# Patient Record
Sex: MALE | Race: WHITE | NOT HISPANIC OR LATINO | URBAN - METROPOLITAN AREA
[De-identification: names, ages, dates, MRNs, and addresses within clinical notes are randomized per-mention and may not be internally consistent; named-entity substitution may affect disease eponyms.]

---

## 2022-12-26 ENCOUNTER — INPATIENT (INPATIENT)
Facility: HOSPITAL | Age: 77
LOS: 34 days | Discharge: HOME | DRG: 4 | End: 2023-01-30
Attending: INTERNAL MEDICINE | Admitting: HOSPITALIST
Payer: MEDICARE

## 2022-12-26 VITALS
SYSTOLIC BLOOD PRESSURE: 176 MMHG | HEART RATE: 70 BPM | TEMPERATURE: 98 F | DIASTOLIC BLOOD PRESSURE: 61 MMHG | WEIGHT: 170.2 LBS | HEIGHT: 62 IN | OXYGEN SATURATION: 95 % | RESPIRATION RATE: 12 BRPM

## 2022-12-26 DIAGNOSIS — I61.3 NONTRAUMATIC INTRACEREBRAL HEMORRHAGE IN BRAIN STEM: ICD-10-CM

## 2022-12-26 LAB — GLUCOSE BLDC GLUCOMTR-MCNC: 164 MG/DL — HIGH (ref 70–99)

## 2022-12-26 PROCEDURE — 99222 1ST HOSP IP/OBS MODERATE 55: CPT

## 2022-12-26 RX ORDER — SODIUM CHLORIDE 9 MG/ML
1000 INJECTION, SOLUTION INTRAVENOUS
Refills: 0 | Status: DISCONTINUED | OUTPATIENT
Start: 2022-12-26 | End: 2023-01-02

## 2022-12-26 RX ORDER — ACETAMINOPHEN 500 MG
650 TABLET ORAL EVERY 6 HOURS
Refills: 0 | Status: DISCONTINUED | OUTPATIENT
Start: 2022-12-26 | End: 2022-12-27

## 2022-12-26 RX ORDER — INSULIN LISPRO 100/ML
VIAL (ML) SUBCUTANEOUS AT BEDTIME
Refills: 0 | Status: DISCONTINUED | OUTPATIENT
Start: 2022-12-26 | End: 2022-12-28

## 2022-12-26 RX ORDER — HEPARIN SODIUM 5000 [USP'U]/ML
5000 INJECTION INTRAVENOUS; SUBCUTANEOUS EVERY 8 HOURS
Refills: 0 | Status: DISCONTINUED | OUTPATIENT
Start: 2022-12-26 | End: 2022-12-27

## 2022-12-26 RX ORDER — ONDANSETRON 8 MG/1
4 TABLET, FILM COATED ORAL EVERY 8 HOURS
Refills: 0 | Status: DISCONTINUED | OUTPATIENT
Start: 2022-12-26 | End: 2023-01-30

## 2022-12-26 RX ORDER — DEXTROSE 50 % IN WATER 50 %
15 SYRINGE (ML) INTRAVENOUS ONCE
Refills: 0 | Status: DISCONTINUED | OUTPATIENT
Start: 2022-12-26 | End: 2023-01-02

## 2022-12-26 RX ORDER — INSULIN LISPRO 100/ML
VIAL (ML) SUBCUTANEOUS
Refills: 0 | Status: DISCONTINUED | OUTPATIENT
Start: 2022-12-26 | End: 2022-12-28

## 2022-12-26 RX ORDER — DEXTROSE 50 % IN WATER 50 %
25 SYRINGE (ML) INTRAVENOUS ONCE
Refills: 0 | Status: DISCONTINUED | OUTPATIENT
Start: 2022-12-26 | End: 2023-01-02

## 2022-12-26 RX ORDER — GLUCAGON INJECTION, SOLUTION 0.5 MG/.1ML
1 INJECTION, SOLUTION SUBCUTANEOUS ONCE
Refills: 0 | Status: DISCONTINUED | OUTPATIENT
Start: 2022-12-26 | End: 2023-01-02

## 2022-12-26 RX ORDER — DEXTROSE 50 % IN WATER 50 %
12.5 SYRINGE (ML) INTRAVENOUS ONCE
Refills: 0 | Status: DISCONTINUED | OUTPATIENT
Start: 2022-12-26 | End: 2023-01-02

## 2022-12-26 RX ORDER — LANOLIN ALCOHOL/MO/W.PET/CERES
3 CREAM (GRAM) TOPICAL AT BEDTIME
Refills: 0 | Status: DISCONTINUED | OUTPATIENT
Start: 2022-12-26 | End: 2022-12-27

## 2022-12-26 NOTE — H&P ADULT - NSHPPHYSICALEXAM_GEN_ALL_CORE
Vital Signs Last 24 Hrs  T(C): 36.7 (26 Dec 2022 21:23), Max: 36.7 (26 Dec 2022 21:23)  T(F): 98 (26 Dec 2022 21:23), Max: 98 (26 Dec 2022 21:23)  HR: 70 (26 Dec 2022 21:23) (70 - 70)  BP: 176/61 (26 Dec 2022 21:23) (176/61 - 176/61)  BP(mean): --  RR: 12 (26 Dec 2022 21:23) (12 - 12)  SpO2: 95% (26 Dec 2022 21:23) (95% - 95%)    Parameters below as of 26 Dec 2022 21:23  Patient On (Oxygen Delivery Method): room air

## 2022-12-26 NOTE — H&P ADULT - HISTORY OF PRESENT ILLNESS
78 y/o male with PMH of DM-2, HTN, CAD was transferred from [place] for stroke. Patient reported right sided weakness and slurred speech along with difficulty swallowing that started yesterday. Was seen at [place] today, code stroke initiated out of window for tPA. CT head: no acute finding. MRI: acute infarct with left debby. MRA head/neck: Left vertebral artery segmental stenoses and T1 hyperintensity suggesting foci of dissection and/or thrombus. As per resident, neurology from [facility] was consulted and accepted patient for further evaluation. Patient said he is upset about his condition, noted discomfort in his head otherwise no chest pain, shortness of breath, palpitation, fever, chills, nausea, vomiting, abdominal pain, change in bowel/urinary habit.

## 2022-12-26 NOTE — H&P ADULT - ASSESSMENT
76 y/o male with PMH of DM-2, HTN, CAD was transferred from [place] for stroke. Patient reported right sided weakness and slurred speech along with difficulty swallowing that started yesterday. Was seen at [place] today, code stroke initiated out of window for tPA. CT head: no acute finding. MRI: acute infarct with left debby. MRA head/neck: Left vertebral artery segmental stenoses and T1 hyperintensity suggesting foci of dissection and/or thrombus. As per resident, neurology from [facility] was consulted and accepted patient for further evaluation.     Acute left debby stroke   Admit to stroke unit   CT and MRI as noted above   Echo ordered   HbA1C and Lipid panel with AM lab   Neuro checks q2 hours   Neurology consulted   PT/OT/Swallow eval in AM   Aspiration precaution     HTN/CAD   On Aspirin 81mg   Plavix 75mg   On hold for now, failed bedside dysphagia screening   Monitor BP     DM-2  HbA1C with AM lab  Insulin sliding scale     CKD-3  Cr: 1.32 (baseline 1.2) (Terrance GONZALES)   Monitor renal function     Supportive   DVT prophylaxis: Heparin sc   Diet: NPO     Plan of care discussed with patient and nurse

## 2022-12-26 NOTE — PATIENT PROFILE ADULT - FALL HARM RISK - RISK INTERVENTIONS
Assistance OOB with selected safe patient handling equipment/Assistance with ambulation/Communicate Fall Risk and Risk Factors to all staff, patient, and family/Discuss with provider need for PT consult/Monitor gait and stability/Reinforce activity limits and safety measures with patient and family/Visual Cue: Yellow wristband/Bed in lowest position, wheels locked, appropriate side rails in place/Call bell, personal items and telephone in reach/Instruct patient to call for assistance before getting out of bed or chair/Non-slip footwear when patient is out of bed/[place] to call system/Physically safe environment - no spills, clutter or unnecessary equipment/Purposeful Proactive Rounding/Room/bathroom lighting operational, light cord in reach

## 2022-12-27 LAB
A1C WITH ESTIMATED AVERAGE GLUCOSE RESULT: 8 % — HIGH (ref 4–5.6)
ALBUMIN SERPL ELPH-MCNC: 3.9 G/DL — SIGNIFICANT CHANGE UP (ref 3.3–5.2)
ALBUMIN SERPL ELPH-MCNC: 4.2 G/DL — SIGNIFICANT CHANGE UP (ref 3.3–5.2)
ALP SERPL-CCNC: 43 U/L — SIGNIFICANT CHANGE UP (ref 40–120)
ALP SERPL-CCNC: 45 U/L — SIGNIFICANT CHANGE UP (ref 40–120)
ALT FLD-CCNC: 13 U/L — SIGNIFICANT CHANGE UP
ALT FLD-CCNC: 14 U/L — SIGNIFICANT CHANGE UP
ANION GAP SERPL CALC-SCNC: 16 MMOL/L — SIGNIFICANT CHANGE UP (ref 5–17)
ANION GAP SERPL CALC-SCNC: 17 MMOL/L — SIGNIFICANT CHANGE UP (ref 5–17)
APTT BLD: 24.8 SEC — LOW (ref 27.5–35.5)
AST SERPL-CCNC: 13 U/L — SIGNIFICANT CHANGE UP
AST SERPL-CCNC: 20 U/L — SIGNIFICANT CHANGE UP
BASOPHILS # BLD AUTO: 0.02 K/UL — SIGNIFICANT CHANGE UP (ref 0–0.2)
BASOPHILS NFR BLD AUTO: 0.2 % — SIGNIFICANT CHANGE UP (ref 0–2)
BILIRUB SERPL-MCNC: 0.5 MG/DL — SIGNIFICANT CHANGE UP (ref 0.4–2)
BILIRUB SERPL-MCNC: 0.5 MG/DL — SIGNIFICANT CHANGE UP (ref 0.4–2)
BUN SERPL-MCNC: 35.6 MG/DL — HIGH (ref 8–20)
BUN SERPL-MCNC: 38.7 MG/DL — HIGH (ref 8–20)
CALCIUM SERPL-MCNC: 10 MG/DL — SIGNIFICANT CHANGE UP (ref 8.4–10.5)
CALCIUM SERPL-MCNC: 9.6 MG/DL — SIGNIFICANT CHANGE UP (ref 8.4–10.5)
CHLORIDE SERPL-SCNC: 101 MMOL/L — SIGNIFICANT CHANGE UP (ref 96–108)
CHLORIDE SERPL-SCNC: 101 MMOL/L — SIGNIFICANT CHANGE UP (ref 96–108)
CHOLEST SERPL-MCNC: 217 MG/DL — HIGH
CK SERPL-CCNC: 54 U/L — SIGNIFICANT CHANGE UP (ref 30–200)
CO2 SERPL-SCNC: 21 MMOL/L — LOW (ref 22–29)
CO2 SERPL-SCNC: 22 MMOL/L — SIGNIFICANT CHANGE UP (ref 22–29)
CREAT SERPL-MCNC: 1.17 MG/DL — SIGNIFICANT CHANGE UP (ref 0.5–1.3)
CREAT SERPL-MCNC: 1.23 MG/DL — SIGNIFICANT CHANGE UP (ref 0.5–1.3)
EGFR: 60 ML/MIN/1.73M2 — SIGNIFICANT CHANGE UP
EGFR: 64 ML/MIN/1.73M2 — SIGNIFICANT CHANGE UP
EOSINOPHIL # BLD AUTO: 0.06 K/UL — SIGNIFICANT CHANGE UP (ref 0–0.5)
EOSINOPHIL NFR BLD AUTO: 0.6 % — SIGNIFICANT CHANGE UP (ref 0–6)
ESTIMATED AVERAGE GLUCOSE: 183 MG/DL — HIGH (ref 68–114)
GLUCOSE BLDC GLUCOMTR-MCNC: 124 MG/DL — HIGH (ref 70–99)
GLUCOSE BLDC GLUCOMTR-MCNC: 140 MG/DL — HIGH (ref 70–99)
GLUCOSE BLDC GLUCOMTR-MCNC: 153 MG/DL — HIGH (ref 70–99)
GLUCOSE BLDC GLUCOMTR-MCNC: 160 MG/DL — HIGH (ref 70–99)
GLUCOSE BLDC GLUCOMTR-MCNC: 166 MG/DL — HIGH (ref 70–99)
GLUCOSE BLDC GLUCOMTR-MCNC: 221 MG/DL — HIGH (ref 70–99)
GLUCOSE SERPL-MCNC: 161 MG/DL — HIGH (ref 70–99)
GLUCOSE SERPL-MCNC: 220 MG/DL — HIGH (ref 70–99)
HCT VFR BLD CALC: 41.7 % — SIGNIFICANT CHANGE UP (ref 39–50)
HCT VFR BLD CALC: 43.7 % — SIGNIFICANT CHANGE UP (ref 39–50)
HCV AB S/CO SERPL IA: 0.16 S/CO — SIGNIFICANT CHANGE UP (ref 0–0.99)
HCV AB SERPL-IMP: SIGNIFICANT CHANGE UP
HDLC SERPL-MCNC: 42 MG/DL — SIGNIFICANT CHANGE UP
HGB BLD-MCNC: 14 G/DL — SIGNIFICANT CHANGE UP (ref 13–17)
HGB BLD-MCNC: 14.4 G/DL — SIGNIFICANT CHANGE UP (ref 13–17)
IMM GRANULOCYTES NFR BLD AUTO: 0.4 % — SIGNIFICANT CHANGE UP (ref 0–0.9)
INR BLD: 1.05 RATIO — SIGNIFICANT CHANGE UP (ref 0.88–1.16)
LIPID PNL WITH DIRECT LDL SERPL: 143 MG/DL — HIGH
LYMPHOCYTES # BLD AUTO: 1.98 K/UL — SIGNIFICANT CHANGE UP (ref 1–3.3)
LYMPHOCYTES # BLD AUTO: 18.3 % — SIGNIFICANT CHANGE UP (ref 13–44)
MCHC RBC-ENTMCNC: 29.1 PG — SIGNIFICANT CHANGE UP (ref 27–34)
MCHC RBC-ENTMCNC: 29.6 PG — SIGNIFICANT CHANGE UP (ref 27–34)
MCHC RBC-ENTMCNC: 33 GM/DL — SIGNIFICANT CHANGE UP (ref 32–36)
MCHC RBC-ENTMCNC: 33.6 GM/DL — SIGNIFICANT CHANGE UP (ref 32–36)
MCV RBC AUTO: 88.2 FL — SIGNIFICANT CHANGE UP (ref 80–100)
MCV RBC AUTO: 88.5 FL — SIGNIFICANT CHANGE UP (ref 80–100)
MONOCYTES # BLD AUTO: 1.15 K/UL — HIGH (ref 0–0.9)
MONOCYTES NFR BLD AUTO: 10.6 % — SIGNIFICANT CHANGE UP (ref 2–14)
NEUTROPHILS # BLD AUTO: 7.55 K/UL — HIGH (ref 1.8–7.4)
NEUTROPHILS NFR BLD AUTO: 69.9 % — SIGNIFICANT CHANGE UP (ref 43–77)
NON HDL CHOLESTEROL: 175 MG/DL — HIGH
PLATELET # BLD AUTO: 261 K/UL — SIGNIFICANT CHANGE UP (ref 150–400)
PLATELET # BLD AUTO: 277 K/UL — SIGNIFICANT CHANGE UP (ref 150–400)
POTASSIUM SERPL-MCNC: 4.4 MMOL/L — SIGNIFICANT CHANGE UP (ref 3.5–5.3)
POTASSIUM SERPL-MCNC: 4.8 MMOL/L — SIGNIFICANT CHANGE UP (ref 3.5–5.3)
POTASSIUM SERPL-SCNC: 4.4 MMOL/L — SIGNIFICANT CHANGE UP (ref 3.5–5.3)
POTASSIUM SERPL-SCNC: 4.8 MMOL/L — SIGNIFICANT CHANGE UP (ref 3.5–5.3)
PROT SERPL-MCNC: 7.1 G/DL — SIGNIFICANT CHANGE UP (ref 6.6–8.7)
PROT SERPL-MCNC: 7.6 G/DL — SIGNIFICANT CHANGE UP (ref 6.6–8.7)
PROTHROM AB SERPL-ACNC: 12.2 SEC — SIGNIFICANT CHANGE UP (ref 10.5–13.4)
RBC # BLD: 4.73 M/UL — SIGNIFICANT CHANGE UP (ref 4.2–5.8)
RBC # BLD: 4.94 M/UL — SIGNIFICANT CHANGE UP (ref 4.2–5.8)
RBC # FLD: 13 % — SIGNIFICANT CHANGE UP (ref 10.3–14.5)
RBC # FLD: 13.2 % — SIGNIFICANT CHANGE UP (ref 10.3–14.5)
SODIUM SERPL-SCNC: 139 MMOL/L — SIGNIFICANT CHANGE UP (ref 135–145)
SODIUM SERPL-SCNC: 139 MMOL/L — SIGNIFICANT CHANGE UP (ref 135–145)
TRIGL SERPL-MCNC: 162 MG/DL — HIGH
TROPONIN T SERPL-MCNC: <0.01 NG/ML — SIGNIFICANT CHANGE UP (ref 0–0.06)
WBC # BLD: 10.8 K/UL — HIGH (ref 3.8–10.5)
WBC # BLD: 9.73 K/UL — SIGNIFICANT CHANGE UP (ref 3.8–10.5)
WBC # FLD AUTO: 10.8 K/UL — HIGH (ref 3.8–10.5)
WBC # FLD AUTO: 9.73 K/UL — SIGNIFICANT CHANGE UP (ref 3.8–10.5)

## 2022-12-27 PROCEDURE — 93010 ELECTROCARDIOGRAM REPORT: CPT

## 2022-12-27 PROCEDURE — 71045 X-RAY EXAM CHEST 1 VIEW: CPT | Mod: 26

## 2022-12-27 PROCEDURE — 99222 1ST HOSP IP/OBS MODERATE 55: CPT

## 2022-12-27 PROCEDURE — 99291 CRITICAL CARE FIRST HOUR: CPT

## 2022-12-27 PROCEDURE — 99233 SBSQ HOSP IP/OBS HIGH 50: CPT

## 2022-12-27 PROCEDURE — 70450 CT HEAD/BRAIN W/O DYE: CPT | Mod: 26

## 2022-12-27 RX ORDER — HEPARIN SODIUM 5000 [USP'U]/ML
950 INJECTION INTRAVENOUS; SUBCUTANEOUS
Qty: 25000 | Refills: 0 | Status: DISCONTINUED | OUTPATIENT
Start: 2022-12-27 | End: 2023-01-02

## 2022-12-27 RX ORDER — ASPIRIN/CALCIUM CARB/MAGNESIUM 324 MG
300 TABLET ORAL ONCE
Refills: 0 | Status: COMPLETED | OUTPATIENT
Start: 2022-12-27 | End: 2022-12-27

## 2022-12-27 RX ORDER — ASPIRIN/CALCIUM CARB/MAGNESIUM 324 MG
325 TABLET ORAL DAILY
Refills: 0 | Status: DISCONTINUED | OUTPATIENT
Start: 2022-12-27 | End: 2022-12-27

## 2022-12-27 RX ORDER — PREDNISOLONE SODIUM PHOSPHATE 1 %
1 DROPS OPHTHALMIC (EYE) DAILY
Refills: 0 | Status: DISCONTINUED | OUTPATIENT
Start: 2022-12-28 | End: 2023-01-30

## 2022-12-27 RX ORDER — CLOPIDOGREL BISULFATE 75 MG/1
75 TABLET, FILM COATED ORAL DAILY
Refills: 0 | Status: DISCONTINUED | OUTPATIENT
Start: 2022-12-27 | End: 2022-12-28

## 2022-12-27 RX ORDER — HEPARIN SODIUM 5000 [USP'U]/ML
800 INJECTION INTRAVENOUS; SUBCUTANEOUS
Qty: 25000 | Refills: 0 | Status: DISCONTINUED | OUTPATIENT
Start: 2022-12-27 | End: 2022-12-27

## 2022-12-27 RX ORDER — SODIUM CHLORIDE 9 MG/ML
500 INJECTION INTRAMUSCULAR; INTRAVENOUS; SUBCUTANEOUS ONCE
Refills: 0 | Status: COMPLETED | OUTPATIENT
Start: 2022-12-27 | End: 2022-12-27

## 2022-12-27 RX ORDER — LOTEPREDNOL ETABONATE 2 MG/ML
1 SUSPENSION/ DROPS OPHTHALMIC DAILY
Refills: 0 | Status: DISCONTINUED | OUTPATIENT
Start: 2022-12-28 | End: 2023-01-30

## 2022-12-27 RX ORDER — SODIUM CHLORIDE 9 MG/ML
1000 INJECTION INTRAMUSCULAR; INTRAVENOUS; SUBCUTANEOUS
Refills: 0 | Status: DISCONTINUED | OUTPATIENT
Start: 2022-12-27 | End: 2022-12-28

## 2022-12-27 RX ORDER — LANOLIN ALCOHOL/MO/W.PET/CERES
3 CREAM (GRAM) TOPICAL AT BEDTIME
Refills: 0 | Status: DISCONTINUED | OUTPATIENT
Start: 2022-12-27 | End: 2023-01-03

## 2022-12-27 RX ORDER — CLOPIDOGREL BISULFATE 75 MG/1
75 TABLET, FILM COATED ORAL DAILY
Refills: 0 | Status: DISCONTINUED | OUTPATIENT
Start: 2022-12-27 | End: 2022-12-27

## 2022-12-27 RX ORDER — ACETAMINOPHEN 500 MG
650 TABLET ORAL EVERY 6 HOURS
Refills: 0 | Status: DISCONTINUED | OUTPATIENT
Start: 2022-12-27 | End: 2023-01-03

## 2022-12-27 RX ORDER — ATORVASTATIN CALCIUM 80 MG/1
80 TABLET, FILM COATED ORAL AT BEDTIME
Refills: 0 | Status: DISCONTINUED | OUTPATIENT
Start: 2022-12-27 | End: 2022-12-27

## 2022-12-27 RX ORDER — ATORVASTATIN CALCIUM 80 MG/1
80 TABLET, FILM COATED ORAL AT BEDTIME
Refills: 0 | Status: DISCONTINUED | OUTPATIENT
Start: 2022-12-27 | End: 2023-01-03

## 2022-12-27 RX ORDER — ASPIRIN/CALCIUM CARB/MAGNESIUM 324 MG
325 TABLET ORAL DAILY
Refills: 0 | Status: DISCONTINUED | OUTPATIENT
Start: 2022-12-27 | End: 2022-12-30

## 2022-12-27 RX ORDER — ASPIRIN/CALCIUM CARB/MAGNESIUM 324 MG
1 TABLET ORAL
Qty: 0 | Refills: 0 | DISCHARGE

## 2022-12-27 RX ADMIN — HEPARIN SODIUM 9.5 UNIT(S)/HR: 5000 INJECTION INTRAVENOUS; SUBCUTANEOUS at 18:30

## 2022-12-27 RX ADMIN — Medication 300 MILLIGRAM(S): at 10:48

## 2022-12-27 RX ADMIN — SODIUM CHLORIDE 500 MILLILITER(S): 9 INJECTION INTRAMUSCULAR; INTRAVENOUS; SUBCUTANEOUS at 15:14

## 2022-12-27 RX ADMIN — CLOPIDOGREL BISULFATE 75 MILLIGRAM(S): 75 TABLET, FILM COATED ORAL at 22:53

## 2022-12-27 RX ADMIN — HEPARIN SODIUM 5000 UNIT(S): 5000 INJECTION INTRAVENOUS; SUBCUTANEOUS at 13:56

## 2022-12-27 RX ADMIN — Medication 1: at 15:49

## 2022-12-27 RX ADMIN — HEPARIN SODIUM 5000 UNIT(S): 5000 INJECTION INTRAVENOUS; SUBCUTANEOUS at 05:36

## 2022-12-27 RX ADMIN — Medication 325 MILLIGRAM(S): at 22:53

## 2022-12-27 RX ADMIN — HEPARIN SODIUM 9.5 UNIT(S)/HR: 5000 INJECTION INTRAVENOUS; SUBCUTANEOUS at 19:22

## 2022-12-27 RX ADMIN — Medication 1: at 12:00

## 2022-12-27 RX ADMIN — SODIUM CHLORIDE 100 MILLILITER(S): 9 INJECTION INTRAMUSCULAR; INTRAVENOUS; SUBCUTANEOUS at 22:52

## 2022-12-27 RX ADMIN — CLOPIDOGREL BISULFATE 75 MILLIGRAM(S): 75 TABLET, FILM COATED ORAL at 13:56

## 2022-12-27 RX ADMIN — ATORVASTATIN CALCIUM 80 MILLIGRAM(S): 80 TABLET, FILM COATED ORAL at 22:53

## 2022-12-27 NOTE — CONSULT NOTE ADULT - SUBJECTIVE AND OBJECTIVE BOX
Patient is a 77 year old Male who presents with a chief complaint of Acute stroke (27 Dec 2022 13:26)    HISTORY OF PRESENT ILLNESS  77 year old Male with PMHX DMII, HTN, CAD was trf from [place] c/o R sided weakness, slurred speech, difficulty swallowing that started 12/25. Code stroke at [place] 12/26, CTH negative, on MRI found to have L debby CVA; out of tPA window. MRA revealing L vertebral artery segmental stenoses and T1 hyperintensity suggesting foci of dissection and/or thrombus. Trf to [facility] admitted to Hospitalist service. 12/27 afternoon, noted to have worsening R sided weakness, worsening dysarthria- code stroke called.   NSICU consulted from Neurology team for higher level of care. Patient seen and examined on floor, denies HA. C/o of swallowing difficulties. Wife bedside.     PAST MEDICAL & SURGICAL HISTORY:  HTN (hypertension)  CAD (coronary artery disease)  DM (diabetes mellitus)    FAMILY HISTORY:  No pertinent family history in first degree relatives    SOCIAL HISTORY:  Per chart, denies cigarettes, ETOH, illicit drug use    Allergies  iodine (Anaphylaxis (Mod to Severe))    REVIEW OF SYSTEMS  Negative except as noted in HPI    HOME MEDICATIONS:  Aspirin Low Dose 81 mg oral tablet, chewable: 1 tab(s) orally once a day (27 Dec 2022 13:15)  atenolol 50 mg oral tablet: 1 tab(s) orally once a day (27 Dec 2022 13:15)  atorvastatin 40 mg oral tablet: 1 tab(s) orally once a day (27 Dec 2022 13:15)  Jardiance 25 mg oral tablet: 1 tab(s) orally once a day (in the morning) (27 Dec 2022 13:15)  lisinopril 10 mg oral tablet: 1 tab(s) orally once a day (27 Dec 2022 13:15)  metFORMIN 1000 mg oral tablet: 1 tab(s) orally 2 times a day (27 Dec 2022 13:15)  Plavix 75 mg oral tablet: 1 tab(s) orally once a day (27 Dec 2022 13:15)      MEDICATIONS:  Antibiotics:  Neuro:  acetaminophen     Tablet .. 650 milliGRAM(s) Oral every 6 hours PRN  melatonin 3 milliGRAM(s) Oral at bedtime PRN  ondansetron Injectable 4 milliGRAM(s) IV Push every 8 hours PRN  Anticoagulation:  aspirin enteric coated 325 milliGRAM(s) Oral daily  clopidogrel Tablet 75 milliGRAM(s) Oral daily  heparin  Infusion. 800 Unit(s)/Hr IV Continuous <Continuous>  OTHER:  aluminum hydroxide/magnesium hydroxide/simethicone Suspension 30 milliLiter(s) Oral every 4 hours PRN  atorvastatin 80 milliGRAM(s) Oral at bedtime  dextrose 50% Injectable 25 Gram(s) IV Push once  dextrose 50% Injectable 12.5 Gram(s) IV Push once  dextrose 50% Injectable 25 Gram(s) IV Push once  dextrose Oral Gel 15 Gram(s) Oral once PRN  glucagon  Injectable 1 milliGRAM(s) IntraMuscular once  insulin lispro (ADMELOG) corrective regimen sliding scale   SubCutaneous three times a day before meals  insulin lispro (ADMELOG) corrective regimen sliding scale   SubCutaneous at bedtime  IVF:  dextrose 5%. 1000 milliLiter(s) IV Continuous <Continuous>  dextrose 5%. 1000 milliLiter(s) IV Continuous <Continuous>  sodium chloride 0.9%. 1000 milliLiter(s) IV Continuous <Continuous>    Vital Signs Last 24 Hrs  T(C): 36.7 (27 Dec 2022 15:21), Max: 37.3 (27 Dec 2022 12:43)  T(F): 98 (27 Dec 2022 15:21), Max: 99.1 (27 Dec 2022 12:43)  HR: 75 (27 Dec 2022 15:21) (69 - 82)  BP: 150/69 (27 Dec 2022 15:21) (123/49 - 179/59)  BP(mean): 82 (27 Dec 2022 08:00) (82 - 82)  RR: 15 (27 Dec 2022 15:21) (12 - 16)  SpO2: 95% (27 Dec 2022 15:21) (94% - 97%)  Parameters below as of 27 Dec 2022 15:21  Patient On (Oxygen Delivery Method): room air      PHYSICAL EXAM:  GENERAL: NAD, well-groomed, well-developed  HEAD: Atraumatic, normocephalic  MENTAL STATUS: Awake; Opens eyes spontaneously; conversant with slurred speech, following simple commands w encouragement   CRANIAL NERVES: Visual acuity normal for age, R surgical oval pupil. L pupil 3mm reactive. EOMI without nystagmus. Facial sensation intact V1-3 distribution b/l. Right facial. Tongue midline. Hearing grossly intact. Dysarthric, slurred speech. Head turning intact. Shoulder shrug intact L>>>R  MOTOR: strength RUE 0/5 to noxious. LUE 5/5, no drift. RLE WD to noxious. LLE 3/5  SENSATION: grossly intact to light touch all extremities  CHEST/LUNG: Pursed lip breathing  HEART: +S1/+S2  ABDOMEN: Soft, nontender, nondistended; bowel sounds present all four quadrants  SKIN: Warm, dry    LABS:             14.4   10.80 )-----------( 261      ( 27 Dec 2022 15:05 )             43.7     12-27    139  |  101  |  35.6<H>  ----------------------------<  161<H>  4.4   |  21.0<L>  |  1.17    Ca    9.6      27 Dec 2022 06:20    TPro  7.1  /  Alb  3.9  /  TBili  0.5  /  DBili  x   /  AST  13  /  ALT  13  /  AlkPhos  43  12-27      CULTURES:        RADIOLOGY & ADDITIONAL STUDIES:  CT Head No Cont (12.27.22 @ 14:28)   IMPRESSION:  No evidence of an acute intracranial hemorrhage, midline shift, or   hydrocephalus.  Patient's known acute left pontine infarct better visualized on recent   MRI.  Other chronic small infarcts and white matter ischemic changes.    12/26 MRA Angio w/ contrast @ [facility] 2PM:   IMPRESSION:  1. RIGHT CAROTID NECK CIRCULATION: Intact.  2. LEFT CAROTID NECK CIRCULATION: Intact.  3. VERTEBRAL NECK CIRCULATION: Right vertebral artery is intact. Left  vertebral artery demonstrates segmental stenoses and T1 hyperintensity  suggesting foci of dissection and/or thrombus. Flow appears improved  compared to time-of-flight angiography earlier same date  4. ANTERIOR INTRACRANIAL CIRCULATION: Intracranial atherosclerosis  cavernous carotid segments internal carotid arteries, at least mild, and  right MCA M1 segment, moderate.  5. POSTERIOR INTRACRANIAL CIRCULATION: Right vertebral artery focal  stenosis just proximal to the basilar formation, moderate. Attenuated  segmentally nonvisualized left vertebral artery. Flow appears improved  compared to earlier this same date. Intact basilar artery and posterior  cerebral arteries.    12/26 MRA Brain @ [facility]  1. Nonopacification of left proximal to mid intracranial V4 segment, as  well as the left extracranial V3 segment. Distal reconstitution at left  vertebrobasilar junction. 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 However, occlusive  intraluminal thrombus or acute dissection cannot excluded (and are  suboptimally evaluated for due to lack of axial T1 fat saturated sequence).  Correlate clinically and consider follow-up imaging with CTA or contrast  enhanced MRA as indicated.  2. Mild multifocal stenosis within the basilar artery and posterior  cerebral arteries, likely due to atherosclerosis.  3. Tiny 2 mm outpouching at right M1 segment, may reflect possible tiny  saccular aneurysm or mild luminal irregularity to atherosclerotic stenosis.  Consider CTA evaluation for further characterization as clinically indicated

## 2022-12-27 NOTE — SWALLOW BEDSIDE ASSESSMENT ADULT - MODE OF PRESENTATION
spoon/self fed/fed by clinician spoon/fed by clinician cup/spoon/fed by clinician cup/self fed/fed by clinician

## 2022-12-27 NOTE — PHYSICAL THERAPY INITIAL EVALUATION ADULT - PHYSICAL ASSIST/NONPHYSICAL ASSIST: STAND/SIT, REHAB EVAL
required increased assistance  to help safely lower to a sitting position + verbal cues for proper hand placement./2 person assist

## 2022-12-27 NOTE — SPEECH LANGUAGE PATHOLOGY EVALUATION - SLP DIAGNOSIS
Pt's receptive/expressive language skills informally assessed to be WFL. Pt presents w/ moderate dysarthria w/ impact on speech intelligibility, pt benefits from cues to reduce rate & over articulate. Higher level cognitive linguistic skills were not assessed 2' pt's emotional lability and increased frustration w/ speech intelligibility.

## 2022-12-27 NOTE — SWALLOW BEDSIDE ASSESSMENT ADULT - SLP GENERAL OBSERVATIONS
Pt recd a&oX3, cooperative, emotional lability noted t/o evaluation, tolerating RA, NAD, 0/10 pain scale pre/post, wife @ the bedside

## 2022-12-27 NOTE — CONSULT NOTE ADULT - ASSESSMENT
IMPRESSION:  -Left pontine infarct  - Left VA dissection    Mechanism- Artery to artery embolism vs Small vessel disease.  Risk Factors. HTN, DM HLD    ASSESSMENT/ PLAN:     - Admit to inpatient hospitalist service.  - Neuro checks and vital signs Q 2 hours.  - SBP goal permissive upto 200/120 for 48 hours.  -  mg PO or 300 WI QD.  - Add Plavix 75 QD after enteral access is established.  - Lipitor for LDL goal of < 70 .  - Telemetry monitoring.  - CT -images and reports were reviewed.   - MRI and MRA Brain -images and reports were reviewed.   - TTE with bubble study.  - PT OT SLP evaluation.  - SCD/ SQ Heparin for DVT prophylaxis.

## 2022-12-27 NOTE — PHYSICAL THERAPY INITIAL EVALUATION ADULT - PHYSICAL ASSIST/NONPHYSICAL ASSIST: SUPINE/SIT, REHAB EVAL
required increased assistance to get bilateral LE's out of bed/assume upright sitting at EOB position + verbal cues for proper hand placement. pt required MOD A x 1 to help maintain upright sitting posture/2 person assist

## 2022-12-27 NOTE — SPEECH LANGUAGE PATHOLOGY EVALUATION - SLP PERTINENT HISTORY OF CURRENT PROBLEM
As per H&P: "76 y/o male with PMH of DM-2, HTN, CAD was transferred from [place] for stroke. Patient reported right sided weakness and slurred speech along with difficulty swallowing that started yesterday. Was seen at [place] today, code stroke initiated out of window for tPA. CT head: no acute finding. MRI: acute infarct with left debby. MRA head/neck: Left vertebral artery segmental stenoses and T1 hyperintensity suggesting foci of dissection and/or thrombus. As per resident, neurology from [facility] was consulted and accepted patient for further evaluation. Patient said he is upset about his condition, noted discomfort in his head otherwise no chest pain, shortness of breath, palpitation, fever, chills, nausea, vomiting, abdominal pain, change in bowel/urinary habit."

## 2022-12-27 NOTE — PHYSICAL THERAPY INITIAL EVALUATION ADULT - PHYSICAL ASSIST/NONPHYSICAL ASSIST: SIT/STAND, REHAB EVAL
required increased assistance  to help rise to a full standing position +verbal cues for proper hand placement. pt tolerated multiple attempts. pt demonstrated Left LE buckling/2 person assist

## 2022-12-27 NOTE — OCCUPATIONAL THERAPY INITIAL EVALUATION ADULT - PHYSICAL ASSIST/NONPHYSICAL ASSIST: BED TO CHAIR, REHAB EVAL
with use of fan steady. Additional assist to place and maintain RUE in position/verbal cues/2 person assist

## 2022-12-27 NOTE — SWALLOW BEDSIDE ASSESSMENT ADULT - SWALLOW EVAL: DIAGNOSIS
Pt presents w/ moderate oral dysphagia w/ soft/bite sized trials marked by increased mastication time and moderate diffuse oral residue. Suspect pharyngeal dysphagia w/ thin and mildly thick liquids marked by immediate strong cough following intake.

## 2022-12-27 NOTE — PROGRESS NOTE ADULT - ASSESSMENT
76 y/o male with PMH of DM-2, HTN, CAD was transferred from [place] for stroke. Patient reported right sided weakness and slurred speech along with difficulty swallowing that started yesterday. Was seen at [place] today, code stroke initiated out of window for tPA. CT head: no acute finding. MRI: acute infarct with left debby. MRA head/neck: Left vertebral artery segmental stenoses and T1 hyperintensity suggesting foci of dissection and/or thrombus. As per resident, neurology from [facility] was consulted and accepted patient for further evaluation.     Acute left debby stroke   CT and MRI as noted above   Echo ordered   HbA1C at 8    Neuro checks q2 hours   Neurology consulted  Permissive HTN < x 48h   PT/OT/Swallow eval  Aspiration precaution   Was on DAPT prior to presentation   Increase ASA to 325 mg and Cont Plavix 75mg    HTN/CAD   On Aspirin 81mg / Plavix 75mg at home   Cont DAPT  Hold AntiHTN meds for first 48 hours    DM-2  A1C 8  Insulin sliding scale     CKD-3  Cr: 1.32 (baseline 1.2) (Terrance GONZALES)   Monitor renal function     Supportive   DVT prophylaxis: Heparin sc       Dispo: Remains acute  Patient and wife updated

## 2022-12-27 NOTE — PROGRESS NOTE ADULT - SUBJECTIVE AND OBJECTIVE BOX
[facility] Division of Hospital Medicine    Chief Complaint:    Acute CVA    SUBJECTIVE / OVERNIGHT EVENTS:  Admitted overnight  SLP evaluated and recommended oral diet   Patient with minimal change in symptoms   Patient denies chest pain, SOB, abd pain, N/V, fever, chills, dysuria or any other complaints. All remainder ROS negative.     MEDICATIONS  (STANDING):  aspirin enteric coated 325 milliGRAM(s) Oral daily  atorvastatin 80 milliGRAM(s) Oral at bedtime  clopidogrel Tablet 75 milliGRAM(s) Oral daily  dextrose 5%. 1000 milliLiter(s) (100 mL/Hr) IV Continuous <Continuous>  dextrose 5%. 1000 milliLiter(s) (50 mL/Hr) IV Continuous <Continuous>  dextrose 50% Injectable 25 Gram(s) IV Push once  dextrose 50% Injectable 12.5 Gram(s) IV Push once  dextrose 50% Injectable 25 Gram(s) IV Push once  glucagon  Injectable 1 milliGRAM(s) IntraMuscular once  heparin   Injectable 5000 Unit(s) SubCutaneous every 8 hours  insulin lispro (ADMELOG) corrective regimen sliding scale   SubCutaneous three times a day before meals  insulin lispro (ADMELOG) corrective regimen sliding scale   SubCutaneous at bedtime    MEDICATIONS  (PRN):  acetaminophen     Tablet .. 650 milliGRAM(s) Oral every 6 hours PRN Temp greater or equal to 38C (100.4F), Mild Pain (1 - 3)  aluminum hydroxide/magnesium hydroxide/simethicone Suspension 30 milliLiter(s) Oral every 4 hours PRN Dyspepsia  dextrose Oral Gel 15 Gram(s) Oral once PRN Blood Glucose LESS THAN 70 milliGRAM(s)/deciliter  melatonin 3 milliGRAM(s) Oral at bedtime PRN Insomnia  ondansetron Injectable 4 milliGRAM(s) IV Push every 8 hours PRN Nausea and/or Vomiting        I&O's Summary    26 Dec 2022 07:01  -  27 Dec 2022 07:00  --------------------------------------------------------  IN: 0 mL / OUT: 500 mL / NET: -500 mL        PHYSICAL EXAM:  Vital Signs Last 24 Hrs  T(C): 37.3 (27 Dec 2022 12:43), Max: 37.3 (27 Dec 2022 12:43)  T(F): 99.1 (27 Dec 2022 12:43), Max: 99.1 (27 Dec 2022 12:43)  HR: 69 (27 Dec 2022 12:02) (69 - 82)  BP: 140/92 (27 Dec 2022 12:02) (123/49 - 176/61)  BP(mean): 82 (27 Dec 2022 08:00) (82 - 82)  RR: 16 (27 Dec 2022 12:02) (12 - 16)  SpO2: 96% (27 Dec 2022 12:02) (94% - 97%)    Parameters below as of 27 Dec 2022 12:02  Patient On (Oxygen Delivery Method): room air            CONSTITUTIONAL: NAD, well-developed  ENMT: Moist oral mucosa, no pharyngeal injection or exudates; normal dentition  RESPIRATORY: Normal respiratory effort; lungs are clear to auscultation bilaterally  CARDIOVASCULAR: Regular rate and rhythm, normal S1 and S2, no murmur/rub/gallop; Peripheral pulses are 2+ bilaterally  ABDOMEN: Nontender to palpation, normoactive bowel sounds, no rebound/guarding;   MUSCLOSKELETAL:  No clubbing or cyanosis of digits; no joint swelling or tenderness to palpation  PSYCH: A+O to person, place, and time; affect appropriate  NEUROLOGY: Right sided strength diminished. Aphasia  SKIN: No rashes; no palpable lesions    LABS:                        14.0   9.73  )-----------( 277      ( 27 Dec 2022 06:20 )             41.7     12-27    139  |  101  |  35.6<H>  ----------------------------<  161<H>  4.4   |  21.0<L>  |  1.17    Ca    9.6      27 Dec 2022 06:20    TPro  7.1  /  Alb  3.9  /  TBili  0.5  /  DBili  x   /  AST  13  /  ALT  13  /  AlkPhos  43  12-27              CAPILLARY BLOOD GLUCOSE      POCT Blood Glucose.: 166 mg/dL (27 Dec 2022 11:57)  POCT Blood Glucose.: 153 mg/dL (27 Dec 2022 08:08)  POCT Blood Glucose.: 140 mg/dL (27 Dec 2022 05:33)  POCT Blood Glucose.: 164 mg/dL (26 Dec 2022 23:21)        RADIOLOGY & ADDITIONAL TESTS:  Results Reviewed:   Imaging Personally Reviewed:  Electrocardiogram Personally Reviewed:

## 2022-12-27 NOTE — CONSULT NOTE ADULT - CRITICAL CARE ATTENDING COMMENT
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  Worsening neuro exam today - RUE plegia, worsening bulbar dysfunction. NIH 13 today. Concern for embolic events.  PMH of  DMII, HTN, CAD.  Allergy to iodine.    Plan:  admit to NSICU  monitor resp status  keep NPO except meds, needs NGT  cont DAPT via NGT, lipitor  start Heparin per GRIFFIN - no bolus, weight-based dosing, aPTT goal 60-80  rest as above

## 2022-12-27 NOTE — SPEECH LANGUAGE PATHOLOGY EVALUATION - COMMENTS
higher level cognitive linguistic skills not formally assessed 2' pt emotional lability & increased frustration w/ dysarthria.

## 2022-12-27 NOTE — SWALLOW BEDSIDE ASSESSMENT ADULT - SLP PERTINENT HISTORY OF CURRENT PROBLEM
As per H&P: "78 y/o male with PMH of DM-2, HTN, CAD was transferred from [place] for stroke. Patient reported right sided weakness and slurred speech along with difficulty swallowing that started yesterday. Was seen at [place] today, code stroke initiated out of window for tPA. CT head: no acute finding. MRI: acute infarct with left debby. MRA head/neck: Left vertebral artery segmental stenoses and T1 hyperintensity suggesting foci of dissection and/or thrombus. As per resident, neurology from [facility] was consulted and accepted patient for further evaluation. Patient said he is upset about his condition, noted discomfort in his head otherwise no chest pain, shortness of breath, palpitation, fever, chills, nausea, vomiting, abdominal pain, change in bowel/urinary habit."

## 2022-12-27 NOTE — CONSULT NOTE ADULT - SUBJECTIVE AND OBJECTIVE BOX
Neurology consult    KYUNG BALLARD 77y Male       HPI:  76 y/o male with PMH of DM-2, HTN, CAD was transferred from [place] for stroke. Patient reported right sided weakness and slurred speech along with difficulty swallowing that started yesterday. Was seen at [place] today, code stroke initiated out of window for tPA. CT head: no acute finding. MRI: acute infarct with left debby. MRA head/neck: Left vertebral artery segmental stenoses and T1 hyperintensity suggesting foci of dissection and/or thrombus. As per resident, neurology from [facility] was consulted and accepted patient for further evaluation. Patient said he is upset about his condition, noted discomfort in his head otherwise no chest pain, shortness of breath, palpitation, fever, chills, nausea, vomiting, abdominal pain, change in bowel/urinary habit.  (26 Dec 2022 22:58)    PMH: HTN (hypertension)    CAD (coronary artery disease)    DM (diabetes mellitus)     PSH:     FAMILY HISTORY:  No pertinent family history in first degree relatives    SOCIAL HISTORY:  No history of tobacco or alcohol use     Allergies    iodine (Anaphylaxis (Mod to Severe))    Intolerances    Height (cm): 157.5 (12-26 @ 21:23)  Weight (kg): 77.2 (12-26 @ 21:23)  BMI (kg/m2): 31.1 (12-26 @ 21:23)    Vital Signs Last 24 Hrs  T(C): 36.6 (27 Dec 2022 07:27), Max: 36.8 (27 Dec 2022 04:52)  T(F): 97.9 (27 Dec 2022 07:27), Max: 98.2 (27 Dec 2022 04:52)  HR: 75 (27 Dec 2022 08:00) (70 - 82)  BP: 147/59 (27 Dec 2022 08:00) (123/49 - 176/61)  BP(mean): 82 (27 Dec 2022 08:00) (82 - 82)  RR: 15 (27 Dec 2022 08:00) (12 - 16)  SpO2: 97% (27 Dec 2022 08:00) (94% - 97%)    Parameters below as of 27 Dec 2022 08:00  Patient On (Oxygen Delivery Method): room air      MEDICATIONS    acetaminophen     Tablet .. 650 milliGRAM(s) Oral every 6 hours PRN  aluminum hydroxide/magnesium hydroxide/simethicone Suspension 30 milliLiter(s) Oral every 4 hours PRN  dextrose 5%. 1000 milliLiter(s) IV Continuous <Continuous>  dextrose 5%. 1000 milliLiter(s) IV Continuous <Continuous>  dextrose 50% Injectable 25 Gram(s) IV Push once  dextrose 50% Injectable 12.5 Gram(s) IV Push once  dextrose 50% Injectable 25 Gram(s) IV Push once  dextrose Oral Gel 15 Gram(s) Oral once PRN  glucagon  Injectable 1 milliGRAM(s) IntraMuscular once  heparin   Injectable 5000 Unit(s) SubCutaneous every 8 hours  insulin lispro (ADMELOG) corrective regimen sliding scale   SubCutaneous three times a day before meals  insulin lispro (ADMELOG) corrective regimen sliding scale   SubCutaneous at bedtime  melatonin 3 milliGRAM(s) Oral at bedtime PRN  ondansetron Injectable 4 milliGRAM(s) IV Push every 8 hours PRN      LABS:  CBC Full  -  ( 27 Dec 2022 06:20 )  WBC Count : 9.73 K/uL  RBC Count : 4.73 M/uL  Hemoglobin : 14.0 g/dL  Hematocrit : 41.7 %  Platelet Count - Automated : 277 K/uL  Mean Cell Volume : 88.2 fl  Mean Cell Hemoglobin : 29.6 pg  Mean Cell Hemoglobin Concentration : 33.6 gm/dL  Auto Neutrophil # : x  Auto Lymphocyte # : x  Auto Monocyte # : x  Auto Eosinophil # : x  Auto Basophil # : x  Auto Neutrophil % : x  Auto Lymphocyte % : x  Auto Monocyte % : x  Auto Eosinophil % : x  Auto Basophil % : x      12-27    139  |  101  |  35.6<H>  ----------------------------<  161<H>  4.4   |  21.0<L>  |  1.17    Ca    9.6      27 Dec 2022 06:20    TPro  7.1  /  Alb  3.9  /  TBili  0.5  /  DBili  x   /  AST  13  /  ALT  13  /  AlkPhos  43  12-27    LIVER FUNCTIONS - ( 27 Dec 2022 06:20 )  Alb: 3.9 g/dL / Pro: 7.1 g/dL / ALK PHOS: 43 U/L / ALT: 13 U/L / AST: 13 U/L / GGT: x           Hemoglobin A1C:   A1C with Estimated Average Glucose Result: 8.0 % (12.27.22 @ 06:20)   Lipid Panel 12-27 @ 06:20  Total Cholesterol, Serum 217  LDL --LDL Cholesterol Calculated: 143 mg/dL (12.27.22 @ 06:20)   Triglycerides 162    On Neurological Examination:    Mental Status - Patient is  awake, alert and oriented X3.   Patient can name, repeat and follow commands correctly  There is marked dysarthria.     Cranial Nerves - PERRL, EOMI,  Visual fields are full to finger counting, There is mild right central facial weakness.    Motor Exam -   Right upper ---4-/5 with drift  Left upper ---5/5 No drift  Right lower ---2/5 proximally. PF is 4-/5  Left lower  ---5/5 No drift     nml bulk/tone    Sensory :  Intact to light touch and pinprick bilaterally    Coord:  FTN intact on the left and not testable on the right due to weakness.    Gait - Not assessed.     RADIOLOGY ( All neurological imaging studies were independently reviewed and interpreted by me)    [facility]  12/26/22    IMPRESSION:  No evidence of acute intracranial hemorrhage, midline shift or CT evidence  of acute territorial infarct.    If the patient's symptoms persist, consider short interval follow-up head CT  or brain MRI if there are no MRI contraindications  Findings discussed by Dr. Acharya with Dr. Hobbs on 12/26/22 at 6:55am.    CTA  CTP    MRI:  IMPRESSION:    1. Acute infarct within left debby (1.5 cm), without hemorrhagic  transformation. No midline shift or mass effect. Consider serial imaging  follow-up as clinically indicated.    2. Nonspecific but extensive white matter findings, suggestive of prior  chronic lacunar infarcts and chronic small vessel ischemia.    3. Please see separate report for concomitant MRA brain and neck.    Findings were discussed with covering ED provider (Dr. Sarah Caicedo) via  telephone on 12/26/2022 at 9:49 AM with verbal read back.    Interpreting Physician: SHAHEED STEPHENSON  Signed Electronically by / Date: SHAHEED Sheridan 26 2022 10:03AM    MRA: Head and Neck  IMPRESSION:    1. RIGHT CAROTID NECK CIRCULATION: Intact.    2. LEFT CAROTID NECK CIRCULATION: Intact.    3. VERTEBRAL NECK CIRCULATION: Right vertebral artery is intact. Left  vertebral artery demonstrates segmental stenoses and T1 hyperintensity  suggesting foci of dissection and/or thrombus. Flow appears improved  compared to time-of-flight angiography earlier same date    4. ANTERIOR INTRACRANIAL CIRCULATION: Intracranial atherosclerosis  cavernous carotid segments internal carotid arteries, at least mild, and  right MCA M1 segment, moderate.    5. POSTERIOR INTRACRANIAL CIRCULATION: Right vertebral artery focal  stenosis just proximal to the basilar formation, moderate. Attenuated  segmentally nonvisualized left vertebral artery. Flow appears improved  compared to earlier this same date. Intact basilar artery and posterior  cerebral arteries.    Interpreting Physician: Radiologist LEYDA ZAMBRANO,   Resident VIRAL RUIZ    TTE

## 2022-12-27 NOTE — CONSULT NOTE ADULT - ASSESSMENT
77M w/ PMHX DMII, HTN, CAD trf from [place] c/o R sided weakness, slurred speech, difficulty swallowing. Code stroke at [place] 12/26, CTH negative, on MRI found to have L debby CVA; out of tPA window. MRA revealing L vertebral artery segmental stenoses and T1 hyperintensity suggesting foci of dissection and/or thrombus.  - Upgraded to NSICU for heparin gtt, airway watch       Plan  - Upgrade from Hospitalist service to NSICU   Neuro:  	Q1 hour Neuro checks, Q1 hour Vitals  	HOB 30 degrees, Neck midline position  	Maintain normothermia, PO acetaminophen for temp>38 C or pain  	Pain management: Avoid oversedation   	Turn and Position Q2    CV:  	SBP Goal 100-180, Cardene gtt and PRN medications as needed  Pulm:  	Supplemental O2 PRN to maintain Spo2>92%  	Chest PT, OOB, Pulmonary Toilet              Airway watch  GI:  	NPO except meds               NGT to be placed   Gu:  	Voiding  	Monitor Electrolytes & Renal Function  Heme:  	Monitor H&H  	[id number]              Plavix 75              Heparin gtt PTT goal 60-80, no bolus  ID:  	Monitor WBC and Temperature  Endo  	Monitor BGL, maintain <180  	GINA   		100-150 no correction  		150-200  2units  		200-250  4units  		250-300	 6units  		300-350  8units  		350-400  10units  		400+	12units   D/w Dr. Mtz

## 2022-12-27 NOTE — PHYSICAL THERAPY INITIAL EVALUATION ADULT - RANGE OF MOTION EXAMINATION, REHAB EVAL
defer to OT eval for bilateral UE's ROM specifics. pt was unable to produce AROM of right LE. Left LE WNL

## 2022-12-27 NOTE — PHYSICAL THERAPY INITIAL EVALUATION ADULT - PHYSICAL ASSIST/NONPHYSICAL ASSIST: GAIT, REHAB EVAL
required increased assistance + verbal cues to help maintain proper upright walking posture/pattern + for proper use/sequencing of AD. pt demonstrated multiple incidents of Left LE buckling/2 person assist

## 2022-12-27 NOTE — PHYSICAL THERAPY INITIAL EVALUATION ADULT - GAIT DEVIATIONS NOTED, PT EVAL
decreased janelle/increased time in double stance/decreased step length/decreased stride length/decreased weight-shifting ability

## 2022-12-28 LAB
ANION GAP SERPL CALC-SCNC: 14 MMOL/L — SIGNIFICANT CHANGE UP (ref 5–17)
ANION GAP SERPL CALC-SCNC: 19 MMOL/L — HIGH (ref 5–17)
APTT BLD: 52 SEC — HIGH (ref 27.5–35.5)
APTT BLD: 64.3 SEC — HIGH (ref 27.5–35.5)
APTT BLD: 66.7 SEC — HIGH (ref 27.5–35.5)
APTT BLD: 79.9 SEC — HIGH (ref 27.5–35.5)
BASE EXCESS BLDA CALC-SCNC: -4.5 MMOL/L — LOW (ref -2–3)
BASE EXCESS BLDA CALC-SCNC: -7.8 MMOL/L — LOW (ref -2–3)
BASE EXCESS BLDA CALC-SCNC: -8.8 MMOL/L — LOW (ref -2–3)
BLOOD GAS COMMENTS ARTERIAL: SIGNIFICANT CHANGE UP
BUN SERPL-MCNC: 31.2 MG/DL — HIGH (ref 8–20)
BUN SERPL-MCNC: 36.8 MG/DL — HIGH (ref 8–20)
CALCIUM SERPL-MCNC: 9 MG/DL — SIGNIFICANT CHANGE UP (ref 8.4–10.5)
CALCIUM SERPL-MCNC: 9.1 MG/DL — SIGNIFICANT CHANGE UP (ref 8.4–10.5)
CHLORIDE SERPL-SCNC: 105 MMOL/L — SIGNIFICANT CHANGE UP (ref 96–108)
CHLORIDE SERPL-SCNC: 106 MMOL/L — SIGNIFICANT CHANGE UP (ref 96–108)
CO2 SERPL-SCNC: 19 MMOL/L — LOW (ref 22–29)
CO2 SERPL-SCNC: 22 MMOL/L — SIGNIFICANT CHANGE UP (ref 22–29)
CREAT SERPL-MCNC: 1.07 MG/DL — SIGNIFICANT CHANGE UP (ref 0.5–1.3)
CREAT SERPL-MCNC: 1.23 MG/DL — SIGNIFICANT CHANGE UP (ref 0.5–1.3)
EGFR: 60 ML/MIN/1.73M2 — SIGNIFICANT CHANGE UP
EGFR: 71 ML/MIN/1.73M2 — SIGNIFICANT CHANGE UP
GAS PNL BLDA: SIGNIFICANT CHANGE UP
GLUCOSE BLDC GLUCOMTR-MCNC: 143 MG/DL — HIGH (ref 70–99)
GLUCOSE BLDC GLUCOMTR-MCNC: 213 MG/DL — HIGH (ref 70–99)
GLUCOSE BLDC GLUCOMTR-MCNC: 242 MG/DL — HIGH (ref 70–99)
GLUCOSE SERPL-MCNC: 215 MG/DL — HIGH (ref 70–99)
GLUCOSE SERPL-MCNC: 261 MG/DL — HIGH (ref 70–99)
HCO3 BLDA-SCNC: 19 MMOL/L — LOW (ref 21–28)
HCO3 BLDA-SCNC: 19 MMOL/L — LOW (ref 21–28)
HCO3 BLDA-SCNC: 21 MMOL/L — SIGNIFICANT CHANGE UP (ref 21–28)
HCT VFR BLD CALC: 47.4 % — SIGNIFICANT CHANGE UP (ref 39–50)
HGB BLD-MCNC: 15.3 G/DL — SIGNIFICANT CHANGE UP (ref 13–17)
HOROWITZ INDEX BLDA+IHG-RTO: 0.7 — SIGNIFICANT CHANGE UP
HOROWITZ INDEX BLDA+IHG-RTO: 70 — SIGNIFICANT CHANGE UP
HOROWITZ INDEX BLDA+IHG-RTO: SIGNIFICANT CHANGE UP
MAGNESIUM SERPL-MCNC: 1.9 MG/DL — SIGNIFICANT CHANGE UP (ref 1.8–2.6)
MCHC RBC-ENTMCNC: 29.1 PG — SIGNIFICANT CHANGE UP (ref 27–34)
MCHC RBC-ENTMCNC: 32.3 GM/DL — SIGNIFICANT CHANGE UP (ref 32–36)
MCV RBC AUTO: 90.3 FL — SIGNIFICANT CHANGE UP (ref 80–100)
PCO2 BLDA: 37 MMHG — SIGNIFICANT CHANGE UP (ref 35–48)
PCO2 BLDA: 39 MMHG — SIGNIFICANT CHANGE UP (ref 35–48)
PCO2 BLDA: 45 MMHG — SIGNIFICANT CHANGE UP (ref 35–48)
PH BLDA: 7.23 — LOW (ref 7.35–7.45)
PH BLDA: 7.29 — LOW (ref 7.35–7.45)
PH BLDA: 7.36 — SIGNIFICANT CHANGE UP (ref 7.35–7.45)
PHOSPHATE SERPL-MCNC: 3.9 MG/DL — SIGNIFICANT CHANGE UP (ref 2.4–4.7)
PLATELET # BLD AUTO: 273 K/UL — SIGNIFICANT CHANGE UP (ref 150–400)
PO2 BLDA: 126 MMHG — HIGH (ref 83–108)
PO2 BLDA: 131 MMHG — HIGH (ref 83–108)
PO2 BLDA: 185 MMHG — HIGH (ref 83–108)
POTASSIUM SERPL-MCNC: 4.3 MMOL/L — SIGNIFICANT CHANGE UP (ref 3.5–5.3)
POTASSIUM SERPL-MCNC: 4.7 MMOL/L — SIGNIFICANT CHANGE UP (ref 3.5–5.3)
POTASSIUM SERPL-SCNC: 4.3 MMOL/L — SIGNIFICANT CHANGE UP (ref 3.5–5.3)
POTASSIUM SERPL-SCNC: 4.7 MMOL/L — SIGNIFICANT CHANGE UP (ref 3.5–5.3)
RBC # BLD: 5.25 M/UL — SIGNIFICANT CHANGE UP (ref 4.2–5.8)
RBC # FLD: 13.2 % — SIGNIFICANT CHANGE UP (ref 10.3–14.5)
SAO2 % BLDA: 97.6 % — SIGNIFICANT CHANGE UP (ref 94–98)
SAO2 % BLDA: 98 % — SIGNIFICANT CHANGE UP (ref 94–98)
SAO2 % BLDA: 98.6 % — HIGH (ref 94–98)
SODIUM SERPL-SCNC: 142 MMOL/L — SIGNIFICANT CHANGE UP (ref 135–145)
SODIUM SERPL-SCNC: 143 MMOL/L — SIGNIFICANT CHANGE UP (ref 135–145)
TSH SERPL-MCNC: 0.2 UIU/ML — LOW (ref 0.27–4.2)
WBC # BLD: 15.01 K/UL — HIGH (ref 3.8–10.5)
WBC # FLD AUTO: 15.01 K/UL — HIGH (ref 3.8–10.5)

## 2022-12-28 PROCEDURE — 99291 CRITICAL CARE FIRST HOUR: CPT

## 2022-12-28 PROCEDURE — 71045 X-RAY EXAM CHEST 1 VIEW: CPT | Mod: 26

## 2022-12-28 PROCEDURE — 99232 SBSQ HOSP IP/OBS MODERATE 35: CPT

## 2022-12-28 RX ORDER — INFLUENZA VIRUS VACCINE 15; 15; 15; 15 UG/.5ML; UG/.5ML; UG/.5ML; UG/.5ML
0.7 SUSPENSION INTRAMUSCULAR ONCE
Refills: 0 | Status: COMPLETED | OUTPATIENT
Start: 2022-12-28 | End: 2022-12-28

## 2022-12-28 RX ORDER — INSULIN LISPRO 100/ML
VIAL (ML) SUBCUTANEOUS EVERY 6 HOURS
Refills: 0 | Status: DISCONTINUED | OUTPATIENT
Start: 2022-12-28 | End: 2023-01-03

## 2022-12-28 RX ORDER — DEXMEDETOMIDINE HYDROCHLORIDE IN 0.9% SODIUM CHLORIDE 4 UG/ML
0.1 INJECTION INTRAVENOUS
Qty: 200 | Refills: 0 | Status: DISCONTINUED | OUTPATIENT
Start: 2022-12-28 | End: 2023-01-05

## 2022-12-28 RX ORDER — ETOMIDATE 2 MG/ML
4 INJECTION INTRAVENOUS ONCE
Refills: 0 | Status: DISCONTINUED | OUTPATIENT
Start: 2022-12-28 | End: 2022-12-28

## 2022-12-28 RX ORDER — PROPOFOL 10 MG/ML
100 INJECTION, EMULSION INTRAVENOUS ONCE
Refills: 0 | Status: COMPLETED | OUTPATIENT
Start: 2022-12-28 | End: 2022-12-28

## 2022-12-28 RX ORDER — LABETALOL HCL 100 MG
10 TABLET ORAL
Refills: 0 | Status: DISCONTINUED | OUTPATIENT
Start: 2022-12-28 | End: 2023-01-03

## 2022-12-28 RX ORDER — INSULIN GLARGINE 100 [IU]/ML
10 INJECTION, SOLUTION SUBCUTANEOUS ONCE
Refills: 0 | Status: COMPLETED | OUTPATIENT
Start: 2022-12-28 | End: 2022-12-28

## 2022-12-28 RX ORDER — CHLORHEXIDINE GLUCONATE 213 G/1000ML
1 SOLUTION TOPICAL DAILY
Refills: 0 | Status: DISCONTINUED | OUTPATIENT
Start: 2022-12-28 | End: 2023-01-30

## 2022-12-28 RX ORDER — HYDRALAZINE HCL 50 MG
5 TABLET ORAL
Refills: 0 | Status: DISCONTINUED | OUTPATIENT
Start: 2022-12-28 | End: 2022-12-28

## 2022-12-28 RX ORDER — ACETYLCYSTEINE 200 MG/ML
4 VIAL (ML) MISCELLANEOUS EVERY 6 HOURS
Refills: 0 | Status: DISCONTINUED | OUTPATIENT
Start: 2022-12-28 | End: 2023-01-02

## 2022-12-28 RX ORDER — DEXMEDETOMIDINE HYDROCHLORIDE IN 0.9% SODIUM CHLORIDE 4 UG/ML
0.05 INJECTION INTRAVENOUS
Qty: 200 | Refills: 0 | Status: DISCONTINUED | OUTPATIENT
Start: 2022-12-28 | End: 2022-12-28

## 2022-12-28 RX ORDER — SODIUM CHLORIDE 9 MG/ML
1000 INJECTION INTRAMUSCULAR; INTRAVENOUS; SUBCUTANEOUS
Refills: 0 | Status: DISCONTINUED | OUTPATIENT
Start: 2022-12-28 | End: 2022-12-28

## 2022-12-28 RX ORDER — HYDRALAZINE HCL 50 MG
10 TABLET ORAL
Refills: 0 | Status: DISCONTINUED | OUTPATIENT
Start: 2022-12-28 | End: 2023-01-03

## 2022-12-28 RX ORDER — POLYETHYLENE GLYCOL 3350 17 G/17G
17 POWDER, FOR SOLUTION ORAL DAILY
Refills: 0 | Status: DISCONTINUED | OUTPATIENT
Start: 2022-12-28 | End: 2022-12-29

## 2022-12-28 RX ORDER — HYDROMORPHONE HYDROCHLORIDE 2 MG/ML
0.25 INJECTION INTRAMUSCULAR; INTRAVENOUS; SUBCUTANEOUS ONCE
Refills: 0 | Status: DISCONTINUED | OUTPATIENT
Start: 2022-12-28 | End: 2022-12-28

## 2022-12-28 RX ORDER — INSULIN GLARGINE 100 [IU]/ML
10 INJECTION, SOLUTION SUBCUTANEOUS EVERY MORNING
Refills: 0 | Status: DISCONTINUED | OUTPATIENT
Start: 2022-12-29 | End: 2023-01-02

## 2022-12-28 RX ORDER — PANTOPRAZOLE SODIUM 20 MG/1
40 TABLET, DELAYED RELEASE ORAL DAILY
Refills: 0 | Status: DISCONTINUED | OUTPATIENT
Start: 2022-12-28 | End: 2023-01-03

## 2022-12-28 RX ORDER — PROPOFOL 10 MG/ML
5 INJECTION, EMULSION INTRAVENOUS
Qty: 1000 | Refills: 0 | Status: DISCONTINUED | OUTPATIENT
Start: 2022-12-28 | End: 2022-12-28

## 2022-12-28 RX ORDER — BNT162B2 ORIGINAL AND OMICRON BA.4/BA.5 .1125; .1125 MG/2.25ML; MG/2.25ML
0.3 INJECTION, SUSPENSION INTRAMUSCULAR ONCE
Refills: 0 | Status: COMPLETED | OUTPATIENT
Start: 2022-12-28 | End: 2022-12-28

## 2022-12-28 RX ORDER — SODIUM BICARBONATE 1 MEQ/ML
0.05 SYRINGE (ML) INTRAVENOUS
Qty: 75 | Refills: 0 | Status: DISCONTINUED | OUTPATIENT
Start: 2022-12-28 | End: 2022-12-28

## 2022-12-28 RX ORDER — CHLORHEXIDINE GLUCONATE 213 G/1000ML
15 SOLUTION TOPICAL EVERY 12 HOURS
Refills: 0 | Status: DISCONTINUED | OUTPATIENT
Start: 2022-12-28 | End: 2023-01-30

## 2022-12-28 RX ORDER — SODIUM CHLORIDE 9 MG/ML
1000 INJECTION, SOLUTION INTRAVENOUS
Refills: 0 | Status: DISCONTINUED | OUTPATIENT
Start: 2022-12-28 | End: 2022-12-28

## 2022-12-28 RX ORDER — IPRATROPIUM/ALBUTEROL SULFATE 18-103MCG
3 AEROSOL WITH ADAPTER (GRAM) INHALATION EVERY 6 HOURS
Refills: 0 | Status: DISCONTINUED | OUTPATIENT
Start: 2022-12-28 | End: 2023-01-08

## 2022-12-28 RX ORDER — SENNA PLUS 8.6 MG/1
2 TABLET ORAL AT BEDTIME
Refills: 0 | Status: DISCONTINUED | OUTPATIENT
Start: 2022-12-28 | End: 2023-01-01

## 2022-12-28 RX ORDER — ROCURONIUM BROMIDE 10 MG/ML
90 VIAL (ML) INTRAVENOUS ONCE
Refills: 0 | Status: COMPLETED | OUTPATIENT
Start: 2022-12-28 | End: 2022-12-28

## 2022-12-28 RX ADMIN — Medication 2: at 05:26

## 2022-12-28 RX ADMIN — HYDROMORPHONE HYDROCHLORIDE 0.25 MILLIGRAM(S): 2 INJECTION INTRAMUSCULAR; INTRAVENOUS; SUBCUTANEOUS at 00:30

## 2022-12-28 RX ADMIN — Medication 5 MILLIGRAM(S): at 00:33

## 2022-12-28 RX ADMIN — ATORVASTATIN CALCIUM 80 MILLIGRAM(S): 80 TABLET, FILM COATED ORAL at 21:18

## 2022-12-28 RX ADMIN — CHLORHEXIDINE GLUCONATE 15 MILLILITER(S): 213 SOLUTION TOPICAL at 19:05

## 2022-12-28 RX ADMIN — HEPARIN SODIUM 10 UNIT(S)/HR: 5000 INJECTION INTRAVENOUS; SUBCUTANEOUS at 22:00

## 2022-12-28 RX ADMIN — PANTOPRAZOLE SODIUM 40 MILLIGRAM(S): 20 TABLET, DELAYED RELEASE ORAL at 11:37

## 2022-12-28 RX ADMIN — PROPOFOL 100 MILLIGRAM(S): 10 INJECTION, EMULSION INTRAVENOUS at 02:18

## 2022-12-28 RX ADMIN — Medication 4 MILLILITER(S): at 15:01

## 2022-12-28 RX ADMIN — CHLORHEXIDINE GLUCONATE 1 APPLICATION(S): 213 SOLUTION TOPICAL at 11:37

## 2022-12-28 RX ADMIN — HEPARIN SODIUM 10 UNIT(S)/HR: 5000 INJECTION INTRAVENOUS; SUBCUTANEOUS at 08:50

## 2022-12-28 RX ADMIN — Medication 3 MILLILITER(S): at 20:24

## 2022-12-28 RX ADMIN — PROPOFOL 2.32 MICROGRAM(S)/KG/MIN: 10 INJECTION, EMULSION INTRAVENOUS at 02:55

## 2022-12-28 RX ADMIN — Medication 3 MILLILITER(S): at 08:05

## 2022-12-28 RX ADMIN — Medication 1 DROP(S): at 05:25

## 2022-12-28 RX ADMIN — SODIUM CHLORIDE 50 MILLILITER(S): 9 INJECTION INTRAMUSCULAR; INTRAVENOUS; SUBCUTANEOUS at 04:00

## 2022-12-28 RX ADMIN — DEXMEDETOMIDINE HYDROCHLORIDE IN 0.9% SODIUM CHLORIDE 0.97 MICROGRAM(S)/KG/HR: 4 INJECTION INTRAVENOUS at 00:44

## 2022-12-28 RX ADMIN — POLYETHYLENE GLYCOL 3350 17 GRAM(S): 17 POWDER, FOR SOLUTION ORAL at 11:37

## 2022-12-28 RX ADMIN — Medication 10 MILLIGRAM(S): at 03:49

## 2022-12-28 RX ADMIN — Medication 4 MILLILITER(S): at 08:05

## 2022-12-28 RX ADMIN — DEXMEDETOMIDINE HYDROCHLORIDE IN 0.9% SODIUM CHLORIDE 1.93 MICROGRAM(S)/KG/HR: 4 INJECTION INTRAVENOUS at 21:31

## 2022-12-28 RX ADMIN — SENNA PLUS 2 TABLET(S): 8.6 TABLET ORAL at 21:18

## 2022-12-28 RX ADMIN — Medication 4 MILLILITER(S): at 20:24

## 2022-12-28 RX ADMIN — Medication 50 MEQ/KG/HR: at 08:43

## 2022-12-28 RX ADMIN — INSULIN GLARGINE 10 UNIT(S): 100 INJECTION, SOLUTION SUBCUTANEOUS at 11:35

## 2022-12-28 RX ADMIN — CHLORHEXIDINE GLUCONATE 15 MILLILITER(S): 213 SOLUTION TOPICAL at 04:41

## 2022-12-28 RX ADMIN — Medication 90 MILLIGRAM(S): at 02:18

## 2022-12-28 RX ADMIN — HEPARIN SODIUM 9.5 UNIT(S)/HR: 5000 INJECTION INTRAVENOUS; SUBCUTANEOUS at 01:29

## 2022-12-28 RX ADMIN — Medication 3 MILLILITER(S): at 15:01

## 2022-12-28 RX ADMIN — Medication 325 MILLIGRAM(S): at 11:37

## 2022-12-28 RX ADMIN — Medication 3 MILLILITER(S): at 01:01

## 2022-12-28 RX ADMIN — Medication 4 MILLILITER(S): at 01:00

## 2022-12-28 RX ADMIN — LOTEPREDNOL ETABONATE 1 DROP(S): 2 SUSPENSION/ DROPS OPHTHALMIC at 05:25

## 2022-12-28 NOTE — PROCEDURE NOTE - NSTRACHPOSTINTU_RESP_A_CORE
Chest xray to be ordered by primary team/Breath sounds bilateral/Breath sounds equal/Chest excursion noted/Positive end tidal Co2 noted

## 2022-12-28 NOTE — PROGRESS NOTE ADULT - SUBJECTIVE AND OBJECTIVE BOX
HPI:  77 year old Male with PMH DMII, HTN, CAD was trf from [place] c/o R sided weakness, slurred speech, difficulty swallowing that started 12/25. Code stroke at [place] 12/26, CTH negative, on MRI found to have L debby CVA; out of tPA window. MRA revealing L vertebral artery segmental stenoses and T1 hyperintensity suggesting foci of dissection and/or thrombus. Trf to [facility] admitted to Hospitalist service. 12/27 afternoon, noted to have worsening R sided weakness, worsening dysarthria- code stroke called.   NSICU consulted from Neurology team for higher level of care. Patient seen and examined on floor, denies HA. C/o of swallowing difficulties. Wife bedside. (12/27/2022)    O/n events: unable to manage oral secretions and protect own airways, in resp distress - required intubation.  VS, labs, imaging reviewed.    Exam: off sedation  awake and alert, FC, shakes/nods appropriately,  pupils are post surgical but reactive BL, EOMs - left gaze palsy noted;  Motor - Right side is plegic 0/5; LUE 5/5 and LLE 4/5. Sensation intact to LT.  CTAB  S1S2 present  Abd soft  No peripheral edema

## 2022-12-28 NOTE — PROGRESS NOTE ADULT - ASSESSMENT
IMPRESSION:  -Left pontine infarct  - Left VA dissection    Mechanism- Artery to artery embolism vs Small vessel disease.  Risk Factors. HTN, DM HLD    ASSESSMENT/ PLAN:     - Neuro checks and vital signs Q 2 hours.  - SBP goal keep normotensive.  -  mg PO or 300 OK QD.  - Off Plavix  - On heparin gtt.  - Lipitor for LDL goal of < 70 .  - Telemetry monitoring.  - Wean off ventilator as tolerated.  - CT -images and reports were reviewed.   - MRI and MRA Brain -images and reports were reviewed.   - TTE pending.  - PT OT SLP evaluation.  - SCD/ On Heparin gtt for DVT prophylaxis.

## 2022-12-28 NOTE — PROCEDURE NOTE - ADDITIONAL PROCEDURE DETAILS
During preoxygenation, dried blood noted on patient's lip.  Patient preoxygenated with 100% FiO2 for five minutes with SpO2 ultimately 94%.  RSI performed.  Glidescope S4 blade provided Grade 1 view.  Intubation atraumatic and successful upon first attempt.  Patient remains hemodynamically stable.  Chest xray to be ordered by primary team.

## 2022-12-28 NOTE — DIETITIAN INITIAL EVALUATION ADULT - ENTERAL
As medically feasible/tolerable, initiate Glucerna 1.5 @ 20 ml/hr and advance 10 ml/hr q4 hrs until goal rate of 55 ml/hr (x20 hrs) to provide 1100 ml, 1650 kcal, 91g protein, 835 ml free water, and >100% of RDIs for vitamins/minerals. Additional free water per MD discretion.

## 2022-12-28 NOTE — PROGRESS NOTE ADULT - SUBJECTIVE AND OBJECTIVE BOX
Neurology   KYUNG BALLARD 77y Male     HPI:  76 y/o male with PMH of DM-2, HTN, CAD was transferred from [place] for stroke. Patient reported right sided weakness and slurred speech along with difficulty swallowing that started yesterday. Was seen at [place] today, code stroke initiated out of window for tPA. CT head: no acute finding. MRI: acute infarct with left debby. MRA head/neck: Left vertebral artery segmental stenoses and T1 hyperintensity suggesting foci of dissection and/or thrombus. As per resident, neurology from [facility] was consulted and accepted patient for further evaluation. Patient said he is upset about his condition, noted discomfort in his head otherwise no chest pain, shortness of breath, palpitation, fever, chills, nausea, vomiting, abdominal pain, change in bowel/urinary habit.  (26 Dec 2022 22:58)      12-28-22  Interim events reviewed. Patient was moved to ICU for worsening neuro exam and then intubated overnight for airway protection and respiratory failure.      PMH: HTN (hypertension)    CAD (coronary artery disease)    DM (diabetes mellitus)     PSH:     FAMILY HISTORY:  No pertinent family history in first degree relatives    SOCIAL HISTORY:  No history of tobacco or alcohol use     Allergies    iodine (Anaphylaxis (Mod to Severe))    Intolerances    Height (cm): 157.5 (12-26 @ 21:23)  Weight (kg): 77.2 (12-26 @ 21:23)  BMI (kg/m2): 31.1 (12-26 @ 21:23)          Vital Signs Last 24 Hrs  T(C): 37.4 (28 Dec 2022 04:00), Max: 37.4 (28 Dec 2022 04:00)  T(F): 99.3 (28 Dec 2022 04:00), Max: 99.3 (28 Dec 2022 04:00)  HR: 85 (28 Dec 2022 10:00) (51 - 121)  BP: 154/80 (28 Dec 2022 10:00) (114/64 - 198/82)  BP(mean): 101 (28 Dec 2022 10:00) (72 - 141)  RR: 19 (28 Dec 2022 10:00) (0 - 37)  SpO2: 98% (28 Dec 2022 10:00) (85% - 100%)    Parameters below as of 28 Dec 2022 10:00  Patient On (Oxygen Delivery Method): ventilator    O2 Concentration (%): 50    MEDICATIONS    acetaminophen     Tablet .. 650 milliGRAM(s) Oral every 6 hours PRN  acetylcysteine 10%  Inhalation 4 milliLiter(s) Inhalation every 6 hours  albuterol/ipratropium for Nebulization 3 milliLiter(s) Nebulizer every 6 hours  aluminum hydroxide/magnesium hydroxide/simethicone Suspension 30 milliLiter(s) Oral every 4 hours PRN  aspirin 325 milliGRAM(s) Oral daily  atorvastatin 80 milliGRAM(s) Oral at bedtime  chlorhexidine 0.12% Liquid 15 milliLiter(s) Oral Mucosa every 12 hours  chlorhexidine 2% Cloths 1 Application(s) Topical daily  dextrose 5%. 1000 milliLiter(s) IV Continuous <Continuous>  dextrose 5%. 1000 milliLiter(s) IV Continuous <Continuous>  dextrose 50% Injectable 25 Gram(s) IV Push once  dextrose 50% Injectable 12.5 Gram(s) IV Push once  dextrose 50% Injectable 25 Gram(s) IV Push once  dextrose Oral Gel 15 Gram(s) Oral once PRN  glucagon  Injectable 1 milliGRAM(s) IntraMuscular once  heparin  Infusion 950 Unit(s)/Hr IV Continuous <Continuous>  hydrALAZINE Injectable 10 milliGRAM(s) IV Push every 2 hours PRN  insulin glargine Injectable (LANTUS) 10 Unit(s) SubCutaneous once  insulin lispro (ADMELOG) corrective regimen sliding scale   SubCutaneous every 6 hours  labetalol Injectable 10 milliGRAM(s) IV Push every 2 hours PRN  loteprednol 0.5% Ophthalmic Suspension 1 Drop(s) Right EYE daily  melatonin 3 milliGRAM(s) Oral at bedtime PRN  ondansetron Injectable 4 milliGRAM(s) IV Push every 8 hours PRN  pantoprazole   Suspension 40 milliGRAM(s) Oral daily  polyethylene glycol 3350 17 Gram(s) Oral daily  prednisoLONE acetate 1% Suspension 1 Drop(s) Left EYE daily  propofol Infusion 5 MICROgram(s)/kG/Min IV Continuous <Continuous>  senna 2 Tablet(s) Oral at bedtime  sodium bicarbonate  Infusion 0.049 mEq/kG/Hr IV Continuous <Continuous>         LABS:  CBC Full  -  ( 28 Dec 2022 06:50 )  WBC Count : 15.01 K/uL  RBC Count : 5.25 M/uL  Hemoglobin : 15.3 g/dL  Hematocrit : 47.4 %  Platelet Count - Automated : 273 K/uL  Mean Cell Volume : 90.3 fl  Mean Cell Hemoglobin : 29.1 pg  Mean Cell Hemoglobin Concentration : 32.3 gm/dL  Auto Neutrophil # : x  Auto Lymphocyte # : x  Auto Monocyte # : x  Auto Eosinophil # : x  Auto Basophil # : x  Auto Neutrophil % : x  Auto Lymphocyte % : x  Auto Monocyte % : x  Auto Eosinophil % : x  Auto Basophil % : x      12-28    143  |  105  |  36.8<H>  ----------------------------<  261<H>  4.7   |  19.0<L>  |  1.23    Ca    9.1      28 Dec 2022 06:50  Phos  3.9     12-28  Mg     1.9     12-28    TPro  7.6  /  Alb  4.2  /  TBili  0.5  /  DBili  x   /  AST  20  /  ALT  14  /  AlkPhos  45  12-27    LIVER FUNCTIONS - ( 27 Dec 2022 15:05 )  Alb: 4.2 g/dL / Pro: 7.6 g/dL / ALK PHOS: 45 U/L / ALT: 14 U/L / AST: 20 U/L / GGT: x           Hemoglobin A1C:       PT/INR - ( 27 Dec 2022 15:05 )   PT: 12.2 sec;   INR: 1.05 ratio         PTT - ( 28 Dec 2022 06:50 )  PTT:52.0 sec    STROKE CORE LABS:   Hemoglobin A1C:   A1C with Estimated Average Glucose Result: 8.0 % (12.27.22 @ 06:20)   Lipid Panel 12-27 @ 06:20  Total Cholesterol, Serum 217  LDL --LDL Cholesterol Calculated: 143 mg/dL (12.27.22 @ 06:20)   Triglycerides 162    On Neurological Examination:    Mental Status - Patient is  Intubated.   When sedation was held for exam  he is awake, alert and  follow commands correctly      Cranial Nerves - Pupils are post surgical , EOMs---has a left gaze palsy- unable to overcome with oculocephalics  Visual fields are full to finger counting, There is mild right central facial weakness.    Motor Exam -   Right side is plegic 0/5.  Left upper ---5/5 No drift  Left lower  ---5/5 No drift     nml bulk/tone    Sensory :  Intact to light touch and pinprick bilaterally    Coord:  FTN intact on the left and not testable on the right due to weakness.    Gait - Not assessed.     RADIOLOGY ( All neurological imaging studies were independently reviewed and interpreted by me)    [facility]  12/26/22    IMPRESSION:  No evidence of acute intracranial hemorrhage, midline shift or CT evidence  of acute territorial infarct.    If the patient's symptoms persist, consider short interval follow-up head CT  or brain MRI if there are no MRI contraindications  Findings discussed by Dr. Achayra with Dr. Hobbs on 12/26/22 at 6:55am.    CTA  CTP    MRI:  IMPRESSION:    1. Acute infarct within left debby (1.5 cm), without hemorrhagic  transformation. No midline shift or mass effect. Consider serial imaging  follow-up as clinically indicated.    2. Nonspecific but extensive white matter findings, suggestive of prior  chronic lacunar infarcts and chronic small vessel ischemia.    3. Please see separate report for concomitant MRA brain and neck.    Findings were discussed with covering ED provider (Dr. Sarah Caicedo) via  telephone on 12/26/2022 at 9:49 AM with verbal read back.    Interpreting Physician: SHAHEED STEPHENSON  Signed Electronically by / Date: SHAHEED Sheridan 26 2022 10:03AM    MRA: Head and Neck  IMPRESSION:    1. RIGHT CAROTID NECK CIRCULATION: Intact.    2. LEFT CAROTID NECK CIRCULATION: Intact.    3. VERTEBRAL NECK CIRCULATION: Right vertebral artery is intact. Left  vertebral artery demonstrates segmental stenoses and T1 hyperintensity  suggesting foci of dissection and/or thrombus. Flow appears improved  compared to time-of-flight angiography earlier same date    4. ANTERIOR INTRACRANIAL CIRCULATION: Intracranial atherosclerosis  cavernous carotid segments internal carotid arteries, at least mild, and  right MCA M1 segment, moderate.    5. POSTERIOR INTRACRANIAL CIRCULATION: Right vertebral artery focal  stenosis just proximal to the basilar formation, moderate. Attenuated  segmentally nonvisualized left vertebral artery. Flow appears improved  compared to earlier this same date. Intact basilar artery and posterior  cerebral arteries.    Interpreting Physician: Radiologist LEYDA ZAMBRANO,   Resident VIRAL RUIZ     CT Head No Cont (12.27.22 @ 14:28) >    IMPRESSION:  No evidence of an acute intracranial hemorrhage, midline shift, or   hydrocephalus.  Patient's known acute left pontine infarct better visualized on recent   MRI.  Other chronic small infarcts and whitematter ischemic changes.    MOE JARVIS MD; Attending Radiologist    TTE    `

## 2022-12-28 NOTE — DIETITIAN INITIAL EVALUATION ADULT - PERTINENT MEDS FT
MEDICATIONS  (STANDING):  acetylcysteine 10%  Inhalation 4 milliLiter(s) Inhalation every 6 hours  albuterol/ipratropium for Nebulization 3 milliLiter(s) Nebulizer every 6 hours  aspirin 325 milliGRAM(s) Oral daily  atorvastatin 80 milliGRAM(s) Oral at bedtime  chlorhexidine 0.12% Liquid 15 milliLiter(s) Oral Mucosa every 12 hours  chlorhexidine 2% Cloths 1 Application(s) Topical daily  dextrose 5%. 1000 milliLiter(s) (100 mL/Hr) IV Continuous <Continuous>  dextrose 5%. 1000 milliLiter(s) (50 mL/Hr) IV Continuous <Continuous>  dextrose 50% Injectable 25 Gram(s) IV Push once  dextrose 50% Injectable 12.5 Gram(s) IV Push once  dextrose 50% Injectable 25 Gram(s) IV Push once  glucagon  Injectable 1 milliGRAM(s) IntraMuscular once  heparin  Infusion 950 Unit(s)/Hr (10 mL/Hr) IV Continuous <Continuous>  insulin lispro (ADMELOG) corrective regimen sliding scale   SubCutaneous three times a day before meals  insulin lispro (ADMELOG) corrective regimen sliding scale   SubCutaneous at bedtime  loteprednol 0.5% Ophthalmic Suspension 1 Drop(s) Right EYE daily  prednisoLONE acetate 1% Suspension 1 Drop(s) Left EYE daily  propofol Infusion 5 MICROgram(s)/kG/Min (2.32 mL/Hr) IV Continuous <Continuous>  sodium bicarbonate  Infusion 0.049 mEq/kG/Hr (50 mL/Hr) IV Continuous <Continuous>    MEDICATIONS  (PRN):  acetaminophen     Tablet .. 650 milliGRAM(s) Oral every 6 hours PRN Temp greater or equal to 38C (100.4F), Mild Pain (1 - 3)  aluminum hydroxide/magnesium hydroxide/simethicone Suspension 30 milliLiter(s) Oral every 4 hours PRN Dyspepsia  dextrose Oral Gel 15 Gram(s) Oral once PRN Blood Glucose LESS THAN 70 milliGRAM(s)/deciliter  hydrALAZINE Injectable 10 milliGRAM(s) IV Push every 2 hours PRN SBP >180  labetalol Injectable 10 milliGRAM(s) IV Push every 2 hours PRN SBP >180  melatonin 3 milliGRAM(s) Oral at bedtime PRN Insomnia  ondansetron Injectable 4 milliGRAM(s) IV Push every 8 hours PRN Nausea and/or Vomiting

## 2022-12-28 NOTE — DIETITIAN INITIAL EVALUATION ADULT - NS FNS DIET ORDER
Diet, NPO:      Special Instructions for Nursing:  CODE STROKE; NPO until Dysphagia screen or Speech Bedside Swallow Evaluation completed and passed (12-27-22 @ 14:46)

## 2022-12-28 NOTE — PROGRESS NOTE ADULT - CRITICAL CARE ATTENDING COMMENT
Pt is critically ill and at high risk of rapid deterioration/death due to abovementioned conditions.   Still requires critical care interventions - ongoing frequent evaluations, interventions and management adjustment by the Attending and ICU team, - and included review of relevant history, clinical examination, review of data and images, discussion of treatment with the multidisciplinary team and any consultants involved in this patient’s care as well as family discussion.

## 2022-12-28 NOTE — DIETITIAN INITIAL EVALUATION ADULT - PERTINENT LABORATORY DATA
12-28    143  |  105  |  36.8<H>  ----------------------------<  261<H>  4.7   |  19.0<L>  |  1.23    Ca    9.1      28 Dec 2022 06:50  Phos  3.9     12-28  Mg     1.9     12-28    TPro  7.6  /  Alb  4.2  /  TBili  0.5  /  DBili  x   /  AST  20  /  ALT  14  /  AlkPhos  45  12-27  POCT Blood Glucose.: 242 mg/dL (12-28-22 @ 05:14)  A1C with Estimated Average Glucose Result: 8.0 % (12-27-22 @ 06:20)

## 2022-12-28 NOTE — DIETITIAN INITIAL EVALUATION ADULT - OTHER INFO
77 year old male with acute L pontine CVA, possible L vert dissection; admitted 12/26. Worsening neuro exam - RUE plegia, worsening bulbar dysfunction. NIH 13 today. Concern for embolic events. PMH of DMII, HTN, CAD.    Pt intubated/sedated at this time. Remains NPO. Per flowsheet, propofol infusing @ 6.9 ml/hr (x24 hrs would provide ~182 kcal). RD to follow up.  negative

## 2022-12-28 NOTE — PROGRESS NOTE ADULT - ASSESSMENT
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  Worsening neuro exam 12/27 - RUE plegia, worsening bulbar dysfunction.   Acute resp failure due to neurologic injury.  PMH of  DMII, HTN, CAD.  Allergy to iodine.  Anticoagulated on Heparin ggt.    Plan:  neurochecks  repeat CTh in am  cont ASA 81 mg daily   cont Heparin ggt,  aPTT goal 60-80  vent support, SBTs daily as tolerated  start TF, BM regimen, PPI for ppx  cont lipitor  stroke core measures  monitor e-lytes, d/c bicarb ggt  SCDs, on Heparin ggt  possible early trach/PEG in view of stroke topology with bulbar dysfunction

## 2022-12-28 NOTE — CHART NOTE - NSCHARTNOTEFT_GEN_A_CORE
Called to bedside by nurse around [phone] for desaturations to 70s-80s and severe oral-pharyngeal secretions. Patient placed on 100% NRB. Patient previously had been refusing NT suctioning. Patient was redirected and explained about the benefits of NT suctioning. Nasal trumpet was placed, repeated NT suctioning and oral suctioning performed with improvement in saturations, patient left on 100% NRB. Patient also started on low-dose precedex for agitation and non compliance with cares. ABG performed, stable. However, patient began again with desaturations on 100% NRB with tachypnea, increased work of breathing, and continued oral-pharyngeal secretions with poor airway clearance. Decision was made to intubate patient. Anesthesia was called to bedside, assessed patient and agreed with intubation. Patient was intubated by anesthesia. CXR performed which confirmed ETT in place. Patient left in NAD with minimal sedation.

## 2022-12-29 ENCOUNTER — TRANSCRIPTION ENCOUNTER (OUTPATIENT)
Age: 77
End: 2022-12-29

## 2022-12-29 LAB
ALBUMIN SERPL ELPH-MCNC: 3.4 G/DL — SIGNIFICANT CHANGE UP (ref 3.3–5.2)
ALP SERPL-CCNC: 39 U/L — LOW (ref 40–120)
ALT FLD-CCNC: 12 U/L — SIGNIFICANT CHANGE UP
ANION GAP SERPL CALC-SCNC: 15 MMOL/L — SIGNIFICANT CHANGE UP (ref 5–17)
APTT BLD: 76.8 SEC — HIGH (ref 27.5–35.5)
AST SERPL-CCNC: 18 U/L — SIGNIFICANT CHANGE UP
BASE EXCESS BLDA CALC-SCNC: 0.3 MMOL/L — SIGNIFICANT CHANGE UP (ref -2–3)
BASOPHILS # BLD AUTO: 0.03 K/UL — SIGNIFICANT CHANGE UP (ref 0–0.2)
BASOPHILS NFR BLD AUTO: 0.2 % — SIGNIFICANT CHANGE UP (ref 0–2)
BILIRUB SERPL-MCNC: 1 MG/DL — SIGNIFICANT CHANGE UP (ref 0.4–2)
BLOOD GAS COMMENTS ARTERIAL: SIGNIFICANT CHANGE UP
BUN SERPL-MCNC: 33.1 MG/DL — HIGH (ref 8–20)
CALCIUM SERPL-MCNC: 8.8 MG/DL — SIGNIFICANT CHANGE UP (ref 8.4–10.5)
CHLORIDE SERPL-SCNC: 111 MMOL/L — HIGH (ref 96–108)
CO2 SERPL-SCNC: 22 MMOL/L — SIGNIFICANT CHANGE UP (ref 22–29)
CREAT SERPL-MCNC: 0.99 MG/DL — SIGNIFICANT CHANGE UP (ref 0.5–1.3)
EGFR: 78 ML/MIN/1.73M2 — SIGNIFICANT CHANGE UP
EOSINOPHIL # BLD AUTO: 0.06 K/UL — SIGNIFICANT CHANGE UP (ref 0–0.5)
EOSINOPHIL NFR BLD AUTO: 0.5 % — SIGNIFICANT CHANGE UP (ref 0–6)
GLUCOSE BLDC GLUCOMTR-MCNC: 152 MG/DL — HIGH (ref 70–99)
GLUCOSE BLDC GLUCOMTR-MCNC: 174 MG/DL — HIGH (ref 70–99)
GLUCOSE BLDC GLUCOMTR-MCNC: 185 MG/DL — HIGH (ref 70–99)
GLUCOSE BLDC GLUCOMTR-MCNC: 199 MG/DL — HIGH (ref 70–99)
GLUCOSE SERPL-MCNC: 166 MG/DL — HIGH (ref 70–99)
HCO3 BLDA-SCNC: 25 MMOL/L — SIGNIFICANT CHANGE UP (ref 21–28)
HCT VFR BLD CALC: 39.6 % — SIGNIFICANT CHANGE UP (ref 39–50)
HGB BLD-MCNC: 13.1 G/DL — SIGNIFICANT CHANGE UP (ref 13–17)
HOROWITZ INDEX BLDA+IHG-RTO: 40 — SIGNIFICANT CHANGE UP
IMM GRANULOCYTES NFR BLD AUTO: 0.5 % — SIGNIFICANT CHANGE UP (ref 0–0.9)
LYMPHOCYTES # BLD AUTO: 1.09 K/UL — SIGNIFICANT CHANGE UP (ref 1–3.3)
LYMPHOCYTES # BLD AUTO: 8.5 % — LOW (ref 13–44)
MAGNESIUM SERPL-MCNC: 2.3 MG/DL — SIGNIFICANT CHANGE UP (ref 1.6–2.6)
MCHC RBC-ENTMCNC: 29 PG — SIGNIFICANT CHANGE UP (ref 27–34)
MCHC RBC-ENTMCNC: 33.1 GM/DL — SIGNIFICANT CHANGE UP (ref 32–36)
MCV RBC AUTO: 87.8 FL — SIGNIFICANT CHANGE UP (ref 80–100)
MONOCYTES # BLD AUTO: 0.98 K/UL — HIGH (ref 0–0.9)
MONOCYTES NFR BLD AUTO: 7.6 % — SIGNIFICANT CHANGE UP (ref 2–14)
MRSA PCR RESULT.: SIGNIFICANT CHANGE UP
NEUTROPHILS # BLD AUTO: 10.63 K/UL — HIGH (ref 1.8–7.4)
NEUTROPHILS NFR BLD AUTO: 82.7 % — HIGH (ref 43–77)
PCO2 BLDA: 43 MMHG — SIGNIFICANT CHANGE UP (ref 35–48)
PH BLDA: 7.38 — SIGNIFICANT CHANGE UP (ref 7.35–7.45)
PHOSPHATE SERPL-MCNC: 3.8 MG/DL — SIGNIFICANT CHANGE UP (ref 2.4–4.7)
PLATELET # BLD AUTO: 230 K/UL — SIGNIFICANT CHANGE UP (ref 150–400)
PO2 BLDA: 95 MMHG — SIGNIFICANT CHANGE UP (ref 83–108)
POTASSIUM SERPL-MCNC: 4.3 MMOL/L — SIGNIFICANT CHANGE UP (ref 3.5–5.3)
POTASSIUM SERPL-SCNC: 4.3 MMOL/L — SIGNIFICANT CHANGE UP (ref 3.5–5.3)
PROT SERPL-MCNC: 6.5 G/DL — LOW (ref 6.6–8.7)
RBC # BLD: 4.51 M/UL — SIGNIFICANT CHANGE UP (ref 4.2–5.8)
RBC # FLD: 13.3 % — SIGNIFICANT CHANGE UP (ref 10.3–14.5)
S AUREUS DNA NOSE QL NAA+PROBE: DETECTED
SAO2 % BLDA: 97.5 % — SIGNIFICANT CHANGE UP (ref 94–98)
SODIUM SERPL-SCNC: 148 MMOL/L — HIGH (ref 135–145)
TRIGL SERPL-MCNC: 136 MG/DL — SIGNIFICANT CHANGE UP
WBC # BLD: 12.86 K/UL — HIGH (ref 3.8–10.5)
WBC # FLD AUTO: 12.86 K/UL — HIGH (ref 3.8–10.5)

## 2022-12-29 PROCEDURE — 70450 CT HEAD/BRAIN W/O DYE: CPT | Mod: 26

## 2022-12-29 PROCEDURE — 99292 CRITICAL CARE ADDL 30 MIN: CPT

## 2022-12-29 PROCEDURE — 99291 CRITICAL CARE FIRST HOUR: CPT

## 2022-12-29 RX ORDER — POLYETHYLENE GLYCOL 3350 17 G/17G
17 POWDER, FOR SOLUTION ORAL
Refills: 0 | Status: DISCONTINUED | OUTPATIENT
Start: 2022-12-29 | End: 2023-01-01

## 2022-12-29 RX ADMIN — DEXMEDETOMIDINE HYDROCHLORIDE IN 0.9% SODIUM CHLORIDE 1.93 MICROGRAM(S)/KG/HR: 4 INJECTION INTRAVENOUS at 00:45

## 2022-12-29 RX ADMIN — Medication 650 MILLIGRAM(S): at 21:33

## 2022-12-29 RX ADMIN — Medication 4 MILLILITER(S): at 15:12

## 2022-12-29 RX ADMIN — Medication 4 MILLILITER(S): at 08:57

## 2022-12-29 RX ADMIN — HEPARIN SODIUM 10 UNIT(S)/HR: 5000 INJECTION INTRAVENOUS; SUBCUTANEOUS at 05:12

## 2022-12-29 RX ADMIN — CHLORHEXIDINE GLUCONATE 1 APPLICATION(S): 213 SOLUTION TOPICAL at 13:15

## 2022-12-29 RX ADMIN — Medication 4 MILLILITER(S): at 21:04

## 2022-12-29 RX ADMIN — Medication 1 DROP(S): at 05:16

## 2022-12-29 RX ADMIN — Medication 3 MILLILITER(S): at 08:57

## 2022-12-29 RX ADMIN — Medication 4 MILLILITER(S): at 03:44

## 2022-12-29 RX ADMIN — Medication 3 MILLILITER(S): at 21:04

## 2022-12-29 RX ADMIN — SENNA PLUS 2 TABLET(S): 8.6 TABLET ORAL at 21:34

## 2022-12-29 RX ADMIN — Medication 3 MILLILITER(S): at 15:12

## 2022-12-29 RX ADMIN — CHLORHEXIDINE GLUCONATE 15 MILLILITER(S): 213 SOLUTION TOPICAL at 17:17

## 2022-12-29 RX ADMIN — INSULIN GLARGINE 10 UNIT(S): 100 INJECTION, SOLUTION SUBCUTANEOUS at 09:13

## 2022-12-29 RX ADMIN — POLYETHYLENE GLYCOL 3350 17 GRAM(S): 17 POWDER, FOR SOLUTION ORAL at 17:17

## 2022-12-29 RX ADMIN — PANTOPRAZOLE SODIUM 40 MILLIGRAM(S): 20 TABLET, DELAYED RELEASE ORAL at 13:20

## 2022-12-29 RX ADMIN — ATORVASTATIN CALCIUM 80 MILLIGRAM(S): 80 TABLET, FILM COATED ORAL at 21:34

## 2022-12-29 RX ADMIN — Medication 325 MILLIGRAM(S): at 13:21

## 2022-12-29 RX ADMIN — LOTEPREDNOL ETABONATE 1 DROP(S): 2 SUSPENSION/ DROPS OPHTHALMIC at 05:16

## 2022-12-29 RX ADMIN — CHLORHEXIDINE GLUCONATE 15 MILLILITER(S): 213 SOLUTION TOPICAL at 05:18

## 2022-12-29 RX ADMIN — Medication 3 MILLILITER(S): at 03:44

## 2022-12-29 RX ADMIN — Medication 650 MILLIGRAM(S): at 22:06

## 2022-12-29 NOTE — DISCHARGE NOTE NURSING/CASE MANAGEMENT/SOCIAL WORK - PATIENT PORTAL LINK FT
You can access the FollowMyHealth Patient Portal offered by [facility] by registering at the following website: [email or web address]. By joining [facility]’s FollowMyHealth portal, you will also be able to view your health information using other applications (apps) compatible with our system.

## 2022-12-29 NOTE — PROGRESS NOTE ADULT - ASSESSMENT
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  Worsening neuro exam 12/27 - RUE plegia, worsening bulbar dysfunction.   Acute resp failure due to neurologic injury, required intubation.  PMH of  DMII, HTN, CAD.  Allergy to iodine.  Anticoagulated on Heparin ggt.    Plan:  neurochecks  cont ASA 81 mg daily   cont Heparin ggt,  aPTT goal 60-80  vent support, SBTs daily as tolerated; possible early trach/PEG in view of stroke topology with bulbar dysfunction  bed to chair position, PT/OT  cont TF and FW, BM regimen, PPI for ppx  cont lipitor  stroke core measures  monitor e-lytes; TOV today  SCDs, on Heparin ggt           76 yo M with acute L pontine CVA, possible L vert dissection; admitted 12/26. Worsening neuro exam 12/27 - RUE plegia, worsening bulbar dysfunction.   Acute resp failure due to neurologic injury, required intubation.  Anticoagulated on Heparin ggt.    Plan:  - neurochecks q1  - cont  mg daily   - cont Heparin ggt,  aPTT goal 60-80; most recent 76.8  - cont lipitor  - stroke core measures  - SBP goal 100-180  - vent support, SBTs daily as tolerated; possible early trach/PEG in view of stroke topology with bulbar dysfunction  - bed to chair position, PT/OT  - cont TF and FW, BM regimen, PPI for ppx  - monitor electrolytes  - SCDs, on Heparin ggt           78 yo M with acute L pontine CVA, possible L vert dissection; admitted 12/26. Worsening neuro exam 12/27 - RUE plegia, worsening bulbar dysfunction.   Acute resp failure due to neurologic injury, required intubation.  Anticoagulated on Heparin ggt.    Plan:  - neurochecks q1  - cont  mg daily   - cont Heparin ggt,  aPTT goal 60-80; most recent 76.8  - cont lipitor  - stroke core measures  - SBP goal 100-180  - vent support, SBTs daily as tolerated; possible early trach/PEG in view of stroke topology with bulbar dysfunction  - bed to chair position, PT/OT  - cont TF and FW, BM regimen, PPI for ppx  - monitor electrolytes  - SCDs, on Heparin ggt    Patient is critically ill, requiring critical care services.   I have personally and independently provided 31 minutes of critical care services.    Patient is critically ill and at high risk of rapid deterioration/death due to abovementioned conditions.   Still requires critical care interventions - ongoing frequent evaluations, interventions and management adjustment by the Attending and ICU team, - and included review of relevant history, clinical examination, review of data and images, discussion of treatment with the multidisciplinary team and any consultants involved in this patient’s care as well as family discussion.

## 2022-12-29 NOTE — PROGRESS NOTE ADULT - SUBJECTIVE AND OBJECTIVE BOX
HPI:  77 year old Male with PMH DMII, HTN, CAD was trf from [place] c/o R sided weakness, slurred speech, difficulty swallowing that started 12/25. Code stroke at [place] 12/26, CTH negative, on MRI found to have L debby CVA; out of tPA window. MRA revealing L vertebral artery segmental stenoses and T1 hyperintensity suggesting foci of dissection and/or thrombus. Trf to [facility] admitted to Hospitalist service. 12/27 afternoon, noted to have worsening R sided weakness, worsening dysarthria- code stroke called.   NSICU consulted from Neurology team for higher level of care. Patient seen and examined on floor, denies HA. C/o of swallowing difficulties. Wife bedside. (12/27/2022)  12/27 unable to manage oral secretions and protect own airways, in resp distress - required intubation.      O/n events: none reported.  VS, labs, imaging reviewed.    Exam: off sedation  awake and alert, FC, shakes/nods appropriately, makes hes needs known, pupils are post surgical but reactive BL, EOMs - left gaze palsy noted;  Motor - Right side is plegic 0/5; LUE 5/5 and LLE 4/5. Sensation intact to LT.  CTAB  S1S2 present  Abd soft  No peripheral edema           Patient is a 77 year old Male with PMH DMII, HTN, CAD was transferred from [place] c/o R sided weakness, slurred speech, difficulty swallowing that started 12/25. Code stroke at [place] 12/26, CTH negative, on MRI found to have L debby CVA; out of tPA window. MRA revealing L vertebral artery segmental stenoses and T1 hyperintensity suggesting foci of dissection and/or thrombus. Trf to [facility] admitted to Hospitalist service. 12/27 afternoon, noted to have worsening R sided weakness, worsening dysarthria- code stroke called.   NSICU consulted from Neurology team for higher level of care. Patient seen and examined on floor, denies HA. C/o of swallowing difficulties. Wife bedside. (12/27/2022)  12/27 unable to manage oral secretions and protect own airways, in resp distress - required intubation.    O/n events: patient stable, tolerated CPAP for majority of the day, febrile to 38.7; pancultured  VS, labs, imaging reviewed.    Exam: Intubated, awake and alert, FC, shakes/nods appropriately, makes his needs known, pupils are post surgical but reactive BL, EOMs - left gaze palsy noted;  Motor - Right side is plegic 0/5; LUE 5/5 and LLE 4/5. Sensation intact to LT.  CTAB  S1S2 present  Abd soft  No peripheral edema

## 2022-12-29 NOTE — PROGRESS NOTE ADULT - ASSESSMENT
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  Worsening neuro exam 12/27 - RUE plegia, worsening bulbar dysfunction.   Acute resp failure due to neurologic injury, required intubation.  PMH of  DMII, HTN, CAD.  Allergy to iodine.  Anticoagulated on Heparin ggt.    Plan:  neurochecks  cont ASA 81 mg daily   cont Heparin ggt,  aPTT goal 60-80  vent support, SBTs daily as tolerated; possible early trach/PEG in view of stroke topology with bulbar dysfunction  bed to chair position, PT/OT  cont TF and FW, BM regimen, PPI for ppx  cont lipitor  stroke core measures  monitor e-lytes; TOV today  SCDs, on Heparin ggt

## 2022-12-29 NOTE — PROGRESS NOTE ADULT - SUBJECTIVE AND OBJECTIVE BOX
HPI:  77 year old Male with PMH DMII, HTN, CAD was trf from [place] c/o R sided weakness, slurred speech, difficulty swallowing that started 12/25. Code stroke at [place] 12/26, CTH negative, on MRI found to have L debby CVA; out of tPA window. MRA revealing L vertebral artery segmental stenoses and T1 hyperintensity suggesting foci of dissection and/or thrombus. Trf to [facility] admitted to Hospitalist service. 12/27 afternoon, noted to have worsening R sided weakness, worsening dysarthria- code stroke called.   NSICU consulted from Neurology team for higher level of care. Patient seen and examined on floor, denies HA. C/o of swallowing difficulties. Wife bedside. (12/27/2022)  12/27 unable to manage oral secretions and protect own airways, in resp distress - required intubation.      O/n events: none reported.  VS, labs, imaging reviewed.    Exam: off sedation  awake and alert, FC, shakes/nods appropriately, makes hes needs known, pupils are post surgical but reactive BL, EOMs - left gaze palsy noted;  Motor - Right side is plegic 0/5; LUE 5/5 and LLE 4/5. Sensation intact to LT.  CTAB  S1S2 present  Abd soft  No peripheral edema

## 2022-12-30 LAB
ANION GAP SERPL CALC-SCNC: 14 MMOL/L — SIGNIFICANT CHANGE UP (ref 5–17)
APPEARANCE UR: CLEAR — SIGNIFICANT CHANGE UP
APTT BLD: 72.1 SEC — HIGH (ref 27.5–35.5)
BACTERIA # UR AUTO: ABNORMAL
BILIRUB UR-MCNC: NEGATIVE — SIGNIFICANT CHANGE UP
BUN SERPL-MCNC: 39.9 MG/DL — HIGH (ref 8–20)
CALCIUM SERPL-MCNC: 9 MG/DL — SIGNIFICANT CHANGE UP (ref 8.4–10.5)
CHLORIDE SERPL-SCNC: 110 MMOL/L — HIGH (ref 96–108)
CO2 SERPL-SCNC: 24 MMOL/L — SIGNIFICANT CHANGE UP (ref 22–29)
COLOR SPEC: YELLOW — SIGNIFICANT CHANGE UP
CREAT SERPL-MCNC: 1.12 MG/DL — SIGNIFICANT CHANGE UP (ref 0.5–1.3)
DIFF PNL FLD: ABNORMAL
EGFR: 68 ML/MIN/1.73M2 — SIGNIFICANT CHANGE UP
EPI CELLS # UR: SIGNIFICANT CHANGE UP
GAS PNL BLDA: SIGNIFICANT CHANGE UP
GLUCOSE BLDC GLUCOMTR-MCNC: 154 MG/DL — HIGH (ref 70–99)
GLUCOSE BLDC GLUCOMTR-MCNC: 174 MG/DL — HIGH (ref 70–99)
GLUCOSE BLDC GLUCOMTR-MCNC: 251 MG/DL — HIGH (ref 70–99)
GLUCOSE BLDC GLUCOMTR-MCNC: 265 MG/DL — HIGH (ref 70–99)
GLUCOSE SERPL-MCNC: 233 MG/DL — HIGH (ref 70–99)
GLUCOSE UR QL: 1000 MG/DL
GRAM STN FLD: SIGNIFICANT CHANGE UP
HCT VFR BLD CALC: 38.4 % — LOW (ref 39–50)
HGB BLD-MCNC: 12.7 G/DL — LOW (ref 13–17)
KETONES UR-MCNC: ABNORMAL
LEUKOCYTE ESTERASE UR-ACNC: NEGATIVE — SIGNIFICANT CHANGE UP
MAGNESIUM SERPL-MCNC: 2.6 MG/DL — SIGNIFICANT CHANGE UP (ref 1.6–2.6)
MCHC RBC-ENTMCNC: 29.8 PG — SIGNIFICANT CHANGE UP (ref 27–34)
MCHC RBC-ENTMCNC: 33.1 GM/DL — SIGNIFICANT CHANGE UP (ref 32–36)
MCV RBC AUTO: 90.1 FL — SIGNIFICANT CHANGE UP (ref 80–100)
NITRITE UR-MCNC: NEGATIVE — SIGNIFICANT CHANGE UP
PH UR: 5 — SIGNIFICANT CHANGE UP (ref 5–8)
PHOSPHATE SERPL-MCNC: 2.7 MG/DL — SIGNIFICANT CHANGE UP (ref 2.4–4.7)
PLATELET # BLD AUTO: 219 K/UL — SIGNIFICANT CHANGE UP (ref 150–400)
POTASSIUM SERPL-MCNC: 4.3 MMOL/L — SIGNIFICANT CHANGE UP (ref 3.5–5.3)
POTASSIUM SERPL-SCNC: 4.3 MMOL/L — SIGNIFICANT CHANGE UP (ref 3.5–5.3)
PROCALCITONIN SERPL-MCNC: 4.13 NG/ML — HIGH (ref 0.02–0.1)
PROT UR-MCNC: 15
RBC # BLD: 4.26 M/UL — SIGNIFICANT CHANGE UP (ref 4.2–5.8)
RBC # FLD: 13.7 % — SIGNIFICANT CHANGE UP (ref 10.3–14.5)
RBC CASTS # UR COMP ASSIST: SIGNIFICANT CHANGE UP /HPF (ref 0–4)
SODIUM SERPL-SCNC: 148 MMOL/L — HIGH (ref 135–145)
SP GR SPEC: 1.01 — SIGNIFICANT CHANGE UP (ref 1.01–1.02)
SPECIMEN SOURCE: SIGNIFICANT CHANGE UP
UROBILINOGEN FLD QL: NEGATIVE MG/DL — SIGNIFICANT CHANGE UP
WBC # BLD: 14.72 K/UL — HIGH (ref 3.8–10.5)
WBC # FLD AUTO: 14.72 K/UL — HIGH (ref 3.8–10.5)
WBC UR QL: SIGNIFICANT CHANGE UP /HPF (ref 0–5)

## 2022-12-30 PROCEDURE — 93970 EXTREMITY STUDY: CPT | Mod: 26

## 2022-12-30 PROCEDURE — 99291 CRITICAL CARE FIRST HOUR: CPT

## 2022-12-30 PROCEDURE — 71045 X-RAY EXAM CHEST 1 VIEW: CPT | Mod: 26

## 2022-12-30 RX ORDER — PIPERACILLIN AND TAZOBACTAM 4; .5 G/20ML; G/20ML
3.38 INJECTION, POWDER, LYOPHILIZED, FOR SOLUTION INTRAVENOUS ONCE
Refills: 0 | Status: COMPLETED | OUTPATIENT
Start: 2022-12-30 | End: 2022-12-30

## 2022-12-30 RX ORDER — VANCOMYCIN HCL 1 G
750 VIAL (EA) INTRAVENOUS EVERY 12 HOURS
Refills: 0 | Status: DISCONTINUED | OUTPATIENT
Start: 2022-12-30 | End: 2022-12-31

## 2022-12-30 RX ORDER — ASPIRIN/CALCIUM CARB/MAGNESIUM 324 MG
81 TABLET ORAL DAILY
Refills: 0 | Status: DISCONTINUED | OUTPATIENT
Start: 2022-12-30 | End: 2023-01-03

## 2022-12-30 RX ORDER — DOXAZOSIN MESYLATE 4 MG
2 TABLET ORAL AT BEDTIME
Refills: 0 | Status: DISCONTINUED | OUTPATIENT
Start: 2022-12-30 | End: 2022-12-31

## 2022-12-30 RX ORDER — MUPIROCIN 20 MG/G
1 OINTMENT TOPICAL
Refills: 0 | Status: COMPLETED | OUTPATIENT
Start: 2022-12-30 | End: 2023-01-03

## 2022-12-30 RX ORDER — SODIUM,POTASSIUM PHOSPHATES 278-250MG
1 POWDER IN PACKET (EA) ORAL ONCE
Refills: 0 | Status: COMPLETED | OUTPATIENT
Start: 2022-12-30 | End: 2022-12-30

## 2022-12-30 RX ORDER — PIPERACILLIN AND TAZOBACTAM 4; .5 G/20ML; G/20ML
3.38 INJECTION, POWDER, LYOPHILIZED, FOR SOLUTION INTRAVENOUS EVERY 8 HOURS
Refills: 0 | Status: DISCONTINUED | OUTPATIENT
Start: 2022-12-31 | End: 2023-01-03

## 2022-12-30 RX ORDER — PIPERACILLIN AND TAZOBACTAM 4; .5 G/20ML; G/20ML
3.38 INJECTION, POWDER, LYOPHILIZED, FOR SOLUTION INTRAVENOUS ONCE
Refills: 0 | Status: COMPLETED | OUTPATIENT
Start: 2022-12-31 | End: 2022-12-31

## 2022-12-30 RX ORDER — VANCOMYCIN HCL 1 G
1150 VIAL (EA) INTRAVENOUS EVERY 12 HOURS
Refills: 0 | Status: DISCONTINUED | OUTPATIENT
Start: 2022-12-30 | End: 2022-12-30

## 2022-12-30 RX ORDER — PIPERACILLIN AND TAZOBACTAM 4; .5 G/20ML; G/20ML
3.38 INJECTION, POWDER, LYOPHILIZED, FOR SOLUTION INTRAVENOUS ONCE
Refills: 0 | Status: DISCONTINUED | OUTPATIENT
Start: 2022-12-30 | End: 2022-12-30

## 2022-12-30 RX ADMIN — MUPIROCIN 1 APPLICATION(S): 20 OINTMENT TOPICAL at 05:05

## 2022-12-30 RX ADMIN — DEXMEDETOMIDINE HYDROCHLORIDE IN 0.9% SODIUM CHLORIDE 1.93 MICROGRAM(S)/KG/HR: 4 INJECTION INTRAVENOUS at 15:06

## 2022-12-30 RX ADMIN — Medication 4 MILLILITER(S): at 09:16

## 2022-12-30 RX ADMIN — Medication 2: at 05:10

## 2022-12-30 RX ADMIN — Medication 4 MILLILITER(S): at 15:41

## 2022-12-30 RX ADMIN — Medication 1 PACKET(S): at 07:58

## 2022-12-30 RX ADMIN — PIPERACILLIN AND TAZOBACTAM 25 GRAM(S): 4; .5 INJECTION, POWDER, LYOPHILIZED, FOR SOLUTION INTRAVENOUS at 15:43

## 2022-12-30 RX ADMIN — CHLORHEXIDINE GLUCONATE 15 MILLILITER(S): 213 SOLUTION TOPICAL at 17:04

## 2022-12-30 RX ADMIN — Medication 4 MILLILITER(S): at 03:57

## 2022-12-30 RX ADMIN — SENNA PLUS 2 TABLET(S): 8.6 TABLET ORAL at 21:47

## 2022-12-30 RX ADMIN — Medication 3 MILLILITER(S): at 09:15

## 2022-12-30 RX ADMIN — POLYETHYLENE GLYCOL 3350 17 GRAM(S): 17 POWDER, FOR SOLUTION ORAL at 05:04

## 2022-12-30 RX ADMIN — Medication 250 MILLIGRAM(S): at 09:44

## 2022-12-30 RX ADMIN — HEPARIN SODIUM 10 UNIT(S)/HR: 5000 INJECTION INTRAVENOUS; SUBCUTANEOUS at 20:49

## 2022-12-30 RX ADMIN — POLYETHYLENE GLYCOL 3350 17 GRAM(S): 17 POWDER, FOR SOLUTION ORAL at 17:04

## 2022-12-30 RX ADMIN — Medication 650 MILLIGRAM(S): at 11:15

## 2022-12-30 RX ADMIN — ATORVASTATIN CALCIUM 80 MILLIGRAM(S): 80 TABLET, FILM COATED ORAL at 21:47

## 2022-12-30 RX ADMIN — Medication 650 MILLIGRAM(S): at 12:00

## 2022-12-30 RX ADMIN — CHLORHEXIDINE GLUCONATE 15 MILLILITER(S): 213 SOLUTION TOPICAL at 05:05

## 2022-12-30 RX ADMIN — INSULIN GLARGINE 10 UNIT(S): 100 INJECTION, SOLUTION SUBCUTANEOUS at 08:36

## 2022-12-30 RX ADMIN — MUPIROCIN 1 APPLICATION(S): 20 OINTMENT TOPICAL at 17:06

## 2022-12-30 RX ADMIN — Medication 325 MILLIGRAM(S): at 11:15

## 2022-12-30 RX ADMIN — PIPERACILLIN AND TAZOBACTAM 200 GRAM(S): 4; .5 INJECTION, POWDER, LYOPHILIZED, FOR SOLUTION INTRAVENOUS at 07:58

## 2022-12-30 RX ADMIN — Medication 3 MILLILITER(S): at 21:38

## 2022-12-30 RX ADMIN — Medication 1 DROP(S): at 05:05

## 2022-12-30 RX ADMIN — Medication 4 MILLILITER(S): at 21:38

## 2022-12-30 RX ADMIN — CHLORHEXIDINE GLUCONATE 1 APPLICATION(S): 213 SOLUTION TOPICAL at 11:16

## 2022-12-30 RX ADMIN — PANTOPRAZOLE SODIUM 40 MILLIGRAM(S): 20 TABLET, DELAYED RELEASE ORAL at 11:15

## 2022-12-30 RX ADMIN — LOTEPREDNOL ETABONATE 1 DROP(S): 2 SUSPENSION/ DROPS OPHTHALMIC at 05:05

## 2022-12-30 RX ADMIN — Medication 250 MILLIGRAM(S): at 17:04

## 2022-12-30 RX ADMIN — Medication 3 MILLILITER(S): at 15:41

## 2022-12-30 RX ADMIN — Medication 3 MILLILITER(S): at 03:57

## 2022-12-30 RX ADMIN — Medication 2: at 11:34

## 2022-12-30 RX ADMIN — DEXMEDETOMIDINE HYDROCHLORIDE IN 0.9% SODIUM CHLORIDE 1.93 MICROGRAM(S)/KG/HR: 4 INJECTION INTRAVENOUS at 20:50

## 2022-12-30 NOTE — PROGRESS NOTE ADULT - SUBJECTIVE AND OBJECTIVE BOX
Chief complaint:   Patient is a 77y old  Male who presents with a chief complaint of Acute stroke (29 Dec 2022 21:35)    HPI:  78 y/o male with PMH of DM-2, HTN, CAD was transferred from [place] for stroke. Patient reported right sided weakness and slurred speech along with difficulty swallowing that started yesterday. Was seen at [place] today, code stroke initiated out of window for tPA. CT head: no acute finding. MRI: acute infarct with left debby. MRA head/neck: Left vertebral artery segmental stenoses and T1 hyperintensity suggesting foci of dissection and/or thrombus. As per resident, neurology from [facility] was consulted and accepted patient for further evaluation. Patient said he is upset about his condition, noted discomfort in his head otherwise no chest pain, shortness of breath, palpitation, fever, chills, nausea, vomiting, abdominal pain, change in bowel/urinary habit.  (26 Dec 2022 22:58)        24hr EVENTS:      ROS: [ ]  Unable to assess due to mental status   All other systems negative    -----------------------------------------------------------------------------------------------------------------------------------------------------------------------------------  ICU Vital Signs Last 24 Hrs  T(C): 37.5 (30 Dec 2022 03:00), Max: 38.5 (29 Dec 2022 20:00)  T(F): 99.5 (30 Dec 2022 03:00), Max: 101.3 (29 Dec 2022 20:00)  HR: 69 (30 Dec 2022 08:00) (60 - 80)  BP: 150/72 (30 Dec 2022 08:00) (116/57 - 157/93)  BP(mean): 92 (30 Dec 2022 08:00) (74 - 113)  ABP: --  ABP(mean): --  RR: 16 (30 Dec 2022 08:00) (15 - 20)  SpO2: 95% (30 Dec 2022 08:00) (95% - 98%)    O2 Parameters below as of 30 Dec 2022 08:00  Patient On (Oxygen Delivery Method): ventilator            I&O's Summary    29 Dec 2022 07:01  -  30 Dec 2022 07:00  --------------------------------------------------------  IN: 1931.6 mL / OUT: 2015 mL / NET: -83.4 mL    30 Dec 2022 07:01  -  30 Dec 2022 08:25  --------------------------------------------------------  IN: 135.4 mL / OUT: 30 mL / NET: 105.4 mL        MEDICATIONS  (STANDING):  acetylcysteine 10%  Inhalation 4 milliLiter(s) Inhalation every 6 hours  albuterol/ipratropium for Nebulization 3 milliLiter(s) Nebulizer every 6 hours  aspirin 325 milliGRAM(s) Oral daily  atorvastatin 80 milliGRAM(s) Oral at bedtime  chlorhexidine 0.12% Liquid 15 milliLiter(s) Oral Mucosa every 12 hours  chlorhexidine 2% Cloths 1 Application(s) Topical daily  coronavirus bivalent (EUA) Booster Vaccine (PFIZER) 0.3 milliLiter(s) IntraMuscular once  dexMEDEtomidine Infusion 0.1 MICROgram(s)/kG/Hr (1.93 mL/Hr) IV Continuous <Continuous>  dextrose 5%. 1000 milliLiter(s) (100 mL/Hr) IV Continuous <Continuous>  dextrose 5%. 1000 milliLiter(s) (50 mL/Hr) IV Continuous <Continuous>  dextrose 50% Injectable 25 Gram(s) IV Push once  dextrose 50% Injectable 12.5 Gram(s) IV Push once  dextrose 50% Injectable 25 Gram(s) IV Push once  glucagon  Injectable 1 milliGRAM(s) IntraMuscular once  heparin  Infusion 950 Unit(s)/Hr (10 mL/Hr) IV Continuous <Continuous>  influenza  Vaccine (HIGH DOSE) 0.7 milliLiter(s) IntraMuscular once  insulin glargine Injectable (LANTUS) 10 Unit(s) SubCutaneous every morning  insulin lispro (ADMELOG) corrective regimen sliding scale   SubCutaneous every 6 hours  loteprednol 0.5% Ophthalmic Suspension 1 Drop(s) Right EYE daily  mupirocin 2% Ointment 1 Application(s) Both Nostrils two times a day  pantoprazole   Suspension 40 milliGRAM(s) Oral daily  piperacillin/tazobactam IVPB.- 3.375 Gram(s) IV Intermittent once  piperacillin/tazobactam IVPB.- 3.375 Gram(s) IV Intermittent once  polyethylene glycol 3350 17 Gram(s) Oral two times a day  prednisoLONE acetate 1% Suspension 1 Drop(s) Left EYE daily  senna 2 Tablet(s) Oral at bedtime  vancomycin  IVPB 750 milliGRAM(s) IV Intermittent every 12 hours      RESPIRATORY:  Mode: CPAP with PS  FiO2: 40  PEEP: 5  PS: 10  MAP: 8        IMAGING:   Recent imaging studies were reviewed.    LAB RESULTS:                          12.7   14.72 )-----------( 219      ( 30 Dec 2022 02:45 )             38.4       PTT - ( 30 Dec 2022 02:45 )  PTT:72.1 sec    12-30    148<H>  |  110<H>  |  39.9<H>  ----------------------------<  233<H>  4.3   |  24.0  |  1.12    Ca    9.0      30 Dec 2022 02:45  Phos  2.7     12-30  Mg     2.6     12-30    TPro  6.5<L>  /  Alb  3.4  /  TBili  1.0  /  DBili  x   /  AST  18  /  ALT  12  /  AlkPhos  39<L>  12-29    ABG - ( 29 Dec 2022 04:56 )  pH, Arterial: 7.380 pH, Blood: x     /  pCO2: 43    /  pO2: 95    / HCO3: 25    / Base Excess: 0.3   /  SaO2: 97.5      -----------------------------------------------------------------------------------------------------------------------------------------------------------------------------------    PHYSICAL EXAM:  General: Calm, intubated  HEENT: MMM  Neuro:  -Mental status- No acute distress  -CN- PERRL 3mm, EOMI, tongue midline, face symmetric    CV: RRR  Pulm: clear to auscultation  Abd: Soft, nontender, nondistended  Ext: no noted edema in lower ext  Skin: warm, dry       Chief complaint:   Patient is a 77y old  Male who presents with a chief complaint of Acute stroke (29 Dec 2022 21:35)    HPI:  76 y/o male with PMH of DM-2, HTN, CAD was transferred from [place] for stroke. Patient reported right sided weakness and slurred speech along with difficulty swallowing that started yesterday. Was seen at [place] today, code stroke initiated out of window for tPA. CT head: no acute finding. MRI: acute infarct with left debby. MRA head/neck: Left vertebral artery segmental stenoses and T1 hyperintensity suggesting foci of dissection and/or thrombus. As per resident, neurology from [facility] was consulted and accepted patient for further evaluation. Patient said he is upset about his condition, noted discomfort in his head otherwise no chest pain, shortness of breath, palpitation, fever, chills, nausea, vomiting, abdominal pain, change in bowel/urinary habit.  (26 Dec 2022 22:58)    24hr EVENTS: improved exam  febrile    ROS: wants to leave  All other systems negative    -----------------------------------------------------------------------------------------------------------------------------------------------------------------------------------  ICU Vital Signs Last 24 Hrs  T(C): 37.5 (30 Dec 2022 03:00), Max: 38.5 (29 Dec 2022 20:00)  T(F): 99.5 (30 Dec 2022 03:00), Max: 101.3 (29 Dec 2022 20:00)  HR: 69 (30 Dec 2022 08:00) (60 - 80)  BP: 150/72 (30 Dec 2022 08:00) (116/57 - 157/93)  BP(mean): 92 (30 Dec 2022 08:00) (74 - 113)  ABP: --  ABP(mean): --  RR: 16 (30 Dec 2022 08:00) (15 - 20)  SpO2: 95% (30 Dec 2022 08:00) (95% - 98%)    O2 Parameters below as of 30 Dec 2022 08:00  Patient On (Oxygen Delivery Method): ventilator            I&O's Summary    29 Dec 2022 07:01  -  30 Dec 2022 07:00  --------------------------------------------------------  IN: 1931.6 mL / OUT: 2015 mL / NET: -83.4 mL    30 Dec 2022 07:01  -  30 Dec 2022 08:25  --------------------------------------------------------  IN: 135.4 mL / OUT: 30 mL / NET: 105.4 mL        MEDICATIONS  (STANDING):  acetylcysteine 10%  Inhalation 4 milliLiter(s) Inhalation every 6 hours  albuterol/ipratropium for Nebulization 3 milliLiter(s) Nebulizer every 6 hours  aspirin 325 milliGRAM(s) Oral daily  atorvastatin 80 milliGRAM(s) Oral at bedtime  chlorhexidine 0.12% Liquid 15 milliLiter(s) Oral Mucosa every 12 hours  chlorhexidine 2% Cloths 1 Application(s) Topical daily  coronavirus bivalent (EUA) Booster Vaccine (PFIZER) 0.3 milliLiter(s) IntraMuscular once  dexMEDEtomidine Infusion 0.1 MICROgram(s)/kG/Hr (1.93 mL/Hr) IV Continuous <Continuous>  dextrose 5%. 1000 milliLiter(s) (100 mL/Hr) IV Continuous <Continuous>  dextrose 5%. 1000 milliLiter(s) (50 mL/Hr) IV Continuous <Continuous>  dextrose 50% Injectable 25 Gram(s) IV Push once  dextrose 50% Injectable 12.5 Gram(s) IV Push once  dextrose 50% Injectable 25 Gram(s) IV Push once  glucagon  Injectable 1 milliGRAM(s) IntraMuscular once  heparin  Infusion 950 Unit(s)/Hr (10 mL/Hr) IV Continuous <Continuous>  influenza  Vaccine (HIGH DOSE) 0.7 milliLiter(s) IntraMuscular once  insulin glargine Injectable (LANTUS) 10 Unit(s) SubCutaneous every morning  insulin lispro (ADMELOG) corrective regimen sliding scale   SubCutaneous every 6 hours  loteprednol 0.5% Ophthalmic Suspension 1 Drop(s) Right EYE daily  mupirocin 2% Ointment 1 Application(s) Both Nostrils two times a day  pantoprazole   Suspension 40 milliGRAM(s) Oral daily  piperacillin/tazobactam IVPB.- 3.375 Gram(s) IV Intermittent once  piperacillin/tazobactam IVPB.- 3.375 Gram(s) IV Intermittent once  polyethylene glycol 3350 17 Gram(s) Oral two times a day  prednisoLONE acetate 1% Suspension 1 Drop(s) Left EYE daily  senna 2 Tablet(s) Oral at bedtime  vancomycin  IVPB 750 milliGRAM(s) IV Intermittent every 12 hours      RESPIRATORY:  Mode: CPAP with PS  FiO2: 40  PEEP: 5  PS: 10  MAP: 8        IMAGING:   Recent imaging studies were reviewed.    LAB RESULTS:                          12.7   14.72 )-----------( 219      ( 30 Dec 2022 02:45 )             38.4       PTT - ( 30 Dec 2022 02:45 )  PTT:72.1 sec    12-30    148<H>  |  110<H>  |  39.9<H>  ----------------------------<  233<H>  4.3   |  24.0  |  1.12    Ca    9.0      30 Dec 2022 02:45  Phos  2.7     12-30  Mg     2.6     12-30    TPro  6.5<L>  /  Alb  3.4  /  TBili  1.0  /  DBili  x   /  AST  18  /  ALT  12  /  AlkPhos  39<L>  12-29    ABG - ( 29 Dec 2022 04:56 )  pH, Arterial: 7.380 pH, Blood: x     /  pCO2: 43    /  pO2: 95    / HCO3: 25    / Base Excess: 0.3   /  SaO2: 97.5      -----------------------------------------------------------------------------------------------------------------------------------------------------------------------------------    PHYSICAL EXAM:  General: Calm, intubated  HEENT: MMM  Neuro:  -Mental status- anxious following commands  R gaze pref  -CN- PERRL 3mm, tongue midline, R facial droop  +cough/gag  flaccid RUE and RLE  LUE and LLE full strength  diminished sensation on R    CV: RRR  Pulm: clear to auscultation  Abd: Soft, nontender, nondistended  Ext: no noted edema in lower ext  Skin: warm, dry

## 2022-12-30 NOTE — PROGRESS NOTE ADULT - ASSESSMENT
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  Worsening neuro exam 12/27 - RUE plegia, worsening bulbar dysfunction.   Acute resp failure due to neurologic injury, required intubation.  PMH of  DMII, HTN, CAD.  Allergy to iodine.  Anticoagulated on Heparin ggt.    Plan:  neurochecks  cont ASA 81 mg daily   cont Heparin ggt,  aPTT goal 60-80  vent support, SBTs daily as tolerated; possible early trach/PEG in view of stroke topology with bulbar dysfunction  bed to chair position, PT/OT  cont TF and FW, BM regimen, PPI for ppx  cont lipitor  stroke core measures  monitor e-lytes; TOV today  SCDs, on Heparin ggt           76 yo M with acute L pontine CVA, possible L vert dissection; admitted 12/26.  Worsening neuro exam 12/27 - RUE plegia, worsening bulbar dysfunction.   Acute resp failure due to neurologic injury, required intubation.  PMH of  DMII, HTN, CAD.  Allergy to iodine.  Anticoagulated on Heparin ggt.    NEURO:   SBP<160  q2hr neurochecks  neurochecks  cont ASA 81 mg daily   cont Heparin ggt,  aPTT goal 60-80  pain mgt: tylenol and oxy 5 prn  Activity: [x] mobilize as tolerated [] Bedrest [x] PT [x] OT [] PMNR    PULM: vent support, SBTs daily as tolerated; possible early trach/PEG in view of stroke topology with bulbar dysfunction  bed to chair position, PT/OT  SpO2>92%  incentive spirometry     CV:  SBP goal <160  lipitor  ASA    RENAL: monitor I/O  replete lytes prn  dc tierney  NS while nPO    GI:  Diet: hold TF for possible extubation  GI prophylaxis [x] PPI  zofran prn for nausea  Bowel regimen [x] senna [] other: miralax    ENDO:   Goal euglycemia (-180)  lantus  ISS    HEME/ONC:  VTE prophylaxis: [] SCDs [x] chemoprophylaxis hep gtt  hep gtt (AC pending trach/peg)    ID: febrile   start empiric vanco and zosyn  follow up cultures    MISC:    I affirm that this patient is critically ill and at high risk for sudden, fatal deterioration due to one or more of the above stated active issues.     This time does not include bedside procedures that are documented separately.

## 2022-12-31 DIAGNOSIS — J96.01 ACUTE RESPIRATORY FAILURE WITH HYPOXIA: ICD-10-CM

## 2022-12-31 LAB
ANION GAP SERPL CALC-SCNC: 13 MMOL/L — SIGNIFICANT CHANGE UP (ref 5–17)
APTT BLD: 67.2 SEC — HIGH (ref 27.5–35.5)
BASOPHILS # BLD AUTO: 0.03 K/UL — SIGNIFICANT CHANGE UP (ref 0–0.2)
BASOPHILS NFR BLD AUTO: 0.2 % — SIGNIFICANT CHANGE UP (ref 0–2)
BUN SERPL-MCNC: 36 MG/DL — HIGH (ref 8–20)
CALCIUM SERPL-MCNC: 9.1 MG/DL — SIGNIFICANT CHANGE UP (ref 8.4–10.5)
CHLORIDE SERPL-SCNC: 108 MMOL/L — SIGNIFICANT CHANGE UP (ref 96–108)
CO2 SERPL-SCNC: 27 MMOL/L — SIGNIFICANT CHANGE UP (ref 22–29)
CREAT SERPL-MCNC: 1.2 MG/DL — SIGNIFICANT CHANGE UP (ref 0.5–1.3)
EGFR: 62 ML/MIN/1.73M2 — SIGNIFICANT CHANGE UP
EOSINOPHIL # BLD AUTO: 0.06 K/UL — SIGNIFICANT CHANGE UP (ref 0–0.5)
EOSINOPHIL NFR BLD AUTO: 0.4 % — SIGNIFICANT CHANGE UP (ref 0–6)
GLUCOSE BLDC GLUCOMTR-MCNC: 172 MG/DL — HIGH (ref 70–99)
GLUCOSE BLDC GLUCOMTR-MCNC: 183 MG/DL — HIGH (ref 70–99)
GLUCOSE BLDC GLUCOMTR-MCNC: 197 MG/DL — HIGH (ref 70–99)
GLUCOSE SERPL-MCNC: 174 MG/DL — HIGH (ref 70–99)
HCT VFR BLD CALC: 38.9 % — LOW (ref 39–50)
HGB BLD-MCNC: 12.7 G/DL — LOW (ref 13–17)
IMM GRANULOCYTES NFR BLD AUTO: 1 % — HIGH (ref 0–0.9)
LYMPHOCYTES # BLD AUTO: 1.05 K/UL — SIGNIFICANT CHANGE UP (ref 1–3.3)
LYMPHOCYTES # BLD AUTO: 7.1 % — LOW (ref 13–44)
MAGNESIUM SERPL-MCNC: 2.4 MG/DL — SIGNIFICANT CHANGE UP (ref 1.6–2.6)
MCHC RBC-ENTMCNC: 29.5 PG — SIGNIFICANT CHANGE UP (ref 27–34)
MCHC RBC-ENTMCNC: 32.6 GM/DL — SIGNIFICANT CHANGE UP (ref 32–36)
MCV RBC AUTO: 90.3 FL — SIGNIFICANT CHANGE UP (ref 80–100)
MONOCYTES # BLD AUTO: 1.2 K/UL — HIGH (ref 0–0.9)
MONOCYTES NFR BLD AUTO: 8.1 % — SIGNIFICANT CHANGE UP (ref 2–14)
NEUTROPHILS # BLD AUTO: 12.27 K/UL — HIGH (ref 1.8–7.4)
NEUTROPHILS NFR BLD AUTO: 83.2 % — HIGH (ref 43–77)
PHOSPHATE SERPL-MCNC: 3.8 MG/DL — SIGNIFICANT CHANGE UP (ref 2.4–4.7)
PLATELET # BLD AUTO: 237 K/UL — SIGNIFICANT CHANGE UP (ref 150–400)
POTASSIUM SERPL-MCNC: 4 MMOL/L — SIGNIFICANT CHANGE UP (ref 3.5–5.3)
POTASSIUM SERPL-SCNC: 4 MMOL/L — SIGNIFICANT CHANGE UP (ref 3.5–5.3)
RBC # BLD: 4.31 M/UL — SIGNIFICANT CHANGE UP (ref 4.2–5.8)
RBC # FLD: 13.7 % — SIGNIFICANT CHANGE UP (ref 10.3–14.5)
SODIUM SERPL-SCNC: 148 MMOL/L — HIGH (ref 135–145)
VANCOMYCIN TROUGH SERPL-MCNC: 11 UG/ML — SIGNIFICANT CHANGE UP (ref 10–20)
WBC # BLD: 14.76 K/UL — HIGH (ref 3.8–10.5)
WBC # FLD AUTO: 14.76 K/UL — HIGH (ref 3.8–10.5)

## 2022-12-31 PROCEDURE — 99222 1ST HOSP IP/OBS MODERATE 55: CPT

## 2022-12-31 PROCEDURE — 71045 X-RAY EXAM CHEST 1 VIEW: CPT | Mod: 26

## 2022-12-31 PROCEDURE — 99291 CRITICAL CARE FIRST HOUR: CPT

## 2022-12-31 PROCEDURE — 99223 1ST HOSP IP/OBS HIGH 75: CPT

## 2022-12-31 RX ORDER — DOXAZOSIN MESYLATE 4 MG
2 TABLET ORAL AT BEDTIME
Refills: 0 | Status: DISCONTINUED | OUTPATIENT
Start: 2022-12-31 | End: 2022-12-31

## 2022-12-31 RX ORDER — VANCOMYCIN HCL 1 G
1000 VIAL (EA) INTRAVENOUS EVERY 12 HOURS
Refills: 0 | Status: DISCONTINUED | OUTPATIENT
Start: 2022-12-31 | End: 2023-01-02

## 2022-12-31 RX ORDER — DOXAZOSIN MESYLATE 4 MG
2 TABLET ORAL AT BEDTIME
Refills: 0 | Status: DISCONTINUED | OUTPATIENT
Start: 2022-12-31 | End: 2023-01-03

## 2022-12-31 RX ADMIN — POLYETHYLENE GLYCOL 3350 17 GRAM(S): 17 POWDER, FOR SOLUTION ORAL at 17:11

## 2022-12-31 RX ADMIN — CHLORHEXIDINE GLUCONATE 15 MILLILITER(S): 213 SOLUTION TOPICAL at 17:11

## 2022-12-31 RX ADMIN — Medication 3 MILLILITER(S): at 16:07

## 2022-12-31 RX ADMIN — Medication 650 MILLIGRAM(S): at 05:45

## 2022-12-31 RX ADMIN — Medication 4 MILLILITER(S): at 16:07

## 2022-12-31 RX ADMIN — POLYETHYLENE GLYCOL 3350 17 GRAM(S): 17 POWDER, FOR SOLUTION ORAL at 05:44

## 2022-12-31 RX ADMIN — Medication 3 MILLILITER(S): at 21:14

## 2022-12-31 RX ADMIN — Medication 250 MILLIGRAM(S): at 05:45

## 2022-12-31 RX ADMIN — PIPERACILLIN AND TAZOBACTAM 25 GRAM(S): 4; .5 INJECTION, POWDER, LYOPHILIZED, FOR SOLUTION INTRAVENOUS at 22:01

## 2022-12-31 RX ADMIN — Medication 3 MILLILITER(S): at 05:11

## 2022-12-31 RX ADMIN — CHLORHEXIDINE GLUCONATE 1 APPLICATION(S): 213 SOLUTION TOPICAL at 11:29

## 2022-12-31 RX ADMIN — MUPIROCIN 1 APPLICATION(S): 20 OINTMENT TOPICAL at 17:11

## 2022-12-31 RX ADMIN — Medication 4 MILLILITER(S): at 05:10

## 2022-12-31 RX ADMIN — Medication 650 MILLIGRAM(S): at 06:15

## 2022-12-31 RX ADMIN — Medication 2 MILLIGRAM(S): at 22:01

## 2022-12-31 RX ADMIN — Medication 4 MILLILITER(S): at 21:14

## 2022-12-31 RX ADMIN — DEXMEDETOMIDINE HYDROCHLORIDE IN 0.9% SODIUM CHLORIDE 1.93 MICROGRAM(S)/KG/HR: 4 INJECTION INTRAVENOUS at 05:51

## 2022-12-31 RX ADMIN — PIPERACILLIN AND TAZOBACTAM 25 GRAM(S): 4; .5 INJECTION, POWDER, LYOPHILIZED, FOR SOLUTION INTRAVENOUS at 13:37

## 2022-12-31 RX ADMIN — PIPERACILLIN AND TAZOBACTAM 25 GRAM(S): 4; .5 INJECTION, POWDER, LYOPHILIZED, FOR SOLUTION INTRAVENOUS at 00:30

## 2022-12-31 RX ADMIN — Medication 3 MILLILITER(S): at 09:03

## 2022-12-31 RX ADMIN — LOTEPREDNOL ETABONATE 1 DROP(S): 2 SUSPENSION/ DROPS OPHTHALMIC at 05:56

## 2022-12-31 RX ADMIN — Medication 4 MILLILITER(S): at 09:03

## 2022-12-31 RX ADMIN — CHLORHEXIDINE GLUCONATE 15 MILLILITER(S): 213 SOLUTION TOPICAL at 05:46

## 2022-12-31 RX ADMIN — ATORVASTATIN CALCIUM 80 MILLIGRAM(S): 80 TABLET, FILM COATED ORAL at 22:01

## 2022-12-31 RX ADMIN — Medication 10 MILLIGRAM(S): at 01:07

## 2022-12-31 RX ADMIN — MUPIROCIN 1 APPLICATION(S): 20 OINTMENT TOPICAL at 05:45

## 2022-12-31 RX ADMIN — Medication 250 MILLIGRAM(S): at 17:13

## 2022-12-31 RX ADMIN — Medication 1 DROP(S): at 05:56

## 2022-12-31 RX ADMIN — INSULIN GLARGINE 10 UNIT(S): 100 INJECTION, SOLUTION SUBCUTANEOUS at 08:08

## 2022-12-31 RX ADMIN — SENNA PLUS 2 TABLET(S): 8.6 TABLET ORAL at 22:01

## 2022-12-31 RX ADMIN — Medication 81 MILLIGRAM(S): at 11:30

## 2022-12-31 RX ADMIN — Medication 2 MILLIGRAM(S): at 01:36

## 2022-12-31 RX ADMIN — PIPERACILLIN AND TAZOBACTAM 25 GRAM(S): 4; .5 INJECTION, POWDER, LYOPHILIZED, FOR SOLUTION INTRAVENOUS at 05:45

## 2022-12-31 RX ADMIN — PANTOPRAZOLE SODIUM 40 MILLIGRAM(S): 20 TABLET, DELAYED RELEASE ORAL at 11:29

## 2022-12-31 NOTE — PROGRESS NOTE ADULT - SUBJECTIVE AND OBJECTIVE BOX
I fit Ted with a Right resting hand brace, and a right PRAFO as requested. Interface socks were provided for both braces. Braces should be wiped clean daily.  [facility]  [phone]

## 2022-12-31 NOTE — CONSULT NOTE ADULT - SUBJECTIVE AND OBJECTIVE BOX
Surgeon: Mary    Consult requesting by: Smith    HISTORY OF PRESENT ILLNESS:   76 yo M with PMH HTN, DM, CAD.  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 Worsening neuro exam  - RUE plegia, worsening bulbar dysfunction.   Acute resp failure due to neurologic injury, required intubation.  Thoracic surgery consulted for trach and peg eval.    Pt lying in bed intubated.  NAD noted.     PAST MEDICAL & SURGICAL HISTORY:  HTN (hypertension)      CAD (coronary artery disease)      DM (diabetes mellitus)          MEDICATIONS  (STANDING):  acetylcysteine 10%  Inhalation 4 milliLiter(s) Inhalation every 6 hours  albuterol/ipratropium for Nebulization 3 milliLiter(s) Nebulizer every 6 hours  aspirin 81 milliGRAM(s) Oral daily  atorvastatin 80 milliGRAM(s) Oral at bedtime  bisacodyl Suppository 10 milliGRAM(s) Rectal daily  chlorhexidine 0.12% Liquid 15 milliLiter(s) Oral Mucosa every 12 hours  chlorhexidine 2% Cloths 1 Application(s) Topical daily  coronavirus bivalent (EUA) Booster Vaccine (PFIZER) 0.3 milliLiter(s) IntraMuscular once  dexMEDEtomidine Infusion 0.1 MICROgram(s)/kG/Hr (1.93 mL/Hr) IV Continuous <Continuous>  dextrose 5%. 1000 milliLiter(s) (100 mL/Hr) IV Continuous <Continuous>  dextrose 5%. 1000 milliLiter(s) (50 mL/Hr) IV Continuous <Continuous>  dextrose 50% Injectable 25 Gram(s) IV Push once  dextrose 50% Injectable 12.5 Gram(s) IV Push once  dextrose 50% Injectable 25 Gram(s) IV Push once  doxazosin 2 milliGRAM(s) Oral at bedtime  glucagon  Injectable 1 milliGRAM(s) IntraMuscular once  heparin  Infusion 950 Unit(s)/Hr (10 mL/Hr) IV Continuous <Continuous>  influenza  Vaccine (HIGH DOSE) 0.7 milliLiter(s) IntraMuscular once  insulin glargine Injectable (LANTUS) 10 Unit(s) SubCutaneous every morning  insulin lispro (ADMELOG) corrective regimen sliding scale   SubCutaneous every 6 hours  loteprednol 0.5% Ophthalmic Suspension 1 Drop(s) Right EYE daily  mupirocin 2% Ointment 1 Application(s) Both Nostrils two times a day  pantoprazole   Suspension 40 milliGRAM(s) Oral daily  piperacillin/tazobactam IVPB.. 3.375 Gram(s) IV Intermittent every 8 hours  polyethylene glycol 3350 17 Gram(s) Oral two times a day  prednisoLONE acetate 1% Suspension 1 Drop(s) Left EYE daily  senna 2 Tablet(s) Oral at bedtime  vancomycin  IVPB 1000 milliGRAM(s) IV Intermittent every 12 hours    MEDICATIONS  (PRN):  acetaminophen     Tablet .. 650 milliGRAM(s) Oral every 6 hours PRN Temp greater or equal to 38C (100.4F), Mild Pain (1 - 3)  aluminum hydroxide/magnesium hydroxide/simethicone Suspension 30 milliLiter(s) Oral every 4 hours PRN Dyspepsia  dextrose Oral Gel 15 Gram(s) Oral once PRN Blood Glucose LESS THAN 70 milliGRAM(s)/deciliter  hydrALAZINE Injectable 10 milliGRAM(s) IV Push every 2 hours PRN SBP >180  labetalol Injectable 10 milliGRAM(s) IV Push every 2 hours PRN SBP >180  melatonin 3 milliGRAM(s) Oral at bedtime PRN Insomnia  ondansetron Injectable 4 milliGRAM(s) IV Push every 8 hours PRN Nausea and/or Vomiting    Antiplatelet therapy:                           Last dose/amt:    Allergies    iodine (Anaphylaxis (Mod to Severe))    Intolerances        SOCIAL HISTORY:  Smoker: [ ] Yes  [ ] No        PACK YEARS:                         WHEN QUIT?  ETOH use: [ ] Yes  [ ] No              FREQUENCY / QUANTITY:  Ilicit Drug use:  [ ] Yes  [ ] No  Occupation:  Live with:  Assisted device use:    FAMILY HISTORY:  No pertinent family history in first degree relatives        Review of Systems * Limited ROS.  Pt intubated.  CONSTITUTIONAL:  Fevers[ ] chills[ ] sweats[ ] fatigue[ ] weight loss[ ] weight gain [ ]                                     NEGATIVE [ ]   NEURO:  parathesias[ ] seizures [ ]  syncope [ ]  confusion [ ]                                                                                NEGATIVE[ ]   EYES: glasses[ ]  blurry vision[ ]  discharge[ ] pain[ ] glaucoma [ ]                                                                          NEGATIVE[ ]   ENMT:  difficulty hearing [ ]  vertigo[ ]  dysphagia[ ] epistaxis[ ] recent dental work [ ]                                    NEGATIVE[ ]   CV:  chest pain[ ] palpitations[ ] ABDI [ ] diaphoresis [ ]                                                                                           NEGATIVE[ ]   RESPIRATORY:  wheezing[ ] SOB[ ] cough [ ] sputum[ ] hemoptysis[ ]                                                                  NEGATIVE[ ]   GI:  nausea[ ]  vommiting [ ]  diarrhea[ ] constipation [ ] melena [ ]                                                                      NEGATIVE[ ]   : hematuria[ ]  dysuria[ ] urgency[ ] incontinence[ ]                                                                                            NEGATIVE[ ]   MUSKULOSKELETAL:  arthritis[ ]  joint swelling [ ] muscle weakness [ ]                                                                NEGATIVE[ ]   SKIN/BREAST:  rash[ ] itching [ ]  hair loss[ ] masses[ ]                                                                                              NEGATIVE[ ]   PSYCH:  dementia [ ] depresion [ ] anxiety[ ]                                                                                                               NEGATIVE[ ]   HEME/LYMPH:  bruises easily[ ] enlarged lymph nodes[ ] tender lymph nodes[ ]                                               NEGATIVE[ ]   ENDOCRINE:  cold intolerance[ ] heat intolerance[ ] polydipsia[ ]                                                                          NEGATIVE[ ]     PHYSICAL EXAM  Vital Signs Last 24 Hrs  T(C): 37.2 (31 Dec 2022 12:00), Max: 38 (31 Dec 2022 05:00)  T(F): 98.9 (31 Dec 2022 12:00), Max: 100.4 (31 Dec 2022 05:00)  HR: 56 (31 Dec 2022 18:00) (50 - 67)  BP: 146/61 (31 Dec 2022 18:00) (120/99 - 165/73)  BP(mean): 87 (31 Dec 2022 18:00) (82 - 97)  RR: 16 (31 Dec 2022 08:00) (16 - 16)  SpO2: 96% (31 Dec 2022 18:00) (92% - 99%)    Parameters below as of 31 Dec 2022 08:00  Patient On (Oxygen Delivery Method): ventilator        CONSTITUTIONAL:                                                                           Neuro: WNL[ ] Normal exam oriented to person/place & time with no focal motor or sensory  deficits. Other                     Eyes: WNL[ ]   Normal exam of conjunctiva & lids, pupils equally reactive. Other     ENT: WNL[ ]    Normal exam of nasal/oral mucosa with absence of cyanosis. Other  Neck: WNL[ ]  Normal exam of jugular veins, trachea & thyroid. Other  Chest: WNL[ ] Normal lung exam with good air movement absence of wheezes, rales, or rhonchi: Other                                                                                CV:  Auscultation: normal [ ] S3[ ] S4[ ] Irregular [ ] Rub[ ] Clicks[ ]    Murmurs none:[ ]systolic [ ]  diastolic [ ] holosystolic [ ]  Carotids: No Bruits[ ] Other                 Abdominal Aorta: normal [ ] nonpalpable[ ]Other                                                                                      GI:           WNL[ ] Normal exam of abdomen, liver & spleen with no noted masses or tenderness. Other                                                                                                        Extremities: WNL[ ] Normal no evidence of cyanosis or deformity Edema: none[ ]trace[ ]1+[ ]2+[ ]3+[ ]4+[ ]  Lower Extremity Pulses: Right[ ] Left[ ]Varicosities[ ]  SKIN :WNL[ ] Normal exam to inspection & palation. Other:                                                          LABS:                        12.7   14.76 )-----------( 237      ( 31 Dec 2022 02:59 )             38.9     12-    148<H>  |  108  |  36.0<H>  ----------------------------<  174<H>  4.0   |  27.0  |  1.20    Ca    9.1      31 Dec 2022 02:59  Phos  3.8       Mg     2.4           PTT - ( 31 Dec 2022 02:59 )  PTT:67.2 sec  Urinalysis Basic - ( 30 Dec 2022 02:50 )    Color: Yellow / Appearance: Clear / S.015 / pH: x  Gluc: x / Ketone: Moderate  / Bili: Negative / Urobili: Negative mg/dL   Blood: x / Protein: 15 / Nitrite: Negative   Leuk Esterase: Negative / RBC: 0-2 /HPF / WBC 0-2 /HPF   Sq Epi: x / Non Sq Epi: Occasional / Bacteria: Occasional                                 Surgeon: Mary    Consult requesting by: Smith    HISTORY OF PRESENT ILLNESS:   76 yo M with PMH HTN, DM, CAD.  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 Worsening neuro exam  - RUE plegia, worsening bulbar dysfunction.   Acute resp failure due to neurologic injury, required intubation.  Thoracic surgery consulted for trach and peg eval.    Pt lying in bed intubated.  NAD noted.     PAST MEDICAL & SURGICAL HISTORY:  HTN (hypertension)      CAD (coronary artery disease)      DM (diabetes mellitus)          MEDICATIONS  (STANDING):  acetylcysteine 10%  Inhalation 4 milliLiter(s) Inhalation every 6 hours  albuterol/ipratropium for Nebulization 3 milliLiter(s) Nebulizer every 6 hours  aspirin 81 milliGRAM(s) Oral daily  atorvastatin 80 milliGRAM(s) Oral at bedtime  bisacodyl Suppository 10 milliGRAM(s) Rectal daily  chlorhexidine 0.12% Liquid 15 milliLiter(s) Oral Mucosa every 12 hours  chlorhexidine 2% Cloths 1 Application(s) Topical daily  coronavirus bivalent (EUA) Booster Vaccine (PFIZER) 0.3 milliLiter(s) IntraMuscular once  dexMEDEtomidine Infusion 0.1 MICROgram(s)/kG/Hr (1.93 mL/Hr) IV Continuous <Continuous>  dextrose 5%. 1000 milliLiter(s) (100 mL/Hr) IV Continuous <Continuous>  dextrose 5%. 1000 milliLiter(s) (50 mL/Hr) IV Continuous <Continuous>  dextrose 50% Injectable 25 Gram(s) IV Push once  dextrose 50% Injectable 12.5 Gram(s) IV Push once  dextrose 50% Injectable 25 Gram(s) IV Push once  doxazosin 2 milliGRAM(s) Oral at bedtime  glucagon  Injectable 1 milliGRAM(s) IntraMuscular once  heparin  Infusion 950 Unit(s)/Hr (10 mL/Hr) IV Continuous <Continuous>  influenza  Vaccine (HIGH DOSE) 0.7 milliLiter(s) IntraMuscular once  insulin glargine Injectable (LANTUS) 10 Unit(s) SubCutaneous every morning  insulin lispro (ADMELOG) corrective regimen sliding scale   SubCutaneous every 6 hours  loteprednol 0.5% Ophthalmic Suspension 1 Drop(s) Right EYE daily  mupirocin 2% Ointment 1 Application(s) Both Nostrils two times a day  pantoprazole   Suspension 40 milliGRAM(s) Oral daily  piperacillin/tazobactam IVPB.. 3.375 Gram(s) IV Intermittent every 8 hours  polyethylene glycol 3350 17 Gram(s) Oral two times a day  prednisoLONE acetate 1% Suspension 1 Drop(s) Left EYE daily  senna 2 Tablet(s) Oral at bedtime  vancomycin  IVPB 1000 milliGRAM(s) IV Intermittent every 12 hours    MEDICATIONS  (PRN):  acetaminophen     Tablet .. 650 milliGRAM(s) Oral every 6 hours PRN Temp greater or equal to 38C (100.4F), Mild Pain (1 - 3)  aluminum hydroxide/magnesium hydroxide/simethicone Suspension 30 milliLiter(s) Oral every 4 hours PRN Dyspepsia  dextrose Oral Gel 15 Gram(s) Oral once PRN Blood Glucose LESS THAN 70 milliGRAM(s)/deciliter  hydrALAZINE Injectable 10 milliGRAM(s) IV Push every 2 hours PRN SBP >180  labetalol Injectable 10 milliGRAM(s) IV Push every 2 hours PRN SBP >180  melatonin 3 milliGRAM(s) Oral at bedtime PRN Insomnia  ondansetron Injectable 4 milliGRAM(s) IV Push every 8 hours PRN Nausea and/or Vomiting    Antiplatelet therapy:                           Last dose/amt:    Allergies    iodine (Anaphylaxis (Mod to Severe))    Intolerances        SOCIAL HISTORY:  Unable to assess. Pt intubatedx  Smoker: [ ] Yes  [ ] No        PACK YEARS:                         WHEN QUIT?  ETOH use: [ ] Yes  [ ] No              FREQUENCY / QUANTITY:  Ilicit Drug use:  [ ] Yes  [ ] No  Occupation:  Live with:  Assisted device use:    FAMILY HISTORY:  No pertinent family history in first degree relatives        Review of Systems * Limited ROS.  Pt intubated.  CONSTITUTIONAL:  Fevers[ ] chills[ ] sweats[ ] fatigue[ ] weight loss[ ] weight gain [ ]                                     NEGATIVE [ ]   NEURO:  parathesias[ ] seizures [ ]  syncope [ ]  confusion [ ]                                                                                NEGATIVE[x ]   EYES: glasses[ ]  blurry vision[ ]  discharge[ ] pain[ ] glaucoma [ ]                                                                          NEGATIVE[x ]   ENMT:  difficulty hearing [ ]  vertigo[ ]  dysphagia[ ] epistaxis[ ] recent dental work [ ]                                    NEGATIVE[ x]   CV:  chest pain[ ] palpitations[ ] ABDI [ ] diaphoresis [ ]                                                                                           NEGATIVE[ x]   RESPIRATORY:  wheezing[ ] SOB[ ] cough [ ] sputum[ ] hemoptysis[ ]                                                                  NEGATIVE[x ]   GI:  nausea[ ]  vommiting [ ]  diarrhea[ ] constipation [ ] melena [ ]                                                                      NEGATIVE[x ]   : hematuria[ ]  dysuria[ ] urgency[ ] incontinence[ ]                                                                                            NEGATIVE[x ]   MUSKULOSKELETAL:  arthritis[ ]  joint swelling [ ] muscle weakness [ ]                                                                NEGATIVE[ x  SKIN/BREAST:  rash[ ] itching [ ]  hair loss[ ] masses[ ]                                                                                              NEGATIVE[x ]   PSYCH:  dementia [ ] depresion [ ] anxiety[ ]                                                                                                               NEGATIVE[x ]   HEME/LYMPH:  bruises easily[ ] enlarged lymph nodes[ ] tender lymph nodes[ ]                                               NEGATIVE[x ]   ENDOCRINE:  cold intolerance[ ] heat intolerance[ ] polydipsia[ ]                                                                          NEGATIVE[x ]     PHYSICAL EXAM  Vital Signs Last 24 Hrs  T(C): 37.2 (31 Dec 2022 12:00), Max: 38 (31 Dec 2022 05:00)  T(F): 98.9 (31 Dec 2022 12:00), Max: 100.4 (31 Dec 2022 05:00)  HR: 56 (31 Dec 2022 18:00) (50 - 67)  BP: 146/61 (31 Dec 2022 18:00) (120/99 - 165/73)  BP(mean): 87 (31 Dec 2022 18:00) (82 - 97)  RR: 16 (31 Dec 2022 08:00) (16 - 16)  SpO2: 96% (31 Dec 2022 18:00) (92% - 99%)    Parameters below as of 31 Dec 2022 08:00  Patient On (Oxygen Delivery Method): ventilator        CONSTITUTIONAL:           Pt intubated.  Limited exam                                                                Neuro: WNL[ ] Normal exam oriented to person/place & time with no focal motor or sensory  deficits. Other     Pt intubated .  Moves left arm spontaneously.                  Eyes: WNL[x ]   Normal exam of conjunctiva & lids, pupils equally reactive. Other     ENT: WNL[ x]    Normal exam of nasal/oral mucosa with absence of cyanosis. Other  Neck: WNL[x ]  Normal exam of jugular veins, trachea & thyroid. Other  Chest: WNL[x ] Normal lung exam with good air movement absence of wheezes, rales, or rhonchi: Other                                                                                CV:  Auscultation: normal [ x] S3[ ] S4[ ] Irregular [ ] Rub[ ] Clicks[ ]    Murmurs none:[x ]systolic [ ]  diastolic [ ] holosystolic [ ]  Carotids: No Bruits[ x] Other                 Abdominal Aorta: normal [ ] nonpalpable[x ]Other                                                                                      GI:           WNL[x ] Normal exam of abdomen, liver & spleen with no noted masses or tenderness. Other                                                                                                        Extremities: WNL[x ] Normal no evidence of cyanosis or deformity Edema: none[ x]trace[ ]1+[ ]2+[ ]3+[ ]4+[ ]  Lower Extremity Pulses: Right[pp ] Left[ pp]  SKIN :WNL[ x] Normal exam to inspection & palpation. Other:                                                          LABS:                        12.7   14.76 )-----------( 237      ( 31 Dec 2022 02:59 )             38.9     12-    148<H>  |  108  |  36.0<H>  ----------------------------<  174<H>  4.0   |  27.0  |  1.20    Ca    9.1      31 Dec 2022 02:59  Phos  3.8     12-  Mg     2.4     12-31      PTT - ( 31 Dec 2022 02:59 )  PTT:67.2 sec  Urinalysis Basic - ( 30 Dec 2022 02:50 )    Color: Yellow / Appearance: Clear / S.015 / pH: x  Gluc: x / Ketone: Moderate  / Bili: Negative / Urobili: Negative mg/dL   Blood: x / Protein: 15 / Nitrite: Negative   Leuk Esterase: Negative / RBC: 0-2 /HPF / WBC 0-2 /HPF   Sq Epi: x / Non Sq Epi: Occasional / Bacteria: Occasional

## 2022-12-31 NOTE — CONSULT NOTE ADULT - ASSESSMENT
78 yo M with PMH HTN, DM, CAD.  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 Worsening neuro exam 12/27 - RUE plegia, worsening bulbar dysfunction.   Acute resp failure due to neurologic injury, required intubation.  Thoracic surgery consulted for trach and peg eval.

## 2022-12-31 NOTE — PROGRESS NOTE ADULT - ASSESSMENT
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  Worsening neuro exam 12/27 - RUE plegia, worsening bulbar dysfunction.   Acute resp failure due to neurologic injury, required intubation.  PMH of  DMII, HTN, CAD.  Allergy to iodine.  Anticoagulated on Heparin ggt.  tracheal aspirate GPCs 12/30    NEURO:   SBP<160  q2hr neurochecks  cont ASA 81 mg daily   cont Heparin ggt,  aPTT goal 60-80  pain mgt: tylenol and oxy 5 prn  Activity: [x] mobilize as tolerated [] Bedrest [x] PT [x] OT [] PMNR    PULM: vent support, SBTs daily as tolerated; possible early trach/PEG in view of stroke topology with bulbar dysfunction  trial of extubation today  bed to chair position, PT/OT  SpO2>92%  incentive spirometry     CV:  SBP goal <160  lipitor  ASA    RENAL: monitor I/O  replete lytes prn  NS while nPO    GI:  Diet: hold TF for possible extubation  GI prophylaxis [x] PPI  zofran prn for nausea  Bowel regimen [x] senna [] other: miralax    ENDO:   Goal euglycemia (-180)  lantus  ISS    HEME/ONC:  VTE prophylaxis: [] SCDs [x] chemoprophylaxis hep gtt  hep gtt (AC pending trach/peg)    ID: febrile   start empiric vanco and zosyn  follow up cultures    MISC:    I affirm that this patient is critically ill and at high risk for sudden, fatal deterioration due to one or more of the above stated active issues.     This time does not include bedside procedures that are documented separately.           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  Worsening neuro exam 12/27 - RUE plegia, worsening bulbar dysfunction.   Acute resp failure due to neurologic injury, required intubation.  PMH of  DMII, HTN, CAD.  Allergy to iodine.  Anticoagulated on Heparin ggt.  tracheal aspirate GPCs 12/30- PNA    NEURO:   q4hr neurochecks  cont ASA 81 mg daily   cont Heparin ggt,  aPTT goal 60-80  precedex for sedation  pain mgt: tylenol and oxy 5 prn  Activity: [x] mobilize as tolerated [] Bedrest [x] PT [x] OT [] PMNR    PULM: vent support, SBTs daily as tolerated; possible early trach/PEG in view of stroke topology with bulbar dysfunction  trial of extubation today?  bed to chair position, PT/OT  SpO2>92%    CV:  SBP goal <160  lipitor  ASA    RENAL: monitor I/O  replete lytes prn  NS while nPO  urinary retention- start cardura    GI:  Diet: hold TF for possible extubation  GI prophylaxis [x] PPI  zofran prn for nausea  Bowel regimen [x] senna [] other: miralax  LBM PTA    ENDO:   Goal euglycemia (-180)  lantus 10units  ISS    HEME/ONC:  VTE prophylaxis: [] SCDs [x] chemoprophylaxis hep gtt  hep gtt (AC pending trach/peg)    ID: febrile   empiric vanco and zosyn  follow up cultures  MRSA + swab    MISC:    I affirm that this patient is critically ill and at high risk for sudden, fatal deterioration due to one or more of the above stated active issues.     This time does not include bedside procedures that are documented separately.

## 2022-12-31 NOTE — PHYSICAL THERAPY INITIAL EVALUATION ADULT - IMPAIRMENTS CONTRIBUTING IMPAIRED BED MOBILITY, REHAB EVAL
impaired balance/impaired coordination/impaired motor control/impaired postural control/decreased ROM/decreased strength
impaired balance/impaired coordination/decreased flexibility/impaired postural control/decreased ROM/decreased strength

## 2022-12-31 NOTE — PROGRESS NOTE ADULT - SUBJECTIVE AND OBJECTIVE BOX
Chief complaint:   Patient is a 77y old  Male who presents with a chief complaint of Acute stroke (30 Dec 2022 08:25)    HPI:  76 y/o male with PMH of DM-2, HTN, CAD was transferred from [place] for stroke. Patient reported right sided weakness and slurred speech along with difficulty swallowing that started yesterday. Was seen at [place] today, code stroke initiated out of window for tPA. CT head: no acute finding. MRI: acute infarct with left debby. MRA head/neck: Left vertebral artery segmental stenoses and T1 hyperintensity suggesting foci of dissection and/or thrombus. As per resident, neurology from [facility] was consulted and accepted patient for further evaluation. Patient said he is upset about his condition, noted discomfort in his head otherwise no chest pain, shortness of breath, palpitation, fever, chills, nausea, vomiting, abdominal pain, change in bowel/urinary habit.  (26 Dec 2022 22:58)    24hr EVENTS:  febrile    ROS: wants ETT removed  All other systems negative    -----------------------------------------------------------------------------------------------------------------------------------------------------------------------------------  ICU Vital Signs Last 24 Hrs  T(C): 37.5 (31 Dec 2022 08:00), Max: 38.3 (30 Dec 2022 11:14)  T(F): 99.5 (31 Dec 2022 08:00), Max: 100.9 (30 Dec 2022 11:14)  HR: 60 (31 Dec 2022 08:00) (54 - 72)  BP: 144/68 (31 Dec 2022 08:00) (100/77 - 165/73)  BP(mean): 91 (31 Dec 2022 08:00) (82 - 109)  ABP: --  ABP(mean): --  RR: 16 (31 Dec 2022 08:00) (16 - 16)  SpO2: 97% (31 Dec 2022 08:00) (92% - 99%)    O2 Parameters below as of 31 Dec 2022 08:00  Patient On (Oxygen Delivery Method): ventilator      I&O's Summary    30 Dec 2022 07:01  -  31 Dec 2022 07:00  --------------------------------------------------------  IN: 2261.3 mL / OUT: 2025 mL / NET: 236.3 mL    31 Dec 2022 07:01  -  31 Dec 2022 08:54  --------------------------------------------------------  IN: 89.4 mL / OUT: 0 mL / NET: 89.4 mL      MEDICATIONS  (STANDING):  acetylcysteine 10%  Inhalation 4 milliLiter(s) Inhalation every 6 hours  albuterol/ipratropium for Nebulization 3 milliLiter(s) Nebulizer every 6 hours  aspirin 81 milliGRAM(s) Oral daily  atorvastatin 80 milliGRAM(s) Oral at bedtime  bisacodyl Suppository 10 milliGRAM(s) Rectal daily  chlorhexidine 0.12% Liquid 15 milliLiter(s) Oral Mucosa every 12 hours  chlorhexidine 2% Cloths 1 Application(s) Topical daily  coronavirus bivalent (EUA) Booster Vaccine (PFIZER) 0.3 milliLiter(s) IntraMuscular once  dexMEDEtomidine Infusion 0.1 MICROgram(s)/kG/Hr (1.93 mL/Hr) IV Continuous <Continuous>  dextrose 5%. 1000 milliLiter(s) (100 mL/Hr) IV Continuous <Continuous>  dextrose 5%. 1000 milliLiter(s) (50 mL/Hr) IV Continuous <Continuous>  dextrose 50% Injectable 25 Gram(s) IV Push once  dextrose 50% Injectable 12.5 Gram(s) IV Push once  dextrose 50% Injectable 25 Gram(s) IV Push once  doxazosin 2 milliGRAM(s) Oral at bedtime  glucagon  Injectable 1 milliGRAM(s) IntraMuscular once  heparin  Infusion 950 Unit(s)/Hr (10 mL/Hr) IV Continuous <Continuous>  influenza  Vaccine (HIGH DOSE) 0.7 milliLiter(s) IntraMuscular once  insulin glargine Injectable (LANTUS) 10 Unit(s) SubCutaneous every morning  insulin lispro (ADMELOG) corrective regimen sliding scale   SubCutaneous every 6 hours  loteprednol 0.5% Ophthalmic Suspension 1 Drop(s) Right EYE daily  mupirocin 2% Ointment 1 Application(s) Both Nostrils two times a day  pantoprazole   Suspension 40 milliGRAM(s) Oral daily  piperacillin/tazobactam IVPB.. 3.375 Gram(s) IV Intermittent every 8 hours  polyethylene glycol 3350 17 Gram(s) Oral two times a day  prednisoLONE acetate 1% Suspension 1 Drop(s) Left EYE daily  senna 2 Tablet(s) Oral at bedtime  vancomycin  IVPB 1000 milliGRAM(s) IV Intermittent every 12 hours      RESPIRATORY:  Mode: AC/ CMV (Assist Control/ Continuous Mandatory Ventilation)  RR (machine): 16  TV (machine): 400  FiO2: 40  PEEP: 5  PS: 5  MAP: 12  PIP: 28        IMAGING:   Recent imaging studies were reviewed.    LAB RESULTS:                          12.7   14.76 )-----------( 237      ( 31 Dec 2022 02:59 )             38.9       PTT - ( 31 Dec 2022 02:59 )  PTT:67.2 sec    12-31    148<H>  |  108  |  36.0<H>  ----------------------------<  174<H>  4.0   |  27.0  |  1.20    Ca    9.1      31 Dec 2022 02:59  Phos  3.8     12-31  Mg     2.4     12-31            ABG - ( 30 Dec 2022 15:34 )  pH, Arterial: 7.470 pH, Blood: x     /  pCO2: 44    /  pO2: 114   / HCO3: 32    / Base Excess: 8.3   /  SaO2: 98.2        Culture - Sputum (collected 12-30-22 @ 12:06)  Source: Trach Asp Tracheal Aspirate  Gram Stain (12-30-22 @ 20:24):    Numerous polymorphonuclear leukocytes per low power field    Rare Squamous epithelial cells per low power field    Numerous Gram positive cocci in pairs per oil power field      -----------------------------------------------------------------------------------------------------------------------------------------------------------------------------------    PHYSICAL EXAM:  General: Calm, intubated  HEENT: MMM  Neuro:  -Mental status- No acute distress  -CN- PERRL 3mm, EOMI, tongue midline, face symmetric    CV: RRR  Pulm: clear to auscultation  Abd: Soft, nontender, nondistended  Ext: no noted edema in lower ext  Skin: warm, dry       Chief complaint:   Patient is a 77y old  Male who presents with a chief complaint of Acute stroke (30 Dec 2022 08:25)    HPI:  76 y/o male with PMH of DM-2, HTN, CAD was transferred from [place] for stroke. Patient reported right sided weakness and slurred speech along with difficulty swallowing that started yesterday. Was seen at [place] today, code stroke initiated out of window for tPA. CT head: no acute finding. MRI: acute infarct with left debby. MRA head/neck: Left vertebral artery segmental stenoses and T1 hyperintensity suggesting foci of dissection and/or thrombus. As per resident, neurology from [facility] was consulted and accepted patient for further evaluation. Patient said he is upset about his condition, noted discomfort in his head otherwise no chest pain, shortness of breath, palpitation, fever, chills, nausea, vomiting, abdominal pain, change in bowel/urinary habit.  (26 Dec 2022 22:58)    24hr EVENTS:  febrile  did not tolerate PSV overnight  retaining urine    ROS: wants ETT removed  All other systems negative    -----------------------------------------------------------------------------------------------------------------------------------------------------------------------------------  ICU Vital Signs Last 24 Hrs  T(C): 37.5 (31 Dec 2022 08:00), Max: 38.3 (30 Dec 2022 11:14)  T(F): 99.5 (31 Dec 2022 08:00), Max: 100.9 (30 Dec 2022 11:14)  HR: 60 (31 Dec 2022 08:00) (54 - 72)  BP: 144/68 (31 Dec 2022 08:00) (100/77 - 165/73)  BP(mean): 91 (31 Dec 2022 08:00) (82 - 109)  ABP: --  ABP(mean): --  RR: 16 (31 Dec 2022 08:00) (16 - 16)  SpO2: 97% (31 Dec 2022 08:00) (92% - 99%)    O2 Parameters below as of 31 Dec 2022 08:00  Patient On (Oxygen Delivery Method): ventilator      I&O's Summary    30 Dec 2022 07:01  -  31 Dec 2022 07:00  --------------------------------------------------------  IN: 2261.3 mL / OUT: 2025 mL / NET: 236.3 mL    31 Dec 2022 07:01  -  31 Dec 2022 08:54  --------------------------------------------------------  IN: 89.4 mL / OUT: 0 mL / NET: 89.4 mL      MEDICATIONS  (STANDING):  acetylcysteine 10%  Inhalation 4 milliLiter(s) Inhalation every 6 hours  albuterol/ipratropium for Nebulization 3 milliLiter(s) Nebulizer every 6 hours  aspirin 81 milliGRAM(s) Oral daily  atorvastatin 80 milliGRAM(s) Oral at bedtime  bisacodyl Suppository 10 milliGRAM(s) Rectal daily  chlorhexidine 0.12% Liquid 15 milliLiter(s) Oral Mucosa every 12 hours  chlorhexidine 2% Cloths 1 Application(s) Topical daily  coronavirus bivalent (EUA) Booster Vaccine (PFIZER) 0.3 milliLiter(s) IntraMuscular once  dexMEDEtomidine Infusion 0.1 MICROgram(s)/kG/Hr (1.93 mL/Hr) IV Continuous <Continuous>  dextrose 5%. 1000 milliLiter(s) (100 mL/Hr) IV Continuous <Continuous>  dextrose 5%. 1000 milliLiter(s) (50 mL/Hr) IV Continuous <Continuous>  dextrose 50% Injectable 25 Gram(s) IV Push once  dextrose 50% Injectable 12.5 Gram(s) IV Push once  dextrose 50% Injectable 25 Gram(s) IV Push once  doxazosin 2 milliGRAM(s) Oral at bedtime  glucagon  Injectable 1 milliGRAM(s) IntraMuscular once  heparin  Infusion 950 Unit(s)/Hr (10 mL/Hr) IV Continuous <Continuous>  influenza  Vaccine (HIGH DOSE) 0.7 milliLiter(s) IntraMuscular once  insulin glargine Injectable (LANTUS) 10 Unit(s) SubCutaneous every morning  insulin lispro (ADMELOG) corrective regimen sliding scale   SubCutaneous every 6 hours  loteprednol 0.5% Ophthalmic Suspension 1 Drop(s) Right EYE daily  mupirocin 2% Ointment 1 Application(s) Both Nostrils two times a day  pantoprazole   Suspension 40 milliGRAM(s) Oral daily  piperacillin/tazobactam IVPB.. 3.375 Gram(s) IV Intermittent every 8 hours  polyethylene glycol 3350 17 Gram(s) Oral two times a day  prednisoLONE acetate 1% Suspension 1 Drop(s) Left EYE daily  senna 2 Tablet(s) Oral at bedtime  vancomycin  IVPB 1000 milliGRAM(s) IV Intermittent every 12 hours      RESPIRATORY:  Mode: AC/ CMV (Assist Control/ Continuous Mandatory Ventilation)  RR (machine): 16  TV (machine): 400  FiO2: 40  PEEP: 5  PS: 5  MAP: 12  PIP: 28        IMAGING:   Recent imaging studies were reviewed.    LAB RESULTS:                          12.7   14.76 )-----------( 237      ( 31 Dec 2022 02:59 )             38.9       PTT - ( 31 Dec 2022 02:59 )  PTT:67.2 sec    12-31    148<H>  |  108  |  36.0<H>  ----------------------------<  174<H>  4.0   |  27.0  |  1.20    Ca    9.1      31 Dec 2022 02:59  Phos  3.8     12-31  Mg     2.4     12-31            ABG - ( 30 Dec 2022 15:34 )  pH, Arterial: 7.470 pH, Blood: x     /  pCO2: 44    /  pO2: 114   / HCO3: 32    / Base Excess: 8.3   /  SaO2: 98.2        Culture - Sputum (collected 12-30-22 @ 12:06)  Source: Trach Asp Tracheal Aspirate  Gram Stain (12-30-22 @ 20:24):    Numerous polymorphonuclear leukocytes per low power field    Rare Squamous epithelial cells per low power field    Numerous Gram positive cocci in pairs per oil power field      -----------------------------------------------------------------------------------------------------------------------------------------------------------------------------------  PHYSICAL EXAM:  General: Calm, intubated  HEENT: MMM  Neuro:  -Mental status- anxious following commands  R gaze pref  -CN- PERRL 3mm, tongue midline, R facial droop  +cough/gag  flaccid RUE and RLE  LUE and LLE full strength  diminished sensation on R    CV: RRR  Pulm: clear to auscultation  Abd: Soft, nontender, nondistended  Ext: no noted edema in lower ext  Skin: warm, dry

## 2022-12-31 NOTE — PHYSICAL THERAPY INITIAL EVALUATION ADULT - PHYSICAL ASSIST/NONPHYSICAL ASSIST: SUPINE/SIT, REHAB EVAL
required increased assistance to get bilateral LE's out of bed/assume upright sitting at EOB position + verbal cues for proper hand placement./2 person assist required increased assistance to get bilateral LE's out of bed/assume upright sitting at EOB position + verbal cues for proper hand placement. pt tolerated sitting at EOB x 3-4 mins/2 person assist

## 2022-12-31 NOTE — CONSULT NOTE ADULT - PROBLEM SELECTOR RECOMMENDATION 9
Admitted with acute left pontine CVA.  Intubated 12/27 for airway protection.  Consulted for trach and peg evaluation.  Will discuss with Dr. Hernandez.   Recommendations to follow.

## 2023-01-01 LAB
ANION GAP SERPL CALC-SCNC: 11 MMOL/L — SIGNIFICANT CHANGE UP (ref 5–17)
ANION GAP SERPL CALC-SCNC: 15 MMOL/L — SIGNIFICANT CHANGE UP (ref 5–17)
APTT BLD: 77.6 SEC — HIGH (ref 27.5–35.5)
APTT BLD: 80 SEC — HIGH (ref 27.5–35.5)
BUN SERPL-MCNC: 45.8 MG/DL — HIGH (ref 8–20)
BUN SERPL-MCNC: 48.6 MG/DL — HIGH (ref 8–20)
CALCIUM SERPL-MCNC: 8.4 MG/DL — SIGNIFICANT CHANGE UP (ref 8.4–10.5)
CALCIUM SERPL-MCNC: 9 MG/DL — SIGNIFICANT CHANGE UP (ref 8.4–10.5)
CHLORIDE SERPL-SCNC: 109 MMOL/L — HIGH (ref 96–108)
CHLORIDE SERPL-SCNC: 109 MMOL/L — HIGH (ref 96–108)
CO2 SERPL-SCNC: 24 MMOL/L — SIGNIFICANT CHANGE UP (ref 22–29)
CO2 SERPL-SCNC: 26 MMOL/L — SIGNIFICANT CHANGE UP (ref 22–29)
CREAT SERPL-MCNC: 1.22 MG/DL — SIGNIFICANT CHANGE UP (ref 0.5–1.3)
CREAT SERPL-MCNC: 1.25 MG/DL — SIGNIFICANT CHANGE UP (ref 0.5–1.3)
EGFR: 59 ML/MIN/1.73M2 — LOW
EGFR: 61 ML/MIN/1.73M2 — SIGNIFICANT CHANGE UP
GAS PNL BLDA: SIGNIFICANT CHANGE UP
GLUCOSE BLDC GLUCOMTR-MCNC: 276 MG/DL — HIGH (ref 70–99)
GLUCOSE BLDC GLUCOMTR-MCNC: 277 MG/DL — HIGH (ref 70–99)
GLUCOSE BLDC GLUCOMTR-MCNC: 306 MG/DL — HIGH (ref 70–99)
GLUCOSE SERPL-MCNC: 258 MG/DL — HIGH (ref 70–99)
GLUCOSE SERPL-MCNC: 334 MG/DL — HIGH (ref 70–99)
HCT VFR BLD CALC: 38.3 % — LOW (ref 39–50)
HGB BLD-MCNC: 12.5 G/DL — LOW (ref 13–17)
MAGNESIUM SERPL-MCNC: 2.7 MG/DL — HIGH (ref 1.6–2.6)
MCHC RBC-ENTMCNC: 29.6 PG — SIGNIFICANT CHANGE UP (ref 27–34)
MCHC RBC-ENTMCNC: 32.6 GM/DL — SIGNIFICANT CHANGE UP (ref 32–36)
MCV RBC AUTO: 90.5 FL — SIGNIFICANT CHANGE UP (ref 80–100)
PHOSPHATE SERPL-MCNC: 4.1 MG/DL — SIGNIFICANT CHANGE UP (ref 2.4–4.7)
PLATELET # BLD AUTO: 246 K/UL — SIGNIFICANT CHANGE UP (ref 150–400)
POTASSIUM SERPL-MCNC: 3.7 MMOL/L — SIGNIFICANT CHANGE UP (ref 3.5–5.3)
POTASSIUM SERPL-MCNC: 5.5 MMOL/L — HIGH (ref 3.5–5.3)
POTASSIUM SERPL-SCNC: 3.7 MMOL/L — SIGNIFICANT CHANGE UP (ref 3.5–5.3)
POTASSIUM SERPL-SCNC: 5.5 MMOL/L — HIGH (ref 3.5–5.3)
RBC # BLD: 4.23 M/UL — SIGNIFICANT CHANGE UP (ref 4.2–5.8)
RBC # FLD: 14 % — SIGNIFICANT CHANGE UP (ref 10.3–14.5)
SODIUM SERPL-SCNC: 146 MMOL/L — HIGH (ref 135–145)
SODIUM SERPL-SCNC: 148 MMOL/L — HIGH (ref 135–145)
WBC # BLD: 9.59 K/UL — SIGNIFICANT CHANGE UP (ref 3.8–10.5)
WBC # FLD AUTO: 9.59 K/UL — SIGNIFICANT CHANGE UP (ref 3.8–10.5)

## 2023-01-01 PROCEDURE — 99231 SBSQ HOSP IP/OBS SF/LOW 25: CPT

## 2023-01-01 PROCEDURE — 99291 CRITICAL CARE FIRST HOUR: CPT

## 2023-01-01 PROCEDURE — 93010 ELECTROCARDIOGRAM REPORT: CPT

## 2023-01-01 RX ORDER — INSULIN HUMAN 100 [IU]/ML
10 INJECTION, SOLUTION SUBCUTANEOUS ONCE
Refills: 0 | Status: COMPLETED | OUTPATIENT
Start: 2023-01-01 | End: 2023-01-01

## 2023-01-01 RX ORDER — SODIUM CHLORIDE 9 MG/ML
500 INJECTION, SOLUTION INTRAVENOUS ONCE
Refills: 0 | Status: DISCONTINUED | OUTPATIENT
Start: 2023-01-01 | End: 2023-01-01

## 2023-01-01 RX ORDER — DEXTROSE 50 % IN WATER 50 %
50 SYRINGE (ML) INTRAVENOUS ONCE
Refills: 0 | Status: COMPLETED | OUTPATIENT
Start: 2023-01-01 | End: 2023-01-01

## 2023-01-01 RX ORDER — SODIUM BICARBONATE 1 MEQ/ML
25 SYRINGE (ML) INTRAVENOUS ONCE
Refills: 0 | Status: COMPLETED | OUTPATIENT
Start: 2023-01-01 | End: 2023-01-01

## 2023-01-01 RX ORDER — ALPRAZOLAM 0.25 MG
0.5 TABLET ORAL EVERY 12 HOURS
Refills: 0 | Status: DISCONTINUED | OUTPATIENT
Start: 2023-01-01 | End: 2023-01-03

## 2023-01-01 RX ADMIN — Medication 1 DROP(S): at 10:54

## 2023-01-01 RX ADMIN — Medication 4 MILLILITER(S): at 20:29

## 2023-01-01 RX ADMIN — PIPERACILLIN AND TAZOBACTAM 25 GRAM(S): 4; .5 INJECTION, POWDER, LYOPHILIZED, FOR SOLUTION INTRAVENOUS at 22:09

## 2023-01-01 RX ADMIN — PIPERACILLIN AND TAZOBACTAM 25 GRAM(S): 4; .5 INJECTION, POWDER, LYOPHILIZED, FOR SOLUTION INTRAVENOUS at 13:30

## 2023-01-01 RX ADMIN — Medication 4 MILLILITER(S): at 03:55

## 2023-01-01 RX ADMIN — PANTOPRAZOLE SODIUM 40 MILLIGRAM(S): 20 TABLET, DELAYED RELEASE ORAL at 12:40

## 2023-01-01 RX ADMIN — CHLORHEXIDINE GLUCONATE 15 MILLILITER(S): 213 SOLUTION TOPICAL at 05:33

## 2023-01-01 RX ADMIN — Medication 650 MILLIGRAM(S): at 09:58

## 2023-01-01 RX ADMIN — Medication 250 MILLIGRAM(S): at 05:38

## 2023-01-01 RX ADMIN — INSULIN GLARGINE 10 UNIT(S): 100 INJECTION, SOLUTION SUBCUTANEOUS at 08:56

## 2023-01-01 RX ADMIN — Medication 3 MILLILITER(S): at 20:29

## 2023-01-01 RX ADMIN — DEXMEDETOMIDINE HYDROCHLORIDE IN 0.9% SODIUM CHLORIDE 1.93 MICROGRAM(S)/KG/HR: 4 INJECTION INTRAVENOUS at 22:14

## 2023-01-01 RX ADMIN — Medication 81 MILLIGRAM(S): at 12:39

## 2023-01-01 RX ADMIN — Medication 4 MILLILITER(S): at 15:04

## 2023-01-01 RX ADMIN — MUPIROCIN 1 APPLICATION(S): 20 OINTMENT TOPICAL at 05:33

## 2023-01-01 RX ADMIN — CHLORHEXIDINE GLUCONATE 15 MILLILITER(S): 213 SOLUTION TOPICAL at 17:39

## 2023-01-01 RX ADMIN — Medication 2: at 05:32

## 2023-01-01 RX ADMIN — Medication 2: at 21:42

## 2023-01-01 RX ADMIN — Medication 3 MILLILITER(S): at 08:54

## 2023-01-01 RX ADMIN — PIPERACILLIN AND TAZOBACTAM 25 GRAM(S): 4; .5 INJECTION, POWDER, LYOPHILIZED, FOR SOLUTION INTRAVENOUS at 06:49

## 2023-01-01 RX ADMIN — Medication 4: at 15:16

## 2023-01-01 RX ADMIN — Medication 3 MILLILITER(S): at 03:55

## 2023-01-01 RX ADMIN — Medication 50 MILLILITER(S): at 12:40

## 2023-01-01 RX ADMIN — Medication 250 MILLIGRAM(S): at 17:40

## 2023-01-01 RX ADMIN — INSULIN HUMAN 10 UNIT(S): 100 INJECTION, SOLUTION SUBCUTANEOUS at 12:38

## 2023-01-01 RX ADMIN — LOTEPREDNOL ETABONATE 1 DROP(S): 2 SUSPENSION/ DROPS OPHTHALMIC at 05:33

## 2023-01-01 RX ADMIN — CHLORHEXIDINE GLUCONATE 1 APPLICATION(S): 213 SOLUTION TOPICAL at 12:40

## 2023-01-01 RX ADMIN — Medication 3 MILLILITER(S): at 15:04

## 2023-01-01 RX ADMIN — Medication 25 MILLIEQUIVALENT(S): at 12:30

## 2023-01-01 RX ADMIN — ATORVASTATIN CALCIUM 80 MILLIGRAM(S): 80 TABLET, FILM COATED ORAL at 21:27

## 2023-01-01 RX ADMIN — Medication 4 MILLILITER(S): at 08:54

## 2023-01-01 RX ADMIN — MUPIROCIN 1 APPLICATION(S): 20 OINTMENT TOPICAL at 17:40

## 2023-01-01 RX ADMIN — Medication 2 MILLIGRAM(S): at 21:26

## 2023-01-01 NOTE — PROGRESS NOTE ADULT - PROBLEM SELECTOR PLAN 1
Admitted with Acute Left Pontine CVA.  Intubated 12/27 for airway protection.  Consulted for trach and peg evaluation.  Wife at bedside and agreeable to plan for Trach/PEG.   Patient febrile with Vanco/Zosyn ordered. Blood cultures from 12/20/22 resulted Negative.   Plan discussed / reviewed with Thoracic Surgery attending Dr. Hernandez / team during AM rounds.  Planning Trach/PEG for later this week, date pending.

## 2023-01-01 NOTE — PROGRESS NOTE ADULT - SUBJECTIVE AND OBJECTIVE BOX
Chief complaint:   Patient is a 77y old  Male who presents with a chief complaint of Acute stroke (31 Dec 2022 18:17)    HPI:  78 y/o male with PMH of DM-2, HTN, CAD was transferred from [place] for stroke. Patient reported right sided weakness and slurred speech along with difficulty swallowing that started yesterday. Was seen at [place] today, code stroke initiated out of window for tPA. CT head: no acute finding. MRI: acute infarct with left debby. MRA head/neck: Left vertebral artery segmental stenoses and T1 hyperintensity suggesting foci of dissection and/or thrombus. As per resident, neurology from [facility] was consulted and accepted patient for further evaluation. Patient said he is upset about his condition, noted discomfort in his head otherwise no chest pain, shortness of breath, palpitation, fever, chills, nausea, vomiting, abdominal pain, change in bowel/urinary habit.  (26 Dec 2022 22:58)        24hr EVENTS:      ROS: [ ]  Unable to assess due to mental status   All other systems negative    -----------------------------------------------------------------------------------------------------------------------------------------------------------------------------------  ICU Vital Signs Last 24 Hrs  T(C): 37.7 (01 Jan 2023 04:00), Max: 37.8 (31 Dec 2022 23:00)  T(F): 99.9 (01 Jan 2023 04:00), Max: 100.1 (31 Dec 2022 23:00)  HR: 61 (01 Jan 2023 06:00) (50 - 86)  BP: 132/62 (01 Jan 2023 06:00) (127/52 - 160/68)  BP(mean): 84 (01 Jan 2023 06:00) (79 - 106)  ABP: --  ABP(mean): --  RR: --  SpO2: 94% (01 Jan 2023 06:00) (93% - 99%)    O2 Parameters below as of 01 Jan 2023 06:00  Patient On (Oxygen Delivery Method): ventilator    O2 Concentration (%): 40        I&O's Summary    31 Dec 2022 07:01  -  01 Jan 2023 07:00  --------------------------------------------------------  IN: 2658.6 mL / OUT: 700 mL / NET: 1958.6 mL        MEDICATIONS  (STANDING):  acetylcysteine 10%  Inhalation 4 milliLiter(s) Inhalation every 6 hours  albuterol/ipratropium for Nebulization 3 milliLiter(s) Nebulizer every 6 hours  aspirin 81 milliGRAM(s) Oral daily  atorvastatin 80 milliGRAM(s) Oral at bedtime  bisacodyl Suppository 10 milliGRAM(s) Rectal daily  chlorhexidine 0.12% Liquid 15 milliLiter(s) Oral Mucosa every 12 hours  chlorhexidine 2% Cloths 1 Application(s) Topical daily  coronavirus bivalent (EUA) Booster Vaccine (PFIZER) 0.3 milliLiter(s) IntraMuscular once  dexMEDEtomidine Infusion 0.1 MICROgram(s)/kG/Hr (1.93 mL/Hr) IV Continuous <Continuous>  dextrose 5%. 1000 milliLiter(s) (100 mL/Hr) IV Continuous <Continuous>  dextrose 5%. 1000 milliLiter(s) (50 mL/Hr) IV Continuous <Continuous>  dextrose 50% Injectable 25 Gram(s) IV Push once  dextrose 50% Injectable 12.5 Gram(s) IV Push once  dextrose 50% Injectable 25 Gram(s) IV Push once  doxazosin 2 milliGRAM(s) Oral at bedtime  glucagon  Injectable 1 milliGRAM(s) IntraMuscular once  heparin  Infusion 950 Unit(s)/Hr (10 mL/Hr) IV Continuous <Continuous>  influenza  Vaccine (HIGH DOSE) 0.7 milliLiter(s) IntraMuscular once  insulin glargine Injectable (LANTUS) 10 Unit(s) SubCutaneous every morning  insulin lispro (ADMELOG) corrective regimen sliding scale   SubCutaneous every 6 hours  loteprednol 0.5% Ophthalmic Suspension 1 Drop(s) Right EYE daily  multiple electrolytes Injection Type 1 Bolus 500 milliLiter(s) IV Bolus once  mupirocin 2% Ointment 1 Application(s) Both Nostrils two times a day  pantoprazole   Suspension 40 milliGRAM(s) Oral daily  piperacillin/tazobactam IVPB.. 3.375 Gram(s) IV Intermittent every 8 hours  polyethylene glycol 3350 17 Gram(s) Oral two times a day  prednisoLONE acetate 1% Suspension 1 Drop(s) Left EYE daily  senna 2 Tablet(s) Oral at bedtime  vancomycin  IVPB 1000 milliGRAM(s) IV Intermittent every 12 hours      RESPIRATORY:  Mode: AC/ CMV (Assist Control/ Continuous Mandatory Ventilation)  RR (machine): 18  TV (machine): 400  FiO2: 40  PEEP: 6  ITime: 0.8  MAP: 11  PIP: 22        IMAGING:   Recent imaging studies were reviewed.    LAB RESULTS:                          12.5   9.59  )-----------( 246      ( 01 Jan 2023 02:30 )             38.3       PTT - ( 01 Jan 2023 02:30 )  PTT:80.0 sec    01-01    148<H>  |  109<H>  |  45.8<H>  ----------------------------<  258<H>  5.5<H>   |  24.0  |  1.25    Ca    9.0      01 Jan 2023 02:30  Phos  4.1     01-01  Mg     2.7     01-01      ABG - ( 01 Jan 2023 04:40 )  pH, Arterial: 7.420 pH, Blood: x     /  pCO2: 40    /  pO2: 82    / HCO3: 26    / Base Excess: 1.4   /  SaO2: 98.2      -----------------------------------------------------------------------------------------------------------------------------------------------------------------------------------    PHYSICAL EXAM:  General: Calm, intubated  HEENT: MMM  Neuro:  -Mental status- No acute distress  -CN- PERRL 3mm, EOMI, tongue midline, face symmetric    CV: RRR  Pulm: clear to auscultation  Abd: Soft, nontender, nondistended  Ext: no noted edema in lower ext  Skin: warm, dry       Chief complaint:   Patient is a 77y old  Male who presents with a chief complaint of Acute stroke (31 Dec 2022 18:17)    HPI:  78 y/o male with PMH of DM-2, HTN, CAD was transferred from [place] for stroke. Patient reported right sided weakness and slurred speech along with difficulty swallowing that started yesterday. Was seen at [place] today, code stroke initiated out of window for tPA. CT head: no acute finding. MRI: acute infarct with left debby. MRA head/neck: Left vertebral artery segmental stenoses and T1 hyperintensity suggesting foci of dissection and/or thrombus. As per resident, neurology from [facility] was consulted and accepted patient for further evaluation. Patient said he is upset about his condition, noted discomfort in his head otherwise no chest pain, shortness of breath, palpitation, fever, chills, nausea, vomiting, abdominal pain, change in bowel/urinary habit.  (26 Dec 2022 22:58)    24hr EVENTS:  thick secretions  3 BMs overnight    ROS: [x ]  Unable to assess due to mental status   All other systems negative    -----------------------------------------------------------------------------------------------------------------------------------------------------------------------------------  ICU Vital Signs Last 24 Hrs  T(C): 37.7 (01 Jan 2023 04:00), Max: 37.8 (31 Dec 2022 23:00)  T(F): 99.9 (01 Jan 2023 04:00), Max: 100.1 (31 Dec 2022 23:00)  HR: 61 (01 Jan 2023 06:00) (50 - 86)  BP: 132/62 (01 Jan 2023 06:00) (127/52 - 160/68)  BP(mean): 84 (01 Jan 2023 06:00) (79 - 106)  ABP: --  ABP(mean): --  RR: --  SpO2: 94% (01 Jan 2023 06:00) (93% - 99%)    O2 Parameters below as of 01 Jan 2023 06:00  Patient On (Oxygen Delivery Method): ventilator    O2 Concentration (%): 40        I&O's Summary    31 Dec 2022 07:01  -  01 Jan 2023 07:00  --------------------------------------------------------  IN: 2658.6 mL / OUT: 700 mL / NET: 1958.6 mL        MEDICATIONS  (STANDING):  acetylcysteine 10%  Inhalation 4 milliLiter(s) Inhalation every 6 hours  albuterol/ipratropium for Nebulization 3 milliLiter(s) Nebulizer every 6 hours  aspirin 81 milliGRAM(s) Oral daily  atorvastatin 80 milliGRAM(s) Oral at bedtime  bisacodyl Suppository 10 milliGRAM(s) Rectal daily  chlorhexidine 0.12% Liquid 15 milliLiter(s) Oral Mucosa every 12 hours  chlorhexidine 2% Cloths 1 Application(s) Topical daily  coronavirus bivalent (EUA) Booster Vaccine (PFIZER) 0.3 milliLiter(s) IntraMuscular once  dexMEDEtomidine Infusion 0.1 MICROgram(s)/kG/Hr (1.93 mL/Hr) IV Continuous <Continuous>  dextrose 5%. 1000 milliLiter(s) (100 mL/Hr) IV Continuous <Continuous>  dextrose 5%. 1000 milliLiter(s) (50 mL/Hr) IV Continuous <Continuous>  dextrose 50% Injectable 25 Gram(s) IV Push once  dextrose 50% Injectable 12.5 Gram(s) IV Push once  dextrose 50% Injectable 25 Gram(s) IV Push once  doxazosin 2 milliGRAM(s) Oral at bedtime  glucagon  Injectable 1 milliGRAM(s) IntraMuscular once  heparin  Infusion 950 Unit(s)/Hr (10 mL/Hr) IV Continuous <Continuous>  influenza  Vaccine (HIGH DOSE) 0.7 milliLiter(s) IntraMuscular once  insulin glargine Injectable (LANTUS) 10 Unit(s) SubCutaneous every morning  insulin lispro (ADMELOG) corrective regimen sliding scale   SubCutaneous every 6 hours  loteprednol 0.5% Ophthalmic Suspension 1 Drop(s) Right EYE daily  multiple electrolytes Injection Type 1 Bolus 500 milliLiter(s) IV Bolus once  mupirocin 2% Ointment 1 Application(s) Both Nostrils two times a day  pantoprazole   Suspension 40 milliGRAM(s) Oral daily  piperacillin/tazobactam IVPB.. 3.375 Gram(s) IV Intermittent every 8 hours  polyethylene glycol 3350 17 Gram(s) Oral two times a day  prednisoLONE acetate 1% Suspension 1 Drop(s) Left EYE daily  senna 2 Tablet(s) Oral at bedtime  vancomycin  IVPB 1000 milliGRAM(s) IV Intermittent every 12 hours      RESPIRATORY:  Mode: AC/ CMV (Assist Control/ Continuous Mandatory Ventilation)  RR (machine): 18  TV (machine): 400  FiO2: 40  PEEP: 6  ITime: 0.8  MAP: 11  PIP: 22        IMAGING:   Recent imaging studies were reviewed.    LAB RESULTS:                          12.5   9.59  )-----------( 246      ( 01 Jan 2023 02:30 )             38.3       PTT - ( 01 Jan 2023 02:30 )  PTT:80.0 sec    01-01    148<H>  |  109<H>  |  45.8<H>  ----------------------------<  258<H>  5.5<H>   |  24.0  |  1.25    Ca    9.0      01 Jan 2023 02:30  Phos  4.1     01-01  Mg     2.7     01-01      ABG - ( 01 Jan 2023 04:40 )  pH, Arterial: 7.420 pH, Blood: x     /  pCO2: 40    /  pO2: 82    / HCO3: 26    / Base Excess: 1.4   /  SaO2: 98.2      -----------------------------------------------------------------------------------------------------------------------------------------------------------------------------------    PHYSICAL EXAM:  General: Calm, intubated  HEENT: MMM  Neuro:  -Mental status- anxious following commands  R gaze pref  -CN- PERRL 3mm, tongue midline, R facial droop  +cough/gag  flaccid RUE and RLE  LUE and LLE full strength  diminished sensation on R    CV: RRR  Pulm: clear to auscultation  Abd: Soft, nontender, nondistended  Ext: no noted edema in lower ext  Skin: warm, dry       Chief complaint:   Patient is a 77y old  Male who presents with a chief complaint of Acute stroke (31 Dec 2022 18:17)    HPI:  78 y/o male with PMH of DM-2, HTN, CAD was transferred from [place] for stroke. Patient reported right sided weakness and slurred speech along with difficulty swallowing that started yesterday. Was seen at [place] today, code stroke initiated out of window for tPA. CT head: no acute finding. MRI: acute infarct with left debby. MRA head/neck: Left vertebral artery segmental stenoses and T1 hyperintensity suggesting foci of dissection and/or thrombus. As per resident, neurology from [facility] was consulted and accepted patient for further evaluation. Patient said he is upset about his condition, noted discomfort in his head otherwise no chest pain, shortness of breath, palpitation, fever, chills, nausea, vomiting, abdominal pain, change in bowel/urinary habit.  (26 Dec 2022 22:58)    24hr EVENTS:  thick secretions  3 BMs overnight    ROS: [x ]  Unable to assess due to mental status   All other systems negative    -----------------------------------------------------------------------------------------------------------------------------------------------------------------------------------  ICU Vital Signs Last 24 Hrs  T(C): 37.7 (01 Jan 2023 04:00), Max: 37.8 (31 Dec 2022 23:00)  T(F): 99.9 (01 Jan 2023 04:00), Max: 100.1 (31 Dec 2022 23:00)  HR: 61 (01 Jan 2023 06:00) (50 - 86)  BP: 132/62 (01 Jan 2023 06:00) (127/52 - 160/68)  BP(mean): 84 (01 Jan 2023 06:00) (79 - 106)  ABP: --  ABP(mean): --  RR: --  SpO2: 94% (01 Jan 2023 06:00) (93% - 99%)    O2 Parameters below as of 01 Jan 2023 06:00  Patient On (Oxygen Delivery Method): ventilator    O2 Concentration (%): 40        I&O's Summary    31 Dec 2022 07:01  -  01 Jan 2023 07:00  --------------------------------------------------------  IN: 2658.6 mL / OUT: 700 mL / NET: 1958.6 mL        MEDICATIONS  (STANDING):  acetylcysteine 10%  Inhalation 4 milliLiter(s) Inhalation every 6 hours  albuterol/ipratropium for Nebulization 3 milliLiter(s) Nebulizer every 6 hours  aspirin 81 milliGRAM(s) Oral daily  atorvastatin 80 milliGRAM(s) Oral at bedtime  bisacodyl Suppository 10 milliGRAM(s) Rectal daily  chlorhexidine 0.12% Liquid 15 milliLiter(s) Oral Mucosa every 12 hours  chlorhexidine 2% Cloths 1 Application(s) Topical daily  coronavirus bivalent (EUA) Booster Vaccine (PFIZER) 0.3 milliLiter(s) IntraMuscular once  dexMEDEtomidine Infusion 0.1 MICROgram(s)/kG/Hr (1.93 mL/Hr) IV Continuous <Continuous>  dextrose 5%. 1000 milliLiter(s) (100 mL/Hr) IV Continuous <Continuous>  dextrose 5%. 1000 milliLiter(s) (50 mL/Hr) IV Continuous <Continuous>  dextrose 50% Injectable 25 Gram(s) IV Push once  dextrose 50% Injectable 12.5 Gram(s) IV Push once  dextrose 50% Injectable 25 Gram(s) IV Push once  doxazosin 2 milliGRAM(s) Oral at bedtime  glucagon  Injectable 1 milliGRAM(s) IntraMuscular once  heparin  Infusion 950 Unit(s)/Hr (10 mL/Hr) IV Continuous <Continuous>  influenza  Vaccine (HIGH DOSE) 0.7 milliLiter(s) IntraMuscular once  insulin glargine Injectable (LANTUS) 10 Unit(s) SubCutaneous every morning  insulin lispro (ADMELOG) corrective regimen sliding scale   SubCutaneous every 6 hours  loteprednol 0.5% Ophthalmic Suspension 1 Drop(s) Right EYE daily  multiple electrolytes Injection Type 1 Bolus 500 milliLiter(s) IV Bolus once  mupirocin 2% Ointment 1 Application(s) Both Nostrils two times a day  pantoprazole   Suspension 40 milliGRAM(s) Oral daily  piperacillin/tazobactam IVPB.. 3.375 Gram(s) IV Intermittent every 8 hours  polyethylene glycol 3350 17 Gram(s) Oral two times a day  prednisoLONE acetate 1% Suspension 1 Drop(s) Left EYE daily  senna 2 Tablet(s) Oral at bedtime  vancomycin  IVPB 1000 milliGRAM(s) IV Intermittent every 12 hours      RESPIRATORY:  Mode: AC/ CMV (Assist Control/ Continuous Mandatory Ventilation)  RR (machine): 18  TV (machine): 400  FiO2: 40  PEEP: 6  ITime: 0.8  MAP: 11  PIP: 22        IMAGING:   Recent imaging studies were reviewed.    LAB RESULTS:                          12.5   9.59  )-----------( 246      ( 01 Jan 2023 02:30 )             38.3       PTT - ( 01 Jan 2023 02:30 )  PTT:80.0 sec    01-01    148<H>  |  109<H>  |  45.8<H>  ----------------------------<  258<H>  5.5<H>   |  24.0  |  1.25    Ca    9.0      01 Jan 2023 02:30  Phos  4.1     01-01  Mg     2.7     01-01      ABG - ( 01 Jan 2023 04:40 )  pH, Arterial: 7.420 pH, Blood: x     /  pCO2: 40    /  pO2: 82    / HCO3: 26    / Base Excess: 1.4   /  SaO2: 98.2      -----------------------------------------------------------------------------------------------------------------------------------------------------------------------------------    PHYSICAL EXAM:  General: Calm, intubated  HEENT: MMM  Neuro:  -Mental status- following commands  -CN- PERRL 3mm, tongue midline, R facial droop  +weak cough/gag  flaccid RUE and RLE  LUE and LLE full strength  diminished sensation on R    CV: RRR  Pulm: coarse breath sounds  Abd: Soft, nontender, nondistended  Ext: no noted edema in lower ext  Skin: warm, dry

## 2023-01-01 NOTE — PROGRESS NOTE ADULT - ASSESSMENT
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  Worsening neuro exam 12/27 - RUE plegia, worsening bulbar dysfunction.   Acute resp failure due to neurologic injury, required intubation.  PMH of  DMII, HTN, CAD.  Allergy to iodine.  Anticoagulated on Heparin ggt.  tracheal aspirate GPCs 12/30- PNA    NEURO:   q4hr neurochecks  cont ASA 81 mg daily   cont Heparin ggt,  aPTT goal 60-80  precedex for sedation  pain mgt: tylenol and oxy 5 prn  Activity: [x] mobilize as tolerated [] Bedrest [x] PT [x] OT [] PMNR    PULM: vent support, SBTs daily as tolerated; possible early trach/PEG in view of stroke with bulbar dysfunction  thick secretions and poor cough/gag, unlikely success at extubation  bed to chair position, PT/OT  SpO2>92%    CV:  SBP goal <160  lipitor  ASA    RENAL: monitor I/O  replete lytes prn  NS while nPO  urinary retention- start cardura    GI:  Diet: TF via NGT  GI prophylaxis [x] PPI  zofran prn for nausea  Bowel regimen [x] senna [] other: miralax  LBM PTA    ENDO:   Goal euglycemia (-180)  lantus 10units  ISS    HEME/ONC:  VTE prophylaxis: [] SCDs [x] chemoprophylaxis hep gtt  hep gtt (AC pending trach/peg)    ID: febrile   empiric vanco and zosyn  follow up cultures  MRSA + swab    MISC:    I affirm that this patient is critically ill and at high risk for sudden, fatal deterioration due to one or more of the above stated active issues.     This time does not include bedside procedures that are documented separately.           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  Worsening neuro exam 12/27 - RUE plegia, worsening bulbar dysfunction.   Acute resp failure due to neurologic injury, required intubation.  PMH of  DMII, HTN, CAD.  Allergy to iodine.  Anticoagulated on Heparin ggt.  tracheal aspirate GPCs 12/30- PNA    NEURO:   q4hr neurochecks  cont ASA 81 mg daily   cont Heparin ggt,  aPTT goal 60-80  precedex for sedation  pain mgt: tylenol and oxy 5 prn  Activity: [x] mobilize as tolerated [] Bedrest [x] PT [x] OT [] PMNR    PULM: vent support, SBTs daily as tolerated; possible early trach/PEG in view of stroke with bulbar dysfunction  thick secretions and poor cough/gag, tachypneic overtime with SBT, unlikely success at extubation  bed to chair position, PT/OT  SpO2>92%  CT surg consulted for trach/peg    CV:  SBP goal <160  lipitor  ASA    RENAL: monitor I/O  replete lytes prn  hyperkalemia- bicarb/insulin/dextrose  voiding cardura    GI:  Diet: TF via NGT (will need PEG)  FWF 350q6h  GI prophylaxis [x] PPI  zofran prn for nausea  Bowel regimen [x] senna [] other: miralax  LBM 1/1    ENDO:   Goal euglycemia (-180)  lantus 10units  ISS    HEME/ONC:  VTE prophylaxis: [] SCDs [x] chemoprophylaxis hep gtt  hep gtt (AC pending trach/peg)    ID: febrile   empiric vanco and zosyn ( 12/30- 1/5)- staph aureus sputum  follow up cultures  MRSA + swab    MISC:    I affirm that this patient is critically ill and at high risk for sudden, fatal deterioration due to one or more of the above stated active issues.     This time does not include bedside procedures that are documented separately.           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  Worsening neuro exam 12/27 - RUE plegia, worsening bulbar dysfunction.   Acute resp failure due to neurologic injury, required intubation.  PMH of  DMII, HTN, CAD.  Allergy to iodine.  Anticoagulated on Heparin ggt.  tracheal aspirate GPCs 12/30- PNA    NEURO:   q4hr neurochecks  cont ASA 81 mg daily   cont Heparin ggt,  aPTT goal 60-80  precedex for sedation  pain mgt: tylenol and oxy 5 prn  Activity: [x] mobilize as tolerated [] Bedrest [x] PT [x] OT [] PMNR    PULM: vent support, SBTs daily as tolerated; possible early trach/PEG in view of stroke with bulbar dysfunction  thick secretions and poor cough/gag, tachypneic overtime with SBT, unlikely success at extubation  bed to chair position, PT/OT  duonebs and mucomyst  SpO2>92%  CT surg consulted for trach/peg    CV:  SBP goal <160  lipitor  ASA    RENAL: monitor I/O  replete lytes prn  hyperkalemia- bicarb/insulin/dextrose  voiding cardura    GI:  Diet: TF via NGT (will need PEG)  FWF 350q6h  GI prophylaxis [x] PPI  zofran prn for nausea  Bowel regimen [x] senna [] other: miralax  LBM 1/1    ENDO:   Goal euglycemia (-180)  lantus 10units  ISS    HEME/ONC:  VTE prophylaxis: [] SCDs [x] chemoprophylaxis hep gtt  hep gtt (AC pending trach/peg)    ID: febrile   empiric vanco and zosyn ( 12/30- 1/5)- staph aureus sputum  follow up cultures  MRSA + swab    MISC:    I affirm that this patient is critically ill and at high risk for sudden, fatal deterioration due to one or more of the above stated active issues.     This time does not include bedside procedures that are documented separately.

## 2023-01-01 NOTE — PROGRESS NOTE ADULT - ASSESSMENT
78 yo M with PMH HTN, DM, CAD.  Transferred from [place] 12/26 with an acute L pontine CVA, possible L vert dissection. Worsening neuro exam 12/27 - RUE plegia, worsening bulbar dysfunction.   Acute resp failure due to neurologic injury, required intubation 12/27 for airway protection.  Thoracic surgery consulted for trach and peg eval.

## 2023-01-01 NOTE — PROGRESS NOTE ADULT - SUBJECTIVE AND OBJECTIVE BOX
Evaluation for Trach/PEG     PAST MEDICAL & SURGICAL HISTORY:  HTN (hypertension)  CAD (coronary artery disease)  DM (diabetes mellitus)    FAMILY HISTORY:  No pertinent family history in first degree relatives    Brief Hospital Course: 76 yo M with PMH HTN, DM, CAD.  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 Worsening neuro exam 12/27 - RUE plegia, worsening bulbar dysfunction.   Acute resp failure due to neurologic injury, required intubation 12/27 for airway protection.  Thoracic surgery consulted for trach and peg eval.    Significant recent/past 24 hr events: Patient febrile with Vanco/Zosyn ordered. Blood cultures from 12/20/22 resulted Negative.     Subjective: Resting in bed, calm, intubated with wife at bedside. Opens eyes briefly when wife talks with him. Unable to obtain full ROS given mental status/ intubation.     MEDICATIONS  (STANDING):  acetylcysteine 10%  Inhalation 4 milliLiter(s) Inhalation every 6 hours  albuterol/ipratropium for Nebulization 3 milliLiter(s) Nebulizer every 6 hours  aspirin 81 milliGRAM(s) Oral daily  atorvastatin 80 milliGRAM(s) Oral at bedtime  bisacodyl Suppository 10 milliGRAM(s) Rectal daily  chlorhexidine 0.12% Liquid 15 milliLiter(s) Oral Mucosa every 12 hours  chlorhexidine 2% Cloths 1 Application(s) Topical daily  coronavirus bivalent (EUA) Booster Vaccine (PFIZER) 0.3 milliLiter(s) IntraMuscular once  dexMEDEtomidine Infusion 0.1 MICROgram(s)/kG/Hr (1.93 mL/Hr) IV Continuous   doxazosin 2 milliGRAM(s) Oral at bedtime  heparin  Infusion 950 Unit(s)/Hr (10 mL/Hr) IV Continuous   influenza  Vaccine (HIGH DOSE) 0.7 milliLiter(s) IntraMuscular once  insulin glargine Injectable (LANTUS) 10 Unit(s) SubCutaneous every morning  insulin lispro (ADMELOG) corrective regimen sliding scale   SubCutaneous every 6 hours  loteprednol 0.5% Ophthalmic Suspension 1 Drop(s) Right EYE daily  mupirocin 2% Ointment 1 Application(s) Both Nostrils two times a day  pantoprazole   Suspension 40 milliGRAM(s) Oral daily  piperacillin/tazobactam IVPB.. 3.375 Gram(s) IV Intermittent every 8 hours  prednisoLONE acetate 1% Suspension 1 Drop(s) Left EYE daily  sodium bicarbonate  Injectable 5 milliEquivalent(s) IV Push once  vancomycin  IVPB 1000 milliGRAM(s) IV Intermittent every 12 hours    MEDICATIONS  (PRN):  acetaminophen     Tablet .. 650 milliGRAM(s) Oral every 6 hours PRN Temp greater or equal to 38C (100.4F), Mild Pain (1 - 3)  ALPRAZolam 0.5 milliGRAM(s) Oral every 12 hours PRN for agitation  aluminum hydroxide/magnesium hydroxide/simethicone Suspension 30 milliLiter(s) Oral every 4 hours PRN Dyspepsia  dextrose Oral Gel 15 Gram(s) Oral once PRN Blood Glucose LESS THAN 70 milliGRAM(s)/deciliter  hydrALAZINE Injectable 10 milliGRAM(s) IV Push every 2 hours PRN SBP >180  labetalol Injectable 10 milliGRAM(s) IV Push every 2 hours PRN SBP >180  melatonin 3 milliGRAM(s) Oral at bedtime PRN Insomnia  ondansetron Injectable 4 milliGRAM(s) IV Push every 8 hours PRN Nausea and/or Vomiting    Allergies: iodine (Anaphylaxis (Mod to Severe))    Vitals   T(C): 38.6 (01 Jan 2023 09:00), Max: 38.6 (01 Jan 2023 09:00)  T(F): 101.4 (01 Jan 2023 09:00), Max: 101.4 (01 Jan 2023 09:00)  HR: 65 (01 Jan 2023 11:45) (51 - 86)  BP: 139/59 (01 Jan 2023 11:00) (127/62 - 156/62)  BP(mean): 84 (01 Jan 2023 11:00) (79 - 106)  RR: 17 (01 Jan 2023 10:00) (17 - 18)  SpO2: 95% (01 Jan 2023 11:45) (93% - 99%)  O2 Parameters below as of 01 Jan 2023 08:00  Patient On (Oxygen Delivery Method): ventilator    I&O's Detail    31 Dec 2022 07:01  -  01 Jan 2023 07:00  --------------------------------------------------------  IN:    Dexmedetomidine: 223.6 mL    Enteral Tube Flush: 1050 mL    Glucerna 1.5: 370 mL    Heparin: 240 mL    IV PiggyBack: 325 mL    IV PiggyBack: 500 mL  Total IN: 2708.6 mL    OUT:    Intermittent Catheterization - Urethral (mL): 700 mL  Total OUT: 700 mL    Total NET: 2008.6 mL      01 Jan 2023 07:01  -  01 Jan 2023 13:03  --------------------------------------------------------  IN:    Dexmedetomidine: 52 mL    Enteral Tube Flush: 75 mL    Glucerna 1.5: 200 mL    Heparin: 40 mL  Total IN: 367 mL    OUT:  Total OUT: 0 mL    Total NET: 367 mL    Physical Exam  General: Calm, intubated  Neuro: R facial droop  Neck:  Supple, trachea midline  Pulm: Course BSs b/l, intubated  CV: regular rate, regular rhythm, +S1S2  Abd: soft, NT, ND, + BS  Ext: Flaccid RUE/RLE, moves LUE and LLE, no edema, no cyanosis  Skin: warm, dry    LABS                        12.5   9.59  )-----------( 246      ( 01 Jan 2023 02:30 )             38.3     01-01    148<H>  |  109<H>  |  45.8<H>  ----------------------------<  258<H>  5.5<H>   |  24.0  |  1.25    Ca    9.0      01 Jan 2023 02:30  Phos  4.1     01-01  Mg     2.7     01-01    PTT - ( 01 Jan 2023 08:30 )  PTT:77.6 sec    POCT Blood Glucose.: 276 mg/dL (01-01-23 @ 10:33)  POCT Blood Glucose.: 197 mg/dL (12-31-22 @ 21:59)  POCT Blood Glucose.: 183 mg/dL (12-31-22 @ 17:10)    ABG - ( 01 Jan 2023 04:40 )  pH, Arterial: 7.420 pH, Blood: x     /  pCO2: 40    /  pO2: 82    / HCO3: 26    / Base Excess: 1.4   /  SaO2: 98.2        Last CXR:  < from: Xray Chest 1 View- PORTABLE-Urgent (Xray Chest 1 View- PORTABLE-Urgent .) (12.28.22 @ 02:54) >  IMPRESSION:  ET and NG tubes in satisfactory position, unchanged.  Infiltrate/atelectasis in the left lower lobe, new  < end of copied text >     Evaluation for Trach/PEG     PAST MEDICAL & SURGICAL HISTORY:  HTN (hypertension)  CAD (coronary artery disease)  DM (diabetes mellitus)    FAMILY HISTORY:  No pertinent family history in first degree relatives    Brief Hospital Course: 76 yo M with PMH HTN, DM, CAD.  Transferred from [place] 12/26 with an acute L pontine CVA, possible L vert dissection. Worsening neuro exam 12/27 - RUE plegia, worsening bulbar dysfunction.   Acute resp failure due to neurologic injury, required intubation 12/27 for airway protection.  Thoracic surgery consulted for trach and peg eval.    Significant recent/past 24 hr events: Patient febrile with Vanco/Zosyn ordered. Blood cultures from 12/20/22 resulted Negative.     Subjective: Resting in bed, calm, intubated with wife at bedside. Opens eyes briefly when wife talks with him. Unable to obtain full ROS given mental status/ intubation.     MEDICATIONS  (STANDING):  acetylcysteine 10%  Inhalation 4 milliLiter(s) Inhalation every 6 hours  albuterol/ipratropium for Nebulization 3 milliLiter(s) Nebulizer every 6 hours  aspirin 81 milliGRAM(s) Oral daily  atorvastatin 80 milliGRAM(s) Oral at bedtime  bisacodyl Suppository 10 milliGRAM(s) Rectal daily  chlorhexidine 0.12% Liquid 15 milliLiter(s) Oral Mucosa every 12 hours  chlorhexidine 2% Cloths 1 Application(s) Topical daily  coronavirus bivalent (EUA) Booster Vaccine (PFIZER) 0.3 milliLiter(s) IntraMuscular once  dexMEDEtomidine Infusion 0.1 MICROgram(s)/kG/Hr (1.93 mL/Hr) IV Continuous   doxazosin 2 milliGRAM(s) Oral at bedtime  heparin  Infusion 950 Unit(s)/Hr (10 mL/Hr) IV Continuous   influenza  Vaccine (HIGH DOSE) 0.7 milliLiter(s) IntraMuscular once  insulin glargine Injectable (LANTUS) 10 Unit(s) SubCutaneous every morning  insulin lispro (ADMELOG) corrective regimen sliding scale   SubCutaneous every 6 hours  loteprednol 0.5% Ophthalmic Suspension 1 Drop(s) Right EYE daily  mupirocin 2% Ointment 1 Application(s) Both Nostrils two times a day  pantoprazole   Suspension 40 milliGRAM(s) Oral daily  piperacillin/tazobactam IVPB.. 3.375 Gram(s) IV Intermittent every 8 hours  prednisoLONE acetate 1% Suspension 1 Drop(s) Left EYE daily  sodium bicarbonate  Injectable 5 milliEquivalent(s) IV Push once  vancomycin  IVPB 1000 milliGRAM(s) IV Intermittent every 12 hours    MEDICATIONS  (PRN):  acetaminophen     Tablet .. 650 milliGRAM(s) Oral every 6 hours PRN Temp greater or equal to 38C (100.4F), Mild Pain (1 - 3)  ALPRAZolam 0.5 milliGRAM(s) Oral every 12 hours PRN for agitation  aluminum hydroxide/magnesium hydroxide/simethicone Suspension 30 milliLiter(s) Oral every 4 hours PRN Dyspepsia  dextrose Oral Gel 15 Gram(s) Oral once PRN Blood Glucose LESS THAN 70 milliGRAM(s)/deciliter  hydrALAZINE Injectable 10 milliGRAM(s) IV Push every 2 hours PRN SBP >180  labetalol Injectable 10 milliGRAM(s) IV Push every 2 hours PRN SBP >180  melatonin 3 milliGRAM(s) Oral at bedtime PRN Insomnia  ondansetron Injectable 4 milliGRAM(s) IV Push every 8 hours PRN Nausea and/or Vomiting    Allergies: iodine (Anaphylaxis (Mod to Severe))    Vitals   T(C): 38.6 (01 Jan 2023 09:00), Max: 38.6 (01 Jan 2023 09:00)  T(F): 101.4 (01 Jan 2023 09:00), Max: 101.4 (01 Jan 2023 09:00)  HR: 65 (01 Jan 2023 11:45) (51 - 86)  BP: 139/59 (01 Jan 2023 11:00) (127/62 - 156/62)  BP(mean): 84 (01 Jan 2023 11:00) (79 - 106)  RR: 17 (01 Jan 2023 10:00) (17 - 18)  SpO2: 95% (01 Jan 2023 11:45) (93% - 99%)  O2 Parameters below as of 01 Jan 2023 08:00  Patient On (Oxygen Delivery Method): ventilator    Vent Settings:  Mode: AC/ CMV (Assist Control/ Continuous Mandatory Ventilation)  RR (machine): 18  TV (machine): 400  FiO2: 40  PEEP: 6  ITime: 0.8  MAP: 12  PIP: 28    I&O's Detail    31 Dec 2022 07:01 - 01 Jan 2023 07:00  --------------------------------------------------------  IN:    Dexmedetomidine: 223.6 mL    Enteral Tube Flush: 1050 mL    Glucerna 1.5: 370 mL    Heparin: 240 mL    IV PiggyBack: 325 mL    IV PiggyBack: 500 mL  Total IN: 2708.6 mL    OUT:    Intermittent Catheterization - Urethral (mL): 700 mL  Total OUT: 700 mL    Total NET: 2008.6 mL      01 Jan 2023 07:01  -  01 Jan 2023 13:03  --------------------------------------------------------  IN:    Dexmedetomidine: 52 mL    Enteral Tube Flush: 75 mL    Glucerna 1.5: 200 mL    Heparin: 40 mL  Total IN: 367 mL    OUT:  Total OUT: 0 mL    Total NET: 367 mL    Physical Exam  General: Calm, intubated  Neuro: R facial droop  Neck:  Supple, trachea midline  Pulm: Course BSs b/l, intubated  CV: regular rate, regular rhythm, +S1S2  Abd: soft, NT, ND, + BS  Ext: Flaccid RUE/RLE, moves LUE and LLE, no edema, no cyanosis  Skin: warm, dry    LABS                        12.5   9.59  )-----------( 246      ( 01 Jan 2023 02:30 )             38.3     01-01    148<H>  |  109<H>  |  45.8<H>  ----------------------------<  258<H>  5.5<H>   |  24.0  |  1.25    Ca    9.0      01 Jan 2023 02:30  Phos  4.1     01-01  Mg     2.7     01-01    PTT - ( 01 Jan 2023 08:30 )  PTT:77.6 sec    POCT Blood Glucose.: 276 mg/dL (01-01-23 @ 10:33)  POCT Blood Glucose.: 197 mg/dL (12-31-22 @ 21:59)  POCT Blood Glucose.: 183 mg/dL (12-31-22 @ 17:10)    ABG - ( 01 Jan 2023 04:40 )  pH, Arterial: 7.420 pH, Blood: x     /  pCO2: 40    /  pO2: 82    / HCO3: 26    / Base Excess: 1.4   /  SaO2: 98.2        Last CXR:  < from: Xray Chest 1 View- PORTABLE-Urgent (Xray Chest 1 View- PORTABLE-Urgent .) (12.28.22 @ 02:54) >  IMPRESSION:  ET and NG tubes in satisfactory position, unchanged.  Infiltrate/atelectasis in the left lower lobe, new  < end of copied text >

## 2023-01-02 ENCOUNTER — TRANSCRIPTION ENCOUNTER (OUTPATIENT)
Age: 78
End: 2023-01-02

## 2023-01-02 LAB
-  AMPICILLIN/SULBACTAM: SIGNIFICANT CHANGE UP
-  CEFAZOLIN: SIGNIFICANT CHANGE UP
-  CLINDAMYCIN: SIGNIFICANT CHANGE UP
-  ERYTHROMYCIN: SIGNIFICANT CHANGE UP
-  GENTAMICIN: SIGNIFICANT CHANGE UP
-  OXACILLIN: SIGNIFICANT CHANGE UP
-  RIFAMPIN: SIGNIFICANT CHANGE UP
-  TETRACYCLINE: SIGNIFICANT CHANGE UP
-  TRIMETHOPRIM/SULFAMETHOXAZOLE: SIGNIFICANT CHANGE UP
-  VANCOMYCIN: SIGNIFICANT CHANGE UP
ANION GAP SERPL CALC-SCNC: 10 MMOL/L — SIGNIFICANT CHANGE UP (ref 5–17)
ANION GAP SERPL CALC-SCNC: 11 MMOL/L — SIGNIFICANT CHANGE UP (ref 5–17)
ANION GAP SERPL CALC-SCNC: 14 MMOL/L — SIGNIFICANT CHANGE UP (ref 5–17)
APPEARANCE UR: CLEAR — SIGNIFICANT CHANGE UP
APTT BLD: 169.9 SEC — CRITICAL HIGH (ref 27.5–35.5)
APTT BLD: 84.4 SEC — HIGH (ref 27.5–35.5)
APTT BLD: 93.5 SEC — HIGH (ref 27.5–35.5)
APTT BLD: 94 SEC — HIGH (ref 27.5–35.5)
APTT BLD: 95.3 SEC — HIGH (ref 27.5–35.5)
BACTERIA # UR AUTO: ABNORMAL
BILIRUB UR-MCNC: NEGATIVE — SIGNIFICANT CHANGE UP
BUN SERPL-MCNC: 50.7 MG/DL — HIGH (ref 8–20)
BUN SERPL-MCNC: 51.5 MG/DL — HIGH (ref 8–20)
BUN SERPL-MCNC: 51.7 MG/DL — HIGH (ref 8–20)
CALCIUM SERPL-MCNC: 7.5 MG/DL — LOW (ref 8.4–10.5)
CALCIUM SERPL-MCNC: 7.9 MG/DL — LOW (ref 8.4–10.5)
CALCIUM SERPL-MCNC: 8.1 MG/DL — LOW (ref 8.4–10.5)
CHLORIDE SERPL-SCNC: 115 MMOL/L — HIGH (ref 96–108)
CHLORIDE SERPL-SCNC: 117 MMOL/L — HIGH (ref 96–108)
CHLORIDE SERPL-SCNC: 117 MMOL/L — HIGH (ref 96–108)
CO2 SERPL-SCNC: 25 MMOL/L — SIGNIFICANT CHANGE UP (ref 22–29)
CO2 SERPL-SCNC: 26 MMOL/L — SIGNIFICANT CHANGE UP (ref 22–29)
CO2 SERPL-SCNC: 27 MMOL/L — SIGNIFICANT CHANGE UP (ref 22–29)
COLOR SPEC: YELLOW — SIGNIFICANT CHANGE UP
CREAT SERPL-MCNC: 1.2 MG/DL — SIGNIFICANT CHANGE UP (ref 0.5–1.3)
CREAT SERPL-MCNC: 1.2 MG/DL — SIGNIFICANT CHANGE UP (ref 0.5–1.3)
CREAT SERPL-MCNC: 1.26 MG/DL — SIGNIFICANT CHANGE UP (ref 0.5–1.3)
CULTURE RESULTS: SIGNIFICANT CHANGE UP
DIFF PNL FLD: ABNORMAL
EGFR: 59 ML/MIN/1.73M2 — LOW
EGFR: 62 ML/MIN/1.73M2 — SIGNIFICANT CHANGE UP
EGFR: 62 ML/MIN/1.73M2 — SIGNIFICANT CHANGE UP
EPI CELLS # UR: SIGNIFICANT CHANGE UP
GLUCOSE BLDC GLUCOMTR-MCNC: 265 MG/DL — HIGH (ref 70–99)
GLUCOSE BLDC GLUCOMTR-MCNC: 265 MG/DL — HIGH (ref 70–99)
GLUCOSE BLDC GLUCOMTR-MCNC: 303 MG/DL — HIGH (ref 70–99)
GLUCOSE SERPL-MCNC: 316 MG/DL — HIGH (ref 70–99)
GLUCOSE SERPL-MCNC: 332 MG/DL — HIGH (ref 70–99)
GLUCOSE SERPL-MCNC: 360 MG/DL — HIGH (ref 70–99)
GLUCOSE UR QL: 1000 MG/DL
HCT VFR BLD CALC: 41.6 % — SIGNIFICANT CHANGE UP (ref 39–50)
HGB BLD-MCNC: 13.5 G/DL — SIGNIFICANT CHANGE UP (ref 13–17)
KETONES UR-MCNC: NEGATIVE — SIGNIFICANT CHANGE UP
LEUKOCYTE ESTERASE UR-ACNC: NEGATIVE — SIGNIFICANT CHANGE UP
MAGNESIUM SERPL-MCNC: 3.1 MG/DL — HIGH (ref 1.6–2.6)
MCHC RBC-ENTMCNC: 29.5 PG — SIGNIFICANT CHANGE UP (ref 27–34)
MCHC RBC-ENTMCNC: 32.5 GM/DL — SIGNIFICANT CHANGE UP (ref 32–36)
MCV RBC AUTO: 90.8 FL — SIGNIFICANT CHANGE UP (ref 80–100)
METHOD TYPE: SIGNIFICANT CHANGE UP
NITRITE UR-MCNC: NEGATIVE — SIGNIFICANT CHANGE UP
ORGANISM # SPEC MICROSCOPIC CNT: SIGNIFICANT CHANGE UP
ORGANISM # SPEC MICROSCOPIC CNT: SIGNIFICANT CHANGE UP
OSMOLALITY SERPL: 353 MOSMOL/KG — HIGH (ref 280–301)
PH UR: 6 — SIGNIFICANT CHANGE UP (ref 5–8)
PHOSPHATE SERPL-MCNC: 2.4 MG/DL — SIGNIFICANT CHANGE UP (ref 2.4–4.7)
PLATELET # BLD AUTO: 235 K/UL — SIGNIFICANT CHANGE UP (ref 150–400)
POTASSIUM SERPL-MCNC: 4 MMOL/L — SIGNIFICANT CHANGE UP (ref 3.5–5.3)
POTASSIUM SERPL-MCNC: 4 MMOL/L — SIGNIFICANT CHANGE UP (ref 3.5–5.3)
POTASSIUM SERPL-MCNC: 4.1 MMOL/L — SIGNIFICANT CHANGE UP (ref 3.5–5.3)
POTASSIUM SERPL-SCNC: 4 MMOL/L — SIGNIFICANT CHANGE UP (ref 3.5–5.3)
POTASSIUM SERPL-SCNC: 4 MMOL/L — SIGNIFICANT CHANGE UP (ref 3.5–5.3)
POTASSIUM SERPL-SCNC: 4.1 MMOL/L — SIGNIFICANT CHANGE UP (ref 3.5–5.3)
PROT UR-MCNC: NEGATIVE — SIGNIFICANT CHANGE UP
RBC # BLD: 4.58 M/UL — SIGNIFICANT CHANGE UP (ref 4.2–5.8)
RBC # FLD: 14.1 % — SIGNIFICANT CHANGE UP (ref 10.3–14.5)
RBC CASTS # UR COMP ASSIST: SIGNIFICANT CHANGE UP /HPF (ref 0–4)
SODIUM SERPL-SCNC: 153 MMOL/L — HIGH (ref 135–145)
SODIUM SERPL-SCNC: 154 MMOL/L — HIGH (ref 135–145)
SODIUM SERPL-SCNC: 154 MMOL/L — HIGH (ref 135–145)
SP GR SPEC: 1.01 — SIGNIFICANT CHANGE UP (ref 1.01–1.02)
SPECIMEN SOURCE: SIGNIFICANT CHANGE UP
UROBILINOGEN FLD QL: NEGATIVE MG/DL — SIGNIFICANT CHANGE UP
VANCOMYCIN TROUGH SERPL-MCNC: 15.6 UG/ML — SIGNIFICANT CHANGE UP (ref 10–20)
WBC # BLD: 8.22 K/UL — SIGNIFICANT CHANGE UP (ref 3.8–10.5)
WBC # FLD AUTO: 8.22 K/UL — SIGNIFICANT CHANGE UP (ref 3.8–10.5)
WBC UR QL: SIGNIFICANT CHANGE UP /HPF (ref 0–5)

## 2023-01-02 PROCEDURE — 71045 X-RAY EXAM CHEST 1 VIEW: CPT | Mod: 26

## 2023-01-02 PROCEDURE — 99231 SBSQ HOSP IP/OBS SF/LOW 25: CPT

## 2023-01-02 PROCEDURE — 99291 CRITICAL CARE FIRST HOUR: CPT

## 2023-01-02 RX ORDER — SODIUM CHLORIDE 9 MG/ML
1000 INJECTION, SOLUTION INTRAVENOUS ONCE
Refills: 0 | Status: COMPLETED | OUTPATIENT
Start: 2023-01-02 | End: 2023-01-02

## 2023-01-02 RX ORDER — HEPARIN SODIUM 5000 [USP'U]/ML
950 INJECTION INTRAVENOUS; SUBCUTANEOUS
Qty: 25000 | Refills: 0 | Status: DISCONTINUED | OUTPATIENT
Start: 2023-01-02 | End: 2023-01-02

## 2023-01-02 RX ORDER — HUMAN INSULIN 100 [IU]/ML
10 INJECTION, SUSPENSION SUBCUTANEOUS ONCE
Refills: 0 | Status: COMPLETED | OUTPATIENT
Start: 2023-01-02 | End: 2023-01-02

## 2023-01-02 RX ORDER — CALCIUM GLUCONATE 100 MG/ML
2 VIAL (ML) INTRAVENOUS ONCE
Refills: 0 | Status: COMPLETED | OUTPATIENT
Start: 2023-01-02 | End: 2023-01-02

## 2023-01-02 RX ORDER — INSULIN GLARGINE 100 [IU]/ML
15 INJECTION, SOLUTION SUBCUTANEOUS EVERY MORNING
Refills: 0 | Status: DISCONTINUED | OUTPATIENT
Start: 2023-01-02 | End: 2023-01-03

## 2023-01-02 RX ORDER — HEPARIN SODIUM 5000 [USP'U]/ML
700 INJECTION INTRAVENOUS; SUBCUTANEOUS
Qty: 25000 | Refills: 0 | Status: DISCONTINUED | OUTPATIENT
Start: 2023-01-02 | End: 2023-01-03

## 2023-01-02 RX ORDER — SODIUM CHLORIDE 9 MG/ML
500 INJECTION, SOLUTION INTRAVENOUS ONCE
Refills: 0 | Status: COMPLETED | OUTPATIENT
Start: 2023-01-02 | End: 2023-01-02

## 2023-01-02 RX ORDER — CALCIUM GLUCONATE 100 MG/ML
1 VIAL (ML) INTRAVENOUS ONCE
Refills: 0 | Status: COMPLETED | OUTPATIENT
Start: 2023-01-02 | End: 2023-01-02

## 2023-01-02 RX ADMIN — HEPARIN SODIUM 8 UNIT(S)/HR: 5000 INJECTION INTRAVENOUS; SUBCUTANEOUS at 13:11

## 2023-01-02 RX ADMIN — Medication 1 DROP(S): at 05:59

## 2023-01-02 RX ADMIN — Medication 3 MILLILITER(S): at 16:08

## 2023-01-02 RX ADMIN — MUPIROCIN 1 APPLICATION(S): 20 OINTMENT TOPICAL at 06:00

## 2023-01-02 RX ADMIN — Medication 2: at 04:51

## 2023-01-02 RX ADMIN — DEXMEDETOMIDINE HYDROCHLORIDE IN 0.9% SODIUM CHLORIDE 1.93 MICROGRAM(S)/KG/HR: 4 INJECTION INTRAVENOUS at 10:38

## 2023-01-02 RX ADMIN — DEXMEDETOMIDINE HYDROCHLORIDE IN 0.9% SODIUM CHLORIDE 1.93 MICROGRAM(S)/KG/HR: 4 INJECTION INTRAVENOUS at 05:57

## 2023-01-02 RX ADMIN — PANTOPRAZOLE SODIUM 40 MILLIGRAM(S): 20 TABLET, DELAYED RELEASE ORAL at 11:03

## 2023-01-02 RX ADMIN — Medication 650 MILLIGRAM(S): at 09:00

## 2023-01-02 RX ADMIN — ATORVASTATIN CALCIUM 80 MILLIGRAM(S): 80 TABLET, FILM COATED ORAL at 21:36

## 2023-01-02 RX ADMIN — Medication 100 GRAM(S): at 08:56

## 2023-01-02 RX ADMIN — PIPERACILLIN AND TAZOBACTAM 25 GRAM(S): 4; .5 INJECTION, POWDER, LYOPHILIZED, FOR SOLUTION INTRAVENOUS at 06:00

## 2023-01-02 RX ADMIN — Medication 200 GRAM(S): at 18:02

## 2023-01-02 RX ADMIN — Medication 2: at 21:33

## 2023-01-02 RX ADMIN — SODIUM CHLORIDE 1000 MILLILITER(S): 9 INJECTION, SOLUTION INTRAVENOUS at 10:38

## 2023-01-02 RX ADMIN — Medication 4 MILLILITER(S): at 09:24

## 2023-01-02 RX ADMIN — DEXMEDETOMIDINE HYDROCHLORIDE IN 0.9% SODIUM CHLORIDE 1.93 MICROGRAM(S)/KG/HR: 4 INJECTION INTRAVENOUS at 20:56

## 2023-01-02 RX ADMIN — Medication 3 MILLILITER(S): at 09:24

## 2023-01-02 RX ADMIN — PIPERACILLIN AND TAZOBACTAM 25 GRAM(S): 4; .5 INJECTION, POWDER, LYOPHILIZED, FOR SOLUTION INTRAVENOUS at 21:46

## 2023-01-02 RX ADMIN — Medication 4: at 17:10

## 2023-01-02 RX ADMIN — DEXMEDETOMIDINE HYDROCHLORIDE IN 0.9% SODIUM CHLORIDE 1.93 MICROGRAM(S)/KG/HR: 4 INJECTION INTRAVENOUS at 18:03

## 2023-01-02 RX ADMIN — Medication 250 MILLIGRAM(S): at 06:48

## 2023-01-02 RX ADMIN — CHLORHEXIDINE GLUCONATE 1 APPLICATION(S): 213 SOLUTION TOPICAL at 11:03

## 2023-01-02 RX ADMIN — MUPIROCIN 1 APPLICATION(S): 20 OINTMENT TOPICAL at 17:11

## 2023-01-02 RX ADMIN — Medication 81 MILLIGRAM(S): at 11:03

## 2023-01-02 RX ADMIN — Medication 4 MILLILITER(S): at 03:46

## 2023-01-02 RX ADMIN — CHLORHEXIDINE GLUCONATE 15 MILLILITER(S): 213 SOLUTION TOPICAL at 17:11

## 2023-01-02 RX ADMIN — Medication 4: at 10:44

## 2023-01-02 RX ADMIN — Medication 650 MILLIGRAM(S): at 08:19

## 2023-01-02 RX ADMIN — PIPERACILLIN AND TAZOBACTAM 25 GRAM(S): 4; .5 INJECTION, POWDER, LYOPHILIZED, FOR SOLUTION INTRAVENOUS at 13:28

## 2023-01-02 RX ADMIN — LOTEPREDNOL ETABONATE 1 DROP(S): 2 SUSPENSION/ DROPS OPHTHALMIC at 05:59

## 2023-01-02 RX ADMIN — CHLORHEXIDINE GLUCONATE 15 MILLILITER(S): 213 SOLUTION TOPICAL at 05:59

## 2023-01-02 RX ADMIN — HEPARIN SODIUM 9.5 UNIT(S)/HR: 5000 INJECTION INTRAVENOUS; SUBCUTANEOUS at 05:58

## 2023-01-02 RX ADMIN — Medication 3 MILLILITER(S): at 03:46

## 2023-01-02 RX ADMIN — HEPARIN SODIUM 8 UNIT(S)/HR: 5000 INJECTION INTRAVENOUS; SUBCUTANEOUS at 20:56

## 2023-01-02 RX ADMIN — INSULIN GLARGINE 15 UNIT(S): 100 INJECTION, SOLUTION SUBCUTANEOUS at 08:53

## 2023-01-02 RX ADMIN — DEXMEDETOMIDINE HYDROCHLORIDE IN 0.9% SODIUM CHLORIDE 1.93 MICROGRAM(S)/KG/HR: 4 INJECTION INTRAVENOUS at 02:07

## 2023-01-02 RX ADMIN — Medication 2 MILLIGRAM(S): at 21:52

## 2023-01-02 RX ADMIN — HUMAN INSULIN 10 UNIT(S): 100 INJECTION, SUSPENSION SUBCUTANEOUS at 21:34

## 2023-01-02 RX ADMIN — SODIUM CHLORIDE 500 MILLILITER(S): 9 INJECTION, SOLUTION INTRAVENOUS at 18:02

## 2023-01-02 NOTE — PROGRESS NOTE ADULT - ASSESSMENT
78 yo M with PMH HTN, DM, CAD.  Transferred from [place] 12/26 with an acute L pontine CVA, possible L vert dissection. Worsening neuro exam 12/27 - RUE plegia, worsening bulbar dysfunction.   Acute resp failure due to neurologic injury, required intubation 12/27 for airway protection.  Thoracic surgery consulted for trach and peg eval.

## 2023-01-02 NOTE — PROGRESS NOTE ADULT - PROBLEM SELECTOR PLAN 1
Admitted with Acute Left Pontine CVA.  Intubated 12/27 for airway protection.  Consulted for trach and peg evaluation.  Wife at bedside and agreeable to plan for Trach/PEG.   Patient febrile with Vanco/Zosyn ordered. Blood cultures from 12/20/22 resulted Negative.   Plan discussed / reviewed with Thoracic Surgery attending Dr. Hernandez   Planning Trach/PEG for   Possibly tomorrow 1/3.  Please keep NPO after midnight.

## 2023-01-02 NOTE — PROGRESS NOTE ADULT - ASSESSMENT
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  Worsening neuro exam 12/27 - RUE plegia, worsening bulbar dysfunction.   Acute resp failure due to neurologic injury, required intubation.  PMH of  DMII, HTN, CAD.  Allergy to iodine.  Anticoagulated on Heparin ggt.  tracheal aspirate GPCs 12/30- PNA    NEURO:   q4hr neurochecks  cont ASA 81 mg daily   cont Heparin ggt,  aPTT goal 60-80  precedex for sedation  pain mgt: tylenol and oxy 5 prn  Activity: [x] mobilize as tolerated [] Bedrest [x] PT [x] OT [] PMNR    PULM: vent support, SBTs daily as tolerated; possible early trach/PEG in view of stroke with bulbar dysfunction  thick secretions and poor cough/gag, tachypneic overtime with SBT, unlikely success at extubation  bed to chair position, PT/OT  duonebs and mucomyst  SpO2>92%  CT surg consulted for trach/peg    CV:  SBP goal <160  lipitor  ASA    RENAL: monitor I/O  replete lytes prn  hyperkalemia- bicarb/insulin/dextrose  voiding cardura    GI:  Diet: TF via NGT (will need PEG)  FWF 350q6h  GI prophylaxis [x] PPI  zofran prn for nausea  Bowel regimen [x] senna [] other: miralax  LBM 1/1    ENDO:   Goal euglycemia (-180)  lantus 10units  ISS    HEME/ONC:  VTE prophylaxis: [] SCDs [x] chemoprophylaxis hep gtt  hep gtt (AC pending trach/peg)    ID: febrile   empiric vanco and zosyn ( 12/30- 1/5)- staph aureus sputum  follow up cultures  MRSA + swab    MISC:    I affirm that this patient is critically ill and at high risk for sudden, fatal deterioration due to one or more of the above stated active issues.     This time does not include bedside procedures that are documented separately.           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  Worsening neuro exam 12/27 - RUE plegia, worsening bulbar dysfunction.   Acute resp failure due to neurologic injury, required intubation.  PMH of  DMII, HTN, CAD.  Allergy to iodine.  Anticoagulated on Heparin ggt.  tracheal aspirate GPCs 12/30- PNA    NEURO:   q4hr neurochecks  cont ASA 81 mg daily   cont Heparin ggt,  aPTT goal 60-80  precedex for sedation  pain mgt: tylenol and oxy 5 prn  Activity: [x] mobilize as tolerated [] Bedrest [x] PT [x] OT     PULM: vent support, SBTs daily as tolerated; possible early trach/PEG in view of stroke with bulbar dysfunction  thick secretions and poor cough/gag, tachypneic overtime with SBT, unlikely success at extubation- recommend proceeding to trach/peg  bed to chair position, PT/OT  duonebs    SpO2>92%  CT surg consulted for trach/peg  chest xray    CV:  SBP goal <160  lipitor  ASA    RENAL: monitor I/O  replete lytes prn  voiding cardura- incontinent   plasmalyte bolus    GI:  Diet: TF via NGT (will need PEG)  FWF 350q6h  GI prophylaxis [x] PPI  zofran prn for nausea  rectal tube  LBM 1/2    ENDO:   Goal euglycemia (-180)  inc lantus 15units  ISS    HEME/ONC:  VTE prophylaxis: [] SCDs [x] chemoprophylaxis hep gtt  hep gtt (AC pending trach/peg)    ID: febrile   empiric vanco and zosyn ( 12/30- 1/5)- staph aureus sputum  follow up cultures    MISC:    I affirm that this patient is critically ill and at high risk for sudden, fatal deterioration due to one or more of the above stated active issues.     This time does not include bedside procedures that are documented separately.           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  Worsening neuro exam 12/27 - RUE plegia, worsening bulbar dysfunction.   Acute resp failure due to neurologic injury, required intubation.  PMH of  DMII, HTN, CAD.  Allergy to iodine.  Anticoagulated on Heparin ggt.  tracheal aspirate GPCs 12/30- PNA    NEURO:   q4hr neurochecks  cont ASA 81 mg daily   cont Heparin ggt,  aPTT goal 60-80  precedex for sedation  pain mgt: tylenol and oxy 5 prn  Activity: [x] mobilize as tolerated [] Bedrest [x] PT [x] OT     PULM: vent support, SBTs daily as tolerated; possible early trach/PEG in view of stroke with bulbar dysfunction  thick secretions and poor cough/gag, tachypneic overtime with SBT, unlikely success at extubation- recommend proceeding to trach/peg  bed to chair position, PT/OT  duonebs    SpO2>92%  CT surg consulted for trach/peg- likely 1/3  chest xray    CV:  SBP goal <160  lipitor  ASA    RENAL: monitor I/O  replete lytes prn  voiding cardura- incontinent   plasmalyte bolus    GI: NPO at midnight  Diet: TF via NGT (will need PEG)  FWF 350q6h  GI prophylaxis [x] PPI  zofran prn for nausea  rectal tube  LBM 1/2    ENDO:   Goal euglycemia (-180)  inc lantus 15units  ISS    HEME/ONC:  VTE prophylaxis: [] SCDs [x] chemoprophylaxis hep gtt  hep gtt (AC pending trach/peg)    ID: febrile   empiric vanco and zosyn ( 12/30- 1/5)- staph aureus sputum  follow up cultures    MISC:    I affirm that this patient is critically ill and at high risk for sudden, fatal deterioration due to one or more of the above stated active issues.     This time does not include bedside procedures that are documented separately.

## 2023-01-02 NOTE — PROGRESS NOTE ADULT - SUBJECTIVE AND OBJECTIVE BOX
Subjective: Pt lying in bed.  Intubated. NAD noted.                           T(F): 98.2 (01-02-23 @ 19:33), Max: 100.5 (01-02-23 @ 08:00)  HR: 62 (01-02-23 @ 19:00) (57 - 115)  BP: 166/66 (01-02-23 @ 19:00) (101/63 - 170/68)  RR: 21 (01-02-23 @ 19:00) (18 - 22)  SpO2: 97% (01-02-23 @ 19:00) (91% - 100%)    Allergies  iodine (Anaphylaxis (Mod to Severe))      01-02    154<H>  |  117<H>  |  51.7<H>  ----------------------------<  332<H>  4.1   |  26.0  |  1.20    Ca    7.5<L>      02 Jan 2023 16:30  Phos  2.4     01-02  Mg     3.1     01-02                                 13.5   8.22  )-----------( 235      ( 02 Jan 2023 03:37 )             41.6        PTT - ( 02 Jan 2023 19:15 )  PTT:84.4 sec           CAPILLARY BLOOD GLUCOSE  POCT Blood Glucose.: 303 mg/dL (02 Jan 2023 10:43)  POCT Blood Glucose.: 265 mg/dL (02 Jan 2023 04:49)  POCT Blood Glucose.: 277 mg/dL (01 Jan 2023 21:40)         I&O's Detail    01 Jan 2023 07:01  -  02 Jan 2023 07:00  --------------------------------------------------------  IN:    Dexmedetomidine: 180 mL    Enteral Tube Flush: 125 mL    Free Water: 700 mL    Glucerna 1.5: 600 mL    Heparin: 220 mL    Heparin: 19 mL    IV PiggyBack: 100 mL    IV PiggyBack: 250 mL  Total IN: 2194 mL    OUT:    Rectal Tube (mL): 0 mL  Total OUT: 0 mL    Total NET: 2194 mL      02 Jan 2023 07:01  -  02 Jan 2023 20:06  --------------------------------------------------------  IN:    Dexmedetomidine: 100.4 mL    Enteral Tube Flush: 90 mL    Free Water: 700 mL    Glucerna 1.5: 600 mL    Heparin: 105 mL    IV PiggyBack: 250 mL    multiple electrolytes Injection Type 1 Bolus: 2000 mL  Total IN: 3845.4 mL    OUT:    Incontinent per Condom Catheter (mL): 1350 mL    Rectal Tube (mL): 800 mL  Total OUT: 2150 mL    Total NET: 1695.4 mL        Active Medications:  acetaminophen     Tablet .. 650 milliGRAM(s) Oral every 6 hours PRN  albuterol/ipratropium for Nebulization 3 milliLiter(s) Nebulizer every 6 hours  ALPRAZolam 0.5 milliGRAM(s) Oral every 12 hours PRN  aluminum hydroxide/magnesium hydroxide/simethicone Suspension 30 milliLiter(s) Oral every 4 hours PRN  aspirin 81 milliGRAM(s) Oral daily  atorvastatin 80 milliGRAM(s) Oral at bedtime  bisacodyl Suppository 10 milliGRAM(s) Rectal daily  chlorhexidine 0.12% Liquid 15 milliLiter(s) Oral Mucosa every 12 hours  chlorhexidine 2% Cloths 1 Application(s) Topical daily  coronavirus bivalent (EUA) Booster Vaccine (PFIZER) 0.3 milliLiter(s) IntraMuscular once  dexMEDEtomidine Infusion 0.1 MICROgram(s)/kG/Hr IV Continuous <Continuous>  doxazosin 2 milliGRAM(s) Oral at bedtime  heparin  Infusion 950 Unit(s)/Hr IV Continuous <Continuous>  hydrALAZINE Injectable 10 milliGRAM(s) IV Push every 2 hours PRN  influenza  Vaccine (HIGH DOSE) 0.7 milliLiter(s) IntraMuscular once  insulin glargine Injectable (LANTUS) 15 Unit(s) SubCutaneous every morning  insulin lispro (ADMELOG) corrective regimen sliding scale   SubCutaneous every 6 hours  insulin NPH human recombinant 10 Unit(s) SubCutaneous once  labetalol Injectable 10 milliGRAM(s) IV Push every 2 hours PRN  loteprednol 0.5% Ophthalmic Suspension 1 Drop(s) Right EYE daily  melatonin 3 milliGRAM(s) Oral at bedtime PRN  mupirocin 2% Ointment 1 Application(s) Both Nostrils two times a day  ondansetron Injectable 4 milliGRAM(s) IV Push every 8 hours PRN  pantoprazole   Suspension 40 milliGRAM(s) Oral daily  piperacillin/tazobactam IVPB.. 3.375 Gram(s) IV Intermittent every 8 hours  prednisoLONE acetate 1% Suspension 1 Drop(s) Left EYE daily      Physical Exam:    Neuro: Intubated and sleeping    Pulm: Diminished BLL    CV: RRR    Abd: Soft/NT/ND.                          PAST MEDICAL & SURGICAL HISTORY:  HTN (hypertension)      CAD (coronary artery disease)      DM (diabetes mellitus)

## 2023-01-02 NOTE — PROGRESS NOTE ADULT - SUBJECTIVE AND OBJECTIVE BOX
Chief complaint:   Patient is a 77y old  Male who presents with a chief complaint of Acute stroke (01 Jan 2023 13:03)    HPI:  78 y/o male with PMH of DM-2, HTN, CAD was transferred from [place] for stroke. Patient reported right sided weakness and slurred speech along with difficulty swallowing that started yesterday. Was seen at [place] today, code stroke initiated out of window for tPA. CT head: no acute finding. MRI: acute infarct with left debby. MRA head/neck: Left vertebral artery segmental stenoses and T1 hyperintensity suggesting foci of dissection and/or thrombus. As per resident, neurology from [facility] was consulted and accepted patient for further evaluation. Patient said he is upset about his condition, noted discomfort in his head otherwise no chest pain, shortness of breath, palpitation, fever, chills, nausea, vomiting, abdominal pain, change in bowel/urinary habit.  (26 Dec 2022 22:58)        24hr EVENTS:      ROS: [ ]  Unable to assess due to mental status   All other systems negative    -----------------------------------------------------------------------------------------------------------------------------------------------------------------------------------  ICU Vital Signs Last 24 Hrs  T(C): 38.1 (02 Jan 2023 08:00), Max: 38.6 (01 Jan 2023 09:00)  T(F): 100.5 (02 Jan 2023 08:00), Max: 101.4 (01 Jan 2023 09:00)  HR: 73 (02 Jan 2023 08:00) (57 - 115)  BP: 137/71 (02 Jan 2023 08:00) (117/59 - 158/71)  BP(mean): 91 (02 Jan 2023 08:00) (75 - 103)  ABP: --  ABP(mean): --  RR: 18 (02 Jan 2023 08:00) (16 - 19)  SpO2: 93% (02 Jan 2023 08:00) (91% - 98%)    O2 Parameters below as of 02 Jan 2023 08:00  Patient On (Oxygen Delivery Method): ventilator    O2 Concentration (%): 50        I&O's Summary    01 Jan 2023 07:01  -  02 Jan 2023 07:00  --------------------------------------------------------  IN: 2194 mL / OUT: 0 mL / NET: 2194 mL    02 Jan 2023 07:01  -  02 Jan 2023 08:25  --------------------------------------------------------  IN: 73 mL / OUT: 0 mL / NET: 73 mL        MEDICATIONS  (STANDING):  acetylcysteine 10%  Inhalation 4 milliLiter(s) Inhalation every 6 hours  albuterol/ipratropium for Nebulization 3 milliLiter(s) Nebulizer every 6 hours  aspirin 81 milliGRAM(s) Oral daily  atorvastatin 80 milliGRAM(s) Oral at bedtime  bisacodyl Suppository 10 milliGRAM(s) Rectal daily  chlorhexidine 0.12% Liquid 15 milliLiter(s) Oral Mucosa every 12 hours  chlorhexidine 2% Cloths 1 Application(s) Topical daily  coronavirus bivalent (EUA) Booster Vaccine (PFIZER) 0.3 milliLiter(s) IntraMuscular once  dexMEDEtomidine Infusion 0.1 MICROgram(s)/kG/Hr (1.93 mL/Hr) IV Continuous <Continuous>  dextrose 5%. 1000 milliLiter(s) (100 mL/Hr) IV Continuous <Continuous>  dextrose 5%. 1000 milliLiter(s) (50 mL/Hr) IV Continuous <Continuous>  doxazosin 2 milliGRAM(s) Oral at bedtime  heparin  Infusion 950 Unit(s)/Hr (9.5 mL/Hr) IV Continuous <Continuous>  influenza  Vaccine (HIGH DOSE) 0.7 milliLiter(s) IntraMuscular once  insulin glargine Injectable (LANTUS) 15 Unit(s) SubCutaneous every morning  insulin lispro (ADMELOG) corrective regimen sliding scale   SubCutaneous every 6 hours  loteprednol 0.5% Ophthalmic Suspension 1 Drop(s) Right EYE daily  mupirocin 2% Ointment 1 Application(s) Both Nostrils two times a day  pantoprazole   Suspension 40 milliGRAM(s) Oral daily  piperacillin/tazobactam IVPB.. 3.375 Gram(s) IV Intermittent every 8 hours  prednisoLONE acetate 1% Suspension 1 Drop(s) Left EYE daily  vancomycin  IVPB 1000 milliGRAM(s) IV Intermittent every 12 hours      RESPIRATORY:  Mode: AC/ CMV (Assist Control/ Continuous Mandatory Ventilation)  RR (machine): 18  TV (machine): 400  FiO2: 40  PEEP: 6  ITime: 0.8  MAP: 12  PIP: 24        IMAGING:   Recent imaging studies were reviewed.    LAB RESULTS:                          13.5   8.22  )-----------( 235      ( 02 Jan 2023 03:37 )             41.6       PTT - ( 02 Jan 2023 04:42 )  PTT:95.3 sec    01-02    154<H>  |  117<H>  |  51.5<H>  ----------------------------<  360<H>  4.0   |  27.0  |  1.20    Ca    7.9<L>      02 Jan 2023 07:07  Phos  2.4     01-02  Mg     3.1     01-02    ABG - ( 01 Jan 2023 04:40 )  pH, Arterial: 7.420 pH, Blood: x     /  pCO2: 40    /  pO2: 82    / HCO3: 26    / Base Excess: 1.4   /  SaO2: 98.2      -----------------------------------------------------------------------------------------------------------------------------------------------------------------------------------    PHYSICAL EXAM:  General: Calm, intubated  HEENT: MMM  Neuro:  -Mental status- No acute distress  -CN- PERRL 3mm, EOMI, tongue midline, face symmetric    CV: RRR  Pulm: clear to auscultation  Abd: Soft, nontender, nondistended  Ext: no noted edema in lower ext  Skin: warm, dry       Chief complaint:   Patient is a 77y old  Male who presents with a chief complaint of Acute stroke (01 Jan 2023 13:03)    HPI:  78 y/o male with PMH of DM-2, HTN, CAD was transferred from [place] for stroke. Patient reported right sided weakness and slurred speech along with difficulty swallowing that started yesterday. Was seen at [place] today, code stroke initiated out of window for tPA. CT head: no acute finding. MRI: acute infarct with left debby. MRA head/neck: Left vertebral artery segmental stenoses and T1 hyperintensity suggesting foci of dissection and/or thrombus. As per resident, neurology from [facility] was consulted and accepted patient for further evaluation. Patient said he is upset about his condition, noted discomfort in his head otherwise no chest pain, shortness of breath, palpitation, fever, chills, nausea, vomiting, abdominal pain, change in bowel/urinary habit.  (26 Dec 2022 22:58)    24hr EVENTS:  not tolerating SBT    ROS: uncomfortable  All other systems negative    -----------------------------------------------------------------------------------------------------------------------------------------------------------------------------------  ICU Vital Signs Last 24 Hrs  T(C): 38.1 (02 Jan 2023 08:00), Max: 38.6 (01 Jan 2023 09:00)  T(F): 100.5 (02 Jan 2023 08:00), Max: 101.4 (01 Jan 2023 09:00)  HR: 73 (02 Jan 2023 08:00) (57 - 115)  BP: 137/71 (02 Jan 2023 08:00) (117/59 - 158/71)  BP(mean): 91 (02 Jan 2023 08:00) (75 - 103)  ABP: --  ABP(mean): --  RR: 18 (02 Jan 2023 08:00) (16 - 19)  SpO2: 93% (02 Jan 2023 08:00) (91% - 98%)    O2 Parameters below as of 02 Jan 2023 08:00  Patient On (Oxygen Delivery Method): ventilator    O2 Concentration (%): 50        I&O's Summary    01 Jan 2023 07:01  -  02 Jan 2023 07:00  --------------------------------------------------------  IN: 2194 mL / OUT: 0 mL / NET: 2194 mL    02 Jan 2023 07:01  -  02 Jan 2023 08:25  --------------------------------------------------------  IN: 73 mL / OUT: 0 mL / NET: 73 mL        MEDICATIONS  (STANDING):  acetylcysteine 10%  Inhalation 4 milliLiter(s) Inhalation every 6 hours  albuterol/ipratropium for Nebulization 3 milliLiter(s) Nebulizer every 6 hours  aspirin 81 milliGRAM(s) Oral daily  atorvastatin 80 milliGRAM(s) Oral at bedtime  bisacodyl Suppository 10 milliGRAM(s) Rectal daily  chlorhexidine 0.12% Liquid 15 milliLiter(s) Oral Mucosa every 12 hours  chlorhexidine 2% Cloths 1 Application(s) Topical daily  coronavirus bivalent (EUA) Booster Vaccine (PFIZER) 0.3 milliLiter(s) IntraMuscular once  dexMEDEtomidine Infusion 0.1 MICROgram(s)/kG/Hr (1.93 mL/Hr) IV Continuous <Continuous>  dextrose 5%. 1000 milliLiter(s) (100 mL/Hr) IV Continuous <Continuous>  dextrose 5%. 1000 milliLiter(s) (50 mL/Hr) IV Continuous <Continuous>  doxazosin 2 milliGRAM(s) Oral at bedtime  heparin  Infusion 950 Unit(s)/Hr (9.5 mL/Hr) IV Continuous <Continuous>  influenza  Vaccine (HIGH DOSE) 0.7 milliLiter(s) IntraMuscular once  insulin glargine Injectable (LANTUS) 15 Unit(s) SubCutaneous every morning  insulin lispro (ADMELOG) corrective regimen sliding scale   SubCutaneous every 6 hours  loteprednol 0.5% Ophthalmic Suspension 1 Drop(s) Right EYE daily  mupirocin 2% Ointment 1 Application(s) Both Nostrils two times a day  pantoprazole   Suspension 40 milliGRAM(s) Oral daily  piperacillin/tazobactam IVPB.. 3.375 Gram(s) IV Intermittent every 8 hours  prednisoLONE acetate 1% Suspension 1 Drop(s) Left EYE daily  vancomycin  IVPB 1000 milliGRAM(s) IV Intermittent every 12 hours      RESPIRATORY:  Mode: AC/ CMV (Assist Control/ Continuous Mandatory Ventilation)  RR (machine): 18  TV (machine): 400  FiO2: 40  PEEP: 6  ITime: 0.8  MAP: 12  PIP: 24        IMAGING:   Recent imaging studies were reviewed.    LAB RESULTS:                          13.5   8.22  )-----------( 235      ( 02 Jan 2023 03:37 )             41.6       PTT - ( 02 Jan 2023 04:42 )  PTT:95.3 sec    01-02    154<H>  |  117<H>  |  51.5<H>  ----------------------------<  360<H>  4.0   |  27.0  |  1.20    Ca    7.9<L>      02 Jan 2023 07:07  Phos  2.4     01-02  Mg     3.1     01-02    ABG - ( 01 Jan 2023 04:40 )  pH, Arterial: 7.420 pH, Blood: x     /  pCO2: 40    /  pO2: 82    / HCO3: 26    / Base Excess: 1.4   /  SaO2: 98.2      -----------------------------------------------------------------------------------------------------------------------------------------------------------------------------------    PHYSICAL EXAM:  General: Calm, intubated  HEENT: MMM  Neuro:  -Mental status- following commands  -CN- PERRL 3mm, tongue midline, R facial droop  +weak cough/gag  flaccid RUE and RLE  LUE and LLE full strength  diminished sensation on R    CV: RRR  Pulm: coarse breath sounds  Abd: Soft, nontender, nondistended  Ext: no noted edema in lower ext  Skin: warm, dry

## 2023-01-03 LAB
ABO RH CONFIRMATION: SIGNIFICANT CHANGE UP
ANION GAP SERPL CALC-SCNC: 11 MMOL/L — SIGNIFICANT CHANGE UP (ref 5–17)
ANION GAP SERPL CALC-SCNC: 9 MMOL/L — SIGNIFICANT CHANGE UP (ref 5–17)
APTT BLD: 73.7 SEC — HIGH (ref 27.5–35.5)
BLD GP AB SCN SERPL QL: SIGNIFICANT CHANGE UP
BUN SERPL-MCNC: 47 MG/DL — HIGH (ref 8–20)
BUN SERPL-MCNC: 47.1 MG/DL — HIGH (ref 8–20)
CALCIUM SERPL-MCNC: 7.9 MG/DL — LOW (ref 8.4–10.5)
CALCIUM SERPL-MCNC: 8.1 MG/DL — LOW (ref 8.4–10.5)
CHLORIDE SERPL-SCNC: 120 MMOL/L — HIGH (ref 96–108)
CHLORIDE SERPL-SCNC: 122 MMOL/L — HIGH (ref 96–108)
CO2 SERPL-SCNC: 25 MMOL/L — SIGNIFICANT CHANGE UP (ref 22–29)
CO2 SERPL-SCNC: 28 MMOL/L — SIGNIFICANT CHANGE UP (ref 22–29)
CREAT SERPL-MCNC: 1.2 MG/DL — SIGNIFICANT CHANGE UP (ref 0.5–1.3)
CREAT SERPL-MCNC: 1.48 MG/DL — HIGH (ref 0.5–1.3)
EGFR: 48 ML/MIN/1.73M2 — LOW
EGFR: 62 ML/MIN/1.73M2 — SIGNIFICANT CHANGE UP
GLUCOSE BLDC GLUCOMTR-MCNC: 165 MG/DL — HIGH (ref 70–99)
GLUCOSE BLDC GLUCOMTR-MCNC: 214 MG/DL — HIGH (ref 70–99)
GLUCOSE BLDC GLUCOMTR-MCNC: 231 MG/DL — HIGH (ref 70–99)
GLUCOSE SERPL-MCNC: 227 MG/DL — HIGH (ref 70–99)
GLUCOSE SERPL-MCNC: 283 MG/DL — HIGH (ref 70–99)
HCT VFR BLD CALC: 42.2 % — SIGNIFICANT CHANGE UP (ref 39–50)
HGB BLD-MCNC: 13.9 G/DL — SIGNIFICANT CHANGE UP (ref 13–17)
INR BLD: 1.24 RATIO — HIGH (ref 0.88–1.16)
MAGNESIUM SERPL-MCNC: 2.9 MG/DL — HIGH (ref 1.6–2.6)
MCHC RBC-ENTMCNC: 29.9 PG — SIGNIFICANT CHANGE UP (ref 27–34)
MCHC RBC-ENTMCNC: 32.9 GM/DL — SIGNIFICANT CHANGE UP (ref 32–36)
MCV RBC AUTO: 90.8 FL — SIGNIFICANT CHANGE UP (ref 80–100)
OSMOLALITY UR: 551 MOSM/KG — SIGNIFICANT CHANGE UP (ref 300–1000)
PHOSPHATE SERPL-MCNC: 2.6 MG/DL — SIGNIFICANT CHANGE UP (ref 2.4–4.7)
PLATELET # BLD AUTO: 259 K/UL — SIGNIFICANT CHANGE UP (ref 150–400)
POTASSIUM SERPL-MCNC: 4.1 MMOL/L — SIGNIFICANT CHANGE UP (ref 3.5–5.3)
POTASSIUM SERPL-MCNC: 4.1 MMOL/L — SIGNIFICANT CHANGE UP (ref 3.5–5.3)
POTASSIUM SERPL-SCNC: 4.1 MMOL/L — SIGNIFICANT CHANGE UP (ref 3.5–5.3)
POTASSIUM SERPL-SCNC: 4.1 MMOL/L — SIGNIFICANT CHANGE UP (ref 3.5–5.3)
PROTHROM AB SERPL-ACNC: 14.4 SEC — HIGH (ref 10.5–13.4)
RBC # BLD: 4.65 M/UL — SIGNIFICANT CHANGE UP (ref 4.2–5.8)
RBC # FLD: 14 % — SIGNIFICANT CHANGE UP (ref 10.3–14.5)
SODIUM SERPL-SCNC: 156 MMOL/L — HIGH (ref 135–145)
SODIUM SERPL-SCNC: 159 MMOL/L — HIGH (ref 135–145)
SODIUM UR-SCNC: 38 MMOL/L — SIGNIFICANT CHANGE UP
SP GR UR STRIP: 1.02 — SIGNIFICANT CHANGE UP (ref 1.01–1.02)
WBC # BLD: 8.89 K/UL — SIGNIFICANT CHANGE UP (ref 3.8–10.5)
WBC # FLD AUTO: 8.89 K/UL — SIGNIFICANT CHANGE UP (ref 3.8–10.5)

## 2023-01-03 PROCEDURE — 31600 PLANNED TRACHEOSTOMY: CPT

## 2023-01-03 PROCEDURE — 31645 BRNCHSC W/THER ASPIR 1ST: CPT

## 2023-01-03 PROCEDURE — 99223 1ST HOSP IP/OBS HIGH 75: CPT

## 2023-01-03 PROCEDURE — 99232 SBSQ HOSP IP/OBS MODERATE 35: CPT

## 2023-01-03 PROCEDURE — 43246 EGD PLACE GASTROSTOMY TUBE: CPT

## 2023-01-03 PROCEDURE — 99291 CRITICAL CARE FIRST HOUR: CPT

## 2023-01-03 DEVICE — FEEDING TUBE PEG KIT ENDOVIVE STANDARD 20FR PUSH WITH LIDOCAINE AMPULE: Type: IMPLANTABLE DEVICE | Status: FUNCTIONAL

## 2023-01-03 RX ORDER — LANOLIN ALCOHOL/MO/W.PET/CERES
3 CREAM (GRAM) TOPICAL AT BEDTIME
Refills: 0 | Status: DISCONTINUED | OUTPATIENT
Start: 2023-01-03 | End: 2023-01-11

## 2023-01-03 RX ORDER — DEXTROSE 50 % IN WATER 50 %
12.5 SYRINGE (ML) INTRAVENOUS ONCE
Refills: 0 | Status: DISCONTINUED | OUTPATIENT
Start: 2023-01-03 | End: 2023-01-04

## 2023-01-03 RX ORDER — SODIUM CHLORIDE 9 MG/ML
1000 INJECTION, SOLUTION INTRAVENOUS
Refills: 0 | Status: DISCONTINUED | OUTPATIENT
Start: 2023-01-03 | End: 2023-01-04

## 2023-01-03 RX ORDER — INSULIN LISPRO 100/ML
4 VIAL (ML) SUBCUTANEOUS EVERY 6 HOURS
Refills: 0 | Status: DISCONTINUED | OUTPATIENT
Start: 2023-01-03 | End: 2023-01-05

## 2023-01-03 RX ORDER — DEXTROSE 50 % IN WATER 50 %
25 SYRINGE (ML) INTRAVENOUS ONCE
Refills: 0 | Status: DISCONTINUED | OUTPATIENT
Start: 2023-01-03 | End: 2023-01-04

## 2023-01-03 RX ORDER — HYDRALAZINE HCL 50 MG
10 TABLET ORAL
Refills: 0 | Status: DISCONTINUED | OUTPATIENT
Start: 2023-01-03 | End: 2023-01-30

## 2023-01-03 RX ORDER — LABETALOL HCL 100 MG
10 TABLET ORAL
Refills: 0 | Status: DISCONTINUED | OUTPATIENT
Start: 2023-01-03 | End: 2023-01-30

## 2023-01-03 RX ORDER — CEFAZOLIN SODIUM 1 G
2000 VIAL (EA) INJECTION EVERY 8 HOURS
Refills: 0 | Status: COMPLETED | OUTPATIENT
Start: 2023-01-03 | End: 2023-01-07

## 2023-01-03 RX ORDER — ATORVASTATIN CALCIUM 80 MG/1
80 TABLET, FILM COATED ORAL AT BEDTIME
Refills: 0 | Status: DISCONTINUED | OUTPATIENT
Start: 2023-01-03 | End: 2023-01-30

## 2023-01-03 RX ORDER — ACETAMINOPHEN 500 MG
650 TABLET ORAL EVERY 6 HOURS
Refills: 0 | Status: DISCONTINUED | OUTPATIENT
Start: 2023-01-03 | End: 2023-01-30

## 2023-01-03 RX ORDER — DOXAZOSIN MESYLATE 4 MG
2 TABLET ORAL AT BEDTIME
Refills: 0 | Status: DISCONTINUED | OUTPATIENT
Start: 2023-01-03 | End: 2023-01-30

## 2023-01-03 RX ORDER — FENTANYL CITRATE 50 UG/ML
100 INJECTION INTRAVENOUS ONCE
Refills: 0 | Status: DISCONTINUED | OUTPATIENT
Start: 2023-01-03 | End: 2023-01-03

## 2023-01-03 RX ORDER — ROCURONIUM BROMIDE 10 MG/ML
100 VIAL (ML) INTRAVENOUS ONCE
Refills: 0 | Status: COMPLETED | OUTPATIENT
Start: 2023-01-03 | End: 2023-01-03

## 2023-01-03 RX ORDER — MIDAZOLAM HYDROCHLORIDE 1 MG/ML
6 INJECTION, SOLUTION INTRAMUSCULAR; INTRAVENOUS ONCE
Refills: 0 | Status: DISCONTINUED | OUTPATIENT
Start: 2023-01-03 | End: 2023-01-03

## 2023-01-03 RX ORDER — GLUCAGON INJECTION, SOLUTION 0.5 MG/.1ML
1 INJECTION, SOLUTION SUBCUTANEOUS ONCE
Refills: 0 | Status: DISCONTINUED | OUTPATIENT
Start: 2023-01-03 | End: 2023-01-04

## 2023-01-03 RX ORDER — ALPRAZOLAM 0.25 MG
0.5 TABLET ORAL EVERY 12 HOURS
Refills: 0 | Status: DISCONTINUED | OUTPATIENT
Start: 2023-01-03 | End: 2023-01-10

## 2023-01-03 RX ORDER — PANTOPRAZOLE SODIUM 20 MG/1
40 TABLET, DELAYED RELEASE ORAL DAILY
Refills: 0 | Status: DISCONTINUED | OUTPATIENT
Start: 2023-01-03 | End: 2023-01-30

## 2023-01-03 RX ORDER — ASPIRIN/CALCIUM CARB/MAGNESIUM 324 MG
81 TABLET ORAL DAILY
Refills: 0 | Status: DISCONTINUED | OUTPATIENT
Start: 2023-01-03 | End: 2023-01-03

## 2023-01-03 RX ORDER — DEXTROSE 50 % IN WATER 50 %
15 SYRINGE (ML) INTRAVENOUS ONCE
Refills: 0 | Status: DISCONTINUED | OUTPATIENT
Start: 2023-01-03 | End: 2023-01-04

## 2023-01-03 RX ORDER — INSULIN GLARGINE 100 [IU]/ML
18 INJECTION, SOLUTION SUBCUTANEOUS EVERY MORNING
Refills: 0 | Status: DISCONTINUED | OUTPATIENT
Start: 2023-01-04 | End: 2023-01-05

## 2023-01-03 RX ORDER — ASPIRIN/CALCIUM CARB/MAGNESIUM 324 MG
81 TABLET ORAL DAILY
Refills: 0 | Status: DISCONTINUED | OUTPATIENT
Start: 2023-01-03 | End: 2023-01-30

## 2023-01-03 RX ORDER — INSULIN LISPRO 100/ML
VIAL (ML) SUBCUTANEOUS
Refills: 0 | Status: DISCONTINUED | OUTPATIENT
Start: 2023-01-03 | End: 2023-01-26

## 2023-01-03 RX ADMIN — DEXMEDETOMIDINE HYDROCHLORIDE IN 0.9% SODIUM CHLORIDE 1.93 MICROGRAM(S)/KG/HR: 4 INJECTION INTRAVENOUS at 16:03

## 2023-01-03 RX ADMIN — Medication 3 MILLILITER(S): at 03:58

## 2023-01-03 RX ADMIN — ATORVASTATIN CALCIUM 80 MILLIGRAM(S): 80 TABLET, FILM COATED ORAL at 21:34

## 2023-01-03 RX ADMIN — CHLORHEXIDINE GLUCONATE 15 MILLILITER(S): 213 SOLUTION TOPICAL at 05:15

## 2023-01-03 RX ADMIN — FENTANYL CITRATE 100 MICROGRAM(S): 50 INJECTION INTRAVENOUS at 10:10

## 2023-01-03 RX ADMIN — LOTEPREDNOL ETABONATE 1 DROP(S): 2 SUSPENSION/ DROPS OPHTHALMIC at 05:29

## 2023-01-03 RX ADMIN — MUPIROCIN 1 APPLICATION(S): 20 OINTMENT TOPICAL at 16:54

## 2023-01-03 RX ADMIN — Medication 4: at 12:19

## 2023-01-03 RX ADMIN — Medication 10 MILLIGRAM(S): at 18:53

## 2023-01-03 RX ADMIN — Medication 650 MILLIGRAM(S): at 12:18

## 2023-01-03 RX ADMIN — Medication 2: at 16:52

## 2023-01-03 RX ADMIN — Medication 1 DROP(S): at 05:29

## 2023-01-03 RX ADMIN — MUPIROCIN 1 APPLICATION(S): 20 OINTMENT TOPICAL at 05:29

## 2023-01-03 RX ADMIN — Medication 650 MILLIGRAM(S): at 19:49

## 2023-01-03 RX ADMIN — CHLORHEXIDINE GLUCONATE 1 APPLICATION(S): 213 SOLUTION TOPICAL at 12:19

## 2023-01-03 RX ADMIN — PIPERACILLIN AND TAZOBACTAM 25 GRAM(S): 4; .5 INJECTION, POWDER, LYOPHILIZED, FOR SOLUTION INTRAVENOUS at 05:15

## 2023-01-03 RX ADMIN — MIDAZOLAM HYDROCHLORIDE 6 MILLIGRAM(S): 1 INJECTION, SOLUTION INTRAMUSCULAR; INTRAVENOUS at 09:55

## 2023-01-03 RX ADMIN — DEXMEDETOMIDINE HYDROCHLORIDE IN 0.9% SODIUM CHLORIDE 1.93 MICROGRAM(S)/KG/HR: 4 INJECTION INTRAVENOUS at 05:15

## 2023-01-03 RX ADMIN — Medication 3 MILLILITER(S): at 08:51

## 2023-01-03 RX ADMIN — Medication 2000 MILLIGRAM(S): at 14:33

## 2023-01-03 RX ADMIN — CHLORHEXIDINE GLUCONATE 15 MILLILITER(S): 213 SOLUTION TOPICAL at 16:53

## 2023-01-03 RX ADMIN — Medication 81 MILLIGRAM(S): at 12:18

## 2023-01-03 RX ADMIN — PANTOPRAZOLE SODIUM 40 MILLIGRAM(S): 20 TABLET, DELAYED RELEASE ORAL at 12:18

## 2023-01-03 RX ADMIN — SODIUM CHLORIDE 30 MILLILITER(S): 9 INJECTION, SOLUTION INTRAVENOUS at 17:56

## 2023-01-03 RX ADMIN — Medication 3 MILLILITER(S): at 21:07

## 2023-01-03 RX ADMIN — Medication 650 MILLIGRAM(S): at 13:18

## 2023-01-03 RX ADMIN — Medication 650 MILLIGRAM(S): at 21:00

## 2023-01-03 RX ADMIN — FENTANYL CITRATE 100 MICROGRAM(S): 50 INJECTION INTRAVENOUS at 09:55

## 2023-01-03 RX ADMIN — Medication 2: at 04:23

## 2023-01-03 RX ADMIN — Medication 3 MILLILITER(S): at 16:00

## 2023-01-03 RX ADMIN — Medication 2 MILLIGRAM(S): at 21:33

## 2023-01-03 RX ADMIN — Medication 3 MILLILITER(S): at 00:28

## 2023-01-03 RX ADMIN — Medication 2000 MILLIGRAM(S): at 21:33

## 2023-01-03 RX ADMIN — Medication 100 MILLIGRAM(S): at 09:55

## 2023-01-03 RX ADMIN — INSULIN GLARGINE 15 UNIT(S): 100 INJECTION, SOLUTION SUBCUTANEOUS at 08:52

## 2023-01-03 NOTE — BRIEF OPERATIVE NOTE - COMMENTS
performed in MICU   without acute issues    6 shiley in place   okay to use PEG for medications 1/3   okay to start feeds 1/4   okay to restart blood thinners 1/4     sutures to be d/c from trach 1/10   surgicel to be d/c from trach site 1/4

## 2023-01-03 NOTE — PROGRESS NOTE ADULT - ASSESSMENT
ASSESSMENT: 76 y/o male with PMH of DM-2, HTN, CAD was transferred from [place] for stroke. Patient reported right sided weakness and slurred speech along with difficulty swallowing that started day prior to admission. Was seen at [place] day of admission, code stroke initiated out of window for tPA. CT head: no acute finding. MRI: acute infarct with left debby. MRA head/neck: Left vertebral artery segmental stenoses and T1 hyperintensity suggesting foci of dissection and/or thrombus.  Etiology, dissection  vs. embolic stroke of undetermined source.       NEURO:   -Continue close monitoring for neurologic deterioration in ICU  -Gradual normotension as tolerated   -Titrate statin to LDL goal less than 70  -MRA T1 Fat Sat to further differentiate dissection vs. thrombus as it would determine further plan for medical management   -Physical therapy/OT/Speech eval/treatment.   ANTITHROMBOTIC THERAPY: Heparin gtt for now PTT 60-80 until further clarification obtain re: thrombus vs. dissection, on ASA   -vent care per ICU   - TTE as noted , cardiac monitoring, if thrombus would consider embolic cardiac workup                            - TF per primary team   - Maintain adequate hydration   - DVT ppx   - Age and risk appropriate malignancy screenings     OTHER:  d/w neuro ICU attending     DISPOSITION: Rehab or home depending on PT eval once stable and workup is complete      CORE MEASURES:        Admission NIHSS: 13     TPA: [] YES [x] NO      LDL/HDL/A1C: 143/42/8.0     Depression Screen: na      Statin Therapy: as noted      Dysphagia Screen: [] PASS [x] FAIL     Smoking [] YES [] NO      Afib [] YES [x] NO     Stroke Education [x] YES [] NO        ASSESSMENT: 76 y/o male with PMH of DM-2, HTN, CAD was transferred from [place] for stroke. Patient reported right sided weakness and slurred speech along with difficulty swallowing that started day prior to admission. Was seen at [place] day of admission, code stroke initiated out of window for tPA. CT head: no acute finding. MRI: acute infarct with left debby. MRA head/neck: Left vertebral artery segmental stenoses and T1 hyperintensity suggesting foci of dissection and/or thrombus.  Etiology, dissection  vs. embolic stroke of undetermined source.       NEURO:   -Continue close monitoring for neurologic deterioration in ICU  -Gradual normotension as tolerated   -Titrate statin to LDL goal less than 70  -MRA T1 Fat Sat to further differentiate dissection vs. thrombus as it would determine further plan for medical management   -Physical therapy/OT/Speech eval/treatment.   ANTITHROMBOTIC THERAPY: Heparin gtt for now PTT 60-80 until further clarification obtain re: thrombus vs. dissection, on ASA   - vent care per ICU   - TTE as noted , cardiac monitoring, if thrombus would consider embolic cardiac workup                            - TF per primary team   - Maintain adequate hydration   - DVT ppx   - Age and risk appropriate malignancy screenings     OTHER:  d/w Dr Richmond Villarreal, neuro ICU attending     DISPOSITION: Rehab or home depending on PT eval once stable and workup is complete      CORE MEASURES:        Admission NIHSS: 13     TPA: [] YES [x] NO      LDL/HDL/A1C: 143/42/8.0     Depression Screen: na      Statin Therapy: as noted      Dysphagia Screen: [] PASS [x] FAIL     Smoking [] YES [] NO      Afib [] YES [x] NO     Stroke Education [x] YES [] NO

## 2023-01-03 NOTE — PHARMACOTHERAPY INTERVENTION NOTE - COMMENTS
MSSA PNA- recommended to change to cefazolin
Pre-3rd vancomycin level ordered for tomorrow AM
triglycerides

## 2023-01-03 NOTE — PROCEDURE NOTE - NSBRONCHPROCDETAILS_GEN_A_CORE_FT
Patient was previously intubated and sedated in the intensive care unit.  Bronchoscope was passed via the ET tube.  The ET tube was withdrawn approximately 4 cm.  A needle was visualized entering the anterior trachea followed by a guidewire.  A series of dilators was then passed over the guidewire, followed by a #6 cuffed non-fenestrated Shiley tracheostomy tube.  The ventilator circuit was then transitioned to the tracheostomy tube.  Waveform capnography was used to confirm correct tube placement.

## 2023-01-03 NOTE — CONSULT NOTE ADULT - ASSESSMENT
The patient is a 77 year old male with PMH of DM, HTN and CAD who was transferred from [facility] for acute L pontine CVA; of note, outside window for tPA. Hospital course is notable for acute hypoxic respiratory failure secondary to inability to manage oral secretions s/p intubation on 12/28, followed by EGD and PEG placement on 01/03/23. Nephrology is consulted for hypernatremia.      Hypernatremia  -Secondary to NPO status + insensible water loss   -Corrected sodium when hyperglycemia is accounted for is 161meQ/L  -Recommend lowering of serum sodium of ~10meQ/L in a 24 hour period   -s/p PEG placement today   -Currently getting 350cc q4h  -Recommend addition of D5W at 30cc/hr x 24 hours  -DM/Hyperglycemia - management as per primary team    Gayathri Boyer DO  Nephrology  [facility]  [address] [place] [address]  Office Number [phone]     The patient is a 77 year old male with PMH of DM, HTN and CAD who was transferred from [facility] for acute L pontine CVA; of note, outside window for tPA. Hospital course is notable for acute hypoxic respiratory failure secondary to inability to manage oral secretions s/p intubation on 12/28, followed by EGD and PEG placement on 01/03/23. Nephrology is consulted for hypernatremia.      Hypernatremia  -Secondary to NPO status + insensible water loss   -Corrected sodium when hyperglycemia is accounted for is 161meQ/L  -Recommend lowering of serum sodium of ~10meQ/L in a 24 hour period   -s/p PEG placement today   -Currently getting 350cc q4h  -Recommend addition of D5W at 30cc/hr x 24 hours    DM/Hyperglycemia   -Management as per primary team    Gayathri Boyer DO  Nephrology  [facility]  [address] [place] [address]  Office Number [phone]

## 2023-01-03 NOTE — CONSULT NOTE ADULT - SUBJECTIVE AND OBJECTIVE BOX
77yM was admitted on  from [place] with left debby CVA.  Patient developed a worsened neuro exam on  and developed respiratory failure and admitted to ICU.     Imaging performed:  HEAD CT  - No evidence of an acute intracranial hemorrhage, midline shift, or hydrocephalus. Known acute left pontine infarct partly obscured by artifact.    Patient intubated.  Able to follow come commands.   Intubated.     REVIEW OF SYSTEMS - unable to provide    VITALS  T(C): 36.8 (23 @ 07:00), Max: 37.2 (23 @ 12:00)  HR: 82 (23 @ 09:15) (57 - 94)  BP: 143/75 (23 @ 09:15) (125/59 - 179/76)  RR: 18 (23 @ 07:30) (16 - 22)  SpO2: 92% (23 @ 09:15) (92% - 100%)  Wt(kg): --    PAST MEDICAL & SURGICAL HISTORY  HTN (hypertension)    CAD (coronary artery disease)    DM (diabetes mellitus)        FUNCTIONAL HISTORY  Lives with spouse, 4 EZEQUIEL  Independent    CURRENT FUNCTIONAL STATUS  1/2 PT  Bed Mobility  Bed Mobility Training Sit-to-Supine: maximum assist (25% patient effort);  2 person assist  Bed Mobility Training Supine-to-Sit: maximum assist (25% patient effort);  2 person assist  Bed Mobility Training Limitations: decreased ability to use legs for bridging/pushing;  decreased ability to use arms for pushing/pulling;  impaired ability to control trunk for mobility;  decreased strength;  impaired balance;  impaired postural control    SOCIAL HISTORY - as per documentation/history  Smoking - None  EtOH - None  Drugs - None    FAMILY HISTORY   No pertinent family history in first degree relatives        ALLERGIES  iodine (Anaphylaxis (Mod to Severe))      MEDICATIONS   acetaminophen     Tablet .. 650 milliGRAM(s) Oral every 6 hours PRN  albuterol/ipratropium for Nebulization 3 milliLiter(s) Nebulizer every 6 hours  ALPRAZolam 0.5 milliGRAM(s) Oral every 12 hours PRN  aluminum hydroxide/magnesium hydroxide/simethicone Suspension 30 milliLiter(s) Oral every 4 hours PRN  aspirin 81 milliGRAM(s) Oral daily  atorvastatin 80 milliGRAM(s) Oral at bedtime  bisacodyl Suppository 10 milliGRAM(s) Rectal daily  chlorhexidine 0.12% Liquid 15 milliLiter(s) Oral Mucosa every 12 hours  chlorhexidine 2% Cloths 1 Application(s) Topical daily  coronavirus bivalent (EUA) Booster Vaccine (PFIZER) 0.3 milliLiter(s) IntraMuscular once  dexMEDEtomidine Infusion 0.1 MICROgram(s)/kG/Hr IV Continuous <Continuous>  doxazosin 2 milliGRAM(s) Oral at bedtime  fentaNYL    Injectable 100 MICROGram(s) IV Push once  hydrALAZINE Injectable 10 milliGRAM(s) IV Push every 2 hours PRN  influenza  Vaccine (HIGH DOSE) 0.7 milliLiter(s) IntraMuscular once  insulin glargine Injectable (LANTUS) 15 Unit(s) SubCutaneous every morning  insulin lispro (ADMELOG) corrective regimen sliding scale   SubCutaneous every 6 hours  labetalol Injectable 10 milliGRAM(s) IV Push every 2 hours PRN  loteprednol 0.5% Ophthalmic Suspension 1 Drop(s) Right EYE daily  melatonin 3 milliGRAM(s) Oral at bedtime PRN  midazolam Injectable 6 milliGRAM(s) IV Push once  mupirocin 2% Ointment 1 Application(s) Both Nostrils two times a day  ondansetron Injectable 4 milliGRAM(s) IV Push every 8 hours PRN  pantoprazole   Suspension 40 milliGRAM(s) Oral daily  piperacillin/tazobactam IVPB.. 3.375 Gram(s) IV Intermittent every 8 hours  prednisoLONE acetate 1% Suspension 1 Drop(s) Left EYE daily  rocuronium Injectable 100 milliGRAM(s) IV Push once      RECENT LABS - Reviewed  CBC Full  -  ( 2023 03:30 )  WBC Count : 8.89 K/uL  RBC Count : 4.65 M/uL  Hemoglobin : 13.9 g/dL  Hematocrit : 42.2 %  Platelet Count - Automated : 259 K/uL  Mean Cell Volume : 90.8 fl  Mean Cell Hemoglobin : 29.9 pg  Mean Cell Hemoglobin Concentration : 32.9 gm/dL  Auto Neutrophil # : x  Auto Lymphocyte # : x  Auto Monocyte # : x  Auto Eosinophil # : x  Auto Basophil # : x  Auto Neutrophil % : x  Auto Lymphocyte % : x  Auto Monocyte % : x  Auto Eosinophil % : x  Auto Basophil % : x    01-03    156<H>  |  120<H>  |  47.0<H>  ----------------------------<  283<H>  4.1   |  25.0  |  1.20    Ca    7.9<L>      2023 03:30  Phos  2.6       Mg     2.9           Urinalysis Basic - ( 2023 19:00 )    Color: Yellow / Appearance: Clear / S.010 / pH: x  Gluc: x / Ketone: Negative  / Bili: Negative / Urobili: Negative mg/dL   Blood: x / Protein: Negative / Nitrite: Negative   Leuk Esterase: Negative / RBC: 0-2 /HPF / WBC 0-2 /HPF   Sq Epi: x / Non Sq Epi: Occasional / Bacteria: Occasional        ----------------------------------------------------------------------------------------  PHYSICAL EXAM  Constitutional - NAD, Appears Comfortable  HEENT - +Oral intubation   Neck - Supple, +limited ROM  Chest - Intubated  Cardiovascular - S1S2   Abdomen - Soft   Extremities - Right hand swelling   Neurologic Exam -                    Cognitive - Keeps eyes closed     Communication - Intubated     Cranial Nerves - Unable to assess     FUNCTIONAL MOTOR EXAM -                     LEFT    UE - ShAB 1/5,  3/5                    RIGHT UE - ShAB 0/5, EF 0/5, EE 0/5,  0/5                    LEFT    LE - HF 1/5, KE 0/5, DF 0/5                     RIGHT LE - HF 1/5, KE 1/5, DF 1/5      Sensory - Unable to assess  Psychiatric - Fatigued  ----------------------------------------------------------------------------------------  ASSESSMENT/PLAN  77yMale with functional deficits after an acute CVA   Acute left debby CVA - ASA, Lipitor  DM2 - Lantus   HTN - Hydralazine, Labetalol  Respiratory failure - Pending Trach, Zosyn, Versed, Duoneb, Precedex  Oropharyngeal Dysphagia - Pending PEG  Sleep - Melatonin  DVT PPX - SCDs  Rehab - Patient medically being optimized. Pending PEG/Trach.      Will continue to follow. Rehab recommendations are dependent on how functional progress changes as well as how patient continues to participate and tolerate therapeutic interventions, which may change. Recommend ongoing mobilization by staff to maintain cardiopulmonary function and prevention of secondary complications related to debility. Discussed with rehab team.

## 2023-01-03 NOTE — PROGRESS NOTE ADULT - SUBJECTIVE AND OBJECTIVE BOX
Chief complaint:   Patient is a 77y old  Male who presents with a chief complaint of Acute stroke (02 Jan 2023 20:06)    HPI:  78 y/o male with PMH of DM-2, HTN, CAD was transferred from [place] for stroke. Patient reported right sided weakness and slurred speech along with difficulty swallowing that started yesterday. Was seen at [place] today, code stroke initiated out of window for tPA. CT head: no acute finding. MRI: acute infarct with left debby. MRA head/neck: Left vertebral artery segmental stenoses and T1 hyperintensity suggesting foci of dissection and/or thrombus. As per resident, neurology from [facility] was consulted and accepted patient for further evaluation. Patient said he is upset about his condition, noted discomfort in his head otherwise no chest pain, shortness of breath, palpitation, fever, chills, nausea, vomiting, abdominal pain, change in bowel/urinary habit.  (26 Dec 2022 22:58)        24hr EVENTS:      ROS: [ ]  Unable to assess due to mental status   All other systems negative    -----------------------------------------------------------------------------------------------------------------------------------------------------------------------------------  ICU Vital Signs Last 24 Hrs  T(C): 36.8 (03 Jan 2023 07:00), Max: 37.2 (02 Jan 2023 12:00)  T(F): 98.3 (03 Jan 2023 07:00), Max: 99 (02 Jan 2023 12:00)  HR: 82 (03 Jan 2023 09:15) (57 - 94)  BP: 143/75 (03 Jan 2023 09:15) (101/63 - 179/76)  BP(mean): 92 (03 Jan 2023 09:15) (75 - 111)  ABP: --  ABP(mean): --  RR: 18 (03 Jan 2023 07:30) (16 - 22)  SpO2: 92% (03 Jan 2023 09:15) (92% - 100%)    O2 Parameters below as of 03 Jan 2023 08:52  Patient On (Oxygen Delivery Method): ventilator            I&O's Summary    02 Jan 2023 07:01  -  03 Jan 2023 07:00  --------------------------------------------------------  IN: 5043 mL / OUT: 3930 mL / NET: 1113 mL    03 Jan 2023 07:01  -  03 Jan 2023 09:29  --------------------------------------------------------  IN: 5.8 mL / OUT: 0 mL / NET: 5.8 mL        MEDICATIONS  (STANDING):  albuterol/ipratropium for Nebulization 3 milliLiter(s) Nebulizer every 6 hours  aspirin 81 milliGRAM(s) Oral daily  atorvastatin 80 milliGRAM(s) Oral at bedtime  bisacodyl Suppository 10 milliGRAM(s) Rectal daily  chlorhexidine 0.12% Liquid 15 milliLiter(s) Oral Mucosa every 12 hours  chlorhexidine 2% Cloths 1 Application(s) Topical daily  coronavirus bivalent (EUA) Booster Vaccine (PFIZER) 0.3 milliLiter(s) IntraMuscular once  dexMEDEtomidine Infusion 0.1 MICROgram(s)/kG/Hr (1.93 mL/Hr) IV Continuous <Continuous>  doxazosin 2 milliGRAM(s) Oral at bedtime  fentaNYL    Injectable 100 MICROGram(s) IV Push once  influenza  Vaccine (HIGH DOSE) 0.7 milliLiter(s) IntraMuscular once  insulin glargine Injectable (LANTUS) 15 Unit(s) SubCutaneous every morning  insulin lispro (ADMELOG) corrective regimen sliding scale   SubCutaneous every 6 hours  loteprednol 0.5% Ophthalmic Suspension 1 Drop(s) Right EYE daily  midazolam Injectable 6 milliGRAM(s) IV Push once  mupirocin 2% Ointment 1 Application(s) Both Nostrils two times a day  pantoprazole   Suspension 40 milliGRAM(s) Oral daily  piperacillin/tazobactam IVPB.. 3.375 Gram(s) IV Intermittent every 8 hours  prednisoLONE acetate 1% Suspension 1 Drop(s) Left EYE daily  rocuronium Injectable 100 milliGRAM(s) IV Push once      RESPIRATORY:  Mode: AC/ CMV (Assist Control/ Continuous Mandatory Ventilation)  RR (machine): 16  TV (machine): 400  FiO2: 40  PEEP: 6  ITime: 0.8  MAP: 10  PIP: 25        IMAGING:   Recent imaging studies were reviewed.    LAB RESULTS:                          13.9   8.89  )-----------( 259      ( 03 Jan 2023 03:30 )             42.2       PT/INR - ( 03 Jan 2023 03:30 )   PT: 14.4 sec;   INR: 1.24 ratio         PTT - ( 03 Jan 2023 03:30 )  PTT:73.7 sec    01-03    156<H>  |  120<H>  |  47.0<H>  ----------------------------<  283<H>  4.1   |  25.0  |  1.20    Ca    7.9<L>      03 Jan 2023 03:30  Phos  2.6     01-03  Mg     2.9     01-03                  -----------------------------------------------------------------------------------------------------------------------------------------------------------------------------------    PHYSICAL EXAM:  General: Calm, intubated  HEENT: MMM  Neuro:  -Mental status- No acute distress  -CN- PERRL 3mm, EOMI, tongue midline, face symmetric    CV: RRR  Pulm: clear to auscultation  Abd: Soft, nontender, nondistended  Ext: no noted edema in lower ext  Skin: warm, dry       Chief complaint:   Patient is a 77y old  Male who presents with a chief complaint of Acute stroke (02 Jan 2023 20:06)    HPI:  76 y/o male with PMH of DM-2, HTN, CAD was transferred from [place] for stroke. Patient reported right sided weakness and slurred speech along with difficulty swallowing that started yesterday. Was seen at [place] today, code stroke initiated out of window for tPA. CT head: no acute finding. MRI: acute infarct with left debby. MRA head/neck: Left vertebral artery segmental stenoses and T1 hyperintensity suggesting foci of dissection and/or thrombus. As per resident, neurology from [facility] was consulted and accepted patient for further evaluation. Patient said he is upset about his condition, noted discomfort in his head otherwise no chest pain, shortness of breath, palpitation, fever, chills, nausea, vomiting, abdominal pain, change in bowel/urinary habit.  (26 Dec 2022 22:58)        24hr EVENTS:      ROS: [ ]  Unable to assess due to mental status   All other systems negative    -----------------------------------------------------------------------------------------------------------------------------------------------------------------------------------  ICU Vital Signs Last 24 Hrs  T(C): 36.8 (03 Jan 2023 07:00), Max: 37.2 (02 Jan 2023 12:00)  T(F): 98.3 (03 Jan 2023 07:00), Max: 99 (02 Jan 2023 12:00)  HR: 82 (03 Jan 2023 09:15) (57 - 94)  BP: 143/75 (03 Jan 2023 09:15) (101/63 - 179/76)  BP(mean): 92 (03 Jan 2023 09:15) (75 - 111)  ABP: --  ABP(mean): --  RR: 18 (03 Jan 2023 07:30) (16 - 22)  SpO2: 92% (03 Jan 2023 09:15) (92% - 100%)    O2 Parameters below as of 03 Jan 2023 08:52  Patient On (Oxygen Delivery Method): ventilator            I&O's Summary    02 Jan 2023 07:01  -  03 Jan 2023 07:00  --------------------------------------------------------  IN: 5043 mL / OUT: 3930 mL / NET: 1113 mL    03 Jan 2023 07:01  -  03 Jan 2023 09:29  --------------------------------------------------------  IN: 5.8 mL / OUT: 0 mL / NET: 5.8 mL        MEDICATIONS  (STANDING):  albuterol/ipratropium for Nebulization 3 milliLiter(s) Nebulizer every 6 hours  aspirin 81 milliGRAM(s) Oral daily  atorvastatin 80 milliGRAM(s) Oral at bedtime  bisacodyl Suppository 10 milliGRAM(s) Rectal daily  chlorhexidine 0.12% Liquid 15 milliLiter(s) Oral Mucosa every 12 hours  chlorhexidine 2% Cloths 1 Application(s) Topical daily  coronavirus bivalent (EUA) Booster Vaccine (PFIZER) 0.3 milliLiter(s) IntraMuscular once  dexMEDEtomidine Infusion 0.1 MICROgram(s)/kG/Hr (1.93 mL/Hr) IV Continuous <Continuous>  doxazosin 2 milliGRAM(s) Oral at bedtime  fentaNYL    Injectable 100 MICROGram(s) IV Push once  influenza  Vaccine (HIGH DOSE) 0.7 milliLiter(s) IntraMuscular once  insulin glargine Injectable (LANTUS) 15 Unit(s) SubCutaneous every morning  insulin lispro (ADMELOG) corrective regimen sliding scale   SubCutaneous every 6 hours  loteprednol 0.5% Ophthalmic Suspension 1 Drop(s) Right EYE daily  midazolam Injectable 6 milliGRAM(s) IV Push once  mupirocin 2% Ointment 1 Application(s) Both Nostrils two times a day  pantoprazole   Suspension 40 milliGRAM(s) Oral daily  piperacillin/tazobactam IVPB.. 3.375 Gram(s) IV Intermittent every 8 hours  prednisoLONE acetate 1% Suspension 1 Drop(s) Left EYE daily  rocuronium Injectable 100 milliGRAM(s) IV Push once      RESPIRATORY:  Mode: AC/ CMV (Assist Control/ Continuous Mandatory Ventilation)  RR (machine): 16  TV (machine): 400  FiO2: 40  PEEP: 6  ITime: 0.8  MAP: 10  PIP: 25        IMAGING:   Recent imaging studies were reviewed.    LAB RESULTS:                          13.9   8.89  )-----------( 259      ( 03 Jan 2023 03:30 )             42.2       PT/INR - ( 03 Jan 2023 03:30 )   PT: 14.4 sec;   INR: 1.24 ratio         PTT - ( 03 Jan 2023 03:30 )  PTT:73.7 sec    01-03    156<H>  |  120<H>  |  47.0<H>  ----------------------------<  283<H>  4.1   |  25.0  |  1.20    Ca    7.9<L>      03 Jan 2023 03:30  Phos  2.6     01-03  Mg     2.9     01-03      -----------------------------------------------------------------------------------------------------------------------------------------------------------------------------------    PHYSICAL EXAM:  General: Calm, intubated  HEENT: MMM  Neuro:  -Mental status- following commands  -CN- PERRL 3mm, tongue midline, R facial droop  +weak cough/gag  flaccid RUE and RLE  LUE and LLE full strength  diminished sensation on R    CV: RRR  Pulm: coarse breath sounds  Abd: Soft, nontender, nondistended  Ext: no noted edema in lower ext  Skin: warm, dry       Chief complaint:   Patient is a 77y old  Male who presents with a chief complaint of Acute stroke (02 Jan 2023 20:06)    HPI:  76 y/o male with PMH of DM-2, HTN, CAD was transferred from [place] for stroke. Patient reported right sided weakness and slurred speech along with difficulty swallowing that started yesterday. Was seen at [place] today, code stroke initiated out of window for tPA. CT head: no acute finding. MRI: acute infarct with left debby. MRA head/neck: Left vertebral artery segmental stenoses and T1 hyperintensity suggesting foci of dissection and/or thrombus. As per resident, neurology from [facility] was consulted and accepted patient for further evaluation. Patient said he is upset about his condition, noted discomfort in his head otherwise no chest pain, shortness of breath, palpitation, fever, chills, nausea, vomiting, abdominal pain, change in bowel/urinary habit.  (26 Dec 2022 22:58)        24hr EVENTS: refusing accuchecks      ROS: frustrated  All other systems negative    -----------------------------------------------------------------------------------------------------------------------------------------------------------------------------------  ICU Vital Signs Last 24 Hrs  T(C): 36.8 (03 Jan 2023 07:00), Max: 37.2 (02 Jan 2023 12:00)  T(F): 98.3 (03 Jan 2023 07:00), Max: 99 (02 Jan 2023 12:00)  HR: 82 (03 Jan 2023 09:15) (57 - 94)  BP: 143/75 (03 Jan 2023 09:15) (101/63 - 179/76)  BP(mean): 92 (03 Jan 2023 09:15) (75 - 111)  ABP: --  ABP(mean): --  RR: 18 (03 Jan 2023 07:30) (16 - 22)  SpO2: 92% (03 Jan 2023 09:15) (92% - 100%)    O2 Parameters below as of 03 Jan 2023 08:52  Patient On (Oxygen Delivery Method): ventilator            I&O's Summary    02 Jan 2023 07:01  -  03 Jan 2023 07:00  --------------------------------------------------------  IN: 5043 mL / OUT: 3930 mL / NET: 1113 mL    03 Jan 2023 07:01  -  03 Jan 2023 09:29  --------------------------------------------------------  IN: 5.8 mL / OUT: 0 mL / NET: 5.8 mL        MEDICATIONS  (STANDING):  albuterol/ipratropium for Nebulization 3 milliLiter(s) Nebulizer every 6 hours  aspirin 81 milliGRAM(s) Oral daily  atorvastatin 80 milliGRAM(s) Oral at bedtime  bisacodyl Suppository 10 milliGRAM(s) Rectal daily  chlorhexidine 0.12% Liquid 15 milliLiter(s) Oral Mucosa every 12 hours  chlorhexidine 2% Cloths 1 Application(s) Topical daily  coronavirus bivalent (EUA) Booster Vaccine (PFIZER) 0.3 milliLiter(s) IntraMuscular once  dexMEDEtomidine Infusion 0.1 MICROgram(s)/kG/Hr (1.93 mL/Hr) IV Continuous <Continuous>  doxazosin 2 milliGRAM(s) Oral at bedtime  fentaNYL    Injectable 100 MICROGram(s) IV Push once  influenza  Vaccine (HIGH DOSE) 0.7 milliLiter(s) IntraMuscular once  insulin glargine Injectable (LANTUS) 15 Unit(s) SubCutaneous every morning  insulin lispro (ADMELOG) corrective regimen sliding scale   SubCutaneous every 6 hours  loteprednol 0.5% Ophthalmic Suspension 1 Drop(s) Right EYE daily  midazolam Injectable 6 milliGRAM(s) IV Push once  mupirocin 2% Ointment 1 Application(s) Both Nostrils two times a day  pantoprazole   Suspension 40 milliGRAM(s) Oral daily  piperacillin/tazobactam IVPB.. 3.375 Gram(s) IV Intermittent every 8 hours  prednisoLONE acetate 1% Suspension 1 Drop(s) Left EYE daily  rocuronium Injectable 100 milliGRAM(s) IV Push once      RESPIRATORY:  Mode: AC/ CMV (Assist Control/ Continuous Mandatory Ventilation)  RR (machine): 16  TV (machine): 400  FiO2: 40  PEEP: 6  ITime: 0.8  MAP: 10  PIP: 25        IMAGING:   Recent imaging studies were reviewed.    LAB RESULTS:                          13.9   8.89  )-----------( 259      ( 03 Jan 2023 03:30 )             42.2       PT/INR - ( 03 Jan 2023 03:30 )   PT: 14.4 sec;   INR: 1.24 ratio         PTT - ( 03 Jan 2023 03:30 )  PTT:73.7 sec    01-03    156<H>  |  120<H>  |  47.0<H>  ----------------------------<  283<H>  4.1   |  25.0  |  1.20    Ca    7.9<L>      03 Jan 2023 03:30  Phos  2.6     01-03  Mg     2.9     01-03      -----------------------------------------------------------------------------------------------------------------------------------------------------------------------------------    PHYSICAL EXAM:  General: Calm, intubated  HEENT: MMM  Neuro:  -Mental status- following commands  -CN- PERRL 3mm, tongue midline, R facial droop  +weak cough/gag  flaccid RUE and RLE  LUE and LLE full strength  diminished sensation on R    CV: RRR  Pulm: coarse breath sounds  Abd: Soft, nontender, nondistended  Ext: no noted edema in lower ext  Skin: warm, dry       Chief complaint:   Patient is a 77y old  Male who presents with a chief complaint of Acute stroke (02 Jan 2023 20:06)    HPI:  78 y/o male with PMH of DM-2, HTN, CAD was transferred from [place] for stroke. Patient reported right sided weakness and slurred speech along with difficulty swallowing that started yesterday. Was seen at [place] today, code stroke initiated out of window for tPA. CT head: no acute finding. MRI: acute infarct with left debby. MRA head/neck: Left vertebral artery segmental stenoses and T1 hyperintensity suggesting foci of dissection and/or thrombus. As per resident, neurology from [facility] was consulted and accepted patient for further evaluation. Patient said he is upset about his condition, noted discomfort in his head otherwise no chest pain, shortness of breath, palpitation, fever, chills, nausea, vomiting, abdominal pain, change in bowel/urinary habit.  (26 Dec 2022 22:58)        24hr EVENTS: refusing accuchecks      ROS: frustrated  All other systems negative    -----------------------------------------------------------------------------------------------------------------------------------------------------------------------------------  ICU Vital Signs Last 24 Hrs  T(C): 36.8 (03 Jan 2023 07:00), Max: 37.2 (02 Jan 2023 12:00)  T(F): 98.3 (03 Jan 2023 07:00), Max: 99 (02 Jan 2023 12:00)  HR: 82 (03 Jan 2023 09:15) (57 - 94)  BP: 143/75 (03 Jan 2023 09:15) (101/63 - 179/76)  BP(mean): 92 (03 Jan 2023 09:15) (75 - 111)  ABP: --  ABP(mean): --  RR: 18 (03 Jan 2023 07:30) (16 - 22)  SpO2: 92% (03 Jan 2023 09:15) (92% - 100%)    O2 Parameters below as of 03 Jan 2023 08:52  Patient On (Oxygen Delivery Method): ventilator            I&O's Summary    02 Jan 2023 07:01  -  03 Jan 2023 07:00  --------------------------------------------------------  IN: 5043 mL / OUT: 3930 mL / NET: 1113 mL    03 Jan 2023 07:01  -  03 Jan 2023 09:29  --------------------------------------------------------  IN: 5.8 mL / OUT: 0 mL / NET: 5.8 mL        MEDICATIONS  (STANDING):  albuterol/ipratropium for Nebulization 3 milliLiter(s) Nebulizer every 6 hours  aspirin 81 milliGRAM(s) Oral daily  atorvastatin 80 milliGRAM(s) Oral at bedtime  bisacodyl Suppository 10 milliGRAM(s) Rectal daily  chlorhexidine 0.12% Liquid 15 milliLiter(s) Oral Mucosa every 12 hours  chlorhexidine 2% Cloths 1 Application(s) Topical daily  coronavirus bivalent (EUA) Booster Vaccine (PFIZER) 0.3 milliLiter(s) IntraMuscular once  dexMEDEtomidine Infusion 0.1 MICROgram(s)/kG/Hr (1.93 mL/Hr) IV Continuous <Continuous>  doxazosin 2 milliGRAM(s) Oral at bedtime  fentaNYL    Injectable 100 MICROGram(s) IV Push once  influenza  Vaccine (HIGH DOSE) 0.7 milliLiter(s) IntraMuscular once  insulin glargine Injectable (LANTUS) 15 Unit(s) SubCutaneous every morning  insulin lispro (ADMELOG) corrective regimen sliding scale   SubCutaneous every 6 hours  loteprednol 0.5% Ophthalmic Suspension 1 Drop(s) Right EYE daily  midazolam Injectable 6 milliGRAM(s) IV Push once  mupirocin 2% Ointment 1 Application(s) Both Nostrils two times a day  pantoprazole   Suspension 40 milliGRAM(s) Oral daily  piperacillin/tazobactam IVPB.. 3.375 Gram(s) IV Intermittent every 8 hours  prednisoLONE acetate 1% Suspension 1 Drop(s) Left EYE daily  rocuronium Injectable 100 milliGRAM(s) IV Push once      RESPIRATORY:  Mode: AC/ CMV (Assist Control/ Continuous Mandatory Ventilation)  RR (machine): 16  TV (machine): 400  FiO2: 40  PEEP: 6  ITime: 0.8  MAP: 10  PIP: 25        IMAGING:   Recent imaging studies were reviewed.    LAB RESULTS:                          13.9   8.89  )-----------( 259      ( 03 Jan 2023 03:30 )             42.2       PT/INR - ( 03 Jan 2023 03:30 )   PT: 14.4 sec;   INR: 1.24 ratio         PTT - ( 03 Jan 2023 03:30 )  PTT:73.7 sec    01-03    156<H>  |  120<H>  |  47.0<H>  ----------------------------<  283<H>  4.1   |  25.0  |  1.20    Ca    7.9<L>      03 Jan 2023 03:30  Phos  2.6     01-03  Mg     2.9     01-03      -----------------------------------------------------------------------------------------------------------------------------------------------------------------------------------    PHYSICAL EXAM:  General: Calm, intubated  HEENT: MMM  Neuro:  -Mental status- following commands  -CN- PERRL 3mm, tongue midline, R facial droop  R gaze pref  +weak cough/gag  flaccid RUE and RLE  LUE and LLE full strength  diminished sensation on R    CV: RRR  Pulm: coarse breath sounds  Abd: Soft, nontender, nondistended  Ext: no noted edema in lower ext  Skin: warm, dry

## 2023-01-03 NOTE — BRIEF OPERATIVE NOTE - NSICDXBRIEFPROCEDURE_GEN_ALL_CORE_FT
PROCEDURES:  Flexible bronchoscopy 03-Jan-2023 11:55:02 w/ trach   6 matias placed Layla Keating  Bronchoscopy, with EGD and PEG tube insertion 03-Jan-2023 11:55:12  Layla Keating

## 2023-01-03 NOTE — PROGRESS NOTE ADULT - SUBJECTIVE AND OBJECTIVE BOX
[facility]                                     Neurology at [place]                                 Aidan Sol, & Feliciano                                  [address]                                        [place], [place], [address]                                             [phone]    78 y/o male with PMH of DM-2, HTN, CAD was transferred from [place] for stroke. Patient reported right sided weakness and slurred speech along with difficulty swallowing that started day prior to admission. Was seen at [place] day of admission, code stroke initiated out of window for tPA. CT head: no acute finding. MRI: acute infarct with left debby. MRA head/neck: Left vertebral artery segmental stenoses and T1 hyperintensity suggesting foci of dissection and/or thrombus. As per resident, neurology from [facility] was consulted and accepted patient for further evaluation.      PAST MEDICAL & SURGICAL HISTORY:  HTN (hypertension)  CAD (coronary artery disease)  DM (diabetes mellitus)    MEDICATIONS  (STANDING):  albuterol/ipratropium for Nebulization 3 milliLiter(s) Nebulizer every 6 hours  aspirin  chewable 81 milliGRAM(s) Oral daily  atorvastatin 80 milliGRAM(s) Oral at bedtime  ceFAZolin  Injectable. 2000 milliGRAM(s) IV Push every 8 hours  chlorhexidine 0.12% Liquid 15 milliLiter(s) Oral Mucosa every 12 hours  chlorhexidine 2% Cloths 1 Application(s) Topical daily  coronavirus bivalent (EUA) Booster Vaccine (PFIZER) 0.3 milliLiter(s) IntraMuscular once  dexMEDEtomidine Infusion 0.1 MICROgram(s)/kG/Hr (1.93 mL/Hr) IV Continuous <Continuous>  dextrose 5%. 1000 milliLiter(s) (50 mL/Hr) IV Continuous <Continuous>  dextrose 5%. 1000 milliLiter(s) (100 mL/Hr) IV Continuous <Continuous>  dextrose 50% Injectable 25 Gram(s) IV Push once  dextrose 50% Injectable 25 Gram(s) IV Push once  dextrose 50% Injectable 12.5 Gram(s) IV Push once  doxazosin 2 milliGRAM(s) Oral at bedtime  glucagon  Injectable 1 milliGRAM(s) IntraMuscular once  influenza  Vaccine (HIGH DOSE) 0.7 milliLiter(s) IntraMuscular once  insulin lispro (ADMELOG) corrective regimen sliding scale   SubCutaneous three times a day before meals  insulin lispro Injectable (ADMELOG) 4 Unit(s) SubCutaneous every 6 hours  loteprednol 0.5% Ophthalmic Suspension 1 Drop(s) Right EYE daily  mupirocin 2% Ointment 1 Application(s) Both Nostrils two times a day  pantoprazole   Suspension 40 milliGRAM(s) Oral daily  prednisoLONE acetate 1% Suspension 1 Drop(s) Left EYE daily    MEDICATIONS  (PRN):  acetaminophen     Tablet .. 650 milliGRAM(s) Oral every 6 hours PRN Temp greater or equal to 38C (100.4F), Mild Pain (1 - 3)  ALPRAZolam 0.5 milliGRAM(s) Oral every 12 hours PRN for agitation  aluminum hydroxide/magnesium hydroxide/simethicone Suspension 30 milliLiter(s) Oral every 4 hours PRN Dyspepsia  dextrose Oral Gel 15 Gram(s) Oral once PRN Blood Glucose LESS THAN 70 milliGRAM(s)/deciliter  hydrALAZINE Injectable 10 milliGRAM(s) IV Push every 2 hours PRN SBP >180  labetalol Injectable 10 milliGRAM(s) IV Push every 2 hours PRN SBP >180  melatonin 3 milliGRAM(s) Oral at bedtime PRN Insomnia  ondansetron Injectable 4 milliGRAM(s) IV Push every 8 hours PRN Nausea and/or Vomiting    Allergies  iodine (Anaphylaxis (Mod to Severe))    SOCIAL HISTORY:  no tob,   no alcohol   no drugs    FAMILY HISTORY:  No pertinent family history in first degree relatives    ROS: 14 point ROS negative other than what is present in HPI or below, unable to be obtained.    Vital Signs Last 24 Hrs  T(C): 36.9 (03 Jan 2023 11:00), Max: 37.1 (02 Jan 2023 23:56)  T(F): 98.5 (03 Jan 2023 11:00), Max: 98.7 (02 Jan 2023 23:56)  HR: 100 (03 Jan 2023 13:14) (59 - 118)  BP: 145/92 (03 Jan 2023 12:30) (125/59 - 179/76)  BP(mean): 107 (03 Jan 2023 12:30) (79 - 114)  RR: 16 (03 Jan 2023 12:00) (16 - 22)  SpO2: 96% (03 Jan 2023 13:14) (92% - 100%)    Parameters below as of 03 Jan 2023 12:00  Patient On (Oxygen Delivery Method): ventilator    O2 Concentration (%): 40      General: s/p tracheostomy     Neurologic Exam:    Mental status: eyes closed, not following commands, no verbal output     Cranial nerves: right surgical pupil, left pupil 1mm reactive , eyes in primary gaze , + gaze     Motor: no movement throughout    Sensation: no withdrawal throughout     Gait : deferred    LABS:                         13.9   8.89  )-----------( 259      ( 03 Jan 2023 03:30 )             42.2       01-03    156<H>  |  120<H>  |  47.0<H>  ----------------------------<  283<H>  4.1   |  25.0  |  1.20    Ca    7.9<L>      03 Jan 2023 03:30  Phos  2.6     01-03  Mg     2.9     01-03        PT/INR - ( 03 Jan 2023 03:30 )   PT: 14.4 sec;   INR: 1.24 ratio         PTT - ( 03 Jan 2023 03:30 )  PTT:73.7 sec    Lipid panel: 143/42    HgbA1c: 8.0      RADIOLOGY & ADDITIONAL STUDIES (independently reviewed unless otherwise noted):  CT Head No Cont (12.29.22 @ 09:57)   No evidence of an acute intracranial hemorrhage, midline shift, or   hydrocephalus. Known acute left pontine infarct partly obscured by   artifact.     MRI/MRA Head/neck 12/26/22:  1. RIGHT CAROTID NECK CIRCULATION: Intact.  2. LEFT CAROTID NECK CIRCULATION: Intact.  3. VERTEBRAL NECK CIRCULATION: Right vertebral artery is intact. Left  vertebral artery demonstrates segmental stenoses and T1 hyperintensity  suggesting foci of dissection and/or thrombus. Flow appears improved  compared to time-of-flight angiography earlier same date  4. ANTERIOR INTRACRANIAL CIRCULATION: Intracranial atherosclerosis  cavernous carotid segments internal carotid arteries, at least mild, and  right MCA M1 segment, moderate.  5. POSTERIOR INTRACRANIAL CIRCULATION: Right vertebral artery focal  stenosis just proximal to the basilar formation, moderate. Attenuated  segmentally nonvisualized left vertebral artery. Flow appears improved  compared to earlier this same date. Intact basilar artery and posterior  cerebral arteries.  TTE    1. Left ventricular ejection fraction, by visual estimation, is 60 to   65%.   2. Normal global left ventricular systolic function.   3. Spectral Doppler shows impaired relaxation pattern of left   ventricular myocardial filling (Grade I diastolic dysfunction).   4. Normal left atrial size.   5. Trace mitral valve regurgitation.   6. Sclerotic aortic valve with decreased opening.   7. Endocardial visualization was enhanced with intravenous echo contrast.                                    [facility]                                     Neurology at [place]                                 Aidan Sol, & Feliciano                                  [address]                                        [place], [address]                                             [phone]    76 y/o male with PMH of DM-2, HTN, CAD was transferred from [place] for stroke. Patient reported right sided weakness and slurred speech along with difficulty swallowing that started day prior to admission. Was seen at [place] day of admission, code stroke initiated out of window for tPA. CT head: no acute finding. MRI: acute infarct with left debby. MRA head/neck: Left vertebral artery segmental stenoses and T1 hyperintensity suggesting foci of dissection and/or thrombus. As per resident, neurology from [facility] was consulted and accepted patient for further evaluation.      Interval history: s/p trach, sedated    Review of systems (neurology):  unable to obtain    MEDICATIONS  (STANDING):  albuterol/ipratropium for Nebulization 3 milliLiter(s) Nebulizer every 6 hours  aspirin  chewable 81 milliGRAM(s) Oral daily  atorvastatin 80 milliGRAM(s) Oral at bedtime  ceFAZolin  Injectable. 2000 milliGRAM(s) IV Push every 8 hours  chlorhexidine 0.12% Liquid 15 milliLiter(s) Oral Mucosa every 12 hours  chlorhexidine 2% Cloths 1 Application(s) Topical daily  coronavirus bivalent (EUA) Booster Vaccine (PFIZER) 0.3 milliLiter(s) IntraMuscular once  dexMEDEtomidine Infusion 0.1 MICROgram(s)/kG/Hr (1.93 mL/Hr) IV Continuous <Continuous>  dextrose 5%. 1000 milliLiter(s) (50 mL/Hr) IV Continuous <Continuous>  dextrose 5%. 1000 milliLiter(s) (100 mL/Hr) IV Continuous <Continuous>  dextrose 50% Injectable 25 Gram(s) IV Push once  dextrose 50% Injectable 25 Gram(s) IV Push once  dextrose 50% Injectable 12.5 Gram(s) IV Push once  doxazosin 2 milliGRAM(s) Oral at bedtime  glucagon  Injectable 1 milliGRAM(s) IntraMuscular once  influenza  Vaccine (HIGH DOSE) 0.7 milliLiter(s) IntraMuscular once  insulin lispro (ADMELOG) corrective regimen sliding scale   SubCutaneous three times a day before meals  insulin lispro Injectable (ADMELOG) 4 Unit(s) SubCutaneous every 6 hours  loteprednol 0.5% Ophthalmic Suspension 1 Drop(s) Right EYE daily  mupirocin 2% Ointment 1 Application(s) Both Nostrils two times a day  pantoprazole   Suspension 40 milliGRAM(s) Oral daily  prednisoLONE acetate 1% Suspension 1 Drop(s) Left EYE daily    MEDICATIONS  (PRN):  acetaminophen     Tablet .. 650 milliGRAM(s) Oral every 6 hours PRN Temp greater or equal to 38C (100.4F), Mild Pain (1 - 3)  ALPRAZolam 0.5 milliGRAM(s) Oral every 12 hours PRN for agitation  aluminum hydroxide/magnesium hydroxide/simethicone Suspension 30 milliLiter(s) Oral every 4 hours PRN Dyspepsia  dextrose Oral Gel 15 Gram(s) Oral once PRN Blood Glucose LESS THAN 70 milliGRAM(s)/deciliter  hydrALAZINE Injectable 10 milliGRAM(s) IV Push every 2 hours PRN SBP >180  labetalol Injectable 10 milliGRAM(s) IV Push every 2 hours PRN SBP >180  melatonin 3 milliGRAM(s) Oral at bedtime PRN Insomnia  ondansetron Injectable 4 milliGRAM(s) IV Push every 8 hours PRN Nausea and/or Vomiting      Vital Signs Last 24 Hrs  T(C): 36.9 (03 Jan 2023 11:00), Max: 37.1 (02 Jan 2023 23:56)  T(F): 98.5 (03 Jan 2023 11:00), Max: 98.7 (02 Jan 2023 23:56)  HR: 94 (03 Jan 2023 13:45) (59 - 118)  BP: 152/89 (03 Jan 2023 13:45) (129/71 - 179/76)  BP(mean): 107 (03 Jan 2023 13:45) (84 - 127)  RR: 16 (03 Jan 2023 12:00) (16 - 22)  SpO2: 96% (03 Jan 2023 13:45) (92% - 100%)    Parameters below as of 03 Jan 2023 12:00  Patient On (Oxygen Delivery Method): ventilator    O2 Concentration (%): 40      General: s/p tracheostomy     Neurologic Exam:    Mental status: sedated eyes closed, not following commands, no verbal output     Cranial nerves: right surgical pupil, left pupil 1mm reactive , eyes in primary gaze , + gaze     Motor: no movement throughout    Sensation: no withdrawal throughout     Gait : deferred    LABS:                         13.9   8.89  )-----------( 259      ( 03 Jan 2023 03:30 )             42.2       01-03    156<H>  |  120<H>  |  47.0<H>  ----------------------------<  283<H>  4.1   |  25.0  |  1.20    Ca    7.9<L>      03 Jan 2023 03:30  Phos  2.6     01-03  Mg     2.9     01-03        PT/INR - ( 03 Jan 2023 03:30 )   PT: 14.4 sec;   INR: 1.24 ratio         PTT - ( 03 Jan 2023 03:30 )  PTT:73.7 sec    Lipid panel: 143/42    HgbA1c: 8.0      RADIOLOGY & ADDITIONAL STUDIES (independently reviewed unless otherwise noted):  CT Head No Cont (12.29.22 @ 09:57)   No evidence of an acute intracranial hemorrhage, midline shift, or   hydrocephalus. Known acute left pontine infarct partly obscured by   artifact.     MRI/MRA Head/neck 12/26/22:  1. RIGHT CAROTID NECK CIRCULATION: Intact.  2. LEFT CAROTID NECK CIRCULATION: Intact.  3. VERTEBRAL NECK CIRCULATION: Right vertebral artery is intact. Left  vertebral artery demonstrates segmental stenoses and T1 hyperintensity  suggesting foci of dissection and/or thrombus. Flow appears improved  compared to time-of-flight angiography earlier same date  4. ANTERIOR INTRACRANIAL CIRCULATION: Intracranial atherosclerosis  cavernous carotid segments internal carotid arteries, at least mild, and  right MCA M1 segment, moderate.  5. POSTERIOR INTRACRANIAL CIRCULATION: Right vertebral artery focal  stenosis just proximal to the basilar formation, moderate. Attenuated  segmentally nonvisualized left vertebral artery. Flow appears improved  compared to earlier this same date. Intact basilar artery and posterior  cerebral arteries.  TTE    1. Left ventricular ejection fraction, by visual estimation, is 60 to   65%.   2. Normal global left ventricular systolic function.   3. Spectral Doppler shows impaired relaxation pattern of left   ventricular myocardial filling (Grade I diastolic dysfunction).   4. Normal left atrial size.   5. Trace mitral valve regurgitation.   6. Sclerotic aortic valve with decreased opening.   7. Endocardial visualization was enhanced with intravenous echo contrast.

## 2023-01-03 NOTE — CHART NOTE - NSCHARTNOTEFT_GEN_A_CORE
Source: Patient [ ]  Family [ ]   other [ x]    Current Diet: Diet, NPO after Midnight:      NPO Start Date: 02-Jan-2023,   NPO Start Time: 23:59  Except Medications (01-02-23 @ 20:12)  Diet, NPO after Midnight:      NPO Start Date: 02-Jan-2023,   NPO Start Time: 23:59 (01-02-23 @ 13:50)  Diet, NPO with Tube Feed:   Tube Feeding Modality: Nasogastric  Glucerna 1.5 Benny (GLUCERNA1.5)  Total Volume for 24 Hours (mL): 1320  Continuous  Starting Tube Feed Rate {mL per Hour}: 20  Until Goal Tube Feed Rate (mL per Hour): 55  Tube Feed Duration (in Hours): 24  Tube Feed Start Time: 15:30 (12-31-22 @ 15:31)    Enteral /Parenteral Nutrition: Tube feeds at goal rate of 55 ml/hr (x20 hrs) provides 1100 ml, 1650 kcal, 91g protein, 835 ml free water, and >100% of RDIs for vitamins/minerals. Additional free water per MD discretion.     Current Weight:   (1/3) 174.1 lbs  (12/27) 171.1 lbs  (12/26) 170.1 lbs  ? accuracy of weights, noted with 1+ generalized edema, continue to trend and maintain strict Is&Os     Pertinent Medications: MEDICATIONS  (STANDING):  albuterol/ipratropium for Nebulization 3 milliLiter(s) Nebulizer every 6 hours  aspirin 81 milliGRAM(s) Oral daily  atorvastatin 80 milliGRAM(s) Oral at bedtime  bisacodyl Suppository 10 milliGRAM(s) Rectal daily  chlorhexidine 0.12% Liquid 15 milliLiter(s) Oral Mucosa every 12 hours  chlorhexidine 2% Cloths 1 Application(s) Topical daily  coronavirus bivalent (EUA) Booster Vaccine (PFIZER) 0.3 milliLiter(s) IntraMuscular once  dexMEDEtomidine Infusion 0.1 MICROgram(s)/kG/Hr (1.93 mL/Hr) IV Continuous <Continuous>  doxazosin 2 milliGRAM(s) Oral at bedtime  influenza  Vaccine (HIGH DOSE) 0.7 milliLiter(s) IntraMuscular once  insulin glargine Injectable (LANTUS) 15 Unit(s) SubCutaneous every morning  insulin lispro (ADMELOG) corrective regimen sliding scale   SubCutaneous every 6 hours  loteprednol 0.5% Ophthalmic Suspension 1 Drop(s) Right EYE daily  mupirocin 2% Ointment 1 Application(s) Both Nostrils two times a day  pantoprazole   Suspension 40 milliGRAM(s) Oral daily  piperacillin/tazobactam IVPB.. 3.375 Gram(s) IV Intermittent every 8 hours  prednisoLONE acetate 1% Suspension 1 Drop(s) Left EYE daily    MEDICATIONS  (PRN):  acetaminophen     Tablet .. 650 milliGRAM(s) Oral every 6 hours PRN Temp greater or equal to 38C (100.4F), Mild Pain (1 - 3)  ALPRAZolam 0.5 milliGRAM(s) Oral every 12 hours PRN for agitation  aluminum hydroxide/magnesium hydroxide/simethicone Suspension 30 milliLiter(s) Oral every 4 hours PRN Dyspepsia  hydrALAZINE Injectable 10 milliGRAM(s) IV Push every 2 hours PRN SBP >180  labetalol Injectable 10 milliGRAM(s) IV Push every 2 hours PRN SBP >180  melatonin 3 milliGRAM(s) Oral at bedtime PRN Insomnia  ondansetron Injectable 4 milliGRAM(s) IV Push every 8 hours PRN Nausea and/or Vomiting    Pertinent Labs: CBC Full  -  ( 03 Jan 2023 03:30 )  WBC Count : 8.89 K/uL  RBC Count : 4.65 M/uL  Hemoglobin : 13.9 g/dL  Hematocrit : 42.2 %  Platelet Count - Automated : 259 K/uL  Mean Cell Volume : 90.8 fl  Mean Cell Hemoglobin : 29.9 pg  Mean Cell Hemoglobin Concentration : 32.9 gm/dL  Auto Neutrophil # : x  Auto Lymphocyte # : x  Auto Monocyte # : x  Auto Eosinophil # : x  Auto Basophil # : x  Auto Neutrophil % : x  Auto Lymphocyte % : x  Auto Monocyte % : x  Auto Eosinophil % : x  Auto Basophil % : x    01-03 Na156 mmol/L<H> Glu 283 mg/dL<H> K+ 4.1 mmol/L Cr  1.20 mg/dL BUN 47.0 mg/dL<H> Phos 2.6 mg/dL Alb n/a   PAB n/a       Skin: Intact per documentation    Nutrition focused physical exam conducted - found signs of malnutrition [ x]absent [ ]present    Subcutaneous fat loss: [ ] Orbital fat pads region, [ ]Buccal fat region, [ ]Triceps region,  [ ]Ribs region    Muscle wasting: [ ]Temples region, [ ]Clavicle region, [ ]Shoulder region, [ ]Scapula region, [ ]Interosseous region,  [ ]thigh region, [ ]Calf region    Estimated Needs:   [ x] no change since previous assessment  [ ] recalculated:     Current Nutrition Diagnosis: Pt remains at nutrition risk secondary to increased nutrient needs related to increased physiological demand for nutrient as evidenced by acute L pontine CVA, 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 Pt remains intubated/sedated. Tube feeds on hold since midnight for plan for trach/PEG today. Last BM 1/3. RD to follow up.     Recommendations:   1) Resume Glucerna 1.5 @ 20 ml/hr via PEG as feasible; increase 10 ml/hr q4 hrs until goal rate of 60 ml/hr (x20 hrs) to provide 1200 ml, 1800 kcal, 99g protein, 911 ml free water, and >100% of RDIs for vitamins/minerals. Additional free water per MD discretion.   2) Bowel regimen PRN.  3) Obtain daily weights to monitor trends.     Monitoring and Evaluation:   [ ] PO intake [ x] Tolerance to diet prescription [X] Weights  [X] Follow up per protocol [X] Labs: chem 8, mg, phos, H/H

## 2023-01-03 NOTE — CONSULT NOTE ADULT - SUBJECTIVE AND OBJECTIVE BOX
The patient is a 77 year old male with PMH of DM, HTN and CAD who was transferred from [facility] for acute L pontine CVA; of note, outside window for tPA. Hospital course is notable for acute hypoxic respiratory failure secondary to inability to manage oral secretions s/p intubation on 12/28, followed by EGD and PEG placement on 01/03/23. Nephrology is consulted for hypernatremia.      PAST MEDICAL & SURGICAL HISTORY:  HTN (hypertension)  CAD (coronary artery disease)  DM (diabetes mellitus)    Allergies  iodine (Anaphylaxis (Mod to Severe))  Intolerances    Home Medications:  Aspirin Low Dose 81 mg oral tablet, chewable: 1 tab(s) orally once a day (27 Dec 2022 13:15)  atenolol 50 mg oral tablet: 1 tab(s) orally once a day (27 Dec 2022 13:15)  atorvastatin 40 mg oral tablet: 1 tab(s) orally once a day (27 Dec 2022 13:15)  Jardiance 25 mg oral tablet: 1 tab(s) orally once a day (in the morning) (27 Dec 2022 13:15)  lisinopril 10 mg oral tablet: 1 tab(s) orally once a day (27 Dec 2022 13:15)  metFORMIN 1000 mg oral tablet: 1 tab(s) orally 2 times a day (27 Dec 2022 13:15)  Plavix 75 mg oral tablet: 1 tab(s) orally once a day (27 Dec 2022 13:15)    FAMILY HISTORY:  No pertinent family history in first degree relatives    Social History:  No cigarette, alcohol or illicit drug use. , lives with family (26 Dec 2022 22:58)    ROS: Unable to obtain; patient is sedated at time of my examination    Vital Signs Last 24 Hrs  T(C): 37.3 (03 Jan 2023 15:28), Max: 37.3 (03 Jan 2023 15:28)  T(F): 99.2 (03 Jan 2023 15:28), Max: 99.2 (03 Jan 2023 15:28)  HR: 90 (03 Jan 2023 16:01) (62 - 118)  BP: 160/87 (03 Jan 2023 15:30) (129/71 - 179/76)  BP(mean): 107 (03 Jan 2023 15:30) (85 - 127)  RR: 16 (03 Jan 2023 12:00) (16 - 22)  SpO2: 95% (03 Jan 2023 16:01) (92% - 100%)    Parameters below as of 03 Jan 2023 12:00  Patient On (Oxygen Delivery Method): ventilator    O2 Concentration (%): 40    I&O's Summary    02 Jan 2023 07:01  -  03 Jan 2023 07:00  --------------------------------------------------------  IN: 5043 mL / OUT: 3930 mL / NET: 1113 mL    03 Jan 2023 07:01  -  03 Jan 2023 16:36  --------------------------------------------------------  IN: 32.4 mL / OUT: 500 mL / NET: -467.6 mL    Physical Exam  General: WDWN male in NAD  HEENT: Trach to vent   Cardiac: S1S2 RRR  Respiratory: CTAB  Abdomen: Soft, NT  Extremities: No appreciable edeam  Neuro: RASS -5    01-03    159<H>  |  122<H>  |  47.1<H>  ----------------------------<  227<H>  4.1   |  28.0  |  1.48<H>    Ca    8.1<L>      03 Jan 2023 14:13  Phos  2.6     01-03  Mg     2.9     01-03                        13.9   8.89  )-----------( 259      ( 03 Jan 2023 03:30 )             42.2     MEDICATIONS  (STANDING):  albuterol/ipratropium for Nebulization 3 milliLiter(s) Nebulizer every 6 hours  aspirin  chewable 81 milliGRAM(s) Oral daily  atorvastatin 80 milliGRAM(s) Oral at bedtime  ceFAZolin  Injectable. 2000 milliGRAM(s) IV Push every 8 hours  chlorhexidine 0.12% Liquid 15 milliLiter(s) Oral Mucosa every 12 hours  chlorhexidine 2% Cloths 1 Application(s) Topical daily  coronavirus bivalent (EUA) Booster Vaccine (PFIZER) 0.3 milliLiter(s) IntraMuscular once  dexMEDEtomidine Infusion 0.1 MICROgram(s)/kG/Hr (1.93 mL/Hr) IV Continuous <Continuous>  dextrose 5%. 1000 milliLiter(s) (100 mL/Hr) IV Continuous <Continuous>  dextrose 5%. 1000 milliLiter(s) (50 mL/Hr) IV Continuous <Continuous>  dextrose 50% Injectable 25 Gram(s) IV Push once  dextrose 50% Injectable 12.5 Gram(s) IV Push once  dextrose 50% Injectable 25 Gram(s) IV Push once  doxazosin 2 milliGRAM(s) Oral at bedtime  glucagon  Injectable 1 milliGRAM(s) IntraMuscular once  influenza  Vaccine (HIGH DOSE) 0.7 milliLiter(s) IntraMuscular once  insulin lispro (ADMELOG) corrective regimen sliding scale   SubCutaneous three times a day before meals  insulin lispro Injectable (ADMELOG) 4 Unit(s) SubCutaneous every 6 hours  loteprednol 0.5% Ophthalmic Suspension 1 Drop(s) Right EYE daily  mupirocin 2% Ointment 1 Application(s) Both Nostrils two times a day  pantoprazole   Suspension 40 milliGRAM(s) Oral daily  prednisoLONE acetate 1% Suspension 1 Drop(s) Left EYE daily    MEDICATIONS  (PRN):  acetaminophen     Tablet .. 650 milliGRAM(s) Oral every 6 hours PRN Temp greater or equal to 38C (100.4F), Mild Pain (1 - 3)  ALPRAZolam 0.5 milliGRAM(s) Oral every 12 hours PRN for agitation  aluminum hydroxide/magnesium hydroxide/simethicone Suspension 30 milliLiter(s) Oral every 4 hours PRN Dyspepsia  dextrose Oral Gel 15 Gram(s) Oral once PRN Blood Glucose LESS THAN 70 milliGRAM(s)/deciliter  hydrALAZINE Injectable 10 milliGRAM(s) IV Push every 2 hours PRN SBP >180  labetalol Injectable 10 milliGRAM(s) IV Push every 2 hours PRN SBP >180  melatonin 3 milliGRAM(s) Oral at bedtime PRN Insomnia  ondansetron Injectable 4 milliGRAM(s) IV Push every 8 hours PRN Nausea and/or Vomiting

## 2023-01-03 NOTE — PROGRESS NOTE ADULT - ASSESSMENT
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  Worsening neuro exam 12/27 - RUE plegia, worsening bulbar dysfunction.   Acute resp failure due to neurologic injury, required intubation.  PMH of  DMII, HTN, CAD.  Allergy to iodine.  Anticoagulated on Heparin ggt.  tracheal aspirate GPCs 12/30- PNA    NEURO:   q4hr neurochecks  cont ASA 81 mg daily   cont Heparin ggt,  aPTT goal 60-80  precedex for sedation  pain mgt: tylenol and oxy 5 prn  Activity: [x] mobilize as tolerated [] Bedrest [x] PT [x] OT     PULM: vent support, SBTs daily as tolerated; possible early trach/PEG in view of stroke with bulbar dysfunction  thick secretions and poor cough/gag, tachypneic overtime with SBT, unlikely success at extubation- recommend proceeding to trach/peg  bed to chair position, PT/OT  duonebs    SpO2>92%  CT surg consulted for trach/peg- likely 1/3  chest xray    CV:  SBP goal <160  lipitor  ASA    RENAL: monitor I/O  replete lytes prn  voiding cardura- incontinent   plasmalyte bolus    GI: NPO at midnight  Diet: TF via NGT (will need PEG)  FWF 350q6h  GI prophylaxis [x] PPI  zofran prn for nausea  rectal tube  LBM 1/2    ENDO:   Goal euglycemia (-180)  inc lantus 15units  ISS    HEME/ONC:  VTE prophylaxis: [] SCDs [x] chemoprophylaxis hep gtt  hep gtt (AC pending trach/peg)    ID: febrile   empiric vanco and zosyn ( 12/30- 1/5)- staph aureus sputum  follow up cultures    MISC:    I affirm that this patient is critically ill and at high risk for sudden, fatal deterioration due to one or more of the above stated active issues.     This time does not include bedside procedures that are documented separately.           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  Worsening neuro exam 12/27 - RUE plegia, worsening bulbar dysfunction.   Acute resp failure due to neurologic injury, required intubation.  PMH of  DMII, HTN, CAD.  Allergy to iodine.  Anticoagulated on Heparin ggt.  tracheal aspirate GPCs 12/30- PNA    NEURO:   q4hr neurochecks  cont ASA 81 mg daily   cont Heparin ggt,  aPTT goal 60-80 for L vert dissection, will discuss need for AC with stroke  (will stop heparin now after 1 week AC)  precedex for sedation  pain mgt: tylenol and oxy 5 prn  Activity: [x] mobilize as tolerated [] Bedrest [x] PT [x] OT     PULM: trach/peg- CT surg today  bed to chair position, PT/OT  duonebs    SpO2>92%  chest xray    CV:  SBP goal <160  lipitor  ASA    RENAL: monitor I/O  replete lytes prn  voiding cardura- incontinent   FWF 350mg q4h    GI: NPO    PEG today restart TF tomorrow  GI prophylaxis [x] PPI  zofran prn for nausea  rectal tube  LBM 1/3    ENDO:   Goal euglycemia (-180)  inc lantus 18units, lispro 4units q6h  ISS    HEME/ONC:  VTE prophylaxis: [] SCDs [x] hold chemoprophylaxis      ID: febrile   empiric vanco and zosyn ( 12/30- 1/5)- staph aureus sputum  follow up cultures    MISC:    I affirm that this patient is critically ill and at high risk for sudden, fatal deterioration due to one or more of the above stated active issues.     This time does not include bedside procedures that are documented separately.           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  Worsening neuro exam 12/27 - RUE plegia, worsening bulbar dysfunction.   Acute resp failure due to neurologic injury, required intubation.  PMH of  DMII, HTN, CAD.  Allergy to iodine.  Anticoagulated on Heparin ggt.  tracheal aspirate GPCs 12/30- PNA    NEURO:   q4hr neurochecks  cont ASA 81 mg daily   cont Heparin ggt,  aPTT goal 60-80 for L vert dissection, will discuss need for AC with stroke  (will stop heparin now after 1 week AC)  precedex for sedation  pain mgt: tylenol and oxy 5 prn  Activity: [x] mobilize as tolerated [] Bedrest [x] PT [x] OT     PULM: trach/peg- CT surg today  bed to chair position, PT/OT  duonebs    SpO2>92%  chest xray    CV:  SBP goal <160  lipitor  ASA    RENAL: monitor I/O  replete lytes prn  voiding cardura- incontinent   FWF 350mg q4h    GI: NPO    PEG today restart TF tomorrow  GI prophylaxis [x] PPI  zofran prn for nausea  rectal tube  LBM 1/3    ENDO:   Goal euglycemia (-180)  inc lantus 18units, lispro 4units q6h  ISS    HEME/ONC:  VTE prophylaxis: [] SCDs [x] hold chemoprophylaxis      ID:   empiric vanco and zosyn change to ancef ( 12/30- 1/5)- MSSA sputum  follow up cultures    MISC:    I affirm that this patient is critically ill and at high risk for sudden, fatal deterioration due to one or more of the above stated active issues.     This time does not include bedside procedures that are documented separately.           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  Worsening neuro exam 12/27 - RUE plegia, worsening bulbar dysfunction.   Acute resp failure due to neurologic injury, required intubation.  PMH of  DMII, HTN, CAD.  Allergy to iodine.  Anticoagulated on Heparin ggt.  tracheal aspirate GPCs 12/30- PNA    NEURO:   q4hr neurochecks  cont ASA 81 mg daily   cont Heparin ggt for L vert dissection vs thrombus, will discuss need for AC with stroke  (will stop heparin now after 1 week AC)  precedex for sedation  MRA T1 fatsat head and neck to eval vert dissection vs thrombus  pain mgt: tylenol and oxy 5 prn  Activity: [x] mobilize as tolerated [] Bedrest [x] PT [x] OT     PULM: trach/peg- CT surg today  bed to chair position, PT/OT  duonebs    SpO2>92%  chest xray    CV:  SBP goal <160  lipitor  ASA    RENAL: monitor I/O  replete lytes prn  voiding cardura- incontinent   FWF 350mg q4h    GI: NPO    PEG today restart TF tomorrow  GI prophylaxis [x] PPI  zofran prn for nausea  rectal tube  LBM 1/3    ENDO:   Goal euglycemia (-180)  inc lantus 18units, lispro 4units q6h  ISS    HEME/ONC:  VTE prophylaxis: [] SCDs [x] hold chemoprophylaxis      ID:   empiric vanco and zosyn change to ancef ( 12/30- 1/5)- MSSA sputum  follow up cultures    MISC:    I affirm that this patient is critically ill and at high risk for sudden, fatal deterioration due to one or more of the above stated active issues.     This time does not include bedside procedures that are documented separately.           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  Worsening neuro exam 12/27 - RUE plegia, worsening bulbar dysfunction.   Acute resp failure due to neurologic injury, required intubation.  PMH of  DMII, HTN, CAD.  Allergy to iodine.  Anticoagulated on Heparin ggt.  tracheal aspirate MSSA 12/30- PNA  hypernatremia, not consistent with DI    NEURO:   q4hr neurochecks  cont ASA 81 mg daily   Heparin ggt for L vert dissection vs thrombus, will discuss need for AC with stroke  (will stop heparin for now after 1 week AC)  precedex for sedation  MRA T1 fatsat head and neck to eval vert dissection vs thrombus  pain mgt: tylenol and oxy 5 prn  Activity: [x] mobilize as tolerated [] Bedrest [x] PT [x] OT     PULM: trach/peg- CT surg today  bed to chair position, PT/OT  duonebs    SpO2>92%  chest xray    CV:  SBP goal <160  lipitor  ASA    RENAL: monitor I/O  replete lytes prn  voiding cardura- incontinent   FWF 350mg q4h    GI: NPO    PEG today restart TF tomorrow  GI prophylaxis [x] PPI  zofran prn for nausea  rectal tube  LBM 1/3    ENDO:   Goal euglycemia (-180)  inc lantus 18units, lispro 4units q6h  ISS    HEME/ONC:  VTE prophylaxis: [] SCDs [x] hold chemoprophylaxis      ID:   empiric vanco and zosyn change to ancef ( 12/30- 1/5)- MSSA sputum  follow up cultures    MISC:    I affirm that this patient is critically ill and at high risk for sudden, fatal deterioration due to one or more of the above stated active issues.     This time does not include bedside procedures that are documented separately.

## 2023-01-04 LAB
ANION GAP SERPL CALC-SCNC: 8 MMOL/L — SIGNIFICANT CHANGE UP (ref 5–17)
ANION GAP SERPL CALC-SCNC: 9 MMOL/L — SIGNIFICANT CHANGE UP (ref 5–17)
BUN SERPL-MCNC: 36.9 MG/DL — HIGH (ref 8–20)
BUN SERPL-MCNC: 41.7 MG/DL — HIGH (ref 8–20)
CALCIUM SERPL-MCNC: 8.2 MG/DL — LOW (ref 8.4–10.5)
CALCIUM SERPL-MCNC: 8.4 MG/DL — SIGNIFICANT CHANGE UP (ref 8.4–10.5)
CHLORIDE SERPL-SCNC: 117 MMOL/L — HIGH (ref 96–108)
CHLORIDE SERPL-SCNC: 120 MMOL/L — HIGH (ref 96–108)
CO2 SERPL-SCNC: 27 MMOL/L — SIGNIFICANT CHANGE UP (ref 22–29)
CO2 SERPL-SCNC: 28 MMOL/L — SIGNIFICANT CHANGE UP (ref 22–29)
CREAT SERPL-MCNC: 1.15 MG/DL — SIGNIFICANT CHANGE UP (ref 0.5–1.3)
CREAT SERPL-MCNC: 1.38 MG/DL — HIGH (ref 0.5–1.3)
CULTURE RESULTS: SIGNIFICANT CHANGE UP
CULTURE RESULTS: SIGNIFICANT CHANGE UP
EGFR: 53 ML/MIN/1.73M2 — LOW
EGFR: 66 ML/MIN/1.73M2 — SIGNIFICANT CHANGE UP
GLUCOSE BLDC GLUCOMTR-MCNC: 115 MG/DL — HIGH (ref 70–99)
GLUCOSE BLDC GLUCOMTR-MCNC: 163 MG/DL — HIGH (ref 70–99)
GLUCOSE BLDC GLUCOMTR-MCNC: 172 MG/DL — HIGH (ref 70–99)
GLUCOSE BLDC GLUCOMTR-MCNC: 182 MG/DL — HIGH (ref 70–99)
GLUCOSE BLDC GLUCOMTR-MCNC: 201 MG/DL — HIGH (ref 70–99)
GLUCOSE BLDC GLUCOMTR-MCNC: 226 MG/DL — HIGH (ref 70–99)
GLUCOSE BLDC GLUCOMTR-MCNC: 296 MG/DL — HIGH (ref 70–99)
GLUCOSE SERPL-MCNC: 147 MG/DL — HIGH (ref 70–99)
GLUCOSE SERPL-MCNC: 198 MG/DL — HIGH (ref 70–99)
HCT VFR BLD CALC: 40.5 % — SIGNIFICANT CHANGE UP (ref 39–50)
HGB BLD-MCNC: 12.9 G/DL — LOW (ref 13–17)
MAGNESIUM SERPL-MCNC: 2.9 MG/DL — HIGH (ref 1.6–2.6)
MCHC RBC-ENTMCNC: 28.7 PG — SIGNIFICANT CHANGE UP (ref 27–34)
MCHC RBC-ENTMCNC: 31.9 GM/DL — LOW (ref 32–36)
MCV RBC AUTO: 90.2 FL — SIGNIFICANT CHANGE UP (ref 80–100)
PHOSPHATE SERPL-MCNC: 3.6 MG/DL — SIGNIFICANT CHANGE UP (ref 2.4–4.7)
PLATELET # BLD AUTO: 249 K/UL — SIGNIFICANT CHANGE UP (ref 150–400)
POTASSIUM SERPL-MCNC: 3.9 MMOL/L — SIGNIFICANT CHANGE UP (ref 3.5–5.3)
POTASSIUM SERPL-MCNC: 4.1 MMOL/L — SIGNIFICANT CHANGE UP (ref 3.5–5.3)
POTASSIUM SERPL-SCNC: 3.9 MMOL/L — SIGNIFICANT CHANGE UP (ref 3.5–5.3)
POTASSIUM SERPL-SCNC: 4.1 MMOL/L — SIGNIFICANT CHANGE UP (ref 3.5–5.3)
RBC # BLD: 4.49 M/UL — SIGNIFICANT CHANGE UP (ref 4.2–5.8)
RBC # FLD: 14.2 % — SIGNIFICANT CHANGE UP (ref 10.3–14.5)
SODIUM SERPL-SCNC: 153 MMOL/L — HIGH (ref 135–145)
SODIUM SERPL-SCNC: 156 MMOL/L — HIGH (ref 135–145)
SPECIMEN SOURCE: SIGNIFICANT CHANGE UP
SPECIMEN SOURCE: SIGNIFICANT CHANGE UP
WBC # BLD: 11.13 K/UL — HIGH (ref 3.8–10.5)
WBC # FLD AUTO: 11.13 K/UL — HIGH (ref 3.8–10.5)

## 2023-01-04 PROCEDURE — 99231 SBSQ HOSP IP/OBS SF/LOW 25: CPT

## 2023-01-04 PROCEDURE — 99232 SBSQ HOSP IP/OBS MODERATE 35: CPT

## 2023-01-04 PROCEDURE — 99291 CRITICAL CARE FIRST HOUR: CPT

## 2023-01-04 PROCEDURE — 99233 SBSQ HOSP IP/OBS HIGH 50: CPT

## 2023-01-04 RX ORDER — SODIUM CHLORIDE 9 MG/ML
1000 INJECTION, SOLUTION INTRAVENOUS
Refills: 0 | Status: DISCONTINUED | OUTPATIENT
Start: 2023-01-04 | End: 2023-01-05

## 2023-01-04 RX ADMIN — Medication 81 MILLIGRAM(S): at 11:14

## 2023-01-04 RX ADMIN — INSULIN GLARGINE 18 UNIT(S): 100 INJECTION, SOLUTION SUBCUTANEOUS at 08:17

## 2023-01-04 RX ADMIN — Medication 0.1 MILLIGRAM(S): at 23:14

## 2023-01-04 RX ADMIN — Medication 3 MILLILITER(S): at 04:00

## 2023-01-04 RX ADMIN — Medication 3 MILLILITER(S): at 22:03

## 2023-01-04 RX ADMIN — Medication 2000 MILLIGRAM(S): at 22:02

## 2023-01-04 RX ADMIN — Medication 2000 MILLIGRAM(S): at 13:52

## 2023-01-04 RX ADMIN — Medication 0.5 MILLIGRAM(S): at 13:52

## 2023-01-04 RX ADMIN — LOTEPREDNOL ETABONATE 1 DROP(S): 2 SUSPENSION/ DROPS OPHTHALMIC at 06:13

## 2023-01-04 RX ADMIN — Medication 4 UNIT(S): at 23:17

## 2023-01-04 RX ADMIN — PANTOPRAZOLE SODIUM 40 MILLIGRAM(S): 20 TABLET, DELAYED RELEASE ORAL at 11:14

## 2023-01-04 RX ADMIN — Medication 650 MILLIGRAM(S): at 08:16

## 2023-01-04 RX ADMIN — Medication 1 DROP(S): at 06:13

## 2023-01-04 RX ADMIN — CHLORHEXIDINE GLUCONATE 15 MILLILITER(S): 213 SOLUTION TOPICAL at 17:29

## 2023-01-04 RX ADMIN — Medication 3 MILLILITER(S): at 15:32

## 2023-01-04 RX ADMIN — CHLORHEXIDINE GLUCONATE 15 MILLILITER(S): 213 SOLUTION TOPICAL at 05:14

## 2023-01-04 RX ADMIN — Medication 2000 MILLIGRAM(S): at 05:14

## 2023-01-04 RX ADMIN — Medication 10 MILLIGRAM(S): at 22:02

## 2023-01-04 RX ADMIN — Medication 4 UNIT(S): at 11:20

## 2023-01-04 RX ADMIN — Medication 2: at 11:19

## 2023-01-04 RX ADMIN — Medication 2 MILLIGRAM(S): at 22:02

## 2023-01-04 RX ADMIN — CHLORHEXIDINE GLUCONATE 1 APPLICATION(S): 213 SOLUTION TOPICAL at 11:17

## 2023-01-04 RX ADMIN — ATORVASTATIN CALCIUM 80 MILLIGRAM(S): 80 TABLET, FILM COATED ORAL at 22:02

## 2023-01-04 RX ADMIN — Medication 3 MILLILITER(S): at 07:58

## 2023-01-04 RX ADMIN — Medication 2: at 08:18

## 2023-01-04 RX ADMIN — Medication 4 UNIT(S): at 17:44

## 2023-01-04 NOTE — PROGRESS NOTE ADULT - SUBJECTIVE AND OBJECTIVE BOX
Brief summary:  77yMale seen and assessed at bedside.  Pt sitting up in hospital bed in NAD on MV via trach.  POD #1 from trach & PEG.  HPI limited 2/2 pt's current clinical condition.    Overnight events:  None.  Hospital course progressing as expected.        PAST MEDICAL & SURGICAL HISTORY:  HTN (hypertension)    CAD (coronary artery disease)    DM (diabetes mellitus)        Medications:  acetaminophen     Tablet .. 650 milliGRAM(s) Oral every 6 hours PRN  albuterol/ipratropium for Nebulization 3 milliLiter(s) Nebulizer every 6 hours  ALPRAZolam 0.5 milliGRAM(s) Oral every 12 hours PRN  aluminum hydroxide/magnesium hydroxide/simethicone Suspension 30 milliLiter(s) Oral every 4 hours PRN  aspirin  chewable 81 milliGRAM(s) Oral daily  atorvastatin 80 milliGRAM(s) Oral at bedtime  ceFAZolin  Injectable. 2000 milliGRAM(s) IV Push every 8 hours  chlorhexidine 0.12% Liquid 15 milliLiter(s) Oral Mucosa every 12 hours  chlorhexidine 2% Cloths 1 Application(s) Topical daily  coronavirus bivalent (EUA) Booster Vaccine (PFIZER) 0.3 milliLiter(s) IntraMuscular once  dexMEDEtomidine Infusion 0.1 MICROgram(s)/kG/Hr IV Continuous <Continuous>  dextrose 5%. 1000 milliLiter(s) IV Continuous <Continuous>  doxazosin 2 milliGRAM(s) Oral at bedtime  hydrALAZINE Injectable 10 milliGRAM(s) IV Push every 2 hours PRN  influenza  Vaccine (HIGH DOSE) 0.7 milliLiter(s) IntraMuscular once  insulin glargine Injectable (LANTUS) 18 Unit(s) SubCutaneous every morning  insulin lispro (ADMELOG) corrective regimen sliding scale   SubCutaneous three times a day before meals  insulin lispro Injectable (ADMELOG) 4 Unit(s) SubCutaneous every 6 hours  labetalol Injectable 10 milliGRAM(s) IV Push every 2 hours PRN  loteprednol 0.5% Ophthalmic Suspension 1 Drop(s) Right EYE daily  melatonin 3 milliGRAM(s) Oral at bedtime PRN  ondansetron Injectable 4 milliGRAM(s) IV Push every 8 hours PRN  pantoprazole   Suspension 40 milliGRAM(s) Oral daily  prednisoLONE acetate 1% Suspension 1 Drop(s) Left EYE daily      MEDICATIONS  (PRN):  acetaminophen     Tablet .. 650 milliGRAM(s) Oral every 6 hours PRN Temp greater or equal to 38C (100.4F), Mild Pain (1 - 3)  ALPRAZolam 0.5 milliGRAM(s) Oral every 12 hours PRN for agitation  aluminum hydroxide/magnesium hydroxide/simethicone Suspension 30 milliLiter(s) Oral every 4 hours PRN Dyspepsia  hydrALAZINE Injectable 10 milliGRAM(s) IV Push every 2 hours PRN SBP >160  labetalol Injectable 10 milliGRAM(s) IV Push every 2 hours PRN SBP >160  melatonin 3 milliGRAM(s) Oral at bedtime PRN Insomnia  ondansetron Injectable 4 milliGRAM(s) IV Push every 8 hours PRN Nausea and/or Vomiting        Daily Review:    Mode: AC/ CMV (Assist Control/ Continuous Mandatory Ventilation), RR (machine): 16, TV (machine): 400, FiO2: 50, PEEP: 6, ITime: 0.8, MAP: 11, PC: 3, PIP: 22               12.9   11.13 )-----------( 249      ( 04 Jan 2023 03:50 )             40.5   01-04    156<H>  |  120<H>  |  41.7<H>  ----------------------------<  198<H>  4.1   |  28.0  |  1.38<H>    Ca    8.2<L>      04 Jan 2023 03:50  Phos  3.6     01-04  Mg     2.9     01-04      PT/INR - ( 03 Jan 2023 03:30 )   PT: 14.4 sec;   INR: 1.24 ratio         PTT - ( 03 Jan 2023 03:30 )  PTT:73.7 sec    T(C): 36.7 (01-04-23 @ 11:47), Max: 37.5 (01-04-23 @ 04:02)  HR: 61 (01-04-23 @ 12:00) (61 - 111)  BP: 149/68 (01-04-23 @ 12:00) (135/74 - 180/86)  RR: 16 (01-04-23 @ 12:00) (16 - 22)  SpO2: 97% (01-04-23 @ 12:00) (94% - 99%)      CAPILLARY BLOOD GLUCOSE    POCT Blood Glucose.: 182 mg/dL (04 Jan 2023 11:13)  POCT Blood Glucose.: 172 mg/dL (04 Jan 2023 08:07)  POCT Blood Glucose.: 201 mg/dL (04 Jan 2023 05:35)  POCT Blood Glucose.: 163 mg/dL (04 Jan 2023 00:12)  POCT Blood Glucose.: 165 mg/dL (03 Jan 2023 16:37)      I&O's Summary    03 Jan 2023 07:01  -  04 Jan 2023 07:00  --------------------------------------------------------  IN: 1583.2 mL / OUT: 1350 mL / NET: 233.2 mL    04 Jan 2023 07:01  -  04 Jan 2023 12:16  --------------------------------------------------------  IN: 67.6 mL / OUT: 300 mL / NET: -232.4 mL        Physical Exam    Neuro: Awake, alert, non-focal  Pulm: coarse breath sounds bilaterally, no wheezing or rales, on MV via trach, 40% FiO2, 6 PEEP  CV: RRR, +S1S2  Abd: soft, NT, ND, normoactive bowel sounds, +PEG c/d/i  Ext: +DP Pulses b/l, +PT pulses, no edema

## 2023-01-04 NOTE — PROGRESS NOTE ADULT - ASSESSMENT
78 yo M with PMH HTN, DM, CAD.  Transferred from [place] 12/26 with an acute L pontine CVA, possible L vert dissection. Worsening neuro exam 12/27 - RUE plegia, worsening bulbar dysfunction.   Acute resp failure due to neurologic injury, required intubation 12/27 for airway protection.  Thoracic surgery consulted for trach and peg eval.  76 yo M with PMH HTN, DM, CAD.  Transferred from [place] 12/26 with an acute L pontine CVA, possible L vert dissection. Worsening neuro exam 12/27 - RUE plegia, worsening bulbar dysfunction.   Acute resp failure due to neurologic injury, required intubation 12/27 for airway protection.  Thoracic surgery consulted for trach and peg eval. S/p trach & PEG 1/3.

## 2023-01-04 NOTE — PROGRESS NOTE ADULT - SUBJECTIVE AND OBJECTIVE BOX
[facility]                                     Neurology at [place]                                 Aidan Sol, & Feliciano                                  [address] [facility]                                        [place], [place], [address]                                             [phone]      HPI:  76 y/o male with PMH of DM-2, HTN, CAD was transferred from [place] for stroke. Patient reported right sided weakness and slurred speech along with difficulty swallowing that started day prior to admission. Was seen at [place] day of admission, code stroke initiated out of window for tPA. CT head: no acute finding. MRI: acute infarct with left debby. MRA head/neck: Left vertebral artery segmental stenoses and T1 hyperintensity suggesting foci of dissection and/or thrombus. As per resident, neurology from [facility] was consulted and accepted patient for further evaluation.          MEDICATIONS  (STANDING):  albuterol/ipratropium for Nebulization 3 milliLiter(s) Nebulizer every 6 hours  aspirin  chewable 81 milliGRAM(s) Oral daily  atorvastatin 80 milliGRAM(s) Oral at bedtime  ceFAZolin  Injectable. 2000 milliGRAM(s) IV Push every 8 hours  chlorhexidine 0.12% Liquid 15 milliLiter(s) Oral Mucosa every 12 hours  chlorhexidine 2% Cloths 1 Application(s) Topical daily  coronavirus bivalent (EUA) Booster Vaccine (PFIZER) 0.3 milliLiter(s) IntraMuscular once  dexMEDEtomidine Infusion 0.1 MICROgram(s)/kG/Hr (1.93 mL/Hr) IV Continuous <Continuous>  dextrose 5%. 1000 milliLiter(s) (30 mL/Hr) IV Continuous <Continuous>  doxazosin 2 milliGRAM(s) Oral at bedtime  influenza  Vaccine (HIGH DOSE) 0.7 milliLiter(s) IntraMuscular once  insulin glargine Injectable (LANTUS) 18 Unit(s) SubCutaneous every morning  insulin lispro (ADMELOG) corrective regimen sliding scale   SubCutaneous three times a day before meals  insulin lispro Injectable (ADMELOG) 4 Unit(s) SubCutaneous every 6 hours  loteprednol 0.5% Ophthalmic Suspension 1 Drop(s) Right EYE daily  pantoprazole   Suspension 40 milliGRAM(s) Oral daily  prednisoLONE acetate 1% Suspension 1 Drop(s) Left EYE daily    MEDICATIONS  (PRN):  acetaminophen     Tablet .. 650 milliGRAM(s) Oral every 6 hours PRN Temp greater or equal to 38C (100.4F), Mild Pain (1 - 3)  ALPRAZolam 0.5 milliGRAM(s) Oral every 12 hours PRN for agitation  aluminum hydroxide/magnesium hydroxide/simethicone Suspension 30 milliLiter(s) Oral every 4 hours PRN Dyspepsia  hydrALAZINE Injectable 10 milliGRAM(s) IV Push every 2 hours PRN SBP >160  labetalol Injectable 10 milliGRAM(s) IV Push every 2 hours PRN SBP >160  melatonin 3 milliGRAM(s) Oral at bedtime PRN Insomnia  ondansetron Injectable 4 milliGRAM(s) IV Push every 8 hours PRN Nausea and/or Vomiting      Allergies  iodine (Anaphylaxis (Mod to Severe))    FAMILY HISTORY:  No pertinent family history in first degree relatives    ROS: 14 point ROS negative other than what is present in HPI or below. Per wife, doing well.     Vital Signs Last 24 Hrs  T(C): 36.7 (04 Jan 2023 11:47), Max: 37.5 (04 Jan 2023 04:02)  T(F): 98 (04 Jan 2023 11:47), Max: 99.5 (04 Jan 2023 04:02)  HR: 61 (04 Jan 2023 12:00) (61 - 111)  BP: 149/68 (04 Jan 2023 12:00) (135/74 - 180/86)  BP(mean): 92 (04 Jan 2023 12:00) (90 - 127)  RR: 16 (04 Jan 2023 12:00) (16 - 22)  SpO2: 97% (04 Jan 2023 12:00) (94% - 99%)    Parameters below as of 04 Jan 2023 12:00  Patient On (Oxygen Delivery Method): ventilator    O2 Concentration (%): 40      General: NAD    Detailed Neurologic Exam:    Mental status: The patient is awake and alert, no verbal output, able to calculate and follow commands.     Cranial nerves: . There is no visual field deficit to confrontation. left gaze palsy,   Facial musculature is symmetric. Palate elevates symmetrically.      Motor:    right hemiplegia   Strength is 5/5 in the left arm and leg with prompting     Sensation: Intact to light touch and pin in 4 extremities, no sensory extinction     Gait : deferred      LABS:                         12.9   11.13 )-----------( 249      ( 04 Jan 2023 03:50 )             40.5       01-04    156<H>  |  120<H>  |  41.7<H>  ----------------------------<  198<H>  4.1   |  28.0  |  1.38<H>    Ca    8.2<L>      04 Jan 2023 03:50  Phos  3.6     01-04  Mg     2.9     01-04        PT/INR - ( 03 Jan 2023 03:30 )   PT: 14.4 sec;   INR: 1.24 ratio         PTT - ( 03 Jan 2023 03:30 )  PTT:73.7 sec    Lipid panel: 143/42    HgbA1c: 8.0    RADIOLOGY & ADDITIONAL STUDIES (independently reviewed unless otherwise noted):  CT Head No Cont (12.29.22 @ 09:57)   No evidence of an acute intracranial hemorrhage, midline shift, or   hydrocephalus. Known acute left pontine infarct partly obscured by   artifact.     MRI/MRA Head/neck 12/26/22:  1. RIGHT CAROTID NECK CIRCULATION: Intact.  2. LEFT CAROTID NECK CIRCULATION: Intact.  3. VERTEBRAL NECK CIRCULATION: Right vertebral artery is intact. Left  vertebral artery demonstrates segmental stenoses and T1 hyperintensity  suggesting foci of dissection and/or thrombus. Flow appears improved  compared to time-of-flight angiography earlier same date  4. ANTERIOR INTRACRANIAL CIRCULATION: Intracranial atherosclerosis  cavernous carotid segments internal carotid arteries, at least mild, and  right MCA M1 segment, moderate.  5. POSTERIOR INTRACRANIAL CIRCULATION: Right vertebral artery focal  stenosis just proximal to the basilar formation, moderate. Attenuated  segmentally nonvisualized left vertebral artery. Flow appears improved  compared to earlier this same date. Intact basilar artery and posterior  cerebral arteries.  TTE    1. Left ventricular ejection fraction, by visual estimation, is 60 to   65%.   2. Normal global left ventricular systolic function.   3. Spectral Doppler shows impaired relaxation pattern of left   ventricular myocardial filling (Grade I diastolic dysfunction).   4. Normal left atrial size.   5. Trace mitral valve regurgitation.   6. Sclerotic aortic valve with decreased opening.   7. Endocardial visualization was enhanced with intravenous echo contrast.                                  [facility]                                     Neurology at [place]                                 Aidan Sol, & Feliciano                                  [address]                                        [place], [place], [address]                                             [phone]      HPI:  78 y/o male with PMH of DM-2, HTN, CAD was transferred from [place] for stroke. Patient reported right sided weakness and slurred speech along with difficulty swallowing that started day prior to admission. Was seen at [place] day of admission, code stroke initiated out of window for tPA. CT head: no acute finding. MRI: acute infarct with left debby. MRA head/neck: Left vertebral artery segmental stenoses and T1 hyperintensity suggesting foci of dissection and/or thrombus. As per resident, neurology from [facility] was consulted and accepted patient for further evaluation.      Interval history- more awake today    MEDICATIONS  (STANDING):  albuterol/ipratropium for Nebulization 3 milliLiter(s) Nebulizer every 6 hours  aspirin  chewable 81 milliGRAM(s) Oral daily  atorvastatin 80 milliGRAM(s) Oral at bedtime  ceFAZolin  Injectable. 2000 milliGRAM(s) IV Push every 8 hours  chlorhexidine 0.12% Liquid 15 milliLiter(s) Oral Mucosa every 12 hours  chlorhexidine 2% Cloths 1 Application(s) Topical daily  coronavirus bivalent (EUA) Booster Vaccine (PFIZER) 0.3 milliLiter(s) IntraMuscular once  dexMEDEtomidine Infusion 0.1 MICROgram(s)/kG/Hr (1.93 mL/Hr) IV Continuous <Continuous>  dextrose 5%. 1000 milliLiter(s) (30 mL/Hr) IV Continuous <Continuous>  doxazosin 2 milliGRAM(s) Oral at bedtime  influenza  Vaccine (HIGH DOSE) 0.7 milliLiter(s) IntraMuscular once  insulin glargine Injectable (LANTUS) 18 Unit(s) SubCutaneous every morning  insulin lispro (ADMELOG) corrective regimen sliding scale   SubCutaneous three times a day before meals  insulin lispro Injectable (ADMELOG) 4 Unit(s) SubCutaneous every 6 hours  loteprednol 0.5% Ophthalmic Suspension 1 Drop(s) Right EYE daily  pantoprazole   Suspension 40 milliGRAM(s) Oral daily  prednisoLONE acetate 1% Suspension 1 Drop(s) Left EYE daily    MEDICATIONS  (PRN):  acetaminophen     Tablet .. 650 milliGRAM(s) Oral every 6 hours PRN Temp greater or equal to 38C (100.4F), Mild Pain (1 - 3)  ALPRAZolam 0.5 milliGRAM(s) Oral every 12 hours PRN for agitation  aluminum hydroxide/magnesium hydroxide/simethicone Suspension 30 milliLiter(s) Oral every 4 hours PRN Dyspepsia  hydrALAZINE Injectable 10 milliGRAM(s) IV Push every 2 hours PRN SBP >160  labetalol Injectable 10 milliGRAM(s) IV Push every 2 hours PRN SBP >160  melatonin 3 milliGRAM(s) Oral at bedtime PRN Insomnia  ondansetron Injectable 4 milliGRAM(s) IV Push every 8 hours PRN Nausea and/or Vomiting      Allergies  iodine (Anaphylaxis (Mod to Severe))    FAMILY HISTORY:  No pertinent family history in first degree relatives    ROS: 14 point ROS negative other than what is present in HPI or below. Per wife, doing well.     Vital Signs Last 24 Hrs  T(C): 36.7 (04 Jan 2023 11:47), Max: 37.5 (04 Jan 2023 04:02)  T(F): 98 (04 Jan 2023 11:47), Max: 99.5 (04 Jan 2023 04:02)  HR: 61 (04 Jan 2023 12:00) (61 - 111)  BP: 149/68 (04 Jan 2023 12:00) (135/74 - 180/86)  BP(mean): 92 (04 Jan 2023 12:00) (90 - 127)  RR: 16 (04 Jan 2023 12:00) (16 - 22)  SpO2: 97% (04 Jan 2023 12:00) (94% - 99%)    Parameters below as of 04 Jan 2023 12:00  Patient On (Oxygen Delivery Method): ventilator    O2 Concentration (%): 40      General: NAD    Detailed Neurologic Exam:    Mental status: The patient is awake and alert, no verbal output, able to calculate and follow commands.     Cranial nerves: . There is no visual field deficit to confrontation. left gaze palsy,   Facial musculature is symmetric. Palate elevates symmetrically.      Motor:    right hemiplegia   Strength is 5/5 in the left arm and leg with prompting     Sensation: Intact to light touch and pin in 4 extremities, no sensory extinction     Gait : deferred      LABS:                         12.9   11.13 )-----------( 249      ( 04 Jan 2023 03:50 )             40.5       01-04    156<H>  |  120<H>  |  41.7<H>  ----------------------------<  198<H>  4.1   |  28.0  |  1.38<H>    Ca    8.2<L>      04 Jan 2023 03:50  Phos  3.6     01-04  Mg     2.9     01-04        PT/INR - ( 03 Jan 2023 03:30 )   PT: 14.4 sec;   INR: 1.24 ratio         PTT - ( 03 Jan 2023 03:30 )  PTT:73.7 sec    Lipid panel: 143/42    HgbA1c: 8.0    RADIOLOGY & ADDITIONAL STUDIES (independently reviewed unless otherwise noted):  CT Head No Cont (12.29.22 @ 09:57)   No evidence of an acute intracranial hemorrhage, midline shift, or   hydrocephalus. Known acute left pontine infarct partly obscured by   artifact.     MRI/MRA Head/neck 12/26/22:  1. RIGHT CAROTID NECK CIRCULATION: Intact.  2. LEFT CAROTID NECK CIRCULATION: Intact.  3. VERTEBRAL NECK CIRCULATION: Right vertebral artery is intact. Left  vertebral artery demonstrates segmental stenoses and T1 hyperintensity  suggesting foci of dissection and/or thrombus. Flow appears improved  compared to time-of-flight angiography earlier same date  4. ANTERIOR INTRACRANIAL CIRCULATION: Intracranial atherosclerosis  cavernous carotid segments internal carotid arteries, at least mild, and  right MCA M1 segment, moderate.  5. POSTERIOR INTRACRANIAL CIRCULATION: Right vertebral artery focal  stenosis just proximal to the basilar formation, moderate. Attenuated  segmentally nonvisualized left vertebral artery. Flow appears improved  compared to earlier this same date. Intact basilar artery and posterior  cerebral arteries.  TTE    1. Left ventricular ejection fraction, by visual estimation, is 60 to   65%.   2. Normal global left ventricular systolic function.   3. Spectral Doppler shows impaired relaxation pattern of left   ventricular myocardial filling (Grade I diastolic dysfunction).   4. Normal left atrial size.   5. Trace mitral valve regurgitation.   6. Sclerotic aortic valve with decreased opening.   7. Endocardial visualization was enhanced with intravenous echo contrast.

## 2023-01-04 NOTE — PROGRESS NOTE ADULT - SUBJECTIVE AND OBJECTIVE BOX
Reason for visit: MURIEL    Subjective: Had trach and PEG placed yesterday. On precedex    ROS: Unable to obtain secondary to patient current clinical condition.     Mode: AC/ CMV (Assist Control/ Continuous Mandatory Ventilation)  RR (machine): 16  TV (machine): 400  FiO2: 50  PEEP: 6  ITime: 0.8  MAP: 11  PC: 3  PIP: 22    Physical Exam:  Gen: ill appearing, intubated  MS: sedated  HENT: Trach+  CV: rhythm reg reg, rate normal, no m/g/r, no LE edema  Chest: CTAB  Abd: soft, non distended, PEG+  Neuro: sedated, no myoclonus  Skin: dry, warm, no rash or jaundice  Dickson+, Central line    =======================================================  Vital Signs Last 24 Hrs  T(C): 37.2 (2023 08:00), Max: 37.5 (2023 04:02)  T(F): 99 (2023 08:00), Max: 99.5 (2023 04:02)  HR: 67 (2023 08:00) (67 - 113)  BP: 165/72 (2023 08:00) (135/74 - 180/86)  BP(mean): 96 (2023 08:00) (90 - 127)  RR: 16 (2023 07:00) (16 - 22)  SpO2: 99% (2023 08:00) (94% - 99%)    Parameters below as of 2023 08:00  Patient On (Oxygen Delivery Method): ventilator    O2 Concentration (%): 50  I&O's Summary    2023 07:01  -  2023 07:00  --------------------------------------------------------  IN: 1583.2 mL / OUT: 1350 mL / NET: 233.2 mL    2023 07:01  -  2023 11:03  --------------------------------------------------------  IN: 67.6 mL / OUT: 300 mL / NET: -232.4 mL      =======================================================  Current Antibiotics:  ceFAZolin  Injectable. 2000 milliGRAM(s) IV Push every 8 hours    Other medications:  albuterol/ipratropium for Nebulization 3 milliLiter(s) Nebulizer every 6 hours  aspirin  chewable 81 milliGRAM(s) Oral daily  atorvastatin 80 milliGRAM(s) Oral at bedtime  chlorhexidine 0.12% Liquid 15 milliLiter(s) Oral Mucosa every 12 hours  chlorhexidine 2% Cloths 1 Application(s) Topical daily  coronavirus bivalent (EUA) Booster Vaccine (PFIZER) 0.3 milliLiter(s) IntraMuscular once  dexMEDEtomidine Infusion 0.1 MICROgram(s)/kG/Hr IV Continuous <Continuous>  dextrose 5%. 1000 milliLiter(s) IV Continuous <Continuous>  dextrose 5%. 1000 milliLiter(s) IV Continuous <Continuous>  dextrose 5%. 1000 milliLiter(s) IV Continuous <Continuous>  dextrose 50% Injectable 25 Gram(s) IV Push once  dextrose 50% Injectable 12.5 Gram(s) IV Push once  dextrose 50% Injectable 25 Gram(s) IV Push once  doxazosin 2 milliGRAM(s) Oral at bedtime  glucagon  Injectable 1 milliGRAM(s) IntraMuscular once  influenza  Vaccine (HIGH DOSE) 0.7 milliLiter(s) IntraMuscular once  insulin glargine Injectable (LANTUS) 18 Unit(s) SubCutaneous every morning  insulin lispro (ADMELOG) corrective regimen sliding scale   SubCutaneous three times a day before meals  insulin lispro Injectable (ADMELOG) 4 Unit(s) SubCutaneous every 6 hours  loteprednol 0.5% Ophthalmic Suspension 1 Drop(s) Right EYE daily  pantoprazole   Suspension 40 milliGRAM(s) Oral daily  prednisoLONE acetate 1% Suspension 1 Drop(s) Left EYE daily    =======================================================      156<H>  |  120<H>  |  41.7<H>  ----------------------------<  198<H>  4.1   |  28.0  |  1.38<H>    Ca    8.2<L>      2023 03:50  Phos  3.6       Mg     2.9           Creatinine, Serum: 1.38 mg/dL (23 @ 03:50)  Creatinine, Serum: 1.48 mg/dL (23 @ 14:13)  Creatinine, Serum: 1.20 mg/dL (23 @ 03:30)  Creatinine, Serum: 1.20 mg/dL (23 @ 16:30)  Creatinine, Serum: 1.20 mg/dL (23 @ 07:07)  Creatinine, Serum: 1.26 mg/dL (23 @ 03:37)  Creatinine, Serum: 1.22 mg/dL (23 @ 14:40)  Creatinine, Serum: 1.25 mg/dL (23 @ 02:30)  Creatinine, Serum: 1.20 mg/dL (22 @ 02:59)    Urinalysis Basic - ( 2023 19:00 )    Color: Yellow / Appearance: Clear / S.010 / pH: x  Gluc: x / Ketone: Negative  / Bili: Negative / Urobili: Negative mg/dL   Blood: x / Protein: Negative / Nitrite: Negative   Leuk Esterase: Negative / RBC: 0-2 /HPF / WBC 0-2 /HPF   Sq Epi: x / Non Sq Epi: Occasional / Bacteria: Occasional      =======================================================

## 2023-01-04 NOTE — PROGRESS NOTE ADULT - ASSESSMENT
ASSESSMENT: 78 y/o male with PMH of DM-2, HTN, CAD was transferred from [place] for stroke. Patient reported right sided weakness and slurred speech along with difficulty swallowing that started day prior to admission. Was seen at [place] day of admission, code stroke initiated out of window for tPA. CT head: no acute finding. MRI: acute infarct with left debby. MRA head/neck: Left vertebral artery segmental stenoses and T1 hyperintensity suggesting foci of dissection and/or thrombus.  Etiology, dissection  vs. embolic stroke of undetermined source.       NEURO:   -Continue close monitoring q1 stroke neuro checks   -Gradual normotension as tolerated   -Titrate statin to LDL goal less than 70  -MRA T1 Fat Sat to further differentiate dissection vs. thrombus as it would determine further plan for medical management   -Physical therapy/OT/Speech eval/treatment.   ANTITHROMBOTIC THERAPY: Heparin gtt for now PTT 60-80 until further clarification obtain re: thrombus vs. dissection, on ASA   - vent care per ICU   - TTE as noted , cardiac monitoring, if thrombus would consider embolic cardiac workup                            - TF per primary team   - Maintain adequate hydration   - DVT ppx   - Age and risk appropriate malignancy screenings     OTHER:  d/w ICU team. Wife at bedside, questions and concerns addressed .     DISPOSITION: Anticipate rehab once stable and workup complete.       CORE MEASURES:        Admission NIHSS: 13     TPA: [] YES [x] NO      LDL/HDL/A1C: 143/42/8.0     Depression Screen: na      Statin Therapy: as noted      Dysphagia Screen: [] PASS [x] FAIL     Smoking [] YES [x] NO      Afib [] YES [x] NO     Stroke Education [x] YES [] NO        ASSESSMENT: 76 y/o male with PMH of DM-2, HTN, CAD was transferred from [place] for stroke. Patient reported right sided weakness and slurred speech along with difficulty swallowing that started day prior to admission. Was seen at [place] day of admission, code stroke initiated out of window for tPA. CT head: no acute finding. MRI: acute infarct with left debby. MRA head/neck: Left vertebral artery segmental stenoses and T1 hyperintensity suggesting foci of dissection and/or thrombus.  Etiology, dissection  vs. embolic stroke of undetermined source.       NEURO:   -Continue close monitoring q1 stroke neuro checks   -Gradual normotension as tolerated   -Titrate statin to LDL goal less than 70  - Await MRA T1 Fat Sat to further differentiate dissection vs. thrombus as it would determine further plan for medical management   -Physical therapy/OT/Speech eval/treatment.   ANTITHROMBOTIC THERAPY: Heparin gtt for now PTT 60-80 until further clarification obtain re: thrombus vs. dissection, on ASA   - vent care per ICU   - TTE as noted , cardiac monitoring, if thrombus would consider embolic cardiac workup                            - TF per primary team   - Maintain adequate hydration   - DVT ppx   - Age and risk appropriate malignancy screenings     OTHER:  d/w NSICU team. Wife at bedside, questions and concerns addressed .     DISPOSITION: Anticipate rehab once stable and workup complete.       CORE MEASURES:        Admission NIHSS: 13     TPA: [] YES [x] NO      LDL/HDL/A1C: 143/42/8.0     Depression Screen: na      Statin Therapy: as noted      Dysphagia Screen: [] PASS [x] FAIL     Smoking [] YES [x] NO      Afib [] YES [x] NO     Stroke Education [x] YES [] NO

## 2023-01-04 NOTE — PROGRESS NOTE ADULT - PROBLEM SELECTOR PLAN 1
Admitted with Acute Left Pontine CVA.  Intubated 12/27 for airway protection.  S/p Trach/PEG 1/3 Admitted with Acute Left Pontine CVA.  Intubated 12/27 for airway protection.  S/p Trach/PEG 1/3  Surgicel removed today 1/4.  Maintain gauze/drain sponge between trach flange and skin to prevent skin breakdown. Change as needed.  Continue trach care and suctioning.   Sutures to be removed 1/10/23  May begin tube feeds as tolerated today.   Thoracic surgery to continue to follow.    Plan discussed with Thoracic attendings during AM rounds.

## 2023-01-04 NOTE — PROGRESS NOTE ADULT - ASSESSMENT
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  Worsening neuro exam 12/27 - RUE plegia, worsening bulbar dysfunction.   Acute resp failure due to neurologic injury, required intubation.  PMH of  DMII, HTN, CAD.  Allergy to iodine.  Anticoagulated on Heparin ggt.  tracheal aspirate MSSA 12/30- PNA  hypernatremia, not consistent with DI    NEURO:   q4hr neurochecks  cont ASA 81 mg daily   Heparin ggt for L vert dissection vs thrombus, will discuss need for AC with stroke  (will stop heparin for now after 1 week AC)  precedex for sedation  MRA T1 fatsat head and neck to eval vert dissection vs thrombus  pain mgt: tylenol and oxy 5 prn  Activity: [x] mobilize as tolerated [] Bedrest [x] PT [x] OT     PULM: trach 1/3  bed to chair position, PT/OT  duonebs    SpO2>92%  SBT as tolerated    CV:  SBP goal <160  lipitor  ASA    RENAL: monitor I/O  replete lytes prn  voiding cardura- incontinent   FWF 350mg q4h  appreciate nephro recs for hypernatremia    GI:   PEG 1/3 restart TF today  GI prophylaxis [x] PPI  zofran prn for nausea  rectal tube  LBM 1/3    ENDO:   Goal euglycemia (-180)  inc lantus 18units, lispro 4units q6h  ISS    HEME/ONC:  VTE prophylaxis: [] SCDs [x] hold chemoprophylaxis      ID:   empiric vanco and zosyn change to ancef ( 12/30- 1/5)- MSSA sputum  follow up cultures    MISC:    I affirm that this patient is critically ill and at high risk for sudden, fatal deterioration due to one or more of the above stated active issues.     This time does not include bedside procedures that are documented separately.           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  Worsening neuro exam 12/27 - RUE plegia, worsening bulbar dysfunction.   Acute resp failure due to neurologic injury, required intubation.  PMH of  DMII, HTN, CAD.  Allergy to iodine.  Anticoagulated on Heparin ggt.  tracheal aspirate MSSA 12/30- PNA  hypernatremia, not consistent with DI    NEURO:   q4hr neurochecks  cont ASA 81 mg daily   Heparin ggt for L vert dissection vs thrombus, will discuss need for AC with stroke  (will hold heparin for now -after 1 week AC)  wean precedex for sedation  MRA T1 fatsat head and neck to eval vert dissection vs thrombus  pain mgt: tylenol and oxy 5 prn  Activity: [x] mobilize as tolerated [] Bedrest [x] PT [x] OT     PULM: trach 1/3 (POD 1)  bed to chair position, PT/OT  duonebs    SpO2>92%  SBT as tolerated    CV:  SBP goal <160  lipitor  ASA    RENAL: monitor I/O  replete lytes prn  voiding cardura- incontinent   FWF 350mg q4h, D5 at 30cc/h  appreciate nephro recs for hypernatremia    GI:   PEG 1/3 restart TF today  GI prophylaxis [x] PPI  rectal tube  LBM 1/4    ENDO:   Goal euglycemia (-180)  inc lantus 18units, lispro 4units q6h  ISS    HEME/ONC:  VTE prophylaxis: [] SCDs [x] chemoprophylaxis      ID: afebrile  empiric vanco and zosyn change to ancef ( 12/30- 1/7)- MSSA sputum    MISC:    I affirm that this patient is critically ill and at high risk for sudden, fatal deterioration due to one or more of the above stated active issues.     This time does not include bedside procedures that are documented separately.

## 2023-01-04 NOTE — PROGRESS NOTE ADULT - SUBJECTIVE AND OBJECTIVE BOX
Chief complaint:   Patient is a 77y old  Male who presents with a chief complaint of Acute stroke (03 Jan 2023 16:27)    HPI:  78 y/o male with PMH of DM-2, HTN, CAD was transferred from [facility] for stroke. Patient reported right sided weakness and slurred speech along with difficulty swallowing that started yesterday. Was seen at [facility] today, code stroke initiated out of window for tPA. CT head: no acute finding. MRI: acute infarct with left debby. MRA head/neck: Left vertebral artery segmental stenoses and T1 hyperintensity suggesting foci of dissection and/or thrombus. As per resident, neurology from [facility] was consulted and accepted patient for further evaluation. Patient said he is upset about his condition, noted discomfort in his head otherwise no chest pain, shortness of breath, palpitation, fever, chills, nausea, vomiting, abdominal pain, change in bowel/urinary habit.  (26 Dec 2022 22:58)        24hr EVENTS:  trach/peg    ROS: more comfortable  All other systems negative    -----------------------------------------------------------------------------------------------------------------------------------------------------------------------------------  ICU Vital Signs Last 24 Hrs  T(C): 37.2 (04 Jan 2023 08:00), Max: 37.5 (04 Jan 2023 04:02)  T(F): 99 (04 Jan 2023 08:00), Max: 99.5 (04 Jan 2023 04:02)  HR: 67 (04 Jan 2023 08:00) (67 - 118)  BP: 165/72 (04 Jan 2023 08:00) (129/71 - 180/86)  BP(mean): 96 (04 Jan 2023 08:00) (85 - 127)  ABP: --  ABP(mean): --  RR: 16 (04 Jan 2023 07:00) (16 - 22)  SpO2: 99% (04 Jan 2023 08:00) (92% - 99%)    O2 Parameters below as of 04 Jan 2023 08:00  Patient On (Oxygen Delivery Method): ventilator    O2 Concentration (%): 50        I&O's Summary    03 Jan 2023 07:01  -  04 Jan 2023 07:00  --------------------------------------------------------  IN: 1583.2 mL / OUT: 1350 mL / NET: 233.2 mL    04 Jan 2023 07:01  -  04 Jan 2023 09:05  --------------------------------------------------------  IN: 67.6 mL / OUT: 300 mL / NET: -232.4 mL        MEDICATIONS  (STANDING):  albuterol/ipratropium for Nebulization 3 milliLiter(s) Nebulizer every 6 hours  aspirin  chewable 81 milliGRAM(s) Oral daily  atorvastatin 80 milliGRAM(s) Oral at bedtime  ceFAZolin  Injectable. 2000 milliGRAM(s) IV Push every 8 hours  chlorhexidine 0.12% Liquid 15 milliLiter(s) Oral Mucosa every 12 hours  chlorhexidine 2% Cloths 1 Application(s) Topical daily  coronavirus bivalent (EUA) Booster Vaccine (PFIZER) 0.3 milliLiter(s) IntraMuscular once  dexMEDEtomidine Infusion 0.1 MICROgram(s)/kG/Hr (1.93 mL/Hr) IV Continuous <Continuous>  dextrose 5%. 1000 milliLiter(s) (30 mL/Hr) IV Continuous <Continuous>  dextrose 5%. 1000 milliLiter(s) (100 mL/Hr) IV Continuous <Continuous>  dextrose 5%. 1000 milliLiter(s) (50 mL/Hr) IV Continuous <Continuous>  dextrose 50% Injectable 25 Gram(s) IV Push once  dextrose 50% Injectable 12.5 Gram(s) IV Push once  dextrose 50% Injectable 25 Gram(s) IV Push once  doxazosin 2 milliGRAM(s) Oral at bedtime  glucagon  Injectable 1 milliGRAM(s) IntraMuscular once  influenza  Vaccine (HIGH DOSE) 0.7 milliLiter(s) IntraMuscular once  insulin glargine Injectable (LANTUS) 18 Unit(s) SubCutaneous every morning  insulin lispro (ADMELOG) corrective regimen sliding scale   SubCutaneous three times a day before meals  insulin lispro Injectable (ADMELOG) 4 Unit(s) SubCutaneous every 6 hours  loteprednol 0.5% Ophthalmic Suspension 1 Drop(s) Right EYE daily  pantoprazole   Suspension 40 milliGRAM(s) Oral daily  prednisoLONE acetate 1% Suspension 1 Drop(s) Left EYE daily      RESPIRATORY:  Mode: AC/ CMV (Assist Control/ Continuous Mandatory Ventilation)  RR (machine): 16  TV (machine): 400  FiO2: 50  PEEP: 6  ITime: 0.8  MAP: 11  PC: 3  PIP: 22        IMAGING:   Recent imaging studies were reviewed.    LAB RESULTS:                          12.9   11.13 )-----------( 249      ( 04 Jan 2023 03:50 )             40.5       PT/INR - ( 03 Jan 2023 03:30 )   PT: 14.4 sec;   INR: 1.24 ratio         PTT - ( 03 Jan 2023 03:30 )  PTT:73.7 sec    01-04    156<H>  |  120<H>  |  41.7<H>  ----------------------------<  198<H>  4.1   |  28.0  |  1.38<H>    Ca    8.2<L>      04 Jan 2023 03:50  Phos  3.6     01-04  Mg     2.9     01-04        -----------------------------------------------------------------------------------------------------------------------------------------------------------------------------------    PHYSICAL EXAM:  General: Calm  HEENT: MMM  Neuro:  -Mental status- No acute distress  -CN- PERRL 3mm, EOMI, tongue midline, face symmetric    CV: RRR  Pulm: clear to auscultation  Abd: Soft, nontender, nondistended  Ext: no noted edema in lower ext  Skin: warm, dry       Chief complaint:   Patient is a 77y old  Male who presents with a chief complaint of Acute stroke (03 Jan 2023 16:27)    HPI:  76 y/o male with PMH of DM-2, HTN, CAD was transferred from [place] for stroke. Patient reported right sided weakness and slurred speech along with difficulty swallowing that started yesterday. Was seen at [facility] today, code stroke initiated out of window for tPA. CT head: no acute finding. MRI: acute infarct with left debby. MRA head/neck: Left vertebral artery segmental stenoses and T1 hyperintensity suggesting foci of dissection and/or thrombus. As per resident, neurology from [facility] was consulted and accepted patient for further evaluation. Patient said he is upset about his condition, noted discomfort in his head otherwise no chest pain, shortness of breath, palpitation, fever, chills, nausea, vomiting, abdominal pain, change in bowel/urinary habit.  (26 Dec 2022 22:58)    24hr EVENTS:  s/p trach/peg    ROS: more comfortable  All other systems negative    -----------------------------------------------------------------------------------------------------------------------------------------------------------------------------------  ICU Vital Signs Last 24 Hrs  T(C): 37.2 (04 Jan 2023 08:00), Max: 37.5 (04 Jan 2023 04:02)  T(F): 99 (04 Jan 2023 08:00), Max: 99.5 (04 Jan 2023 04:02)  HR: 67 (04 Jan 2023 08:00) (67 - 118)  BP: 165/72 (04 Jan 2023 08:00) (129/71 - 180/86)  BP(mean): 96 (04 Jan 2023 08:00) (85 - 127)  ABP: --  ABP(mean): --  RR: 16 (04 Jan 2023 07:00) (16 - 22)  SpO2: 99% (04 Jan 2023 08:00) (92% - 99%)    O2 Parameters below as of 04 Jan 2023 08:00  Patient On (Oxygen Delivery Method): ventilator    O2 Concentration (%): 50      I&O's Summary    03 Jan 2023 07:01  -  04 Jan 2023 07:00  --------------------------------------------------------  IN: 1583.2 mL / OUT: 1350 mL / NET: 233.2 mL    04 Jan 2023 07:01  -  04 Jan 2023 09:05  --------------------------------------------------------  IN: 67.6 mL / OUT: 300 mL / NET: -232.4 mL      MEDICATIONS  (STANDING):  albuterol/ipratropium for Nebulization 3 milliLiter(s) Nebulizer every 6 hours  aspirin  chewable 81 milliGRAM(s) Oral daily  atorvastatin 80 milliGRAM(s) Oral at bedtime  ceFAZolin  Injectable. 2000 milliGRAM(s) IV Push every 8 hours  chlorhexidine 0.12% Liquid 15 milliLiter(s) Oral Mucosa every 12 hours  chlorhexidine 2% Cloths 1 Application(s) Topical daily  coronavirus bivalent (EUA) Booster Vaccine (PFIZER) 0.3 milliLiter(s) IntraMuscular once  dexMEDEtomidine Infusion 0.1 MICROgram(s)/kG/Hr (1.93 mL/Hr) IV Continuous <Continuous>  dextrose 5%. 1000 milliLiter(s) (30 mL/Hr) IV Continuous <Continuous>  dextrose 5%. 1000 milliLiter(s) (100 mL/Hr) IV Continuous <Continuous>  dextrose 5%. 1000 milliLiter(s) (50 mL/Hr) IV Continuous <Continuous>  dextrose 50% Injectable 25 Gram(s) IV Push once  dextrose 50% Injectable 12.5 Gram(s) IV Push once  dextrose 50% Injectable 25 Gram(s) IV Push once  doxazosin 2 milliGRAM(s) Oral at bedtime  glucagon  Injectable 1 milliGRAM(s) IntraMuscular once  influenza  Vaccine (HIGH DOSE) 0.7 milliLiter(s) IntraMuscular once  insulin glargine Injectable (LANTUS) 18 Unit(s) SubCutaneous every morning  insulin lispro (ADMELOG) corrective regimen sliding scale   SubCutaneous three times a day before meals  insulin lispro Injectable (ADMELOG) 4 Unit(s) SubCutaneous every 6 hours  loteprednol 0.5% Ophthalmic Suspension 1 Drop(s) Right EYE daily  pantoprazole   Suspension 40 milliGRAM(s) Oral daily  prednisoLONE acetate 1% Suspension 1 Drop(s) Left EYE daily      RESPIRATORY:  Mode: AC/ CMV (Assist Control/ Continuous Mandatory Ventilation)  RR (machine): 16  TV (machine): 400  FiO2: 50  PEEP: 6  ITime: 0.8  MAP: 11  PC: 3  PIP: 22        IMAGING:   Recent imaging studies were reviewed.    LAB RESULTS:                          12.9   11.13 )-----------( 249      ( 04 Jan 2023 03:50 )             40.5       PT/INR - ( 03 Jan 2023 03:30 )   PT: 14.4 sec;   INR: 1.24 ratio         PTT - ( 03 Jan 2023 03:30 )  PTT:73.7 sec    01-04    156<H>  |  120<H>  |  41.7<H>  ----------------------------<  198<H>  4.1   |  28.0  |  1.38<H>    Ca    8.2<L>      04 Jan 2023 03:50  Phos  3.6     01-04  Mg     2.9     01-04    -----------------------------------------------------------------------------------------------------------------------------------------------------------------------------------      PHYSICAL EXAM:  General: Calm, trach  HEENT: MMM  Neuro:  -Mental status- following commands  -CN- PERRL 3mm, tongue midline, R facial droop  R gaze pref  +weak cough/gag  flaccid RUE and RLE  LUE and LLE 5/5  diminished sensation on R    CV: RRR  Pulm: coarse breath sounds  Abd: Soft, nontender, nondistended  Ext: no noted edema in lower ext  Skin: warm, dry

## 2023-01-04 NOTE — PROGRESS NOTE ADULT - SUBJECTIVE AND OBJECTIVE BOX
Patient more awake and participatory.  Points to abdomen/groin for pain.   RN made aware.   He is s/p PEG/Trach.     REVIEW OF SYSTEMS  Constitutional - No fever,  +fatigue  Neurological - +loss of strength     FUNCTIONAL PROGRESS  Total A    VITALS  T(C): 37.2 (23 @ 08:00), Max: 37.5 (23 @ 04:02)  HR: 67 (23 @ 08:00) (67 - 113)  BP: 165/72 (23 @ 08:00) (135/74 - 180/86)  RR: 16 (23 @ 07:00) (16 - 22)  SpO2: 99% (23 @ 08:00) (94% - 99%)  Wt(kg): --    MEDICATIONS   acetaminophen     Tablet .. 650 milliGRAM(s) every 6 hours PRN  albuterol/ipratropium for Nebulization 3 milliLiter(s) every 6 hours  ALPRAZolam 0.5 milliGRAM(s) every 12 hours PRN  aluminum hydroxide/magnesium hydroxide/simethicone Suspension 30 milliLiter(s) every 4 hours PRN  aspirin  chewable 81 milliGRAM(s) daily  atorvastatin 80 milliGRAM(s) at bedtime  ceFAZolin  Injectable. 2000 milliGRAM(s) every 8 hours  chlorhexidine 0.12% Liquid 15 milliLiter(s) every 12 hours  chlorhexidine 2% Cloths 1 Application(s) daily  coronavirus bivalent (EUA) Booster Vaccine (PFIZER) 0.3 milliLiter(s) once  dexMEDEtomidine Infusion 0.1 MICROgram(s)/kG/Hr <Continuous>  dextrose 5%. 1000 milliLiter(s) <Continuous>  dextrose 5%. 1000 milliLiter(s) <Continuous>  dextrose 5%. 1000 milliLiter(s) <Continuous>  dextrose 50% Injectable 25 Gram(s) once  dextrose 50% Injectable 12.5 Gram(s) once  dextrose 50% Injectable 25 Gram(s) once  dextrose Oral Gel 15 Gram(s) once PRN  doxazosin 2 milliGRAM(s) at bedtime  glucagon  Injectable 1 milliGRAM(s) once  hydrALAZINE Injectable 10 milliGRAM(s) every 2 hours PRN  influenza  Vaccine (HIGH DOSE) 0.7 milliLiter(s) once  insulin glargine Injectable (LANTUS) 18 Unit(s) every morning  insulin lispro (ADMELOG) corrective regimen sliding scale   three times a day before meals  insulin lispro Injectable (ADMELOG) 4 Unit(s) every 6 hours  labetalol Injectable 10 milliGRAM(s) every 2 hours PRN  loteprednol 0.5% Ophthalmic Suspension 1 Drop(s) daily  melatonin 3 milliGRAM(s) at bedtime PRN  ondansetron Injectable 4 milliGRAM(s) every 8 hours PRN  pantoprazole   Suspension 40 milliGRAM(s) daily  prednisoLONE acetate 1% Suspension 1 Drop(s) daily      RECENT LABS/IMAGING                          12.9   11.13 )-----------( 249      ( 2023 03:50 )             40.5     01-04    156<H>  |  120<H>  |  41.7<H>  ----------------------------<  198<H>  4.1   |  28.0  |  1.38<H>    Ca    8.2<L>      2023 03:50  Phos  3.6     01-04  Mg     2.9     01-04      PT/INR - ( 2023 03:30 )   PT: 14.4 sec;   INR: 1.24 ratio         PTT - ( 2023 03:30 )  PTT:73.7 sec  Urinalysis Basic - ( 2023 19:00 )    Color: Yellow / Appearance: Clear / S.010 / pH: x  Gluc: x / Ketone: Negative  / Bili: Negative / Urobili: Negative mg/dL   Blood: x / Protein: Negative / Nitrite: Negative   Leuk Esterase: Negative / RBC: 0-2 /HPF / WBC 0-2 /HPF   Sq Epi: x / Non Sq Epi: Occasional / Bacteria: Occasional              HEAD CT  - No evidence of an acute intracranial hemorrhage, midline shift, or hydrocephalus. Known acute left pontine infarct partly obscured by artifact.    ----------------------------------------------------------------------------------------  PHYSICAL EXAM  Constitutional - NAD, Appears Comfortable  HEENT - +Trach   Extremities - Right hand swelling/Splint  Neurologic Exam -                    Cognitive - Awake/Alert     Communication - Hypophonia, Limited verbalization to quesitons     Cranial Nerves - Unable to assess     FUNCTIONAL MOTOR EXAM - Limited exam                    LEFT    UE - ShAB 1/5, EF 1/5, EE 1/5,  3/5                    RIGHT UE - ShAB 0/5, EF 0/5, EE 0/5,  0/5                    LEFT    LE - HF 1/5, KE 0/5, DF 0/5                     RIGHT LE - HF 1/5, KE 1/5, DF 1/5      Sensory - Unable to assess  Psychiatric - Appears calm   ----------------------------------------------------------------------------------------  ASSESSMENT/PLAN  77yMale with functional deficits after an acute CVA   Acute left debby CVA - ASA, Lipitor  DM2 - Lantus, Lispro   HTN - Hydralazine, Labetalol  PNA/Respiratory failure s/p Trach/Vent - Ancef, Versed, Duoneb, Precedex  Oropharyngeal Dysphagia s/p PEG - NPO   Sleep - Recommend change Melatonin to standing 5mg HS  Pain - Tylenol, Recommend Tramadol or Oxycodone for PRN pain  DVT PPX - SCDs  Rehab - Patient medically being optimized. Will need JESSIE VENT.     Will continue to follow. Rehab recommendations are dependent on how functional progress changes as well as how patient continues to participate and tolerate therapeutic interventions, which may change. Recommend ongoing mobilization by staff to maintain cardiopulmonary function and prevention of secondary complications related to debility. Discussed with rehab team.

## 2023-01-04 NOTE — PROGRESS NOTE ADULT - ASSESSMENT
78 YO M w/ PMH of DM, HTN and CAD who was transferred from [facility] for acute L pontine CVA. Hospital course is notable for acute hypoxic respiratory failure secondary to inability to manage oral secretions s/p intubation on 12/28, followed by EGD and trach/PEG placement on 01/03/23. Nephrology is consulted for hypernatremia.      Hypernatremia  -Secondary to NPO status + insensible water loss   -Improving slowly, 156 today  -s/p PEG placement   -Continue FWF 350cc q4h  -C/w D5W at 30cc/hr x another 24 hours    DM/Hyperglycemia   -Management as per primary team

## 2023-01-05 LAB
ALBUMIN SERPL ELPH-MCNC: 2.1 G/DL — LOW (ref 3.3–5.2)
ALP SERPL-CCNC: 63 U/L — SIGNIFICANT CHANGE UP (ref 40–120)
ALT FLD-CCNC: 14 U/L — SIGNIFICANT CHANGE UP
ANION GAP SERPL CALC-SCNC: 10 MMOL/L — SIGNIFICANT CHANGE UP (ref 5–17)
ANION GAP SERPL CALC-SCNC: 10 MMOL/L — SIGNIFICANT CHANGE UP (ref 5–17)
ANION GAP SERPL CALC-SCNC: 6 MMOL/L — SIGNIFICANT CHANGE UP (ref 5–17)
APPEARANCE UR: CLEAR — SIGNIFICANT CHANGE UP
APTT BLD: 27.4 SEC — LOW (ref 27.5–35.5)
AST SERPL-CCNC: 55 U/L — HIGH
BACTERIA # UR AUTO: ABNORMAL
BILIRUB DIRECT SERPL-MCNC: <0.1 MG/DL — SIGNIFICANT CHANGE UP (ref 0–0.3)
BILIRUB INDIRECT FLD-MCNC: SIGNIFICANT CHANGE UP MG/DL (ref 0.2–1)
BILIRUB SERPL-MCNC: <0.2 MG/DL — LOW (ref 0.4–2)
BILIRUB UR-MCNC: NEGATIVE — SIGNIFICANT CHANGE UP
BUN SERPL-MCNC: 32.5 MG/DL — HIGH (ref 8–20)
BUN SERPL-MCNC: 33.2 MG/DL — HIGH (ref 8–20)
BUN SERPL-MCNC: 33.7 MG/DL — HIGH (ref 8–20)
CALCIUM SERPL-MCNC: 8 MG/DL — LOW (ref 8.4–10.5)
CALCIUM SERPL-MCNC: 8.1 MG/DL — LOW (ref 8.4–10.5)
CALCIUM SERPL-MCNC: 8.2 MG/DL — LOW (ref 8.4–10.5)
CHLORIDE SERPL-SCNC: 107 MMOL/L — SIGNIFICANT CHANGE UP (ref 96–108)
CHLORIDE SERPL-SCNC: 110 MMOL/L — HIGH (ref 96–108)
CHLORIDE SERPL-SCNC: 113 MMOL/L — HIGH (ref 96–108)
CK MB CFR SERPL CALC: 1.7 NG/ML — SIGNIFICANT CHANGE UP (ref 0–6.7)
CK SERPL-CCNC: 322 U/L — HIGH (ref 30–200)
CO2 SERPL-SCNC: 27 MMOL/L — SIGNIFICANT CHANGE UP (ref 22–29)
CO2 SERPL-SCNC: 27 MMOL/L — SIGNIFICANT CHANGE UP (ref 22–29)
CO2 SERPL-SCNC: 30 MMOL/L — HIGH (ref 22–29)
COLOR SPEC: YELLOW — SIGNIFICANT CHANGE UP
CREAT SERPL-MCNC: 1.22 MG/DL — SIGNIFICANT CHANGE UP (ref 0.5–1.3)
CREAT SERPL-MCNC: 1.22 MG/DL — SIGNIFICANT CHANGE UP (ref 0.5–1.3)
CREAT SERPL-MCNC: 1.26 MG/DL — SIGNIFICANT CHANGE UP (ref 0.5–1.3)
DIFF PNL FLD: ABNORMAL
EGFR: 59 ML/MIN/1.73M2 — LOW
EGFR: 61 ML/MIN/1.73M2 — SIGNIFICANT CHANGE UP
EGFR: 61 ML/MIN/1.73M2 — SIGNIFICANT CHANGE UP
EPI CELLS # UR: SIGNIFICANT CHANGE UP
GLUCOSE BLDC GLUCOMTR-MCNC: 208 MG/DL — HIGH (ref 70–99)
GLUCOSE BLDC GLUCOMTR-MCNC: 214 MG/DL — HIGH (ref 70–99)
GLUCOSE BLDC GLUCOMTR-MCNC: 216 MG/DL — HIGH (ref 70–99)
GLUCOSE BLDC GLUCOMTR-MCNC: 219 MG/DL — HIGH (ref 70–99)
GLUCOSE BLDC GLUCOMTR-MCNC: 237 MG/DL — HIGH (ref 70–99)
GLUCOSE BLDC GLUCOMTR-MCNC: 253 MG/DL — HIGH (ref 70–99)
GLUCOSE BLDC GLUCOMTR-MCNC: 279 MG/DL — HIGH (ref 70–99)
GLUCOSE BLDC GLUCOMTR-MCNC: 298 MG/DL — HIGH (ref 70–99)
GLUCOSE BLDC GLUCOMTR-MCNC: 307 MG/DL — HIGH (ref 70–99)
GLUCOSE BLDC GLUCOMTR-MCNC: 313 MG/DL — HIGH (ref 70–99)
GLUCOSE BLDC GLUCOMTR-MCNC: 319 MG/DL — HIGH (ref 70–99)
GLUCOSE SERPL-MCNC: 270 MG/DL — HIGH (ref 70–99)
GLUCOSE SERPL-MCNC: 303 MG/DL — HIGH (ref 70–99)
GLUCOSE SERPL-MCNC: 353 MG/DL — HIGH (ref 70–99)
GLUCOSE UR QL: 1000 MG/DL
GRAM STN FLD: SIGNIFICANT CHANGE UP
HCT VFR BLD CALC: 37.1 % — LOW (ref 39–50)
HCT VFR BLD CALC: 37.9 % — LOW (ref 39–50)
HGB BLD-MCNC: 11.5 G/DL — LOW (ref 13–17)
HGB BLD-MCNC: 11.9 G/DL — LOW (ref 13–17)
INR BLD: 1.17 RATIO — HIGH (ref 0.88–1.16)
KETONES UR-MCNC: ABNORMAL
LACTATE SERPL-SCNC: 1.2 MMOL/L — SIGNIFICANT CHANGE UP (ref 0.5–2)
LEUKOCYTE ESTERASE UR-ACNC: NEGATIVE — SIGNIFICANT CHANGE UP
LIDOCAIN IGE QN: 51 U/L — SIGNIFICANT CHANGE UP (ref 22–51)
MAGNESIUM SERPL-MCNC: 2.5 MG/DL — SIGNIFICANT CHANGE UP (ref 1.6–2.6)
MCHC RBC-ENTMCNC: 28.5 PG — SIGNIFICANT CHANGE UP (ref 27–34)
MCHC RBC-ENTMCNC: 28.8 PG — SIGNIFICANT CHANGE UP (ref 27–34)
MCHC RBC-ENTMCNC: 31 GM/DL — LOW (ref 32–36)
MCHC RBC-ENTMCNC: 31.4 GM/DL — LOW (ref 32–36)
MCV RBC AUTO: 91.8 FL — SIGNIFICANT CHANGE UP (ref 80–100)
MCV RBC AUTO: 92.1 FL — SIGNIFICANT CHANGE UP (ref 80–100)
NITRITE UR-MCNC: NEGATIVE — SIGNIFICANT CHANGE UP
PH UR: 5 — SIGNIFICANT CHANGE UP (ref 5–8)
PHOSPHATE SERPL-MCNC: 2.5 MG/DL — SIGNIFICANT CHANGE UP (ref 2.4–4.7)
PLATELET # BLD AUTO: 226 K/UL — SIGNIFICANT CHANGE UP (ref 150–400)
PLATELET # BLD AUTO: 246 K/UL — SIGNIFICANT CHANGE UP (ref 150–400)
POTASSIUM SERPL-MCNC: 3.8 MMOL/L — SIGNIFICANT CHANGE UP (ref 3.5–5.3)
POTASSIUM SERPL-MCNC: 3.9 MMOL/L — SIGNIFICANT CHANGE UP (ref 3.5–5.3)
POTASSIUM SERPL-MCNC: 3.9 MMOL/L — SIGNIFICANT CHANGE UP (ref 3.5–5.3)
POTASSIUM SERPL-SCNC: 3.8 MMOL/L — SIGNIFICANT CHANGE UP (ref 3.5–5.3)
POTASSIUM SERPL-SCNC: 3.9 MMOL/L — SIGNIFICANT CHANGE UP (ref 3.5–5.3)
POTASSIUM SERPL-SCNC: 3.9 MMOL/L — SIGNIFICANT CHANGE UP (ref 3.5–5.3)
PROCALCITONIN SERPL-MCNC: 0.37 NG/ML — HIGH (ref 0.02–0.1)
PROT SERPL-MCNC: 5.8 G/DL — LOW (ref 6.6–8.7)
PROT UR-MCNC: NEGATIVE — SIGNIFICANT CHANGE UP
PROTHROM AB SERPL-ACNC: 13.6 SEC — HIGH (ref 10.5–13.4)
RBC # BLD: 4.03 M/UL — LOW (ref 4.2–5.8)
RBC # BLD: 4.13 M/UL — LOW (ref 4.2–5.8)
RBC # FLD: 14.3 % — SIGNIFICANT CHANGE UP (ref 10.3–14.5)
RBC # FLD: 14.4 % — SIGNIFICANT CHANGE UP (ref 10.3–14.5)
RBC CASTS # UR COMP ASSIST: SIGNIFICANT CHANGE UP /HPF (ref 0–4)
SODIUM SERPL-SCNC: 144 MMOL/L — SIGNIFICANT CHANGE UP (ref 135–145)
SODIUM SERPL-SCNC: 146 MMOL/L — HIGH (ref 135–145)
SODIUM SERPL-SCNC: 150 MMOL/L — HIGH (ref 135–145)
SP GR SPEC: 1.01 — SIGNIFICANT CHANGE UP (ref 1.01–1.02)
SPECIMEN SOURCE: SIGNIFICANT CHANGE UP
URATE CRY FLD QL MICRO: ABNORMAL
UROBILINOGEN FLD QL: NEGATIVE MG/DL — SIGNIFICANT CHANGE UP
WBC # BLD: 8.89 K/UL — SIGNIFICANT CHANGE UP (ref 3.8–10.5)
WBC # BLD: 9.4 K/UL — SIGNIFICANT CHANGE UP (ref 3.8–10.5)
WBC # FLD AUTO: 8.89 K/UL — SIGNIFICANT CHANGE UP (ref 3.8–10.5)
WBC # FLD AUTO: 9.4 K/UL — SIGNIFICANT CHANGE UP (ref 3.8–10.5)
WBC UR QL: SIGNIFICANT CHANGE UP /HPF (ref 0–5)

## 2023-01-05 PROCEDURE — 99291 CRITICAL CARE FIRST HOUR: CPT

## 2023-01-05 PROCEDURE — 71045 X-RAY EXAM CHEST 1 VIEW: CPT | Mod: 26

## 2023-01-05 PROCEDURE — 99232 SBSQ HOSP IP/OBS MODERATE 35: CPT

## 2023-01-05 PROCEDURE — 93010 ELECTROCARDIOGRAM REPORT: CPT

## 2023-01-05 PROCEDURE — 93970 EXTREMITY STUDY: CPT | Mod: 26

## 2023-01-05 PROCEDURE — 99233 SBSQ HOSP IP/OBS HIGH 50: CPT

## 2023-01-05 PROCEDURE — 99231 SBSQ HOSP IP/OBS SF/LOW 25: CPT

## 2023-01-05 RX ORDER — HEPARIN SODIUM 5000 [USP'U]/ML
700 INJECTION INTRAVENOUS; SUBCUTANEOUS
Qty: 25000 | Refills: 0 | Status: DISCONTINUED | OUTPATIENT
Start: 2023-01-05 | End: 2023-01-07

## 2023-01-05 RX ORDER — SODIUM CHLORIDE 9 MG/ML
1000 INJECTION, SOLUTION INTRAVENOUS
Refills: 0 | Status: DISCONTINUED | OUTPATIENT
Start: 2023-01-05 | End: 2023-01-05

## 2023-01-05 RX ORDER — METOPROLOL TARTRATE 50 MG
5 TABLET ORAL ONCE
Refills: 0 | Status: COMPLETED | OUTPATIENT
Start: 2023-01-05 | End: 2023-01-05

## 2023-01-05 RX ORDER — INSULIN HUMAN 100 [IU]/ML
2 INJECTION, SOLUTION SUBCUTANEOUS
Qty: 50 | Refills: 0 | Status: DISCONTINUED | OUTPATIENT
Start: 2023-01-05 | End: 2023-01-07

## 2023-01-05 RX ORDER — SODIUM CHLORIDE 9 MG/ML
1000 INJECTION, SOLUTION INTRAVENOUS
Refills: 0 | Status: DISCONTINUED | OUTPATIENT
Start: 2023-01-05 | End: 2023-01-07

## 2023-01-05 RX ADMIN — SODIUM CHLORIDE 100 MILLILITER(S): 9 INJECTION, SOLUTION INTRAVENOUS at 13:26

## 2023-01-05 RX ADMIN — CHLORHEXIDINE GLUCONATE 15 MILLILITER(S): 213 SOLUTION TOPICAL at 05:50

## 2023-01-05 RX ADMIN — SODIUM CHLORIDE 75 MILLILITER(S): 9 INJECTION, SOLUTION INTRAVENOUS at 20:30

## 2023-01-05 RX ADMIN — Medication 0.1 MILLIGRAM(S): at 05:50

## 2023-01-05 RX ADMIN — Medication 2000 MILLIGRAM(S): at 13:25

## 2023-01-05 RX ADMIN — INSULIN HUMAN 2 UNIT(S)/HR: 100 INJECTION, SOLUTION SUBCUTANEOUS at 14:29

## 2023-01-05 RX ADMIN — Medication 3 MILLIGRAM(S): at 23:27

## 2023-01-05 RX ADMIN — Medication 81 MILLIGRAM(S): at 11:49

## 2023-01-05 RX ADMIN — Medication 2000 MILLIGRAM(S): at 05:49

## 2023-01-05 RX ADMIN — Medication 2000 MILLIGRAM(S): at 22:17

## 2023-01-05 RX ADMIN — Medication 3 MILLILITER(S): at 09:21

## 2023-01-05 RX ADMIN — CHLORHEXIDINE GLUCONATE 15 MILLILITER(S): 213 SOLUTION TOPICAL at 17:07

## 2023-01-05 RX ADMIN — Medication 3 MILLILITER(S): at 15:35

## 2023-01-05 RX ADMIN — HEPARIN SODIUM 700 UNIT(S)/HR: 5000 INJECTION INTRAVENOUS; SUBCUTANEOUS at 21:31

## 2023-01-05 RX ADMIN — Medication 4 UNIT(S): at 05:53

## 2023-01-05 RX ADMIN — CHLORHEXIDINE GLUCONATE 1 APPLICATION(S): 213 SOLUTION TOPICAL at 11:51

## 2023-01-05 RX ADMIN — Medication 5 MILLIGRAM(S): at 01:36

## 2023-01-05 RX ADMIN — Medication 3 MILLILITER(S): at 20:34

## 2023-01-05 RX ADMIN — Medication 10 MILLIGRAM(S): at 14:32

## 2023-01-05 RX ADMIN — PANTOPRAZOLE SODIUM 40 MILLIGRAM(S): 20 TABLET, DELAYED RELEASE ORAL at 11:49

## 2023-01-05 RX ADMIN — Medication 3 MILLILITER(S): at 04:14

## 2023-01-05 RX ADMIN — Medication 4 UNIT(S): at 11:50

## 2023-01-05 RX ADMIN — INSULIN GLARGINE 18 UNIT(S): 100 INJECTION, SOLUTION SUBCUTANEOUS at 08:52

## 2023-01-05 RX ADMIN — Medication 10: at 08:55

## 2023-01-05 RX ADMIN — Medication 8: at 11:49

## 2023-01-05 RX ADMIN — Medication 2 MILLIGRAM(S): at 22:18

## 2023-01-05 RX ADMIN — Medication 650 MILLIGRAM(S): at 14:31

## 2023-01-05 RX ADMIN — Medication 650 MILLIGRAM(S): at 15:00

## 2023-01-05 RX ADMIN — ATORVASTATIN CALCIUM 80 MILLIGRAM(S): 80 TABLET, FILM COATED ORAL at 22:17

## 2023-01-05 RX ADMIN — Medication 0.5 MILLIGRAM(S): at 05:50

## 2023-01-05 NOTE — PROGRESS NOTE ADULT - PROBLEM SELECTOR PLAN 1
Admitted with Acute Left Pontine CVA.  Intubated 12/27 for airway protection.  S/p Trach/PEG 1/3  Surgicel removed 1/4.  Maintain gauze/drain sponge between trach flange and skin to prevent skin breakdown. Change as needed.  Continue trach care and suctioning.   Sutures to be removed 1/10/23  Continue tube feeds as tolerated.   Thoracic surgery to continue to follow.    Plan discussed with Thoracic attendings during AM rounds.

## 2023-01-05 NOTE — OCCUPATIONAL THERAPY INITIAL EVALUATION ADULT - LEVEL OF INDEPENDENCE: EATING, OT EVAL
did not directly observe
dependent (less than 25% patients effort)
dependent (less than 25% patients effort)

## 2023-01-05 NOTE — PHYSICAL THERAPY INITIAL EVALUATION ADULT - PHYSICAL ASSIST/NONPHYSICAL ASSIST: SIT/SUPINE, REHAB EVAL
2 person assist
required increased assistance  to help get bilateral LE's into bed/assume proper semi-valdivia position + verbal cues for proper hand placement./2 person assist

## 2023-01-05 NOTE — OCCUPATIONAL THERAPY INITIAL EVALUATION ADULT - MANUAL MUSCLE TESTING RESULTS, REHAB EVAL
LUE grossly 3+/5 throughout. RUE 0/5 throughout
left shoulder shrugs, weak grasp left. RUE 0/5
unable to formally assess at this time, LUE grossly at least 2/5 throughout. RUE 0/5

## 2023-01-05 NOTE — PROGRESS NOTE ADULT - SUBJECTIVE AND OBJECTIVE BOX
Subjective - patient seen and evaluated bedside. Sitting comfortably in bed. Unable to participate in HPI or ROS due to clinical condition.       Brief summary:  77yMale POD# 2 trach/PEG    Significant/[id number] events: none      PAST MEDICAL & SURGICAL HISTORY:  HTN (hypertension)      CAD (coronary artery disease)      DM (diabetes mellitus)            acetaminophen     Tablet .. 650 milliGRAM(s) Oral every 6 hours PRN  albuterol/ipratropium for Nebulization 3 milliLiter(s) Nebulizer every 6 hours  ALPRAZolam 0.5 milliGRAM(s) Oral every 12 hours PRN  aluminum hydroxide/magnesium hydroxide/simethicone Suspension 30 milliLiter(s) Oral every 4 hours PRN  aspirin  chewable 81 milliGRAM(s) Oral daily  atorvastatin 80 milliGRAM(s) Oral at bedtime  ceFAZolin  Injectable. 2000 milliGRAM(s) IV Push every 8 hours  chlorhexidine 0.12% Liquid 15 milliLiter(s) Oral Mucosa every 12 hours  chlorhexidine 2% Cloths 1 Application(s) Topical daily  cloNIDine 0.1 milliGRAM(s) Oral every 8 hours  coronavirus bivalent (EUA) Booster Vaccine (PFIZER) 0.3 milliLiter(s) IntraMuscular once  dextrose 5%. 1000 milliLiter(s) IV Continuous <Continuous>  doxazosin 2 milliGRAM(s) Oral at bedtime  hydrALAZINE Injectable 10 milliGRAM(s) IV Push every 2 hours PRN  influenza  Vaccine (HIGH DOSE) 0.7 milliLiter(s) IntraMuscular once  insulin glargine Injectable (LANTUS) 18 Unit(s) SubCutaneous every morning  insulin lispro (ADMELOG) corrective regimen sliding scale   SubCutaneous three times a day before meals  insulin lispro Injectable (ADMELOG) 4 Unit(s) SubCutaneous every 6 hours  labetalol Injectable 10 milliGRAM(s) IV Push every 2 hours PRN  loteprednol 0.5% Ophthalmic Suspension 1 Drop(s) Right EYE daily  melatonin 3 milliGRAM(s) Oral at bedtime PRN  ondansetron Injectable 4 milliGRAM(s) IV Push every 8 hours PRN  pantoprazole   Suspension 40 milliGRAM(s) Oral daily  prednisoLONE acetate 1% Suspension 1 Drop(s) Left EYE daily  MEDICATIONS  (PRN):  acetaminophen     Tablet .. 650 milliGRAM(s) Oral every 6 hours PRN Temp greater or equal to 38C (100.4F), Mild Pain (1 - 3)  ALPRAZolam 0.5 milliGRAM(s) Oral every 12 hours PRN for agitation  aluminum hydroxide/magnesium hydroxide/simethicone Suspension 30 milliLiter(s) Oral every 4 hours PRN Dyspepsia  hydrALAZINE Injectable 10 milliGRAM(s) IV Push every 2 hours PRN SBP >160  labetalol Injectable 10 milliGRAM(s) IV Push every 2 hours PRN SBP >160  melatonin 3 milliGRAM(s) Oral at bedtime PRN Insomnia  ondansetron Injectable 4 milliGRAM(s) IV Push every 8 hours PRN Nausea and/or Vomiting    Mode: AC/ CMV (Assist Control/ Continuous Mandatory Ventilation), RR (machine): 16, TV (machine): 400, FiO2: 40, PEEP: 6, PS: 1, ITime: 0.8, MAP: 14, PIP: 26  Daily     Daily                               11.9   9.40  )-----------( 246      ( 05 Jan 2023 07:05 )             37.9   01-05    150<H>  |  113<H>  |  33.7<H>  ----------------------------<  353<H>  3.9   |  27.0  |  1.26    Ca    8.2<L>      05 Jan 2023 07:05  Phos  .      01-05  Mg     .      01-05              Objective:  T(C): 38.2 (01-05-23 @ 07:33), Max: 38.2 (01-05-23 @ 07:33)  HR: 103 (01-05-23 @ 09:17) (60 - 133)  BP: 136/68 (01-05-23 @ 08:00) (128/70 - 174/83)  RR: 24 (01-05-23 @ 04:00) (16 - 38)  SpO2: 97% (01-05-23 @ 09:17) (93% - 100%)  Wt(kg): --CAPILLARY BLOOD GLUCOSE      POCT Blood Glucose.: 313 mg/dL (05 Jan 2023 05:51)  POCT Blood Glucose.: 296 mg/dL (04 Jan 2023 23:15)  POCT Blood Glucose.: 226 mg/dL (04 Jan 2023 20:21)  POCT Blood Glucose.: 115 mg/dL (04 Jan 2023 17:21)  POCT Blood Glucose.: 182 mg/dL (04 Jan 2023 11:13)  I&O's Summary    04 Jan 2023 07:01  -  05 Jan 2023 07:00  --------------------------------------------------------  IN: 2037.6 mL / OUT: 1300 mL / NET: 737.6 mL        Physical Exam  General: NAD  Neuro: Awake, alert, responsive  Pulm: CTA, equal bilaterally +trach c/d/i, no skin breakdown , infection, or bleeding.   CV: RRR,+S1S2  Abd: soft, NT, ND, +BS +PEG          Imaging:      < from: Xray Chest 1 View- PORTABLE-Urgent (Xray Chest 1 View- PORTABLE-Urgent .) (01.02.23 @ 12:13) >      INTERPRETATION:  Portable chest radiograph    CLINICAL INFORMATION: Follow-up chest radiograph. Intubation.    TECHNIQUE:  Portable  AP chest radiograph.    COMPARISON: 12/31/2022 chest x-ray .    FINDINGS:  CATHETERS AND TUBES: ET tube tip above tracheal bifurcation.  NG tube tip beyond GE junction.    PULMONARY: A LEFT retrograde airspace consolidation and/or effusion. An  LEFT upperzone and RIGHT lung parenchyma clear..   No pneumothorax.    HEART/VASCULAR: The heart is mildly enlarged in transverse diameter.  .    BONES: Visualized osseous structures are intact.    IMPRESSION:   Catheters and tubes in place.  Residual LEFT retrocardiac airspace consolidation and/or effusion..    --- End of Report ---    < end of copied text >

## 2023-01-05 NOTE — OCCUPATIONAL THERAPY INITIAL EVALUATION ADULT - LEVEL OF INDEPENDENCE: SIT/STAND, REHAB EVAL
unable to perform
multiple attempts; pt noted with Left LE buckling/moderate assist (50% patients effort)
n/a, deemed unsafe to perform at this time, will require further assessment

## 2023-01-05 NOTE — OCCUPATIONAL THERAPY INITIAL EVALUATION ADULT - REFERRAL TO ANOTHER SERVICE NEEDED, OT EVAL
neurology/physical therapy/social work/speech language pathology

## 2023-01-05 NOTE — OCCUPATIONAL THERAPY INITIAL EVALUATION ADULT - LEVEL OF INDEPENDENCE: STAND/SIT, REHAB EVAL
n/a, deemed unsafe to perform at this time, will require further assessment
moderate assist (50% patients effort)
unable to perform

## 2023-01-05 NOTE — PHYSICAL THERAPY INITIAL EVALUATION ADULT - MUSCLE TONE ASSESSMENT, REHAB EVAL
left-side extremities/right-side extremities/normal/moderately decreased tone
Left LE normal. right LE: right foot/ankle flaccid, left hip/knee mild to moderately increased tone. defer to OT eval for bilateral UE's tone specifics

## 2023-01-05 NOTE — OCCUPATIONAL THERAPY INITIAL EVALUATION ADULT - PATIENT/FAMILY/SIGNIFICANT OTHER GOALS STATEMENT, OT EVAL
To get better
pt gesturing that he wants the ETT out
wife bedside, wants pt to get better and go to rehab

## 2023-01-05 NOTE — OCCUPATIONAL THERAPY INITIAL EVALUATION ADULT - IMPAIRMENTS CONTRIBUTING IMPAIRED BED MOBILITY, REHAB EVAL
impaired balance/impaired coordination/decreased flexibility/abnormal muscle tone/impaired postural control/decreased ROM/decreased strength
impaired balance/impaired coordination/decreased flexibility/abnormal muscle tone/impaired postural control/decreased ROM/decreased strength
impaired balance/impaired coordination/impaired motor control/impaired postural control/decreased ROM/decreased strength

## 2023-01-05 NOTE — OCCUPATIONAL THERAPY INITIAL EVALUATION ADULT - HOME MANAGEMENT SKILLS, PREVIOUS LEVEL OF FUNCTION, OT EVAL
shared responsibility with spouse/independent

## 2023-01-05 NOTE — PHYSICAL THERAPY INITIAL EVALUATION ADULT - LEVEL OF INDEPENDENCE: BED TO CHAIR, REHAB EVAL
minimum assist (75% patients effort)
unable to perform
n/a, deemed unsafe to perform at this time, will require further assessment

## 2023-01-05 NOTE — OCCUPATIONAL THERAPY INITIAL EVALUATION ADULT - VISUAL ACUITY
+bifocals; pt had bilateral corneal transplants. Pt states at baseline has some difficulty with vision however able to drive and function. Pt able to correctly identify # of digits held in central and peripheral fields bilaterally except for inconsistencies with superior field bilaterally-continue to assess
+bifocals; pt had bilateral corneal transplants. Pt states at baseline has some difficulty with vision however able to drive and function. Pt able to correctly identify # of digits held in central and peripheral fields bilaterally except for inconsistencies with superior field bilaterally-continue to assess
pt denies any changes in vision

## 2023-01-05 NOTE — OCCUPATIONAL THERAPY INITIAL EVALUATION ADULT - LEVEL OF INDEPENDENCE: GROOMING, OT EVAL
dependent (less than 25% patients effort)
dependent (less than 25% patients effort)
maximum assist (25% patients effort)

## 2023-01-05 NOTE — OCCUPATIONAL THERAPY INITIAL EVALUATION ADULT - DIAGNOSIS, OT EVAL
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 Hospital course c/b worsening neuro exam 12/27 - RUE plegia, worsening bulbar dysfunction, RRT and code stroke, pt transferred to ICU, course further complicated by Acute resp failure due to neurologic injury, required intubation on 12/28 for airway protection and poor management of secretions. (-) CTH for additional pathology. pt is still currently intubated (minimal sedation) and in ICU level of care
77 year old Male transferred from [facility] 2/2 stroke workup. pt presented to [facility] with c/o right sided weakness, slurred speech and difficultly swallowing. scans performed at [facility] revealed an acute left debby infarct, and left vertebral artery segmental stenoses and T1 hyperdensity suggesting foci of dissection &/or thrombus.
functional decline with transfers and self care

## 2023-01-05 NOTE — PROGRESS NOTE ADULT - SUBJECTIVE AND OBJECTIVE BOX
Patient report pain in the belly.  Still has no pain medications ordered.    REVIEW OF SYSTEMS  Constitutional - +fever,  +fatigue  Neurological - +loss of strength    FUNCTIONAL PROGRESS  Total A    VITALS  T(C): 38.2 (01-05-23 @ 07:33), Max: 38.2 (01-05-23 @ 07:33)  HR: 103 (01-05-23 @ 08:00) (60 - 133)  BP: 136/68 (01-05-23 @ 08:00) (128/70 - 174/83)  RR: 24 (01-05-23 @ 04:00) (16 - 38)  SpO2: 95% (01-05-23 @ 08:00) (93% - 100%)  Wt(kg): --    MEDICATIONS   acetaminophen     Tablet .. 650 milliGRAM(s) every 6 hours PRN  albuterol/ipratropium for Nebulization 3 milliLiter(s) every 6 hours  ALPRAZolam 0.5 milliGRAM(s) every 12 hours PRN  aluminum hydroxide/magnesium hydroxide/simethicone Suspension 30 milliLiter(s) every 4 hours PRN  aspirin  chewable 81 milliGRAM(s) daily  atorvastatin 80 milliGRAM(s) at bedtime  ceFAZolin  Injectable. 2000 milliGRAM(s) every 8 hours  chlorhexidine 0.12% Liquid 15 milliLiter(s) every 12 hours  chlorhexidine 2% Cloths 1 Application(s) daily  cloNIDine 0.1 milliGRAM(s) every 8 hours  coronavirus bivalent (EUA) Booster Vaccine (PFIZER) 0.3 milliLiter(s) once  dextrose 5%. 1000 milliLiter(s) <Continuous>  doxazosin 2 milliGRAM(s) at bedtime  hydrALAZINE Injectable 10 milliGRAM(s) every 2 hours PRN  influenza  Vaccine (HIGH DOSE) 0.7 milliLiter(s) once  insulin glargine Injectable (LANTUS) 18 Unit(s) every morning  insulin lispro (ADMELOG) corrective regimen sliding scale   three times a day before meals  insulin lispro Injectable (ADMELOG) 4 Unit(s) every 6 hours  labetalol Injectable 10 milliGRAM(s) every 2 hours PRN  loteprednol 0.5% Ophthalmic Suspension 1 Drop(s) daily  melatonin 3 milliGRAM(s) at bedtime PRN  ondansetron Injectable 4 milliGRAM(s) every 8 hours PRN  pantoprazole   Suspension 40 milliGRAM(s) daily  prednisoLONE acetate 1% Suspension 1 Drop(s) daily      RECENT LABS/IMAGING                          11.9   9.40  )-----------( 246      ( 05 Jan 2023 07:05 )             37.9     01-05    150<H>  |  113<H>  |  33.7<H>  ----------------------------<  353<H>  3.9   |  27.0  |  1.26    Ca    8.2<L>      05 Jan 2023 07:05  Phos  .      01-05  Mg     .      01-05                  HEAD CT 12/29 - No evidence of an acute intracranial hemorrhage, midline shift, or hydrocephalus. Known acute left pontine infarct partly obscured by artifact.    ----------------------------------------------------------------------------------------  PHYSICAL EXAM  Constitutional - NAD, Appears Comfortable  HEENT - +Trach   Extremities - Right hand swelling/Splint  Neurologic Exam -                    Cognitive - Awake/Alert     Communication - Hypophonia, Limited verbalization to questions     Cranial Nerves - Unable to assess     FUNCTIONAL MOTOR EXAM -  Limited participation                    LEFT    UE - ShAB 1/5, EF 1/5, EE 1/5,  3/5                    RIGHT UE - ShAB 0/5, EF 0/5, EE 0/5,  0/5                    LEFT    LE - HF 1/5, KE 1/5, DF 1/5                     RIGHT LE - HF 1/5, KE 0/5, DF 0/5      Sensory - Unable to assess  Psychiatric - Fatigued  ----------------------------------------------------------------------------------------  ASSESSMENT/PLAN  77yMale with functional deficits after an acute CVA   Acute left debby CVA - ASA, Lipitor  DM2 - Lantus, Lispro   HTN - Hydralazine, Labetalol  PNA/Respiratory failure s/p Trach/Vent - Ancef, Versed, Duoneb, Precedex  Oropharyngeal Dysphagia s/p PEG - NPO   Sleep - Recommend change Melatonin to standing 5mg HS  Pain - Tylenol, Recommend Tramadol or Oxycodone for PRN pain  DVT PPX - SCDs  Rehab - Patient medically being optimized. Will need JESSIE VENT. Please provide patient with appropriate pain medications and minimize suffering given already complicated hospital course.     Will sign off at this time. Thank you for allowing me to be part of your patient's care. Please reconsult PMR for additional rehab recommendations or dispo needs if functional status changes.     Discussed with rehab clinical care team.

## 2023-01-05 NOTE — PHYSICAL THERAPY INITIAL EVALUATION ADULT - GENERAL OBSERVATIONS, REHAB EVAL
Pt received in bed + trach.vent, PEG, dignacare, tierney, monitor, IV
Pt received on 3EST, pt ok for PT by KIRSTY Allan. Dr. Calvo present at bedside, ok'ed pt for mobilization. co-treated with Ivanna OT. pt's spouse present t/o eval. pt observed semi-valdivia in bed, intubated(ETT @ 23), NGT, telemonitorwtih /BP cuff, right UE resting splint, condom cath, right LE procare boot, pleasant, cooperative, A&O, nonverbal however, able to follow commands 100% and communciate via head nod and thumbs up and c/o 0/10 pain t/o eval.
Pt received on 3TWR, pt ok for PT by KIRSTY Swartz. pt's spouse present at bedside t/o eval. co-treated with Ivanna OT. pt observed semi-valdivia in bed with IV lock, telemonitor with , tierney, pleasant, cooperative, A&O, dysarthric and c/o 0/10 pain t/o eval.

## 2023-01-05 NOTE — PHYSICAL THERAPY INITIAL EVALUATION ADULT - FOLLOWS COMMANDS/ANSWERS QUESTIONS, REHAB EVAL
[facility] [address]  [address] [place] [id number]  [phone]     May 8, 2018    Patient: Maritza Swann   YOB: 1994   Date of Visit: 5/1/2018       To Whom It May Concern:    Maritza Swann was treated in our department from 5/1/2018 till 5/8/2018 .     Sincerely,     Ignacio Resendiz M.D.                
comunication via head nods and thrumbs up/100% of the time
100% of the time
pt presented with significant dysarthria however, able to communicate wants/needs/100% of the time

## 2023-01-05 NOTE — OCCUPATIONAL THERAPY INITIAL EVALUATION ADULT - LEVEL OF INDEPENDENCE, OT EVAL
dependent (less than 25% patients effort)
sponge bathe semi-folwer/maximum assist (25% patients effort)
dependent (less than 25% patients effort)

## 2023-01-05 NOTE — PHYSICAL THERAPY INITIAL EVALUATION ADULT - NSPTDMEREC_GEN_A_CORE
ongoing assessment
ongoing assessment re appropriate Assistive Device d/c recommendations
least restrictive Assistive Device

## 2023-01-05 NOTE — OCCUPATIONAL THERAPY INITIAL EVALUATION ADULT - PHYSICAL ASSIST/NONPHYSICAL ASSIST: SUPINE/SIT, REHAB EVAL
verbal cues/2 person assist
verbal cues/nonverbal cues (demo/gestures)/2 person assist
verbal cues/2 person assist

## 2023-01-05 NOTE — OCCUPATIONAL THERAPY INITIAL EVALUATION ADULT - LEVEL OF INDEPENDENCE: DRESS UPPER BODY, OT EVAL
maximum assist (25% patients effort)
dependent (less than 25% patients effort)
dependent (less than 25% patients effort)

## 2023-01-05 NOTE — OCCUPATIONAL THERAPY INITIAL EVALUATION ADULT - LIVES WITH, PROFILE
pt lives with spouse in a private home with 4 EZEQUIEL 2 handrails (able to reach at same time), able to stay main floor bed/bath./spouse

## 2023-01-05 NOTE — PROGRESS NOTE ADULT - SUBJECTIVE AND OBJECTIVE BOX
Chief complaint:   Patient is a 77y old  Male who presents with a chief complaint of Acute stroke (03 Jan 2023 16:27)    HPI:  76 y/o male with PMH of DM-2, HTN, CAD was transferred from [facility] for stroke. Patient reported right sided weakness and slurred speech along with difficulty swallowing that started yesterday. Was seen at [facility] today, code stroke initiated out of window for tPA. CT head: no acute finding. MRI: acute infarct with left debby. MRA head/neck: Left vertebral artery segmental stenoses and T1 hyperintensity suggesting foci of dissection and/or thrombus. As per resident, neurology from [facility] was consulted and accepted patient for further evaluation. Patient said he is upset about his condition, noted discomfort in his head otherwise no chest pain, shortness of breath, palpitation, fever, chills, nausea, vomiting, abdominal pain, change in bowel/urinary habit.  (26 Dec 2022 22:58)    24hr EVENTS:  s/p trach/peg    ROS: more comfortable  All other systems negative    -----------------------------------------------------------------------------------------------------------------------------------------------------------------------------------  ICU Vital Signs Last 24 Hrs  T(C): 37.2 (04 Jan 2023 08:00), Max: 37.5 (04 Jan 2023 04:02)  T(F): 99 (04 Jan 2023 08:00), Max: 99.5 (04 Jan 2023 04:02)  HR: 67 (04 Jan 2023 08:00) (67 - 118)  BP: 165/72 (04 Jan 2023 08:00) (129/71 - 180/86)  BP(mean): 96 (04 Jan 2023 08:00) (85 - 127)  ABP: --  ABP(mean): --  RR: 16 (04 Jan 2023 07:00) (16 - 22)  SpO2: 99% (04 Jan 2023 08:00) (92% - 99%)    O2 Parameters below as of 04 Jan 2023 08:00  Patient On (Oxygen Delivery Method): ventilator    O2 Concentration (%): 50      I&O's Summary    03 Jan 2023 07:01  -  04 Jan 2023 07:00  --------------------------------------------------------  IN: 1583.2 mL / OUT: 1350 mL / NET: 233.2 mL    04 Jan 2023 07:01  -  04 Jan 2023 09:05  --------------------------------------------------------  IN: 67.6 mL / OUT: 300 mL / NET: -232.4 mL      MEDICATIONS  (STANDING):  albuterol/ipratropium for Nebulization 3 milliLiter(s) Nebulizer every 6 hours  aspirin  chewable 81 milliGRAM(s) Oral daily  atorvastatin 80 milliGRAM(s) Oral at bedtime  ceFAZolin  Injectable. 2000 milliGRAM(s) IV Push every 8 hours  chlorhexidine 0.12% Liquid 15 milliLiter(s) Oral Mucosa every 12 hours  chlorhexidine 2% Cloths 1 Application(s) Topical daily  coronavirus bivalent (EUA) Booster Vaccine (PFIZER) 0.3 milliLiter(s) IntraMuscular once  dexMEDEtomidine Infusion 0.1 MICROgram(s)/kG/Hr (1.93 mL/Hr) IV Continuous <Continuous>  dextrose 5%. 1000 milliLiter(s) (30 mL/Hr) IV Continuous <Continuous>  dextrose 5%. 1000 milliLiter(s) (100 mL/Hr) IV Continuous <Continuous>  dextrose 5%. 1000 milliLiter(s) (50 mL/Hr) IV Continuous <Continuous>  dextrose 50% Injectable 25 Gram(s) IV Push once  dextrose 50% Injectable 12.5 Gram(s) IV Push once  dextrose 50% Injectable 25 Gram(s) IV Push once  doxazosin 2 milliGRAM(s) Oral at bedtime  glucagon  Injectable 1 milliGRAM(s) IntraMuscular once  influenza  Vaccine (HIGH DOSE) 0.7 milliLiter(s) IntraMuscular once  insulin glargine Injectable (LANTUS) 18 Unit(s) SubCutaneous every morning  insulin lispro (ADMELOG) corrective regimen sliding scale   SubCutaneous three times a day before meals  insulin lispro Injectable (ADMELOG) 4 Unit(s) SubCutaneous every 6 hours  loteprednol 0.5% Ophthalmic Suspension 1 Drop(s) Right EYE daily  pantoprazole   Suspension 40 milliGRAM(s) Oral daily  prednisoLONE acetate 1% Suspension 1 Drop(s) Left EYE daily      RESPIRATORY:  Mode: AC/ CMV (Assist Control/ Continuous Mandatory Ventilation)  RR (machine): 16  TV (machine): 400  FiO2: 50  PEEP: 6  ITime: 0.8  MAP: 11  PC: 3  PIP: 22        IMAGING:   Recent imaging studies were reviewed.    LAB RESULTS:                          12.9   11.13 )-----------( 249      ( 04 Jan 2023 03:50 )             40.5       PT/INR - ( 03 Jan 2023 03:30 )   PT: 14.4 sec;   INR: 1.24 ratio         PTT - ( 03 Jan 2023 03:30 )  PTT:73.7 sec    01-04    156<H>  |  120<H>  |  41.7<H>  ----------------------------<  198<H>  4.1   |  28.0  |  1.38<H>    Ca    8.2<L>      04 Jan 2023 03:50  Phos  3.6     01-04  Mg     2.9     01-04    -----------------------------------------------------------------------------------------------------------------------------------------------------------------------------------      PHYSICAL EXAM:  General: Calm, trach  HEENT: MMM  Neuro:  -Mental status- following commands  -CN- PERRL 3mm, tongue midline, R facial droop  R gaze pref  +weak cough/gag  flaccid RUE and RLE  LUE and LLE 5/5  diminished sensation on R    CV: RRR  Pulm: coarse breath sounds  Abd: Soft, nontender, nondistended  Ext: no noted edema in lower ext  Skin: warm, dry         Chief complaint:   Patient is a 77y old  Male who presents with a chief complaint of Acute stroke (03 Jan 2023 16:27)    HPI:  76 y/o male with PMH of DM-2, HTN, CAD was transferred from [place] for stroke. Patient reported right sided weakness and slurred speech along with difficulty swallowing that started yesterday. Was seen at [place] today, code stroke initiated out of window for tPA. CT head: no acute finding. MRI: acute infarct with left debby. MRA head/neck: Left vertebral artery segmental stenoses and T1 hyperintensity suggesting foci of dissection and/or thrombus. As per resident, neurology from [facility] was consulted and accepted patient for further evaluation. Patient said he is upset about his condition, noted discomfort in his head otherwise no chest pain, shortness of breath, palpitation, fever, chills, nausea, vomiting, abdominal pain, change in bowel/urinary habit.  (26 Dec 2022 22:58)    24hr EVENTS:  s/p trach/peg, no acute events.    ICU Vital Signs Last 24 Hrs  T(C): 36.7 (05 Jan 2023 23:00), Max: 38.2 (05 Jan 2023 07:33)  T(F): 98.1 (05 Jan 2023 23:00), Max: 100.7 (05 Jan 2023 07:33)  HR: 68 (06 Jan 2023 00:00) (68 - 132)  BP: 148/63 (06 Jan 2023 00:00) (128/70 - 167/73)  BP(mean): 87 (06 Jan 2023 00:00) (84 - 99)  ABP: --  ABP(mean): --  RR: 24 (05 Jan 2023 04:00) (20 - 26)  SpO2: 99% (06 Jan 2023 00:00) (93% - 100%)    O2 Parameters below as of 05 Jan 2023 15:35  Patient On (Oxygen Delivery Method): ventilator      VS, labs, imaging reviewed.      PHYSICAL EXAM:  General: Calm, cooperative.  Neuro:  -Mental status- following commands, EO spont  -CN- PERRL 3mm, tongue midline, R facial droop  R gaze pref  +weak cough/gag  flaccid RUE and RLE  LUE and LLE 5/5  diminished sensation on R    CV: RRR  Pulm: coarse breath sounds  Abd: Soft, nontender, nondistended  Ext: no noted edema in lower ext  Skin: warm, dry

## 2023-01-05 NOTE — PROGRESS NOTE ADULT - SUBJECTIVE AND OBJECTIVE BOX
Tmax 100.7. Wife is at bedside.    Vital Signs Last 24 Hrs  T(C): 38.2 (05 Jan 2023 07:33), Max: 38.2 (05 Jan 2023 07:33)  T(F): 100.7 (05 Jan 2023 07:33), Max: 100.7 (05 Jan 2023 07:33)  HR: 103 (05 Jan 2023 09:17) (60 - 133)  BP: 136/68 (05 Jan 2023 08:00) (128/70 - 174/83)  BP(mean): 87 (05 Jan 2023 08:00) (87 - 108)  RR: 24 (05 Jan 2023 04:00) (16 - 38)  SpO2: 97% (05 Jan 2023 09:17) (93% - 100%)    Parameters below as of 05 Jan 2023 06:00  Patient On (Oxygen Delivery Method): ventilator    O2 Concentration (%): 40    I&O's Summary    04 Jan 2023 07:01  -  05 Jan 2023 07:00  --------------------------------------------------------  IN: 2037.6 mL / OUT: 1300 mL / NET: 737.6 mL    Physical Exam  General: WDWN male in NAD  HEENT: Trach to vent   Cardiac: S1S2 RRR  Respiratory: CTAB  Abdomen: Soft, NT  Extremities: No appreciable edema  Neuro: Alert, awake     01-05    150<H>  |  113<H>  |  33.7<H>  ----------------------------<  353<H>  3.9   |  27.0  |  1.26    Ca    8.2<L>      05 Jan 2023 07:05  Phos  .      01-05  Mg     .      01-05                        11.9   9.40  )-----------( 246      ( 05 Jan 2023 07:05 )             37.9     MEDICATIONS  (STANDING):  albuterol/ipratropium for Nebulization 3 milliLiter(s) Nebulizer every 6 hours  aspirin  chewable 81 milliGRAM(s) Oral daily  atorvastatin 80 milliGRAM(s) Oral at bedtime  ceFAZolin  Injectable. 2000 milliGRAM(s) IV Push every 8 hours  chlorhexidine 0.12% Liquid 15 milliLiter(s) Oral Mucosa every 12 hours  chlorhexidine 2% Cloths 1 Application(s) Topical daily  cloNIDine 0.1 milliGRAM(s) Oral every 8 hours  coronavirus bivalent (EUA) Booster Vaccine (PFIZER) 0.3 milliLiter(s) IntraMuscular once  dextrose 5%. 1000 milliLiter(s) (30 mL/Hr) IV Continuous <Continuous>  doxazosin 2 milliGRAM(s) Oral at bedtime  influenza  Vaccine (HIGH DOSE) 0.7 milliLiter(s) IntraMuscular once  insulin glargine Injectable (LANTUS) 18 Unit(s) SubCutaneous every morning  insulin lispro (ADMELOG) corrective regimen sliding scale   SubCutaneous three times a day before meals  insulin lispro Injectable (ADMELOG) 4 Unit(s) SubCutaneous every 6 hours  loteprednol 0.5% Ophthalmic Suspension 1 Drop(s) Right EYE daily  pantoprazole   Suspension 40 milliGRAM(s) Oral daily  prednisoLONE acetate 1% Suspension 1 Drop(s) Left EYE daily    MEDICATIONS  (PRN):  acetaminophen     Tablet .. 650 milliGRAM(s) Oral every 6 hours PRN Temp greater or equal to 38C (100.4F), Mild Pain (1 - 3)  ALPRAZolam 0.5 milliGRAM(s) Oral every 12 hours PRN for agitation  aluminum hydroxide/magnesium hydroxide/simethicone Suspension 30 milliLiter(s) Oral every 4 hours PRN Dyspepsia  hydrALAZINE Injectable 10 milliGRAM(s) IV Push every 2 hours PRN SBP >160  labetalol Injectable 10 milliGRAM(s) IV Push every 2 hours PRN SBP >160  melatonin 3 milliGRAM(s) Oral at bedtime PRN Insomnia  ondansetron Injectable 4 milliGRAM(s) IV Push every 8 hours PRN Nausea and/or Vomiting

## 2023-01-05 NOTE — PROGRESS NOTE ADULT - ASSESSMENT
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  Worsening neuro exam 12/27 - RUE plegia, worsening bulbar dysfunction.   Acute resp failure due to neurologic injury, required intubation.  PMH of  DMII, HTN, CAD.  Allergy to iodine.  Anticoagulated on Heparin ggt.  tracheal aspirate MSSA 12/30- PNA  hypernatremia, not consistent with DI    NEURO:   q4hr neurochecks  cont ASA 81 mg daily   Heparin ggt for L vert dissection vs thrombus, will discuss need for AC with stroke  (will hold heparin for now -after 1 week AC)  wean precedex for sedation  MRA T1 fatsat head and neck to eval vert dissection vs thrombus  pain mgt: tylenol and oxy 5 prn  Activity: [x] mobilize as tolerated [] Bedrest [x] PT [x] OT     PULM: trach 1/3 (POD 1)  bed to chair position, PT/OT  duonebs    SpO2>92%  SBT as tolerated    CV:  SBP goal <160  lipitor  ASA    RENAL: monitor I/O  replete lytes prn  voiding cardura- incontinent   FWF 350mg q4h, D5 at 30cc/h  appreciate nephro recs for hypernatremia    GI:   PEG 1/3 restart TF today  GI prophylaxis [x] PPI  rectal tube  LBM 1/4    ENDO:   Goal euglycemia (-180)  inc lantus 18units, lispro 4units q6h  ISS    HEME/ONC:  VTE prophylaxis: [] SCDs [x] chemoprophylaxis      ID: afebrile  empiric vanco and zosyn change to ancef ( 12/30- 1/7)- MSSA sputum    MISC:    I affirm that this patient is critically ill and at high risk for sudden, fatal deterioration due to one or more of the above stated active issues.     This time does not include bedside procedures that are documented separately.           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  Worsening neuro exam 12/27 - RUE plegia, worsening bulbar dysfunction.   Acute resp failure due to neurologic injury, trach-dependent.  PMH of  DMII, HTN, CAD.  Allergy to iodine.  Anticoagulated on Heparin ggt.  tracheal aspirate MSSA 12/30- PNA  hypernatremia, not consistent with DI    NEURO:   q4hr neurochecks  cont ASA 81 mg daily   Heparin ggt for L vert dissection vs thrombus, cont for now per [facility]  MRA T1 fatsat head and neck to eval vert dissection vs thrombus  pain mgt: tylenol and oxy 5 prn  Activity: [x] mobilize as tolerated [] Bedrest [x] PT [x] OT     PULM: trach 1/3   bed to chair position, PT/OT  duonebs    SpO2>92%  SBT as tolerated    CV:  SBP goal <160  lipitor  ASA    RENAL: monitor I/O  replete lytes prn  voiding cardura- incontinent   FWF 350mg q4h, D5 at 100cc/h, monitor BMP  appreciate nephro recs for hypernatremia    GI:   PEG 1/3, on TF  GI prophylaxis [x] PPI  rectal tube  LBM 1/4    ENDO:   Goal euglycemia (-180)  inc lantus 18units, lispro 4units q6h - switch to Insulin ggt  ISS    HEME/ONC:  VTE prophylaxis: [] SCDs [x] chemoprophylaxis      ID: on ancef ( 12/30- 1/7)- MSSA sputum

## 2023-01-05 NOTE — OCCUPATIONAL THERAPY INITIAL EVALUATION ADULT - PLANNED THERAPY INTERVENTIONS, OT EVAL
ADL retraining/IADL retraining/balance training/bed mobility training/cognitive, visual perceptual/motor coordination training/neuromuscular re-education/ROM/strengthening/transfer training
ADL retraining/IADL retraining/balance training/bed mobility training/cognitive, visual perceptual/motor coordination training/neuromuscular re-education/ROM/strengthening/transfer training
ADL retraining/IADL retraining/balance training/bed mobility training/cognitive, visual perceptual/fine motor coordination training/motor coordination training/neuromuscular re-education/ROM/strengthening/transfer training

## 2023-01-05 NOTE — OCCUPATIONAL THERAPY INITIAL EVALUATION ADULT - LEVEL OF INDEPENDENCE: BED TO CHAIR, REHAB EVAL
n/a, deemed unsafe to perform at this time, will require further assessment
unable to perform
minimum assist (75% patients effort)

## 2023-01-05 NOTE — PHYSICAL THERAPY INITIAL EVALUATION ADULT - ORIENTATION, REHAB EVAL
oriented to person, place, time and situation
person/place/time
oriented to person, place, time and situation

## 2023-01-05 NOTE — OCCUPATIONAL THERAPY INITIAL EVALUATION ADULT - LEVEL OF INDEPENDENCE: SUPINE/SIT, REHAB EVAL
pt tolerated sitting EOB approximately 2 min with max A x 2/maximum assist (25% patients effort)
maximum assist (25% patients effort)
pt tolerated sitting EOB approximately 5 min with max A x 2, head in flexion/dependent (less than 25% patients effort)

## 2023-01-05 NOTE — PHYSICAL THERAPY INITIAL EVALUATION ADULT - PLANNED THERAPY INTERVENTIONS, PT EVAL
balance training/bed mobility training/gait training/ROM/strengthening/transfer training
balance training/bed mobility training/gait training/ROM/strengthening/transfer training
balance training/bed mobility training/gait training/neuromuscular re-education/postural re-education/ROM/strengthening/transfer training

## 2023-01-05 NOTE — OCCUPATIONAL THERAPY INITIAL EVALUATION ADULT - ADDITIONAL COMMENTS
Pt has a tub with curtains and no grab bars. Pt owns RW and SAC. Pt is right handed and drives. Pt was independent without use of an Assistive Device PTA. Pt dtr lives next door and can provide prn assist, pt's spouse can assist however she requires use of SAC for ambulation.
Pt has a tub with curtains and no grab bars. Pt owns RW and SAC. Pt is right handed and drives. Pt was independent without use of an Assistive Device PTA. Pt dtr lives next door and can provide prn assist, pt's spouse can assist however she requires use of SAC for ambulation. Pt wife bedside states pt is very active.
Pt has a tub with curtains and no grab bars. Pt owns RW and SAC. Pt is right handed and drives. Pt was independent without use of an Assistive Device PTA. Pt dtr lives next door and can provide prn assist, pt's spouse can assist however she requires use of SAC for ambulation. Pt wife bedside states pt is very active.

## 2023-01-05 NOTE — PROGRESS NOTE ADULT - ASSESSMENT
78 yo M with PMH HTN, DM, CAD.  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 Worsening neuro exam 12/27 - RUE plegia, worsening bulbar dysfunction.   Acute resp failure due to neurologic injury, required intubation 12/27 for airway protection.  Thoracic surgery consulted for trach and peg eval. S/p trach & PEG 1/3.

## 2023-01-05 NOTE — OCCUPATIONAL THERAPY INITIAL EVALUATION ADULT - NS ASR FOLLOW COMMAND OT EVAL
100% of the time
communicates via head nods and thumbs up/down/100% of the time
communicates via head nods and thumbs up/down/100% of the time

## 2023-01-05 NOTE — PHYSICAL THERAPY INITIAL EVALUATION ADULT - DIAGNOSIS, PT EVAL
Decreased functional status and mobility due to decrease strength, transfers, balance, endurance and ambulation/stair tolerance
decreased functional mobility
Decreased functional status and mobility due to decrease strength, transfers, balance, endurance and ambulation/stair tolerance

## 2023-01-05 NOTE — PROGRESS NOTE ADULT - CRITICAL CARE ATTENDING COMMENT
I spent 40 minutes of critical care time examining patient, reviewing vitals, labs, medications, imaging and discussing with the team goals of care to prevent life-threatening in this patient who is at high risk for deterioration or death due to:  stroke Pt is critically ill and at high risk of rapid deterioration/death due to abovementioned conditions.   Still requires critical care interventions - ongoing frequent evaluations, interventions and management adjustment by the Attending and ICU team, - and included review of relevant history, clinical examination, review of data and images, discussion of treatment with the multidisciplinary team and any consultants involved in this patient’s care as well as family discussion.

## 2023-01-05 NOTE — PHYSICAL THERAPY INITIAL EVALUATION ADULT - LEVEL OF INDEPENDENCE: SIT/STAND, REHAB EVAL
n/a, deemed unsafe to perform at this time, will require further assessment
unable to perform
moderate assist (50% patients effort)

## 2023-01-05 NOTE — OCCUPATIONAL THERAPY INITIAL EVALUATION ADULT - PERTINENT HX OF CURRENT PROBLEM, REHAB EVAL
pt was transferred from [facility] 2/2 stroke workup. pt presented to [facility] with c/o right sided weakness, slurred speech and difficultly swallowing. scans performed at [facility] revealed an acute left debby infarct, and left vertebral artery segmental stenoses and T1 hyperdensity suggesting foci of dissection &/or thrombus.
78 yo M with PMH HTN, DM, CAD.  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 Worsening neuro exam 12/27 - RUE plegia, worsening bulbar dysfunction.   Acute resp failure due to neurologic injury, required intubation 12/27 for airway protection.   S/p trach & PEG 1/3.

## 2023-01-05 NOTE — PHYSICAL THERAPY INITIAL EVALUATION ADULT - CRITERIA FOR SKILLED THERAPEUTIC INTERVENTIONS
impairments found/functional limitations in following categories/risk reduction/prevention/rehab potential/therapy frequency/predicted duration of therapy intervention/anticipated equipment needs at discharge/anticipated discharge recommendation

## 2023-01-05 NOTE — OCCUPATIONAL THERAPY INITIAL EVALUATION ADULT - GENERAL OBSERVATIONS, REHAB EVAL
pt received in bed and left as found, all lines and tubes intact, + right resting hand splint, no c/o pain, alert, nodding head appropriately tor yes/no questions,
Received pt semifowler in bed, NAD, IV, intubated(ETT @ 23), NGT, tele//BP, RUE resting hand splint, condom cath,RLE PRAFO, A&O. Pt nonverbal but able to follow commands 100% of the time and communicate via head nod and thumbs up/down. Pt with 0/10 pain pre/post. Pt agreeable to OT evaluation
Received pt semifowler in bed, NAD,+alarm, +IV lock, +Tele//BP, +condom cath, +on RA A&Ox4 with spouse bedside. Pt severely dysarthric, emotional over current state-support provided. Pre/post pain 0/10. Pt agreeable to OT evaluation

## 2023-01-05 NOTE — PHYSICAL THERAPY INITIAL EVALUATION ADULT - NEUROVASCULAR ASSESSMENT RLE
no numbness
warm/no numbness/no tingling/no discoloration
warm/no numbness/no tingling/no discoloration

## 2023-01-05 NOTE — PHYSICAL THERAPY INITIAL EVALUATION ADULT - LEVEL OF INDEPENDENCE: STAND/SIT, REHAB EVAL
no
n/a, deemed unsafe to perform at this time, will require further assessment
unable to perform
moderate assist (50% patients effort)

## 2023-01-05 NOTE — PHYSICAL THERAPY INITIAL EVALUATION ADULT - LEVEL OF INDEPENDENCE: SUPINE/SIT, REHAB EVAL
dependent (less than 25% patients effort)
maximum assist (25% patients effort)
maximum assist (25% patients effort)

## 2023-01-05 NOTE — PROGRESS NOTE ADULT - ASSESSMENT
The patient is a 77 year old male with PMH of DM, HTN and CAD who was transferred from [facility] for acute L pontine CVA; of note, outside window for tPA. Hospital course is notable for acute hypoxic respiratory failure secondary to inability to manage oral secretions s/p intubation on 12/28, followed by EGD and PEG placement on 01/03/23. Nephrology is managing hypernatremia.      Hypernatremia, improving  -Continue free water flushes at 350cc q4h and D5W at 30cc/hr     DM/Hyperglycemia   -Hyperglycemia is suboptimally controlled  -Recommend Endocrine consult     Gayathri Boyer DO  Nephrology  [facility]  [address] [place] [address]  Office Number [phone]

## 2023-01-05 NOTE — PHYSICAL THERAPY INITIAL EVALUATION ADULT - REFERRAL TO ANOTHER SERVICE NEEDED, PT EVAL
occupational therapy/social work/speech language pathology
PM&R/occupational therapy/social work/speech language pathology
PM&R/occupational therapy/social work/speech language pathology

## 2023-01-05 NOTE — PHYSICAL THERAPY INITIAL EVALUATION ADULT - LEVEL OF INDEPENDENCE: SIT/SUPINE, REHAB EVAL
dependent (less than 25% patients effort)
n/a, pt was returned sitting in chair, RN aware
maximum assist (25% patients effort)

## 2023-01-05 NOTE — OCCUPATIONAL THERAPY INITIAL EVALUATION ADULT - LEVEL OF INDEPENDENCE: SIT/SUPINE, REHAB EVAL
dependent (less than 25% patients effort)
maximum assist (25% patients effort)
Left pt in bedside recliner +alarm, NAD, CBWR, spouse bedside; RN aware

## 2023-01-05 NOTE — OCCUPATIONAL THERAPY INITIAL EVALUATION ADULT - PRECAUTIONS/LIMITATIONS, REHAB EVAL
HOB 30 degrees/aspiration precautions/cardiac precautions/fall precautions/oxygen therapy device and L/min/seizure precautions
aspiration precautions/fall precautions/oxygen therapy device and L/min/surgical precautions
aspiration precautions/cardiac precautions/fall precautions

## 2023-01-05 NOTE — PHYSICAL THERAPY INITIAL EVALUATION ADULT - IMPAIRMENTS FOUND, PT EVAL
aerobic capacity/endurance/gait, locomotion, and balance/gross motor/muscle strength/ROM
aerobic capacity/endurance/gait, locomotion, and balance/gross motor/muscle strength/ROM
aerobic capacity/endurance/gait, locomotion, and balance/gross motor/muscle strength/posture/ROM

## 2023-01-05 NOTE — PHYSICAL THERAPY INITIAL EVALUATION ADULT - MANUAL MUSCLE TESTING RESULTS, REHAB EVAL
bilateral UE's + bilateral LE's grossly assessed via functional assessment of sit to stand transfer, 3-/5. defer to OT eval for bilateral UE's MMT specifics
see OT eval for UEs, right LE 0/5, left LE grossly 2/5
unable to assess at this time.

## 2023-01-05 NOTE — PHYSICAL THERAPY INITIAL EVALUATION ADULT - ADDITIONAL COMMENTS
pt lives with spouse (dtr lives next door and can provide prn assist, pt's spouse can assist however, requires use of SAC for ambulation) in a private home with 4 EZEQUIEL 2 handrails and will be residing on the ground floor upon d/c (normally, bed&bath on 2nd floor). pt was independent without use of an Assistive Device and (+) driving PTA. pt has RW and SAC DME at home.
As per previous eval and confirmation with wife at bedside; pt lives with spouse (dtr lives next door and can provide prn assist, pt's spouse can assist however, requires use of SAC for ambulation) in a private home with 4 EZEQUIEL 2 handrails and will be residing on the ground floor upon d/c (normally, bed&bath on 2nd floor). pt was independent without use of an Assistive Device and (+) driving PTA. pt has RW and SAC DME at home.
pt lives with spouse (dtr lives next door and can provide prn assist, pt's spouse can assist however, requires use of SAC for ambulation) in a private home with 4 EZEQUIEL 2 handrails and will be residing on the ground floor upon d/c (normally, bed&bath on 2nd floor). pt was independent without use of an Assistive Device and (+) driving PTA. pt has RW and SAC DME at home.

## 2023-01-05 NOTE — PHYSICAL THERAPY INITIAL EVALUATION ADULT - PASSIVE RANGE OF MOTION EXAMINATION, REHAB EVAL
Left LE Passive ROM was WFL (within functional limits)/Right LE Passive ROM was WFL (within functional limits)
Right LE Passive ROM was WNL (within normal limits)
Left LE Passive ROM was WFL (within functional limits)/Right LE Passive ROM was WFL (within functional limits)

## 2023-01-05 NOTE — OCCUPATIONAL THERAPY INITIAL EVALUATION ADULT - SENSORY TESTS
No complaints in sensation offered

## 2023-01-05 NOTE — OCCUPATIONAL THERAPY INITIAL EVALUATION ADULT - BED MOBILITY LIMITATIONS, REHAB EVAL
decreased ability to use arms for pushing/pulling/decreased ability to use legs for bridging/pushing/impaired ability to control trunk for mobility

## 2023-01-05 NOTE — PHYSICAL THERAPY INITIAL EVALUATION ADULT - LEVEL OF INDEPENDENCE: GAIT, REHAB EVAL
n/a, deemed unsafe to perform at this time, will require further assessment
maximum assist (25% patients effort)
unable to perform

## 2023-01-05 NOTE — OCCUPATIONAL THERAPY INITIAL EVALUATION ADULT - RANGE OF MOTION EXAMINATION, UPPER EXTREMITY
bilateral UE Passive ROM was WFL  (within functional limits)
LUE AROM all joints WFL. Pt with  trace mvmt noted in R shoulder shrug. No AROM noted at any joint in RUE.
LUE AROM all joints WFL while semifowler in bed. RUE with no AROM noted at any joint

## 2023-01-05 NOTE — PHYSICAL THERAPY INITIAL EVALUATION ADULT - PERTINENT HX OF CURRENT PROBLEM, REHAB EVAL
Pt was transferred from [facility] 2/2 stroke workup. pt presented to [place] with c/o right sided weakness, slurred speech and difficultly swallowing. scans performed at [place] revealed an acute left debby infarct, and left vertebral artery segmental stenoses and T1 hyperdensity suggesting foci of dissection &/or thrombus. hospital course complicated 2/2 RRT and code stroke called on 12/27, pt was transferred to ICU, (-) [facility] for additional pathology. further complicated 2/2 requiring intubation on 12/28 for airway protection and poor management of secretions; now s/p trach and PEG.
pt was transferred from [facility] 2/2 stroke workup. pt presented to [place] with c/o right sided weakness, slurred speech and difficultly swallowing. scans performed at [place] revealed an acute left debby infarct, and left vertebral artery segmental stenoses and T1 hyperdensity suggesting foci of dissection &/or thrombus.
pt was transferred from [facility] 2/2 stroke workup. pt presented to [place] with c/o right sided weakness, slurred speech and difficultly swallowing. scans performed at [place] revealed an acute left debby infarct, and left vertebral artery segmental stenoses and T1 hyperdensity suggesting foci of dissection &/or thrombus. hospital course complicated 2/2 RRT and code stroke called on 12/27, pt was transferred to ICU, (-) [facility] for additional pathology. further complicated 2/2 requiring intubation on 12/28 for airway protection and poor management of secretions. pt is still currently intubated (minimal sedation) and in ICU level of care.

## 2023-01-06 LAB
ANION GAP SERPL CALC-SCNC: 5 MMOL/L — SIGNIFICANT CHANGE UP (ref 5–17)
ANION GAP SERPL CALC-SCNC: 9 MMOL/L — SIGNIFICANT CHANGE UP (ref 5–17)
APTT BLD: 67.5 SEC — HIGH (ref 27.5–35.5)
APTT BLD: 89.8 SEC — HIGH (ref 27.5–35.5)
BUN SERPL-MCNC: 25.4 MG/DL — HIGH (ref 8–20)
BUN SERPL-MCNC: 28.6 MG/DL — HIGH (ref 8–20)
CALCIUM SERPL-MCNC: 7.9 MG/DL — LOW (ref 8.4–10.5)
CALCIUM SERPL-MCNC: 8 MG/DL — LOW (ref 8.4–10.5)
CHLORIDE SERPL-SCNC: 108 MMOL/L — SIGNIFICANT CHANGE UP (ref 96–108)
CHLORIDE SERPL-SCNC: 109 MMOL/L — HIGH (ref 96–108)
CHLORIDE UR-SCNC: <27 MMOL/L — SIGNIFICANT CHANGE UP
CO2 SERPL-SCNC: 27 MMOL/L — SIGNIFICANT CHANGE UP (ref 22–29)
CO2 SERPL-SCNC: 29 MMOL/L — SIGNIFICANT CHANGE UP (ref 22–29)
CREAT ?TM UR-MCNC: 76 MG/DL — SIGNIFICANT CHANGE UP
CREAT SERPL-MCNC: 0.98 MG/DL — SIGNIFICANT CHANGE UP (ref 0.5–1.3)
CREAT SERPL-MCNC: 1.04 MG/DL — SIGNIFICANT CHANGE UP (ref 0.5–1.3)
EGFR: 74 ML/MIN/1.73M2 — SIGNIFICANT CHANGE UP
EGFR: 79 ML/MIN/1.73M2 — SIGNIFICANT CHANGE UP
GLUCOSE BLDC GLUCOMTR-MCNC: 141 MG/DL — HIGH (ref 70–99)
GLUCOSE BLDC GLUCOMTR-MCNC: 161 MG/DL — HIGH (ref 70–99)
GLUCOSE BLDC GLUCOMTR-MCNC: 170 MG/DL — HIGH (ref 70–99)
GLUCOSE BLDC GLUCOMTR-MCNC: 171 MG/DL — HIGH (ref 70–99)
GLUCOSE BLDC GLUCOMTR-MCNC: 175 MG/DL — HIGH (ref 70–99)
GLUCOSE BLDC GLUCOMTR-MCNC: 180 MG/DL — HIGH (ref 70–99)
GLUCOSE BLDC GLUCOMTR-MCNC: 181 MG/DL — HIGH (ref 70–99)
GLUCOSE BLDC GLUCOMTR-MCNC: 183 MG/DL — HIGH (ref 70–99)
GLUCOSE BLDC GLUCOMTR-MCNC: 184 MG/DL — HIGH (ref 70–99)
GLUCOSE BLDC GLUCOMTR-MCNC: 189 MG/DL — HIGH (ref 70–99)
GLUCOSE BLDC GLUCOMTR-MCNC: 194 MG/DL — HIGH (ref 70–99)
GLUCOSE BLDC GLUCOMTR-MCNC: 195 MG/DL — HIGH (ref 70–99)
GLUCOSE BLDC GLUCOMTR-MCNC: 197 MG/DL — HIGH (ref 70–99)
GLUCOSE BLDC GLUCOMTR-MCNC: 197 MG/DL — HIGH (ref 70–99)
GLUCOSE BLDC GLUCOMTR-MCNC: 203 MG/DL — HIGH (ref 70–99)
GLUCOSE BLDC GLUCOMTR-MCNC: 213 MG/DL — HIGH (ref 70–99)
GLUCOSE BLDC GLUCOMTR-MCNC: 217 MG/DL — HIGH (ref 70–99)
GLUCOSE BLDC GLUCOMTR-MCNC: 229 MG/DL — HIGH (ref 70–99)
GLUCOSE BLDC GLUCOMTR-MCNC: 232 MG/DL — HIGH (ref 70–99)
GLUCOSE BLDC GLUCOMTR-MCNC: 236 MG/DL — HIGH (ref 70–99)
GLUCOSE BLDC GLUCOMTR-MCNC: 243 MG/DL — HIGH (ref 70–99)
GLUCOSE SERPL-MCNC: 199 MG/DL — HIGH (ref 70–99)
GLUCOSE SERPL-MCNC: 210 MG/DL — HIGH (ref 70–99)
HCT VFR BLD CALC: 33.6 % — LOW (ref 39–50)
HCT VFR BLD CALC: 34.1 % — LOW (ref 39–50)
HGB BLD-MCNC: 10.6 G/DL — LOW (ref 13–17)
HGB BLD-MCNC: 10.6 G/DL — LOW (ref 13–17)
MAGNESIUM SERPL-MCNC: 2.3 MG/DL — SIGNIFICANT CHANGE UP (ref 1.6–2.6)
MCHC RBC-ENTMCNC: 28.9 PG — SIGNIFICANT CHANGE UP (ref 27–34)
MCHC RBC-ENTMCNC: 29.3 PG — SIGNIFICANT CHANGE UP (ref 27–34)
MCHC RBC-ENTMCNC: 31.1 GM/DL — LOW (ref 32–36)
MCHC RBC-ENTMCNC: 31.5 GM/DL — LOW (ref 32–36)
MCV RBC AUTO: 92.8 FL — SIGNIFICANT CHANGE UP (ref 80–100)
MCV RBC AUTO: 92.9 FL — SIGNIFICANT CHANGE UP (ref 80–100)
OSMOLALITY SERPL: 320 MOSMOL/KG — HIGH (ref 280–301)
OSMOLALITY UR: 575 MOSM/KG — SIGNIFICANT CHANGE UP (ref 300–1000)
PHOSPHATE SERPL-MCNC: 3.5 MG/DL — SIGNIFICANT CHANGE UP (ref 2.4–4.7)
PLATELET # BLD AUTO: 216 K/UL — SIGNIFICANT CHANGE UP (ref 150–400)
PLATELET # BLD AUTO: 219 K/UL — SIGNIFICANT CHANGE UP (ref 150–400)
POTASSIUM SERPL-MCNC: 3.6 MMOL/L — SIGNIFICANT CHANGE UP (ref 3.5–5.3)
POTASSIUM SERPL-MCNC: 4.9 MMOL/L — SIGNIFICANT CHANGE UP (ref 3.5–5.3)
POTASSIUM SERPL-SCNC: 3.6 MMOL/L — SIGNIFICANT CHANGE UP (ref 3.5–5.3)
POTASSIUM SERPL-SCNC: 4.9 MMOL/L — SIGNIFICANT CHANGE UP (ref 3.5–5.3)
RBC # BLD: 3.62 M/UL — LOW (ref 4.2–5.8)
RBC # BLD: 3.67 M/UL — LOW (ref 4.2–5.8)
RBC # FLD: 14 % — SIGNIFICANT CHANGE UP (ref 10.3–14.5)
RBC # FLD: 14.2 % — SIGNIFICANT CHANGE UP (ref 10.3–14.5)
SODIUM SERPL-SCNC: 143 MMOL/L — SIGNIFICANT CHANGE UP (ref 135–145)
SODIUM SERPL-SCNC: 144 MMOL/L — SIGNIFICANT CHANGE UP (ref 135–145)
SODIUM UR-SCNC: <30 MMOL/L — SIGNIFICANT CHANGE UP
UUN UR-MCNC: 1045 MG/DL — SIGNIFICANT CHANGE UP
WBC # BLD: 8.88 K/UL — SIGNIFICANT CHANGE UP (ref 3.8–10.5)
WBC # BLD: 9.09 K/UL — SIGNIFICANT CHANGE UP (ref 3.8–10.5)
WBC # FLD AUTO: 8.88 K/UL — SIGNIFICANT CHANGE UP (ref 3.8–10.5)
WBC # FLD AUTO: 9.09 K/UL — SIGNIFICANT CHANGE UP (ref 3.8–10.5)

## 2023-01-06 PROCEDURE — 70544 MR ANGIOGRAPHY HEAD W/O DYE: CPT | Mod: 26,59

## 2023-01-06 PROCEDURE — 99291 CRITICAL CARE FIRST HOUR: CPT

## 2023-01-06 PROCEDURE — 99232 SBSQ HOSP IP/OBS MODERATE 35: CPT

## 2023-01-06 PROCEDURE — 70547 MR ANGIOGRAPHY NECK W/O DYE: CPT | Mod: 26

## 2023-01-06 PROCEDURE — 70551 MRI BRAIN STEM W/O DYE: CPT | Mod: 26

## 2023-01-06 RX ORDER — LISINOPRIL 2.5 MG/1
10 TABLET ORAL DAILY
Refills: 0 | Status: DISCONTINUED | OUTPATIENT
Start: 2023-01-06 | End: 2023-01-30

## 2023-01-06 RX ORDER — ATENOLOL 25 MG/1
50 TABLET ORAL DAILY
Refills: 0 | Status: DISCONTINUED | OUTPATIENT
Start: 2023-01-06 | End: 2023-01-30

## 2023-01-06 RX ORDER — POTASSIUM CHLORIDE 20 MEQ
40 PACKET (EA) ORAL ONCE
Refills: 0 | Status: DISCONTINUED | OUTPATIENT
Start: 2023-01-06 | End: 2023-01-06

## 2023-01-06 RX ORDER — POTASSIUM CHLORIDE 20 MEQ
40 PACKET (EA) ORAL ONCE
Refills: 0 | Status: COMPLETED | OUTPATIENT
Start: 2023-01-06 | End: 2023-01-06

## 2023-01-06 RX ADMIN — Medication 2000 MILLIGRAM(S): at 15:09

## 2023-01-06 RX ADMIN — Medication 40 MILLIEQUIVALENT(S): at 08:58

## 2023-01-06 RX ADMIN — Medication 10 MILLIGRAM(S): at 06:09

## 2023-01-06 RX ADMIN — PANTOPRAZOLE SODIUM 40 MILLIGRAM(S): 20 TABLET, DELAYED RELEASE ORAL at 10:40

## 2023-01-06 RX ADMIN — HEPARIN SODIUM 700 UNIT(S)/HR: 5000 INJECTION INTRAVENOUS; SUBCUTANEOUS at 10:47

## 2023-01-06 RX ADMIN — Medication 10 MILLIGRAM(S): at 16:32

## 2023-01-06 RX ADMIN — Medication 3 MILLILITER(S): at 16:05

## 2023-01-06 RX ADMIN — Medication 0.5 MILLIGRAM(S): at 10:44

## 2023-01-06 RX ADMIN — SODIUM CHLORIDE 50 MILLILITER(S): 9 INJECTION, SOLUTION INTRAVENOUS at 19:36

## 2023-01-06 RX ADMIN — Medication 10 MILLIGRAM(S): at 21:12

## 2023-01-06 RX ADMIN — Medication 3 MILLILITER(S): at 08:13

## 2023-01-06 RX ADMIN — Medication 81 MILLIGRAM(S): at 10:40

## 2023-01-06 RX ADMIN — ATORVASTATIN CALCIUM 80 MILLIGRAM(S): 80 TABLET, FILM COATED ORAL at 21:17

## 2023-01-06 RX ADMIN — Medication 10 MILLIGRAM(S): at 08:13

## 2023-01-06 RX ADMIN — Medication 2 MILLIGRAM(S): at 21:19

## 2023-01-06 RX ADMIN — Medication 3 MILLILITER(S): at 05:17

## 2023-01-06 RX ADMIN — CHLORHEXIDINE GLUCONATE 15 MILLILITER(S): 213 SOLUTION TOPICAL at 17:19

## 2023-01-06 RX ADMIN — Medication 3 MILLILITER(S): at 22:45

## 2023-01-06 RX ADMIN — Medication 650 MILLIGRAM(S): at 10:39

## 2023-01-06 RX ADMIN — CHLORHEXIDINE GLUCONATE 15 MILLILITER(S): 213 SOLUTION TOPICAL at 05:08

## 2023-01-06 RX ADMIN — Medication 2000 MILLIGRAM(S): at 21:19

## 2023-01-06 RX ADMIN — HEPARIN SODIUM 700 UNIT(S)/HR: 5000 INJECTION INTRAVENOUS; SUBCUTANEOUS at 03:53

## 2023-01-06 RX ADMIN — INSULIN HUMAN 2 UNIT(S)/HR: 100 INJECTION, SOLUTION SUBCUTANEOUS at 01:19

## 2023-01-06 RX ADMIN — Medication 2000 MILLIGRAM(S): at 05:08

## 2023-01-06 RX ADMIN — Medication 650 MILLIGRAM(S): at 11:20

## 2023-01-06 RX ADMIN — INSULIN HUMAN 2 UNIT(S)/HR: 100 INJECTION, SOLUTION SUBCUTANEOUS at 22:09

## 2023-01-06 RX ADMIN — CHLORHEXIDINE GLUCONATE 1 APPLICATION(S): 213 SOLUTION TOPICAL at 10:39

## 2023-01-06 NOTE — PROGRESS NOTE ADULT - ASSESSMENT
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  Worsening neuro exam 12/27 - RUE plegia, worsening bulbar dysfunction; evolution of stroke.   Acute resp failure due to neurologic injury, trach-dependent.  PMH of  DMII, HTN, CAD.  Allergy to iodine.  Anticoagulated on Heparin ggt.  tracheal aspirate MSSA 12/30- PNA  hypernatremia.    NEURO:   q4hr neurochecks  cont ASA 81 mg daily   Heparin ggt for L vert dissection vs thrombus, cont for now per [facility]  MRA T1 fatsat head and neck to eval vert dissection vs thrombus  pain mgt: tylenol and oxy 5 prn  Activity: [x] mobilize as tolerated [] Bedrest [x] PT [x] OT     PULM: trach 1/3   bed to chair position, PT/OT  duonebs    SpO2>92%  SBT as tolerated    CV:  SBP goal <160  lipitor  ASA    RENAL: monitor I/O  replete lytes prn  FWF 350mg q4h, D5 at 75cc/h, cont; monitor BMP  appreciate nephro recs for hypernatremia    GI:   PEG 1/3, on TF  GI prophylaxis [x] PPI  rectal tube    ENDO:   Goal euglycemia (-180)  cont Insulin ggt for now, plan to wean D5W in am - will switch Insulin accordingly  ISS    HEME/ONC:  VTE prophylaxis: [x] SCDs [x] chemoprophylaxis      ID: on ancef ( 12/30- 1/7)- MSSA sputum

## 2023-01-06 NOTE — PROGRESS NOTE ADULT - ASSESSMENT
The patient is a 77 year old male with PMH of DM, HTN and CAD who was transferred from [facility] for acute L pontine CVA; of note, outside window for tPA. Hospital course is notable for acute hypoxic respiratory failure secondary to inability to manage oral secretions s/p intubation on 12/28, followed by EGD and PEG placement on 01/03/23. Nephrology is managing hypernatremia.      Hypernatremia, improving  -Continue free water flushes and D5W  - Tube feeds    HTN  BP at goal  Continue current regimen    - Nephrology consult prn

## 2023-01-06 NOTE — PROGRESS NOTE ADULT - ASSESSMENT
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  Worsening neuro exam 12/27 - RUE plegia, worsening bulbar dysfunction; evolution of stroke.   Acute resp failure due to neurologic injury, trach-dependent.  PMH of  DMII, HTN, CAD.  Allergy to iodine.  Anticoagulated on Heparin ggt.  tracheal aspirate MSSA 12/30- PNA  hypernatremia.    NEURO:   q4hr neurochecks  cont ASA 81 mg daily   Heparin ggt for L vert dissection vs thrombus, cont for now per [facility]  MRA T1 fatsat head and neck to eval vert dissection vs thrombus  pain mgt: tylenol and oxy 5 prn  Activity: [x] mobilize as tolerated [] Bedrest [x] PT [x] OT     PULM: trach 1/3   bed to chair position, PT/OT  duonebs    SpO2>92%  SBT as tolerated    CV:  SBP goal <160  Lipitor  Restarted Home atenolol 50, Lisinopril 10  ASA  PRN Hydralazine and Labetalol    RENAL: monitor I/O  replete lytes prn  FWF 350mg q4h, D5 at 75cc/h, cont; monitor BMP  appreciate nephro recs for hypernatremia    GI:   PEG 1/3, on TF  GI prophylaxis [x] PPI  rectal tube    ENDO:   Goal euglycemia (-180)  cont Insulin ggt for now, plan to wean D5W in am - will switch Insulin accordingly  ISS    HEME/ONC:  VTE prophylaxis: [x] SCDs [x] chemoprophylaxis      ID: on ancef ( 12/30- 1/7)- MSSA sputum

## 2023-01-06 NOTE — PROGRESS NOTE ADULT - SUBJECTIVE AND OBJECTIVE BOX
HPI:  HPI:  78 y/o male with PMH of DM-2, HTN, CAD was transferred from [place] for stroke. Patient reported right sided weakness and slurred speech along with difficulty swallowing that started yesterday. Was seen at [place] today, code stroke initiated out of window for tPA. CT head: no acute finding. MRI: acute infarct with left debby. MRA head/neck: Left vertebral artery segmental stenoses and T1 hyperintensity suggesting foci of dissection and/or thrombus. As per resident, neurology from [facility] was consulted and accepted patient for further evaluation. Patient said he is upset about his condition, noted discomfort in his head otherwise no chest pain, shortness of breath, palpitation, fever, chills, nausea, vomiting, abdominal pain, change in bowel/urinary habit.  (26 Dec 2022 22:58)        INTERVAL HPI/OVERNIGHT EVENTS:  POD#  Hospital Day#    77y Male s/p __ seen lying comfortably in bed. Tolerating diet. Passing gas/BM. Voiding. Dickson in with __ clear urine. Denies headache, weakness, numbness, n/v/d, fevers, chills, chest pain, SOB.       Vital Signs Last 24 Hrs  T(C): 37.1 (2023 20:00), Max: 37.1 (2023 04:04)  T(F): 98.7 (2023 20:00), Max: 98.8 (2023 16:06)  HR: 88 (2023 23:12) (64 - 91)  BP: 152/61 (2023 23:00) (120/60 - 172/71)  BP(mean): 86 (2023 23:00) (76 - 109)  RR: 33 (2023 23:12) (10 - 33)  SpO2: 99% (2023 23:12) (94% - 100%)    Parameters below as of 2023 22:47  Patient On (Oxygen Delivery Method): ventilator    O2 Concentration (%): 40      PHYSICAL EXAM:  GENERAL: NAD, well-groomed, well-developed  HEAD:  Atraumatic, normocephalic  DRAINS:   WOUND: Dressing clean dry intact  MENTAL STATUS: AAO x3; Awake/Comatose; Opens eyes spontaneously/to voice/to light touch/to noxious stimuli; Appropriately conversant without aphasia/Nonverbal; following simple commands/mimicking/not following commands  CRANIAL NERVES: PERRL; EOMI; corneals intact b/l; Dolls sign positive; blinks to threat b/l; no facial asymmetry; facial sensation grossly intact to light touch b/l;  tongue midline; palate rises symmetrically; gag reflex intact  MOTOR: strength 5/5 B/L upper and lower extremities; sensation grossly intact all extremities; DTRs 2+ intact and symmetric; negative Atkinson's b/l; negative clonus b/l  CHEST/LUNG: Clear to auscultation bilaterally; no rales, rhonchi, wheezing, or rubs  HEART: +S1/+S2; Regular rate and rhythm; no murmurs, rubs, or gallops  ABDOMEN: Soft, nontender, nondistended; bowel sounds present all four quadrants  EXTREMITIES:  2+ peripheral pulses, no clubbing, cyanosis, or edema  SKIN: Warm, dry; no rashes or lesions      LABS:                        10.6   9.09  )-----------( 219      ( 2023 03:20 )             33.6     01-06    143  |  109<H>  |  25.4<H>  ----------------------------<  199<H>  4.9   |  29.0  |  0.98    Ca    8.0<L>      2023 15:25  Phos  3.5     01-06  Mg     2.3     -06    TPro  5.8<L>  /  Alb  2.1<L>  /  TBili  <0.2<L>  /  DBili  <0.1  /  AST  55<H>  /  ALT  14  /  AlkPhos  63  01-05    PT/INR - ( 2023 19:20 )   PT: 13.6 sec;   INR: 1.17 ratio         PTT - ( 2023 09:10 )  PTT:67.5 sec  Urinalysis Basic - ( 2023 17:40 )    Color: Yellow / Appearance: Clear / S.015 / pH: x  Gluc: x / Ketone: Trace  / Bili: Negative / Urobili: Negative mg/dL   Blood: x / Protein: Negative / Nitrite: Negative   Leuk Esterase: Negative / RBC: 0-2 /HPF / WBC 0-2 /HPF   Sq Epi: x / Non Sq Epi: Occasional / Bacteria: Occasional         @ 07:  -   @ 07:00  --------------------------------------------------------  IN: 5162 mL / OUT: 2300 mL / NET: 2862 mL     @ 07: @ 23:28  --------------------------------------------------------  IN: 3701 mL / OUT: 2000 mL / NET: 1701 mL        RADIOLOGY & ADDITIONAL TESTS:   HPI:  76 y/o male with PMH of DM-2, HTN, CAD was transferred from [place] for stroke. Patient reported right sided weakness and slurred speech along with difficulty swallowing that started yesterday. Was seen at [place] today, code stroke initiated out of window for tPA. CT head: no acute finding. MRI: acute infarct with left debby. MRA head/neck: Left vertebral artery segmental stenoses and T1 hyperintensity suggesting foci of dissection and/or thrombus. As per resident, neurology from [facility] was consulted and accepted patient for further evaluation. Patient said he is upset about his condition, noted discomfort in his head otherwise no chest pain, shortness of breath, palpitation, fever, chills, nausea, vomiting, abdominal pain, change in bowel/urinary habit.  (26 Dec 2022 22:58)    INTERVAL HPI: None   OVERNIGHT EVENTS: None    77y Male seen lying comfortably in bed s/p trach and peg, no acute events    Vital Signs Last 24 Hrs  T(C): 37.1 (2023 20:00), Max: 37.1 (2023 04:04)  T(F): 98.7 (2023 20:00), Max: 98.8 (2023 16:06)  HR: 88 (2023 23:12) (64 - 91)  BP: 152/61 (2023 23:00) (120/60 - 172/71)  BP(mean): 86 (2023 23:00) (76 - 109)  RR: 33 (2023 23:12) (10 - 33)  SpO2: 99% (2023 23:12) (94% - 100%)    Parameters below as of 2023 22:47  Patient On (Oxygen Delivery Method): ventilator    O2 Concentration (%): 40    PHYSICAL EXAM:  General: Calm, cooperative.  Neuro:  -Mental status- following commands, EO spont  -CN- PERRL 3mm, tongue midline, R facial droop  R gaze pref  +weak cough/gag  flaccid RUE and RLE  LUE and LLE 5/5  diminished sensation on R    CV: RRR  Pulm: coarse breath sounds  Abd: Soft, nontender, nondistended  Ext: no noted edema in lower ext  Skin: warm, dry    LABS:                        10.6   9.09  )-----------( 219      ( 2023 03:20 )             33.6         143  |  109<H>  |  25.4<H>  ----------------------------<  199<H>  4.9   |  29.0  |  0.98    Ca    8.0<L>      2023 15:25  Phos  3.5       Mg     2.3         TPro  5.8<L>  /  Alb  2.1<L>  /  TBili  <0.2<L>  /  DBili  <0.1  /  AST  55<H>  /  ALT  14  /  AlkPhos  63      PT/INR - ( 2023 19:20 )   PT: 13.6 sec;   INR: 1.17 ratio         PTT - ( 2023 09:10 )  PTT:67.5 sec  Urinalysis Basic - ( 2023 17:40 )    Color: Yellow / Appearance: Clear / S.015 / pH: x  Gluc: x / Ketone: Trace  / Bili: Negative / Urobili: Negative mg/dL   Blood: x / Protein: Negative / Nitrite: Negative   Leuk Esterase: Negative / RBC: 0-2 /HPF / WBC 0-2 /HPF   Sq Epi: x / Non Sq Epi: Occasional / Bacteria: Occasional     @ 07: @ 07:00  --------------------------------------------------------  IN: 5162 mL / OUT: 2300 mL / NET: 2862 mL     @ 07: @ 23:28  --------------------------------------------------------  IN: 3701 mL / OUT: 2000 mL / NET: 1701 mL    RADIOLOGY & ADDITIONAL TESTS:  < from: MR Angio Neck No Cont (23 @ 13:15) >  IMPRESSION:    Brain MRI: Area of restricted diffusion in the left debby suggesting   acute/subacute infarct, increased in size compared to prior exam.    Brain MRA: Minimal flow related signal in the left vertebral artery V4   segment.    Neck MRA: On axial T1 fat sat sequence, there is abnormal T1   hyperintensity in the left cervical vertebral artery raising suspicion   for dissection or occlusion. MRAneck demonstrates minimal flow related   signal in the left cervical vertebral artery.    --- End of Report ---        UMM REDMOND MD; Attending Radiologist  This document has been electronically signed. 2023  1:50PM    < end of copied text >  < from: MR Angio Head No Cont (23 @ 13:15) >  IMPRESSION:    Brain MRI: Area of restricted diffusion in the left debby suggesting   acute/subacute infarct, increased in size compared to prior exam.    Brain MRA: Minimal flow related signal in the left vertebral artery V4   segment.    Neck MRA: On axial T1 fat sat sequence, there is abnormal T1   hyperintensity in the left cervical vertebral artery raising suspicion   for dissection or occlusion. MRAneck demonstrates minimal flow related   signal in the left cervical vertebral artery.    --- End of Report ---      UMM REDMOND MD; Attending Radiologist  This document has been electronically signed. 2023  1:50PM    < end of copied text >

## 2023-01-06 NOTE — PROGRESS NOTE ADULT - ASSESSMENT
76 yo M with PMH HTN, DM, CAD.  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 Worsening neuro exam 12/27 - RUE plegia, worsening bulbar dysfunction.   Acute resp failure due to neurologic injury, required intubation 12/27 for airway protection.    Thoracic surgery consulted for trach and peg eval.   1/3 S/p trach & PEG   1/4 surgicel d/c  pending suture removal

## 2023-01-06 NOTE — PROGRESS NOTE ADULT - SUBJECTIVE AND OBJECTIVE BOX
[facility] DIVISION OF KIDNEY DISEASES AND HYPERTENSION -- FOLLOW UP NOTE  --------------------------------------------------------------------------------  Chief Complaint: hypernatremia    24 hour events/subjective:  pt seen and examined in icu  no acute event noted      PAST HISTORY  --------------------------------------------------------------------------------  No significant changes to PMH, PSH, FHx, SHx, unless otherwise noted    ALLERGIES & MEDICATIONS  --------------------------------------------------------------------------------  Allergies    iodine (Anaphylaxis (Mod to Severe))    Intolerances      Standing Inpatient Medications  albuterol/ipratropium for Nebulization 3 milliLiter(s) Nebulizer every 6 hours  aspirin  chewable 81 milliGRAM(s) Oral daily  atenolol  Tablet 50 milliGRAM(s) Oral daily  atorvastatin 80 milliGRAM(s) Oral at bedtime  ceFAZolin  Injectable. 2000 milliGRAM(s) IV Push every 8 hours  chlorhexidine 0.12% Liquid 15 milliLiter(s) Oral Mucosa every 12 hours  chlorhexidine 2% Cloths 1 Application(s) Topical daily  coronavirus bivalent (EUA) Booster Vaccine (PFIZER) 0.3 milliLiter(s) IntraMuscular once  dextrose 5%. 1000 milliLiter(s) IV Continuous <Continuous>  doxazosin 2 milliGRAM(s) Oral at bedtime  heparin  Infusion. 700 Unit(s)/Hr IV Continuous <Continuous>  influenza  Vaccine (HIGH DOSE) 0.7 milliLiter(s) IntraMuscular once  insulin lispro (ADMELOG) corrective regimen sliding scale   SubCutaneous three times a day before meals  insulin regular Infusion 2 Unit(s)/Hr IV Continuous <Continuous>  lisinopril 10 milliGRAM(s) Oral daily  loteprednol 0.5% Ophthalmic Suspension 1 Drop(s) Right EYE daily  pantoprazole   Suspension 40 milliGRAM(s) Oral daily  prednisoLONE acetate 1% Suspension 1 Drop(s) Left EYE daily    PRN Inpatient Medications  acetaminophen     Tablet .. 650 milliGRAM(s) Oral every 6 hours PRN  ALPRAZolam 0.5 milliGRAM(s) Oral every 12 hours PRN  aluminum hydroxide/magnesium hydroxide/simethicone Suspension 30 milliLiter(s) Oral every 4 hours PRN  hydrALAZINE Injectable 10 milliGRAM(s) IV Push every 2 hours PRN  labetalol Injectable 10 milliGRAM(s) IV Push every 2 hours PRN  melatonin 3 milliGRAM(s) Oral at bedtime PRN  ondansetron Injectable 4 milliGRAM(s) IV Push every 8 hours PRN      REVIEW OF SYSTEMS  --------------------------------------------------------------------------------  unable to obtain    VITALS/PHYSICAL EXAM  --------------------------------------------------------------------------------  T(C): 36.9 (01-06-23 @ 11:10), Max: 37.6 (01-05-23 @ 15:43)  HR: 77 (01-06-23 @ 11:10) (64 - 98)  BP: 141/69 (01-06-23 @ 11:00) (129/71 - 172/71)  RR: --  SpO2: 99% (01-06-23 @ 11:10) (96% - 100%)  Wt(kg): --        01-05-23 @ 07:01  -  01-06-23 @ 07:00  --------------------------------------------------------  IN: 5162 mL / OUT: 2300 mL / NET: 2862 mL    01-06-23 @ 07:01  -  01-06-23 @ 13:15  --------------------------------------------------------  IN: 946 mL / OUT: 200 mL / NET: 746 mL      Physical Exam:  	  Gen: nad  	HEENT: tracg to vent   	Pulm: mechanical bs  	CV: RRR, S1S2; no rub  	Abd: +BS, soft, nontender/nondistended, PEG  	: niall  	UE: Warm, no edema  	LE: Warm, ; no edema  	Neuro: awake  	Skin: Warm  	      LABS/STUDIES  --------------------------------------------------------------------------------              10.6   9.09  >-----------<  219      [01-06-23 @ 03:20]              33.6     144  |  108  |  28.6  ----------------------------<  210      [01-06-23 @ 03:20]  3.6   |  27.0  |  1.04        Ca     7.9     [01-06-23 @ 03:20]      Mg     2.3     [01-06-23 @ 03:20]      Phos  3.5     [01-06-23 @ 03:20]    TPro  5.8  /  Alb  2.1  /  TBili  <0.2  /  DBili  <0.1  /  AST  55  /  ALT  14  /  AlkPhos  63  [01-05-23 @ 13:50]    PT/INR: PT 13.6 , INR 1.17       [01-05-23 @ 19:20]  PTT: 67.5       [01-06-23 @ 09:10]          [01-05-23 @ 13:50]  Serum Osmolality 320      [01-06-23 @ 03:20]    Creatinine Trend:  SCr 1.04 [01-06 @ 03:20]  SCr 1.22 [01-05 @ 21:10]  SCr 1.22 [01-05 @ 13:50]  SCr 1.26 [01-05 @ 07:05]  SCr 1.15 [01-04 @ 17:55]    Urinalysis - [01-05-23 @ 17:40]      Color Yellow / Appearance Clear / SG 1.015 / pH 5.0      Gluc 1000 / Ketone Trace  / Bili Negative / Urobili Negative       Blood Moderate / Protein Negative / Leuk Est Negative / Nitrite Negative      RBC 0-2 / WBC 0-2 / Hyaline  / Gran  / Sq Epi  / Non Sq Epi Occasional / Bacteria Occasional    Urine Creatinine 76      [01-05-23 @ 23:50]  Urine Sodium <30      [01-05-23 @ 23:50]  Urine Urea Nitrogen 1045      [01-05-23 @ 23:50]  Urine Chloride <27      [01-05-23 @ 23:50]  Urine Osmolality 575      [01-05-23 @ 23:50]    TSH 0.20      [12-28-22 @ 06:50]  Lipid: chol --, , HDL --, LDL --      [12-29-22 @ 03:17]    HCV 0.16, Nonreact      [12-27-22 @ 06:20]

## 2023-01-06 NOTE — PROGRESS NOTE ADULT - SUBJECTIVE AND OBJECTIVE BOX
SUBJECTIVE   not able to offer at this time   without issues     INTERIM HISTORY SIGNIFICANT FOR   without acute thoracic surgery issues   tolerating tube feeds   minimal trach secretions     Patient is a 77y old  Male who presents with a chief complaint of Acute stroke (05 Jan 2023 20:11)    HPI:  76 y/o male with PMH of DM-2, HTN, CAD was transferred from [place] for stroke. Patient reported right sided weakness and slurred speech along with difficulty swallowing that started yesterday. Was seen at [place] today, code stroke initiated out of window for tPA. CT head: no acute finding. MRI: acute infarct with left debby. MRA head/neck: Left vertebral artery segmental stenoses and T1 hyperintensity suggesting foci of dissection and/or thrombus. As per resident, neurology from [facility] was consulted and accepted patient for further evaluation. Patient said he is upset about his condition, noted discomfort in his head otherwise no chest pain, shortness of breath, palpitation, fever, chills, nausea, vomiting, abdominal pain, change in bowel/urinary habit.  (26 Dec 2022 22:58)    OBJECTIVE  PAST MEDICAL & SURGICAL HISTORY:  HTN (hypertension)      CAD (coronary artery disease)      DM (diabetes mellitus)        iodine (Anaphylaxis (Mod to Severe))    Home Medications:  Aspirin Low Dose 81 mg oral tablet, chewable: 1 tab(s) orally once a day (27 Dec 2022 13:15)  atenolol 50 mg oral tablet: 1 tab(s) orally once a day (27 Dec 2022 13:15)  atorvastatin 40 mg oral tablet: 1 tab(s) orally once a day (27 Dec 2022 13:15)  Jardiance 25 mg oral tablet: 1 tab(s) orally once a day (in the morning) (27 Dec 2022 13:15)  lisinopril 10 mg oral tablet: 1 tab(s) orally once a day (27 Dec 2022 13:15)  metFORMIN 1000 mg oral tablet: 1 tab(s) orally 2 times a day (27 Dec 2022 13:15)  Plavix 75 mg oral tablet: 1 tab(s) orally once a day (27 Dec 2022 13:15)    VITALS  Currently in sinus rhythm with vitals as below  ICU Vital Signs Last 24 Hrs  T(C): 37.1 (06 Jan 2023 07:40), Max: 37.6 (05 Jan 2023 15:43)  T(F): 98.7 (06 Jan 2023 07:40), Max: 99.6 (05 Jan 2023 15:43)  HR: 77 (06 Jan 2023 11:10) (64 - 106)  BP: 141/69 (06 Jan 2023 11:00) (129/71 - 172/71)  BP(mean): 90 (06 Jan 2023 11:00) (84 - 100)  ABP: --  ABP(mean): --  RR: --  SpO2: 99% (06 Jan 2023 11:10) (96% - 100%)    O2 Parameters below as of 06 Jan 2023 10:00  Patient On (Oxygen Delivery Method): nasal cannula    O2 Concentration (%): 3      Adult Advanced Hemodynamics Last 24 Hrs  CVP(mm Hg): --  CVP(cm H2O): --  CO: --  CI: --  PA: --  PA(mean): --  PCWP: --  SVR: --  SVRI: --  PVR: --  PVRI: --  LABS                        10.6   9.09  )-----------( 219      ( 06 Jan 2023 03:20 )             33.6     Culture - Sputum (collected 05 Jan 2023 14:30)  Source: Trach Asp Tracheal Aspirate  Gram Stain (05 Jan 2023 23:14):    Rare polymorphonuclear leukocytes per low power field    Few Squamous epithelial cells per low power field    No organisms seen per oil power field    PT/INR - ( 05 Jan 2023 19:20 )   PT: 13.6 sec;   INR: 1.17 ratio         PTT - ( 06 Jan 2023 09:10 )  PTT:67.5 [id number]    144  |  108  |  28.6<H>  ----------------------------<  210<H>  3.6   |  27.0  |  1.04    Ca    7.9<L>      06 Jan 2023 03:20  Phos  3.5     01-06  Mg     2.3     01-06    TPro  5.8<L>  /  Alb  2.1<L>  /  TBili  <0.2<L>  /  DBili  <0.1  /  AST  55<H>  /  ALT  14  /  AlkPhos  63  01-05  CAPILLARY BLOOD GLUCOSE      POCT Blood Glucose.: 180 mg/dL (06 Jan 2023 10:32)  CARDIAC MARKERS ( 05 Jan 2023 13:50 )  x     / x     / 322 U/L / x     / 1.7 ng/mL        Mode: AC/ CMV (Assist Control/ Continuous Mandatory Ventilation)  RR (machine): 16  TV (machine): 400  FiO2: 40  PEEP: 6  ITime: 0.8  MAP: 14  PIP: 24    IN/OUT    01-05-23 @ 07:01  -  01-06-23 @ 07:00  --------------------------------------------------------  IN: 5162 mL / OUT: 2300 mL / NET: 2862 mL    01-06-23 @ 07:01  -  01-06-23 @ 11:22  --------------------------------------------------------  IN: 946 mL / OUT: 200 mL / NET: 746 mL      IMAGING  personally reviewed imaging   Xray Chest 1 View-PORTABLE IMMEDIATE:   ACC: [id number] EXAM:  XR CHEST PORTABLE IMMED 1V                          PROCEDURE DATE:  01/05/2023          INTERPRETATION:  AP semierect chest on January 5, 2023 at 2:24 PM.   Patient has fever.    Heart magnified by technique. A tracheostomy has replaced endotracheal   tube present on January 2. NG tube removed.    On January 2 there was a left base infiltrate which suggests some   improvement.    IMPRESSION: As above.    --- End of Report ---            LISA SCHILLING MD; Attending Radiologist  This document has been electronically signed. Jan 5 2023  3:20PM (01-05-23 @ 15:19)    CURRENT MEDICATIONS  MEDICATIONS  (STANDING):  albuterol/ipratropium for Nebulization 3 milliLiter(s) Nebulizer every 6 hours  aspirin  chewable 81 milliGRAM(s) Oral daily  atorvastatin 80 milliGRAM(s) Oral at bedtime  ceFAZolin  Injectable. 2000 milliGRAM(s) IV Push every 8 hours  chlorhexidine 0.12% Liquid 15 milliLiter(s) Oral Mucosa every 12 hours  chlorhexidine 2% Cloths 1 Application(s) Topical daily  coronavirus bivalent (EUA) Booster Vaccine (PFIZER) 0.3 milliLiter(s) IntraMuscular once  dextrose 5%. 1000 milliLiter(s) (75 mL/Hr) IV Continuous <Continuous>  doxazosin 2 milliGRAM(s) Oral at bedtime  heparin  Infusion. 700 Unit(s)/Hr (7 mL/Hr) IV Continuous <Continuous>  influenza  Vaccine (HIGH DOSE) 0.7 milliLiter(s) IntraMuscular once  insulin lispro (ADMELOG) corrective regimen sliding scale   SubCutaneous three times a day before meals  insulin regular Infusion 2 Unit(s)/Hr (2 mL/Hr) IV Continuous <Continuous>  loteprednol 0.5% Ophthalmic Suspension 1 Drop(s) Right EYE daily  pantoprazole   Suspension 40 milliGRAM(s) Oral daily  prednisoLONE acetate 1% Suspension 1 Drop(s) Left EYE daily    MEDICATIONS  (PRN):  acetaminophen     Tablet .. 650 milliGRAM(s) Oral every 6 hours PRN Temp greater or equal to 38C (100.4F), Mild Pain (1 - 3)  ALPRAZolam 0.5 milliGRAM(s) Oral every 12 hours PRN for agitation  aluminum hydroxide/magnesium hydroxide/simethicone Suspension 30 milliLiter(s) Oral every 4 hours PRN Dyspepsia  hydrALAZINE Injectable 10 milliGRAM(s) IV Push every 2 hours PRN SBP >160  labetalol Injectable 10 milliGRAM(s) IV Push every 2 hours PRN SBP >160  melatonin 3 milliGRAM(s) Oral at bedtime PRN Insomnia  ondansetron Injectable 4 milliGRAM(s) IV Push every 8 hours PRN Nausea and/or Vomiting

## 2023-01-06 NOTE — PROGRESS NOTE ADULT - SUBJECTIVE AND OBJECTIVE BOX
Chief complaint:   Patient is a 77y old  Male who presents with a chief complaint of Acute stroke (03 Jan 2023 16:27)    HPI:  76 y/o male with PMH of DM-2, HTN, CAD was transferred from [place] for stroke. Patient reported right sided weakness and slurred speech along with difficulty swallowing that started yesterday. Was seen at [place] today, code stroke initiated out of window for tPA. CT head: no acute finding. MRI: acute infarct with left debby. MRA head/neck: Left vertebral artery segmental stenoses and T1 hyperintensity suggesting foci of dissection and/or thrombus. As per resident, neurology from [facility] was consulted and accepted patient for further evaluation. Patient said he is upset about his condition, noted discomfort in his head otherwise no chest pain, shortness of breath, palpitation, fever, chills, nausea, vomiting, abdominal pain, change in bowel/urinary habit.  (26 Dec 2022 22:58)    24hr EVENTS:  s/p trach/peg, no acute events.    ICU Vital Signs Last 24 Hrs  T(C): 37.1 (06 Jan 2023 16:06), Max: 37.1 (06 Jan 2023 04:04)  T(F): 98.8 (06 Jan 2023 16:06), Max: 98.8 (06 Jan 2023 16:06)  HR: 78 (06 Jan 2023 16:42) (64 - 87)  BP: 156/70 (06 Jan 2023 16:42) (120/60 - 172/71)  BP(mean): 95 (06 Jan 2023 16:42) (76 - 100)  ABP: --  ABP(mean): --  RR: 28 (06 Jan 2023 16:42) (10 - 28)  SpO2: 99% (06 Jan 2023 16:42) (94% - 100%)    O2 Parameters below as of 06 Jan 2023 15:00  Patient On (Oxygen Delivery Method): ventilator    VS, labs, imaging reviewed.      PHYSICAL EXAM: stable  General: Calm, cooperative.  Neuro:  -Mental status- following commands, EO spont  -CN- PERRL 3mm, tongue midline, R facial droop  R gaze pref  +weak cough/gag  flaccid RUE and RLE  LUE and LLE 5/5  diminished sensation on R    CV: RRR  Pulm: coarse breath sounds  Abd: Soft, nontender, nondistended  Ext: no noted edema in lower ext  Skin: warm, dry

## 2023-01-07 LAB
-  AMIKACIN: SIGNIFICANT CHANGE UP
-  AMOXICILLIN/CLAVULANIC ACID: SIGNIFICANT CHANGE UP
-  AMPICILLIN/SULBACTAM: SIGNIFICANT CHANGE UP
-  AMPICILLIN: SIGNIFICANT CHANGE UP
-  AZTREONAM: SIGNIFICANT CHANGE UP
-  CEFAZOLIN: SIGNIFICANT CHANGE UP
-  CEFEPIME: SIGNIFICANT CHANGE UP
-  CEFOXITIN: SIGNIFICANT CHANGE UP
-  CEFTAZIDIME/AVIBACTAM: SIGNIFICANT CHANGE UP
-  CEFTOLOZANE/TAZOBACTAM: SIGNIFICANT CHANGE UP
-  CEFTRIAXONE: SIGNIFICANT CHANGE UP
-  CIPROFLOXACIN: SIGNIFICANT CHANGE UP
-  ERTAPENEM: SIGNIFICANT CHANGE UP
-  GENTAMICIN: SIGNIFICANT CHANGE UP
-  IMIPENEM: SIGNIFICANT CHANGE UP
-  LEVOFLOXACIN: SIGNIFICANT CHANGE UP
-  MEROPENEM: SIGNIFICANT CHANGE UP
-  PIPERACILLIN/TAZOBACTAM: SIGNIFICANT CHANGE UP
-  TOBRAMYCIN: SIGNIFICANT CHANGE UP
-  TRIMETHOPRIM/SULFAMETHOXAZOLE: SIGNIFICANT CHANGE UP
ANION GAP SERPL CALC-SCNC: 7 MMOL/L — SIGNIFICANT CHANGE UP (ref 5–17)
APTT BLD: 61.1 SEC — HIGH (ref 27.5–35.5)
BUN SERPL-MCNC: 22 MG/DL — HIGH (ref 8–20)
CALCIUM SERPL-MCNC: 8.1 MG/DL — LOW (ref 8.4–10.5)
CHLORIDE SERPL-SCNC: 105 MMOL/L — SIGNIFICANT CHANGE UP (ref 96–108)
CO2 SERPL-SCNC: 28 MMOL/L — SIGNIFICANT CHANGE UP (ref 22–29)
CREAT SERPL-MCNC: 0.94 MG/DL — SIGNIFICANT CHANGE UP (ref 0.5–1.3)
CULTURE RESULTS: SIGNIFICANT CHANGE UP
EGFR: 83 ML/MIN/1.73M2 — SIGNIFICANT CHANGE UP
GLUCOSE BLDC GLUCOMTR-MCNC: 161 MG/DL — HIGH (ref 70–99)
GLUCOSE BLDC GLUCOMTR-MCNC: 163 MG/DL — HIGH (ref 70–99)
GLUCOSE BLDC GLUCOMTR-MCNC: 190 MG/DL — HIGH (ref 70–99)
GLUCOSE BLDC GLUCOMTR-MCNC: 194 MG/DL — HIGH (ref 70–99)
GLUCOSE BLDC GLUCOMTR-MCNC: 197 MG/DL — HIGH (ref 70–99)
GLUCOSE BLDC GLUCOMTR-MCNC: 205 MG/DL — HIGH (ref 70–99)
GLUCOSE BLDC GLUCOMTR-MCNC: 207 MG/DL — HIGH (ref 70–99)
GLUCOSE BLDC GLUCOMTR-MCNC: 214 MG/DL — HIGH (ref 70–99)
GLUCOSE BLDC GLUCOMTR-MCNC: 224 MG/DL — HIGH (ref 70–99)
GLUCOSE BLDC GLUCOMTR-MCNC: 228 MG/DL — HIGH (ref 70–99)
GLUCOSE BLDC GLUCOMTR-MCNC: 242 MG/DL — HIGH (ref 70–99)
GLUCOSE BLDC GLUCOMTR-MCNC: 242 MG/DL — HIGH (ref 70–99)
GLUCOSE BLDC GLUCOMTR-MCNC: 244 MG/DL — HIGH (ref 70–99)
GLUCOSE SERPL-MCNC: 227 MG/DL — HIGH (ref 70–99)
HCT VFR BLD CALC: 35.1 % — LOW (ref 39–50)
HGB BLD-MCNC: 11.1 G/DL — LOW (ref 13–17)
MAGNESIUM SERPL-MCNC: 2.2 MG/DL — SIGNIFICANT CHANGE UP (ref 1.6–2.6)
MCHC RBC-ENTMCNC: 29.1 PG — SIGNIFICANT CHANGE UP (ref 27–34)
MCHC RBC-ENTMCNC: 31.6 GM/DL — LOW (ref 32–36)
MCV RBC AUTO: 91.9 FL — SIGNIFICANT CHANGE UP (ref 80–100)
METHOD TYPE: SIGNIFICANT CHANGE UP
ORGANISM # SPEC MICROSCOPIC CNT: SIGNIFICANT CHANGE UP
ORGANISM # SPEC MICROSCOPIC CNT: SIGNIFICANT CHANGE UP
PHOSPHATE SERPL-MCNC: 2.7 MG/DL — SIGNIFICANT CHANGE UP (ref 2.4–4.7)
PLATELET # BLD AUTO: 253 K/UL — SIGNIFICANT CHANGE UP (ref 150–400)
POTASSIUM SERPL-MCNC: 4.6 MMOL/L — SIGNIFICANT CHANGE UP (ref 3.5–5.3)
POTASSIUM SERPL-SCNC: 4.6 MMOL/L — SIGNIFICANT CHANGE UP (ref 3.5–5.3)
RBC # BLD: 3.82 M/UL — LOW (ref 4.2–5.8)
RBC # FLD: 13.6 % — SIGNIFICANT CHANGE UP (ref 10.3–14.5)
SODIUM SERPL-SCNC: 140 MMOL/L — SIGNIFICANT CHANGE UP (ref 135–145)
SPECIMEN SOURCE: SIGNIFICANT CHANGE UP
WBC # BLD: 10.82 K/UL — HIGH (ref 3.8–10.5)
WBC # FLD AUTO: 10.82 K/UL — HIGH (ref 3.8–10.5)

## 2023-01-07 PROCEDURE — 99232 SBSQ HOSP IP/OBS MODERATE 35: CPT

## 2023-01-07 PROCEDURE — 93010 ELECTROCARDIOGRAM REPORT: CPT

## 2023-01-07 PROCEDURE — 99233 SBSQ HOSP IP/OBS HIGH 50: CPT

## 2023-01-07 RX ORDER — CEFEPIME 1 G/1
INJECTION, POWDER, FOR SOLUTION INTRAMUSCULAR; INTRAVENOUS
Refills: 0 | Status: DISCONTINUED | OUTPATIENT
Start: 2023-01-07 | End: 2023-01-07

## 2023-01-07 RX ORDER — SODIUM CHLORIDE 9 MG/ML
1000 INJECTION, SOLUTION INTRAVENOUS
Refills: 0 | Status: DISCONTINUED | OUTPATIENT
Start: 2023-01-07 | End: 2023-01-30

## 2023-01-07 RX ORDER — CEFEPIME 1 G/1
2000 INJECTION, POWDER, FOR SOLUTION INTRAMUSCULAR; INTRAVENOUS ONCE
Refills: 0 | Status: COMPLETED | OUTPATIENT
Start: 2023-01-07 | End: 2023-01-07

## 2023-01-07 RX ORDER — DEXTROSE 50 % IN WATER 50 %
15 SYRINGE (ML) INTRAVENOUS ONCE
Refills: 0 | Status: DISCONTINUED | OUTPATIENT
Start: 2023-01-07 | End: 2023-01-30

## 2023-01-07 RX ORDER — DEXTROSE 50 % IN WATER 50 %
12.5 SYRINGE (ML) INTRAVENOUS ONCE
Refills: 0 | Status: DISCONTINUED | OUTPATIENT
Start: 2023-01-07 | End: 2023-01-30

## 2023-01-07 RX ORDER — INSULIN GLARGINE 100 [IU]/ML
20 INJECTION, SOLUTION SUBCUTANEOUS ONCE
Refills: 0 | Status: COMPLETED | OUTPATIENT
Start: 2023-01-07 | End: 2023-01-07

## 2023-01-07 RX ORDER — GLUCAGON INJECTION, SOLUTION 0.5 MG/.1ML
1 INJECTION, SOLUTION SUBCUTANEOUS ONCE
Refills: 0 | Status: DISCONTINUED | OUTPATIENT
Start: 2023-01-07 | End: 2023-01-30

## 2023-01-07 RX ORDER — APIXABAN 2.5 MG/1
5 TABLET, FILM COATED ORAL EVERY 12 HOURS
Refills: 0 | Status: DISCONTINUED | OUTPATIENT
Start: 2023-01-07 | End: 2023-01-09

## 2023-01-07 RX ORDER — DEXTROSE 50 % IN WATER 50 %
25 SYRINGE (ML) INTRAVENOUS ONCE
Refills: 0 | Status: DISCONTINUED | OUTPATIENT
Start: 2023-01-07 | End: 2023-01-30

## 2023-01-07 RX ORDER — CEFEPIME 1 G/1
INJECTION, POWDER, FOR SOLUTION INTRAMUSCULAR; INTRAVENOUS
Refills: 0 | Status: DISCONTINUED | OUTPATIENT
Start: 2023-01-07 | End: 2023-01-14

## 2023-01-07 RX ORDER — INSULIN LISPRO 100/ML
4 VIAL (ML) SUBCUTANEOUS EVERY 6 HOURS
Refills: 0 | Status: DISCONTINUED | OUTPATIENT
Start: 2023-01-07 | End: 2023-01-09

## 2023-01-07 RX ORDER — SIMETHICONE 80 MG/1
80 TABLET, CHEWABLE ORAL EVERY 8 HOURS
Refills: 0 | Status: COMPLETED | OUTPATIENT
Start: 2023-01-07 | End: 2023-01-09

## 2023-01-07 RX ORDER — CEFEPIME 1 G/1
2000 INJECTION, POWDER, FOR SOLUTION INTRAMUSCULAR; INTRAVENOUS EVERY 8 HOURS
Refills: 0 | Status: DISCONTINUED | OUTPATIENT
Start: 2023-01-07 | End: 2023-01-14

## 2023-01-07 RX ORDER — INSULIN GLARGINE 100 [IU]/ML
20 INJECTION, SOLUTION SUBCUTANEOUS EVERY MORNING
Refills: 0 | Status: DISCONTINUED | OUTPATIENT
Start: 2023-01-08 | End: 2023-01-09

## 2023-01-07 RX ADMIN — CHLORHEXIDINE GLUCONATE 1 APPLICATION(S): 213 SOLUTION TOPICAL at 11:01

## 2023-01-07 RX ADMIN — ATENOLOL 50 MILLIGRAM(S): 25 TABLET ORAL at 05:07

## 2023-01-07 RX ADMIN — INSULIN HUMAN 2 UNIT(S)/HR: 100 INJECTION, SOLUTION SUBCUTANEOUS at 03:02

## 2023-01-07 RX ADMIN — Medication 4 UNIT(S): at 10:58

## 2023-01-07 RX ADMIN — SODIUM CHLORIDE 50 MILLILITER(S): 9 INJECTION, SOLUTION INTRAVENOUS at 03:09

## 2023-01-07 RX ADMIN — Medication 2 MILLIGRAM(S): at 22:07

## 2023-01-07 RX ADMIN — APIXABAN 5 MILLIGRAM(S): 2.5 TABLET, FILM COATED ORAL at 14:04

## 2023-01-07 RX ADMIN — Medication 4 UNIT(S): at 23:19

## 2023-01-07 RX ADMIN — ATORVASTATIN CALCIUM 80 MILLIGRAM(S): 80 TABLET, FILM COATED ORAL at 22:08

## 2023-01-07 RX ADMIN — Medication 81 MILLIGRAM(S): at 10:59

## 2023-01-07 RX ADMIN — HEPARIN SODIUM 700 UNIT(S)/HR: 5000 INJECTION INTRAVENOUS; SUBCUTANEOUS at 03:36

## 2023-01-07 RX ADMIN — CHLORHEXIDINE GLUCONATE 15 MILLILITER(S): 213 SOLUTION TOPICAL at 05:06

## 2023-01-07 RX ADMIN — Medication 3 MILLILITER(S): at 09:04

## 2023-01-07 RX ADMIN — CHLORHEXIDINE GLUCONATE 15 MILLILITER(S): 213 SOLUTION TOPICAL at 17:03

## 2023-01-07 RX ADMIN — Medication 650 MILLIGRAM(S): at 10:59

## 2023-01-07 RX ADMIN — CEFEPIME 2000 MILLIGRAM(S): 1 INJECTION, POWDER, FOR SOLUTION INTRAMUSCULAR; INTRAVENOUS at 22:08

## 2023-01-07 RX ADMIN — Medication 650 MILLIGRAM(S): at 23:17

## 2023-01-07 RX ADMIN — CEFEPIME 2000 MILLIGRAM(S): 1 INJECTION, POWDER, FOR SOLUTION INTRAMUSCULAR; INTRAVENOUS at 18:42

## 2023-01-07 RX ADMIN — Medication 4: at 17:02

## 2023-01-07 RX ADMIN — Medication 3 MILLILITER(S): at 05:46

## 2023-01-07 RX ADMIN — Medication 30 MILLILITER(S): at 10:58

## 2023-01-07 RX ADMIN — Medication 650 MILLIGRAM(S): at 11:03

## 2023-01-07 RX ADMIN — PANTOPRAZOLE SODIUM 40 MILLIGRAM(S): 20 TABLET, DELAYED RELEASE ORAL at 10:59

## 2023-01-07 RX ADMIN — SIMETHICONE 80 MILLIGRAM(S): 80 TABLET, CHEWABLE ORAL at 22:08

## 2023-01-07 RX ADMIN — Medication 650 MILLIGRAM(S): at 22:07

## 2023-01-07 RX ADMIN — Medication 4 UNIT(S): at 17:03

## 2023-01-07 RX ADMIN — LISINOPRIL 10 MILLIGRAM(S): 2.5 TABLET ORAL at 05:06

## 2023-01-07 RX ADMIN — Medication 3 MILLILITER(S): at 21:05

## 2023-01-07 RX ADMIN — Medication 2000 MILLIGRAM(S): at 05:06

## 2023-01-07 RX ADMIN — INSULIN GLARGINE 20 UNIT(S): 100 INJECTION, SOLUTION SUBCUTANEOUS at 10:57

## 2023-01-07 RX ADMIN — Medication 3 MILLILITER(S): at 15:38

## 2023-01-07 NOTE — CHART NOTE - NSCHARTNOTEFT_GEN_A_CORE
HPI: 78 y/o male with PMH of DM-2, HTN, CAD was transferred from [place] for stroke. Patient reported right sided weakness and slurred speech along with difficulty swallowing that started yesterday. Was seen at [place] today, code stroke initiated out of window for tPA. CT head: no acute finding. MRI: acute infarct with left debby. MRA head/neck: Left vertebral artery segmental stenoses and T1 hyperintensity suggesting foci of dissection and/or thrombus. As per resident, neurology from [facility] was consulted and accepted patient for further evaluation. Patient said he is upset about his condition, noted discomfort in his head otherwise no chest pain, shortness of breath, palpitation, fever, chills, nausea, vomiting, abdominal pain, change in bowel/urinary habit.  (26 Dec 2022 22:58)    Hospital Course: patient transferred on 12/26 from [place] with stroke, L vert occlusion. Patient arrived to ED on [facility] with concern for airway issues, admitted to NSICU. Patient ended up requiring intubation on 12/28. Patient noted with copious secretions and fevers. Unable to tolerate extubation due to secretions, failed CPAP trials, ct surgery consulted for trach / peg which were placed 1/3. Patient with positive sputum cultures 1/5 morganella morganii started on Cefepime. patient also with rectal tube, abd pain, c. diff ordered.     Vital Signs Last 24 Hrs  T(C): 37 (07 Jan 2023 16:09), Max: 37.7 (07 Jan 2023 04:00)  T(F): 98.6 (07 Jan 2023 16:09), Max: 99.9 (07 Jan 2023 04:00)  HR: 30 (07 Jan 2023 16:39) (30 - 101)  BP: 156/77 (07 Jan 2023 15:00) (92/47 - 169/61)  BP(mean): 101 (07 Jan 2023 15:00) (62 - 109)  RR: 21 (07 Jan 2023 16:09) (20 - 42)  SpO2: 100% (07 Jan 2023 16:39) (95% - 100%)    Parameters below as of 07 Jan 2023 16:39  Patient On (Oxygen Delivery Method): ventilator    PHYSICAL EXAM: stable  General: Calm, cooperative.  Neuro:  -Mental status- following commands, EO spont  -CN- PERRL 3mm, tongue midline, R facial droop  R gaze pref  +weak cough/gag  flaccid RUE and RLE  LUE and LLE 5/5  diminished sensation on R    CV: RRR  Pulm: coarse breath sounds  Abd: Soft, nontender, nondistended  Ext: no noted edema in lower ext  Skin: warm, dry    Plan   Neuro: q4 neuro checks, xanax PRN, melatonin at bedtime. Neurology following recommend full dose AC x3 months for vert thrombus   Cards: -160, on home atenolol, lisinopril, atorvastatin 80    Resp: trach to vent, daily CPAP trials. + pna   GI: PEG with Glucerna at 60/hr with bannitrol. Continue to rectal tube, send c. diff   : voiding,  q4, Cardura 2qhs   Heme: SCDs, Eliquis 5 BID x3 months per neurology, ASA 81   ID: Cefepime 2q8 for pna x7 days   Endo: A1c 8.0, Lantus 20U Ademolog 4q6   Stable for downgrade to vent floor under medicine service Dr. Monterroso

## 2023-01-07 NOTE — PROGRESS NOTE ADULT - ASSESSMENT
78 yo M with acute L pontine CVA, L vert occlusion; admitted 12/26.  Acute resp failure due to neurologic injury, trach-dependent.  PMH of  DMII, HTN, CAD.  Allergy to iodine.  Anticoagulated on Heparin ggt.  Morganella PNA.  Hypernatremia.    NEURO:   q4hr neurochecks  cont ASA 81 mg daily   Heparin ggt for L vert occlusion - switch to Eliquis 5 BID, plan to continue for 3 months per Vascular Neurology  pain mgt: tylenol and oxy 5 prn  Activity: [x] mobilize as tolerated [] Bedrest [x] PT [x] OT     PULM:   OOB to chair, PT/OT  duonebs    SpO2>92%  SBT as tolerated    CV:  SBP goal 100-160  lipitor, ASA    RENAL: monitor I/O  replete lytes prn  FWF 350mg q4h - cont  stop D5; repeat BMP in am  appreciate nephro recs     GI:   PEG 1/3, on TF  GI prophylaxis [x] PPI  rectal tube,   persistent diarrhea with abd distension, complaining of abd pain, elevated WBC, episodes of fever - concern for Cdiff, send specimen    ENDO:   Goal euglycemia (-180)  switch to Lantus/Lispro q6h/ISS regimen, monitor FS    HEME/ONC:  VTE prophylaxis: [x] SCDs [x] chemoprophylaxis  - on therapeutic Eliquis    ID: on Cefepime for Morganella PNA, plan at least  for 7 days     Stable to be downgraded to Telemetry, Medicine and Neurology involvement appreciated

## 2023-01-07 NOTE — PROGRESS NOTE ADULT - SUBJECTIVE AND OBJECTIVE BOX
Subjective: Eyes open, follows commands, appears comfortable, trach to vent      V/S  T(C): 36.9 (01-07-23 @ 08:44), Max: 37.7 (01-07-23 @ 04:00)  HR: 82 (01-07-23 @ 10:00) (69 - 101)  BP: 105/56 (01-07-23 @ 10:00) (92/47 - 169/61)  RR: 34 (01-07-23 @ 10:00) (10 - 42)  SpO2: 95% (01-07-23 @ 10:00) (94% - 100%)  Mode: AC/ CMV (Assist Control/ Continuous Mandatory Ventilation)  RR (machine): 16  TV (machine): 400  FiO2: 40  PEEP: 6  MAP: 15  PIP: 24    I&O's Detail    06 Jan 2023 07:01  -  07 Jan 2023 07:00  --------------------------------------------------------  IN:    dextrose 5%: 1500 mL    Enteral Tube Flush: 200 mL    Free Water: 2100 mL    Heparin Infusion: 168 mL    Insulin: 243 mL    Jevity 1.5: 1440 mL  Total IN: 5651 mL    OUT:    Incontinent per Condom Catheter (mL): 875 mL    Rectal Tube (mL): 1000 mL    Voided (mL): 1100 mL  Total OUT: 2975 mL    Total NET: 2676 mL      07 Jan 2023 07:01  -  07 Jan 2023 11:10  --------------------------------------------------------  IN:    dextrose 5%: 150 mL    Free Water: 350 mL    Heparin Infusion: 21 mL    Insulin: 30 mL    Jevity 1.5: 180 mL  Total IN: 731 mL    OUT:  Total OUT: 0 mL    Total NET: 731 mL          01-06-23 @ 07:01  -  01-07-23 @ 07:00  --------------------------------------------------------  IN: 5651 mL / OUT: 2975 mL / NET: 2676 mL    01-07-23 @ 07:01  -  01-07-23 @ 11:10  --------------------------------------------------------  IN: 731 mL / OUT: 0 mL / NET: 731 mL      MEDICATIONS  (STANDING):  albuterol/ipratropium for Nebulization 3 milliLiter(s) Nebulizer every 6 hours  aspirin  chewable 81 milliGRAM(s) Oral daily  atenolol  Tablet 50 milliGRAM(s) Oral daily  atorvastatin 80 milliGRAM(s) Oral at bedtime  chlorhexidine 0.12% Liquid 15 milliLiter(s) Oral Mucosa every 12 hours  chlorhexidine 2% Cloths 1 Application(s) Topical daily  coronavirus bivalent (EUA) Booster Vaccine (PFIZER) 0.3 milliLiter(s) IntraMuscular once  dextrose 5%. 1000 milliLiter(s) (50 mL/Hr) IV Continuous <Continuous>  dextrose 5%. 1000 milliLiter(s) (100 mL/Hr) IV Continuous <Continuous>  dextrose 50% Injectable 25 Gram(s) IV Push once  dextrose 50% Injectable 12.5 Gram(s) IV Push once  dextrose 50% Injectable 25 Gram(s) IV Push once  doxazosin 2 milliGRAM(s) Oral at bedtime  glucagon  Injectable 1 milliGRAM(s) IntraMuscular once  heparin  Infusion. 700 Unit(s)/Hr (7 mL/Hr) IV Continuous <Continuous>  influenza  Vaccine (HIGH DOSE) 0.7 milliLiter(s) IntraMuscular once  insulin lispro (ADMELOG) corrective regimen sliding scale   SubCutaneous three times a day before meals  insulin lispro Injectable (ADMELOG) 4 Unit(s) SubCutaneous every 6 hours  insulin regular Infusion 2 Unit(s)/Hr (2 mL/Hr) IV Continuous <Continuous>  lisinopril 10 milliGRAM(s) Oral daily  loteprednol 0.5% Ophthalmic Suspension 1 Drop(s) Right EYE daily  pantoprazole   Suspension 40 milliGRAM(s) Oral daily  prednisoLONE acetate 1% Suspension 1 Drop(s) Left EYE daily      01-07    140  |  105  |  22.0<H>  ----------------------------<  227<H>  4.6   |  28.0  |  0.94    Ca    8.1<L>      07 Jan 2023 03:10  Phos  2.7     01-07  Mg     2.2     01-07    TPro  5.8<L>  /  Alb  2.1<L>  /  TBili  <0.2<L>  /  DBili  <0.1  /  AST  55<H>  /  ALT  14  /  AlkPhos  63  01-05                               11.1   10.82 )-----------( 253      ( 07 Jan 2023 03:10 )             35.1        PT/INR - ( 05 Jan 2023 19:20 )   PT: 13.6 sec;   INR: 1.17 ratio         PTT - ( 07 Jan 2023 03:10 )  PTT:61.1 sec         CAPILLARY BLOOD GLUCOSE      POCT Blood Glucose.: 163 mg/dL (07 Jan 2023 10:56)  POCT Blood Glucose.: 161 mg/dL (07 Jan 2023 09:44)  POCT Blood Glucose.: 214 mg/dL (07 Jan 2023 08:41)  POCT Blood Glucose.: 194 mg/dL (07 Jan 2023 06:57)  POCT Blood Glucose.: 207 mg/dL (07 Jan 2023 06:00)  POCT Blood Glucose.: 205 mg/dL (07 Jan 2023 05:04)  POCT Blood Glucose.: 197 mg/dL (07 Jan 2023 04:04)  POCT Blood Glucose.: 228 mg/dL (07 Jan 2023 03:04)  POCT Blood Glucose.: 224 mg/dL (07 Jan 2023 02:08)  POCT Blood Glucose.: 244 mg/dL (07 Jan 2023 01:13)  POCT Blood Glucose.: 242 mg/dL (07 Jan 2023 00:15)  POCT Blood Glucose.: 236 mg/dL (06 Jan 2023 23:12)  POCT Blood Glucose.: 197 mg/dL (06 Jan 2023 22:25)  POCT Blood Glucose.: 229 mg/dL (06 Jan 2023 21:27)  POCT Blood Glucose.: 232 mg/dL (06 Jan 2023 20:26)  POCT Blood Glucose.: 195 mg/dL (06 Jan 2023 19:28)  POCT Blood Glucose.: 203 mg/dL (06 Jan 2023 17:13)  POCT Blood Glucose.: 175 mg/dL (06 Jan 2023 16:16)  POCT Blood Glucose.: 170 mg/dL (06 Jan 2023 15:04)  Physical Exam:       Gen:  Awake, alert   CNS: non focal 	  Neck: trach to vent   RES : clear , no wheezing              CVS: Regular  rhythm. Normal S1/S2  Abd: Soft, non-distended. Bowel sounds present.+ PEG  Skin: No rash.  Ext:  no edema    POCT Blood Glucose.: 189 mg/dL (06 Jan 2023 13:47)    POCT Blood Glucose.: 243 mg/dL (06 Jan 2023 11:45)           CXR:      Physical Exam:                PAST MEDICAL & SURGICAL HISTORY:  HTN (hypertension)      CAD (coronary artery disease)      DM (diabetes mellitus)

## 2023-01-07 NOTE — PROGRESS NOTE ADULT - THIS PATIENT HAS THE FOLLOWING CONDITION(S)/DIAGNOSES ON THIS ADMISSION:
Acute Respiratory Failure
None
None
Encephalopathy
None
None
Encephalopathy
Stroke/None
Acute Respiratory Failure
None
Acute Respiratory Failure
None
Encephalopathy

## 2023-01-07 NOTE — PROGRESS NOTE ADULT - SUBJECTIVE AND OBJECTIVE BOX
ICU transfer    seen in icu, denies any complaints  comfortable on vent    MEDICATIONS  (STANDING):  albuterol/ipratropium for Nebulization 3 milliLiter(s) Nebulizer every 6 hours  apixaban 5 milliGRAM(s) Oral every 12 hours  aspirin  chewable 81 milliGRAM(s) Oral daily  atenolol  Tablet 50 milliGRAM(s) Oral daily  atorvastatin 80 milliGRAM(s) Oral at bedtime  cefepime  Injectable.      cefepime  Injectable. 2000 milliGRAM(s) IV Push once  cefepime  Injectable. 2000 milliGRAM(s) IV Push every 8 hours  chlorhexidine 0.12% Liquid 15 milliLiter(s) Oral Mucosa every 12 hours  chlorhexidine 2% Cloths 1 Application(s) Topical daily  coronavirus bivalent (EUA) Booster Vaccine (PFIZER) 0.3 milliLiter(s) IntraMuscular once  dextrose 5%. 1000 milliLiter(s) (50 mL/Hr) IV Continuous <Continuous>  dextrose 5%. 1000 milliLiter(s) (100 mL/Hr) IV Continuous <Continuous>  dextrose 50% Injectable 25 Gram(s) IV Push once  dextrose 50% Injectable 12.5 Gram(s) IV Push once  dextrose 50% Injectable 25 Gram(s) IV Push once  doxazosin 2 milliGRAM(s) Oral at bedtime  glucagon  Injectable 1 milliGRAM(s) IntraMuscular once  influenza  Vaccine (HIGH DOSE) 0.7 milliLiter(s) IntraMuscular once  insulin lispro (ADMELOG) corrective regimen sliding scale   SubCutaneous three times a day before meals  insulin lispro Injectable (ADMELOG) 4 Unit(s) SubCutaneous every 6 hours  lisinopril 10 milliGRAM(s) Oral daily  loteprednol 0.5% Ophthalmic Suspension 1 Drop(s) Right EYE daily  pantoprazole   Suspension 40 milliGRAM(s) Oral daily  prednisoLONE acetate 1% Suspension 1 Drop(s) Left EYE daily  simethicone 80 milliGRAM(s) Chew every 8 hours    MEDICATIONS  (PRN):  acetaminophen     Tablet .. 650 milliGRAM(s) Oral every 6 hours PRN Temp greater or equal to 38C (100.4F), Mild Pain (1 - 3)  ALPRAZolam 0.5 milliGRAM(s) Oral every 12 hours PRN for agitation  aluminum hydroxide/magnesium hydroxide/simethicone Suspension 30 milliLiter(s) Oral every 4 hours PRN Dyspepsia  dextrose Oral Gel 15 Gram(s) Oral once PRN Blood Glucose LESS THAN 70 milliGRAM(s)/deciliter  hydrALAZINE Injectable 10 milliGRAM(s) IV Push every 2 hours PRN SBP >160  labetalol Injectable 10 milliGRAM(s) IV Push every 2 hours PRN SBP >160  melatonin 3 milliGRAM(s) Oral at bedtime PRN Insomnia  ondansetron Injectable 4 milliGRAM(s) IV Push every 8 hours PRN Nausea and/or Vomiting      Allergies    iodine (Anaphylaxis (Mod to Severe))      Vital Signs Last 24 Hrs  T(C): 37 (2023 16:09), Max: 37.7 (2023 04:00)  T(F): 98.6 (2023 16:09), Max: 99.9 (2023 04:00)  HR: 30 (2023 16:39) (30 - 101)  BP: 156/77 (2023 15:00) (92/47 - 169/61)  BP(mean): 101 (2023 15:00) (62 - 109)  RR: 21 (2023 16:09) (20 - 42)  SpO2: 100% (2023 16:39) (95% - 100%)    Parameters below as of 2023 16:39  Patient On (Oxygen Delivery Method): ventilator        PHYSICAL EXAM:    GENERAL: NAD  NECK: trach, vented   CHEST/LUNG: Clear to ausculation bilaterally  HEART: Regular rate and rhythm; S1 S2  ABDOMEN: Soft, Nontender, Bowel sounds present  EXTREMITIES: no edema   NERVOUS SYSTEM: awake, follows commands. right hemiparesis       LABS:                        11.1   10.82 )-----------( 253      ( 2023 03:10 )             35.1     01-07    140  |  105  |  22.0<H>  ----------------------------<  227<H>  4.6   |  28.0  |  0.94    Ca    8.1<L>      2023 03:10  Phos  2.7     01-07  Mg     2.2     01-07      PT/INR - ( 2023 19:20 )   PT: 13.6 sec;   INR: 1.17 ratio         PTT - ( 2023 03:10 )  PTT:61.1 sec  Urinalysis Basic - ( 2023 17:40 )    Color: Yellow / Appearance: Clear / S.015 / pH: x  Gluc: x / Ketone: Trace  / Bili: Negative / Urobili: Negative mg/dL   Blood: x / Protein: Negative / Nitrite: Negative   Leuk Esterase: Negative / RBC: 0-2 /HPF / WBC 0-2 /HPF   Sq Epi: x / Non Sq Epi: Occasional / Bacteria: Occasional        CAPILLARY BLOOD GLUCOSE      POCT Blood Glucose.: 242 mg/dL (2023 16:41)  POCT Blood Glucose.: 163 mg/dL (2023 10:56)  POCT Blood Glucose.: 161 mg/dL (2023 09:44)  POCT Blood Glucose.: 214 mg/dL (2023 08:41)  POCT Blood Glucose.: 194 mg/dL (2023 06:57)  POCT Blood Glucose.: 207 mg/dL (2023 06:00)  POCT Blood Glucose.: 205 mg/dL (2023 05:04)  POCT Blood Glucose.: 197 mg/dL (2023 04:04)  POCT Blood Glucose.: 228 mg/dL (2023 03:04)  POCT Blood Glucose.: 224 mg/dL (2023 02:08)  POCT Blood Glucose.: 244 mg/dL (2023 01:13)  POCT Blood Glucose.: 242 mg/dL (2023 00:15)  POCT Blood Glucose.: 236 mg/dL (2023 23:12)  POCT Blood Glucose.: 197 mg/dL (2023 22:25)  POCT Blood Glucose.: 229 mg/dL (2023 21:27)  POCT Blood Glucose.: 232 mg/dL (2023 20:26)  POCT Blood Glucose.: 195 mg/dL (2023 19:28)        RADIOLOGY & ADDITIONAL TESTS:

## 2023-01-07 NOTE — PROGRESS NOTE ADULT - ASSESSMENT
ASSESSMENT: 76 y/o male with PMH of DM-2, HTN, CAD was transferred from [place] for stroke. Patient reported right sided weakness and slurred speech along with difficulty swallowing that started day prior to admission. Was seen at [place] day of admission, code stroke initiated out of window for tPA. CT head: no acute finding. MRI: acute infarct with left debby. MRA head/neck: Left vertebral artery segmental stenoses and T1 hyperintensity suggesting foci of dissection and/or thrombus.  Etiology, dissection  vs. embolic stroke of undetermined source.       NEURO:   -Continue close monitoring q1 stroke neuro checks   -Gradual normotension as tolerated   -Titrate statin to LDL goal less than 70  - MRA T1 Fat Sat suggests thrombus (discussed with Dr Macario)      - will need at [facility] 3 months anticoagualtion- repeat MRA neck with T1 fat sat in 3 months to assess for continued need of a/c.  -Physical therapy/OT/Speech eval/treatment.   ANTITHROMBOTIC THERAPY: Heparin gtt for now PTT 60-80, can transition to oral anticoagulation  - repsiratory care per ICU   - TTE as noted , cardiac monitoring,  consider embolic cardiac workup (JAMIE/ILR)                    - TF per primary team   - Maintain adequate hydration   - DVT ppx   - Age and risk appropriate malignancy screenings     OTHER:  d/w NSICU team. Wife at bedside, questions and concerns addressed .     DISPOSITION: Anticipate rehab once stable and workup complete.       CORE MEASURES:        Admission NIHSS: 13     TPA: [] YES [x] NO      LDL/HDL/A1C: 143/42/8.0     Depression Screen: na      Statin Therapy: as noted      Dysphagia Screen: [] PASS [x] FAIL     Smoking [] YES [x] NO      Afib [] YES [x] NO     Stroke Education [x] YES [] NO     will follow with you    Kiran Bermudez MD PhD   [id number]

## 2023-01-07 NOTE — PROGRESS NOTE ADULT - ASSESSMENT
76 yo M with PMH HTN, DM, CAD.  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 Worsening neuro exam 12/27 - RUE plegia, worsening bulbar dysfunction.   Acute resp failure due to neurologic injury, required intubation 12/27 for airway protection.    Thoracic surgery consulted for trach and peg eval.   1/3 S/p trach & PEG   1/4 surgicel d/c  pending suture removal 1/10

## 2023-01-07 NOTE — PROGRESS NOTE ADULT - ASSESSMENT
76 yo M w/ hx CAD, DM2, HTN transferred from [facility] for CVA after presenting for right sided weakness, difficulty swallowing and slurred speech. out of window for TPA. admitted to NeuroICU, MRI with acute left debby infarct and left vertebral artery stenosis  foci of dissection vs thrombus. Underwent trach/ peg on 1/5 by ct surgery after being intubated on 12/28 for copious secretions and fevers.     acute left pontine CVA  left vertebral artery stenosis, dissection vs thrombus     eliquis x3 months repeat MRA neck with T1 fat sat in 3 months to assess for continued need of a/c.     c/w aspirin/statin    trach dependence  MSSA and morganella on sputum cultures  aspiration pneumonia?    cefepime x7 days    pulmonary consulted for  vent mgmt     pending suture removal 1/10 by CT surgery    PEG dependence: TF    Dm2: a1c 8    sliding scale, lantus     diarrhea: may be related to tube feeds.  cdiff ordered by ICU    transfer to medical vent unit   PT/OT: JESSIE    dispo: ultimately JESSIE

## 2023-01-07 NOTE — PROGRESS NOTE ADULT - SUBJECTIVE AND OBJECTIVE BOX
[facility]                                     Ne[facility] at [place]                                 Aidan Sol, & Feliciano                                  [address]                                        [place], [place], [address]                                             [phone]      HPI:  76 y/o male with PMH of DM-2, HTN, CAD was transferred from [place] for stroke. Patient reported right sided weakness and slurred speech along with difficulty swallowing that started day prior to admission. Was seen at [place] day of admission, code stroke initiated out of window for tPA. CT head: no acute finding. MRI: acute infarct with left debby. MRA head/neck: Left vertebral artery segmental stenoses and T1 hyperintensity suggesting foci of dissection and/or thrombus. As per resident, neurology from [facility] was consulted and accepted patient for further evaluation.      Interval history- had MRA T1 fat sat, no new neuro events    ROS no new complaints    MEDICATIONS  (STANDING):  albuterol/ipratropium for Nebulization 3 milliLiter(s) Nebulizer every 6 hours  apixaban 5 milliGRAM(s) Oral every 12 hours  aspirin  chewable 81 milliGRAM(s) Oral daily  atenolol  Tablet 50 milliGRAM(s) Oral daily  atorvastatin 80 milliGRAM(s) Oral at bedtime  chlorhexidine 0.12% Liquid 15 milliLiter(s) Oral Mucosa every 12 hours  chlorhexidine 2% Cloths 1 Application(s) Topical daily  coronavirus bivalent (EUA) Booster Vaccine (PFIZER) 0.3 milliLiter(s) IntraMuscular once  dextrose 5%. 1000 milliLiter(s) (50 mL/Hr) IV Continuous <Continuous>  dextrose 5%. 1000 milliLiter(s) (100 mL/Hr) IV Continuous <Continuous>  dextrose 50% Injectable 25 Gram(s) IV Push once  dextrose 50% Injectable 12.5 Gram(s) IV Push once  dextrose 50% Injectable 25 Gram(s) IV Push once  doxazosin 2 milliGRAM(s) Oral at bedtime  glucagon  Injectable 1 milliGRAM(s) IntraMuscular once  influenza  Vaccine (HIGH DOSE) 0.7 milliLiter(s) IntraMuscular once  insulin lispro (ADMELOG) corrective regimen sliding scale   SubCutaneous three times a day before meals  insulin lispro Injectable (ADMELOG) 4 Unit(s) SubCutaneous every 6 hours  insulin regular Infusion 2 Unit(s)/Hr (2 mL/Hr) IV Continuous <Continuous>  lisinopril 10 milliGRAM(s) Oral daily  loteprednol 0.5% Ophthalmic Suspension 1 Drop(s) Right EYE daily  pantoprazole   Suspension 40 milliGRAM(s) Oral daily  prednisoLONE acetate 1% Suspension 1 Drop(s) Left EYE daily    MEDICATIONS  (PRN):  acetaminophen     Tablet .. 650 milliGRAM(s) Oral every 6 hours PRN Temp greater or equal to 38C (100.4F), Mild Pain (1 - 3)  ALPRAZolam 0.5 milliGRAM(s) Oral every 12 hours PRN for agitation  aluminum hydroxide/magnesium hydroxide/simethicone Suspension 30 milliLiter(s) Oral every 4 hours PRN Dyspepsia  dextrose Oral Gel 15 Gram(s) Oral once PRN Blood Glucose LESS THAN 70 milliGRAM(s)/deciliter  hydrALAZINE Injectable 10 milliGRAM(s) IV Push every 2 hours PRN SBP >160  labetalol Injectable 10 milliGRAM(s) IV Push every 2 hours PRN SBP >160  melatonin 3 milliGRAM(s) Oral at bedtime PRN Insomnia  ondansetron Injectable 4 milliGRAM(s) IV Push every 8 hours PRN Nausea and/or Vomiting      Vital Signs Last 24 Hrs  T(C): 36.9 (07 Jan 2023 08:44), Max: 37.7 (07 Jan 2023 04:00)  T(F): 98.4 (07 Jan 2023 08:44), Max: 99.9 (07 Jan 2023 04:00)  HR: 82 (07 Jan 2023 10:00) (69 - 101)  BP: 105/56 (07 Jan 2023 10:00) (92/47 - 169/61)  BP(mean): 70 (07 Jan 2023 10:00) (62 - 109)  RR: 34 (07 Jan 2023 10:00) (10 - 42)  SpO2: 95% (07 Jan 2023 10:00) (94% - 100%)    Parameters below as of 07 Jan 2023 09:40  Patient On (Oxygen Delivery Method): ventilator          General: NAD    Detailed Neurologic Exam:    Mental status: The patient is awake and alert, no verbal output, able follow commands.     Cranial nerves: . There is no visual field deficit to confrontation. left gaze palsy,   Facial musculature is symmetric. Palate elevates symmetrically.      Motor:    right hemiplegia   Strength is 5/5 in the left arm and leg with prompting     Sensation: Intact to light touch and pin in 4 extremities, no sensory extinction     Gait : deferred      LABS:                           11.1   10.82 )-----------( 253      ( 07 Jan 2023 03:10 )             35.1     01-07    140  |  105  |  22.0<H>  ----------------------------<  227<H>  4.6   |  28.0  |  0.94    Ca    8.1<L>      07 Jan 2023 03:10  Phos  2.7     01-07  Mg     2.2     01-07    TPro  5.8<L>  /  Alb  2.1<L>  /  TBili  <0.2<L>  /  DBili  <0.1  /  AST  55<H>  /  ALT  14  /  AlkPhos  63  01-05    LIVER FUNCTIONS - ( 05 Jan 2023 13:50 )  Alb: 2.1 g/dL / Pro: 5.8 g/dL / ALK PHOS: 63 U/L / ALT: 14 U/L / AST: 55 U/L / GGT: x           PT/INR - ( 05 Jan 2023 19:20 )   PT: 13.6 sec;   INR: 1.17 ratio         PTT - ( 07 Jan 2023 03:10 )  PTT:61.1 sec    Lipid panel: 143/42    HgbA1c: 8.0    RADIOLOGY & ADDITIONAL STUDIES (independently reviewed unless otherwise noted):    MRI/A  (01.06.23 @ 13:15)    Brain MRI: Area of restricted diffusion in the left debby suggesting   acute/subacute infarct, increased in size compared to prior exam.    Brain MRA: Minimal flow related signal in the left vertebral artery V4   segment.    Neck MRA: On axial T1 fat sat sequence, there is abnormal T1   hyperintensity in the left cervical vertebral artery raising suspicion   for dissection or occlusion. MRA neck demonstrates minimal flow related   signal in the left cervical vertebral artery.        CT Head No Cont (12.29.22 @ 09:57)   No evidence of an acute intracranial hemorrhage, midline shift, or   hydrocephalus. Known acute left pontine infarct partly obscured by   artifact.     MRI/MRA Head/neck 12/26/22:  1. RIGHT CAROTID NECK CIRCULATION: Intact.  2. LEFT CAROTID NECK CIRCULATION: Intact.  3. VERTEBRAL NECK CIRCULATION: Right vertebral artery is intact. Left  vertebral artery demonstrates segmental stenoses and T1 hyperintensity  suggesting foci of dissection and/or thrombus. Flow appears improved  compared to time-of-flight angiography earlier same date  4. ANTERIOR INTRACRANIAL CIRCULATION: Intracranial atherosclerosis  cavernous carotid segments internal carotid arteries, at least mild, and  right MCA M1 segment, moderate.  5. POSTERIOR INTRACRANIAL CIRCULATION: Right vertebral artery focal  stenosis just proximal to the basilar formation, moderate. Attenuated  segmentally nonvisualized left vertebral artery. Flow appears improved  compared to earlier this same date. Intact basilar artery and posterior  cerebral arteries.  TTE    1. Left ventricular ejection fraction, by visual estimation, is 60 to   65%.   2. Normal global left ventricular systolic function.   3. Spectral Doppler shows impaired relaxation pattern of left   ventricular myocardial filling (Grade I diastolic dysfunction).   4. Normal left atrial size.   5. Trace mitral valve regurgitation.   6. Sclerotic aortic valve with decreased opening.   7. Endocardial visualization was enhanced with intravenous echo contrast.

## 2023-01-08 LAB
ANION GAP SERPL CALC-SCNC: 5 MMOL/L — SIGNIFICANT CHANGE UP (ref 5–17)
BUN SERPL-MCNC: 28.7 MG/DL — HIGH (ref 8–20)
C DIFF BY PCR RESULT: SIGNIFICANT CHANGE UP
CALCIUM SERPL-MCNC: 8.2 MG/DL — LOW (ref 8.4–10.5)
CHLORIDE SERPL-SCNC: 105 MMOL/L — SIGNIFICANT CHANGE UP (ref 96–108)
CO2 SERPL-SCNC: 30 MMOL/L — HIGH (ref 22–29)
CREAT SERPL-MCNC: 0.93 MG/DL — SIGNIFICANT CHANGE UP (ref 0.5–1.3)
EGFR: 85 ML/MIN/1.73M2 — SIGNIFICANT CHANGE UP
GLUCOSE BLDC GLUCOMTR-MCNC: 194 MG/DL — HIGH (ref 70–99)
GLUCOSE BLDC GLUCOMTR-MCNC: 198 MG/DL — HIGH (ref 70–99)
GLUCOSE BLDC GLUCOMTR-MCNC: 252 MG/DL — HIGH (ref 70–99)
GLUCOSE SERPL-MCNC: 201 MG/DL — HIGH (ref 70–99)
HCT VFR BLD CALC: 31.9 % — LOW (ref 39–50)
HGB BLD-MCNC: 10.2 G/DL — LOW (ref 13–17)
MAGNESIUM SERPL-MCNC: 2.2 MG/DL — SIGNIFICANT CHANGE UP (ref 1.6–2.6)
MCHC RBC-ENTMCNC: 29.1 PG — SIGNIFICANT CHANGE UP (ref 27–34)
MCHC RBC-ENTMCNC: 32 GM/DL — SIGNIFICANT CHANGE UP (ref 32–36)
MCV RBC AUTO: 91.1 FL — SIGNIFICANT CHANGE UP (ref 80–100)
PHOSPHATE SERPL-MCNC: 3.4 MG/DL — SIGNIFICANT CHANGE UP (ref 2.4–4.7)
PLATELET # BLD AUTO: 263 K/UL — SIGNIFICANT CHANGE UP (ref 150–400)
POTASSIUM SERPL-MCNC: 5.2 MMOL/L — SIGNIFICANT CHANGE UP (ref 3.5–5.3)
POTASSIUM SERPL-SCNC: 5.2 MMOL/L — SIGNIFICANT CHANGE UP (ref 3.5–5.3)
RBC # BLD: 3.5 M/UL — LOW (ref 4.2–5.8)
RBC # FLD: 13.2 % — SIGNIFICANT CHANGE UP (ref 10.3–14.5)
SODIUM SERPL-SCNC: 140 MMOL/L — SIGNIFICANT CHANGE UP (ref 135–145)
WBC # BLD: 9.58 K/UL — SIGNIFICANT CHANGE UP (ref 3.8–10.5)
WBC # FLD AUTO: 9.58 K/UL — SIGNIFICANT CHANGE UP (ref 3.8–10.5)

## 2023-01-08 PROCEDURE — 99231 SBSQ HOSP IP/OBS SF/LOW 25: CPT

## 2023-01-08 PROCEDURE — 93010 ELECTROCARDIOGRAM REPORT: CPT

## 2023-01-08 PROCEDURE — 99233 SBSQ HOSP IP/OBS HIGH 50: CPT

## 2023-01-08 PROCEDURE — 99291 CRITICAL CARE FIRST HOUR: CPT

## 2023-01-08 RX ORDER — IPRATROPIUM/ALBUTEROL SULFATE 18-103MCG
3 AEROSOL WITH ADAPTER (GRAM) INHALATION EVERY 6 HOURS
Refills: 0 | Status: DISCONTINUED | OUTPATIENT
Start: 2023-01-08 | End: 2023-01-30

## 2023-01-08 RX ORDER — SODIUM ZIRCONIUM CYCLOSILICATE 10 G/10G
10 POWDER, FOR SUSPENSION ORAL ONCE
Refills: 0 | Status: COMPLETED | OUTPATIENT
Start: 2023-01-08 | End: 2023-01-08

## 2023-01-08 RX ORDER — LOPERAMIDE HCL 2 MG
2 TABLET ORAL THREE TIMES A DAY
Refills: 0 | Status: DISCONTINUED | OUTPATIENT
Start: 2023-01-08 | End: 2023-01-09

## 2023-01-08 RX ADMIN — APIXABAN 5 MILLIGRAM(S): 2.5 TABLET, FILM COATED ORAL at 16:09

## 2023-01-08 RX ADMIN — Medication 2: at 06:19

## 2023-01-08 RX ADMIN — ATENOLOL 50 MILLIGRAM(S): 25 TABLET ORAL at 06:20

## 2023-01-08 RX ADMIN — PANTOPRAZOLE SODIUM 40 MILLIGRAM(S): 20 TABLET, DELAYED RELEASE ORAL at 11:03

## 2023-01-08 RX ADMIN — Medication 3 MILLILITER(S): at 08:25

## 2023-01-08 RX ADMIN — CEFEPIME 2000 MILLIGRAM(S): 1 INJECTION, POWDER, FOR SOLUTION INTRAMUSCULAR; INTRAVENOUS at 06:18

## 2023-01-08 RX ADMIN — SIMETHICONE 80 MILLIGRAM(S): 80 TABLET, CHEWABLE ORAL at 21:09

## 2023-01-08 RX ADMIN — LOTEPREDNOL ETABONATE 1 DROP(S): 2 SUSPENSION/ DROPS OPHTHALMIC at 08:09

## 2023-01-08 RX ADMIN — Medication 2 MILLIGRAM(S): at 21:09

## 2023-01-08 RX ADMIN — CEFEPIME 2000 MILLIGRAM(S): 1 INJECTION, POWDER, FOR SOLUTION INTRAMUSCULAR; INTRAVENOUS at 13:52

## 2023-01-08 RX ADMIN — Medication 4 UNIT(S): at 11:03

## 2023-01-08 RX ADMIN — INSULIN GLARGINE 20 UNIT(S): 100 INJECTION, SOLUTION SUBCUTANEOUS at 08:09

## 2023-01-08 RX ADMIN — SIMETHICONE 80 MILLIGRAM(S): 80 TABLET, CHEWABLE ORAL at 13:52

## 2023-01-08 RX ADMIN — Medication 30 MILLILITER(S): at 16:09

## 2023-01-08 RX ADMIN — CHLORHEXIDINE GLUCONATE 15 MILLILITER(S): 213 SOLUTION TOPICAL at 06:18

## 2023-01-08 RX ADMIN — CHLORHEXIDINE GLUCONATE 15 MILLILITER(S): 213 SOLUTION TOPICAL at 16:12

## 2023-01-08 RX ADMIN — CEFEPIME 2000 MILLIGRAM(S): 1 INJECTION, POWDER, FOR SOLUTION INTRAMUSCULAR; INTRAVENOUS at 21:09

## 2023-01-08 RX ADMIN — ATORVASTATIN CALCIUM 80 MILLIGRAM(S): 80 TABLET, FILM COATED ORAL at 21:09

## 2023-01-08 RX ADMIN — Medication 1 DROP(S): at 08:09

## 2023-01-08 RX ADMIN — Medication 3 MILLILITER(S): at 04:41

## 2023-01-08 RX ADMIN — Medication 4 UNIT(S): at 16:12

## 2023-01-08 RX ADMIN — APIXABAN 5 MILLIGRAM(S): 2.5 TABLET, FILM COATED ORAL at 06:20

## 2023-01-08 RX ADMIN — SODIUM ZIRCONIUM CYCLOSILICATE 10 GRAM(S): 10 POWDER, FOR SUSPENSION ORAL at 13:53

## 2023-01-08 RX ADMIN — Medication 6: at 16:13

## 2023-01-08 RX ADMIN — LISINOPRIL 10 MILLIGRAM(S): 2.5 TABLET ORAL at 06:21

## 2023-01-08 RX ADMIN — SIMETHICONE 80 MILLIGRAM(S): 80 TABLET, CHEWABLE ORAL at 06:20

## 2023-01-08 RX ADMIN — CHLORHEXIDINE GLUCONATE 1 APPLICATION(S): 213 SOLUTION TOPICAL at 11:09

## 2023-01-08 RX ADMIN — Medication 2: at 11:02

## 2023-01-08 RX ADMIN — Medication 4 UNIT(S): at 06:19

## 2023-01-08 RX ADMIN — Medication 81 MILLIGRAM(S): at 11:04

## 2023-01-08 NOTE — CONSULT NOTE ADULT - CONSULT REQUESTED DATE/TIME
03-Jan-2023 08:16
08-Jan-2023
03-Jan-2023 16:28
31-Dec-2022 18:18
27-Dec-2022 15:33
27-Dec-2022 11:22

## 2023-01-08 NOTE — PROGRESS NOTE ADULT - SUBJECTIVE AND OBJECTIVE BOX
Brief summary:  77yMale seen and assessed at bedside.  POD # 7 s/p trach & peg.  Pt laying comfortably in hospital bed in NAD on MV.  Offers no complaints at this time.    Overnight events:  None.  Hospital course progressing as expected.        PAST MEDICAL & SURGICAL HISTORY:  HTN (hypertension)    CAD (coronary artery disease)    DM (diabetes mellitus)        Medications:  acetaminophen     Tablet .. 650 milliGRAM(s) Oral every 6 hours PRN  albuterol/ipratropium for Nebulization 3 milliLiter(s) Nebulizer every 6 hours PRN  ALPRAZolam 0.5 milliGRAM(s) Oral every 12 hours PRN  aluminum hydroxide/magnesium hydroxide/simethicone Suspension 30 milliLiter(s) Oral every 4 hours PRN  apixaban 5 milliGRAM(s) Oral every 12 hours  aspirin  chewable 81 milliGRAM(s) Oral daily  atenolol  Tablet 50 milliGRAM(s) Oral daily  atorvastatin 80 milliGRAM(s) Oral at bedtime  cefepime  Injectable.      cefepime  Injectable. 2000 milliGRAM(s) IV Push every 8 hours  chlorhexidine 0.12% Liquid 15 milliLiter(s) Oral Mucosa every 12 hours  chlorhexidine 2% Cloths 1 Application(s) Topical daily  coronavirus bivalent (EUA) Booster Vaccine (PFIZER) 0.3 milliLiter(s) IntraMuscular once  dextrose 5%. 1000 milliLiter(s) IV Continuous <Continuous>  dextrose 5%. 1000 milliLiter(s) IV Continuous <Continuous>  dextrose 50% Injectable 25 Gram(s) IV Push once  dextrose 50% Injectable 12.5 Gram(s) IV Push once  dextrose 50% Injectable 25 Gram(s) IV Push once  dextrose Oral Gel 15 Gram(s) Oral once PRN  doxazosin 2 milliGRAM(s) Oral at bedtime  glucagon  Injectable 1 milliGRAM(s) IntraMuscular once  hydrALAZINE Injectable 10 milliGRAM(s) IV Push every 2 hours PRN  influenza  Vaccine (HIGH DOSE) 0.7 milliLiter(s) IntraMuscular once  insulin glargine Injectable (LANTUS) 20 Unit(s) SubCutaneous every morning  insulin lispro (ADMELOG) corrective regimen sliding scale   SubCutaneous three times a day before meals  insulin lispro Injectable (ADMELOG) 4 Unit(s) SubCutaneous every 6 hours  labetalol Injectable 10 milliGRAM(s) IV Push every 2 hours PRN  lisinopril 10 milliGRAM(s) Oral daily  loperamide 2 milliGRAM(s) Oral three times a day PRN  loteprednol 0.5% Ophthalmic Suspension 1 Drop(s) Right EYE daily  melatonin 3 milliGRAM(s) Oral at bedtime PRN  ondansetron Injectable 4 milliGRAM(s) IV Push every 8 hours PRN  pantoprazole   Suspension 40 milliGRAM(s) Oral daily  prednisoLONE acetate 1% Suspension 1 Drop(s) Left EYE daily  simethicone 80 milliGRAM(s) Chew every 8 hours      MEDICATIONS  (PRN):  acetaminophen     Tablet .. 650 milliGRAM(s) Oral every 6 hours PRN Temp greater or equal to 38C (100.4F), Mild Pain (1 - 3)  albuterol/ipratropium for Nebulization 3 milliLiter(s) Nebulizer every 6 hours PRN Shortness of Breath and/or Wheezing  ALPRAZolam 0.5 milliGRAM(s) Oral every 12 hours PRN for agitation  aluminum hydroxide/magnesium hydroxide/simethicone Suspension 30 milliLiter(s) Oral every 4 hours PRN Dyspepsia  dextrose Oral Gel 15 Gram(s) Oral once PRN Blood Glucose LESS THAN 70 milliGRAM(s)/deciliter  hydrALAZINE Injectable 10 milliGRAM(s) IV Push every 2 hours PRN SBP >160  labetalol Injectable 10 milliGRAM(s) IV Push every 2 hours PRN SBP >160  loperamide 2 milliGRAM(s) Oral three times a day PRN Diarrhea  melatonin 3 milliGRAM(s) Oral at bedtime PRN Insomnia  ondansetron Injectable 4 milliGRAM(s) IV Push every 8 hours PRN Nausea and/or Vomiting        Daily Review:    Weight (kg): 75.5 (01-08 @ 05:00)Mode: AC/ CMV (Assist Control/ Continuous Mandatory Ventilation), RR (machine): 16, TV (machine): 400, FiO2: 40, PEEP: 6, ITime: 0.8, MAP: 13, PIP: 24               10.2   9.58  )-----------( 263      ( 08 Jan 2023 04:00 )             31.9   01-08    140  |  105  |  28.7<H>  ----------------------------<  201<H>  5.2   |  30.0<H>  |  0.93    Ca    8.2<L>      08 Jan 2023 04:00  Phos  3.4     01-08  Mg     2.2     01-08      PTT - ( 07 Jan 2023 03:10 )  PTT:61.1 sec    T(C): 37.2 (01-08-23 @ 15:24), Max: 37.2 (01-08-23 @ 15:24)  HR: 68 (01-08-23 @ 17:00) (60 - 83)  BP: 128/58 (01-08-23 @ 17:00) (97/54 - 140/57)  RR: 24 (01-08-23 @ 17:00) (19 - 33)  SpO2: 96% (01-08-23 @ 17:00) (95% - 100%)      CAPILLARY BLOOD GLUCOSE    POCT Blood Glucose.: 252 mg/dL (08 Jan 2023 16:11)  POCT Blood Glucose.: 194 mg/dL (08 Jan 2023 10:55)  POCT Blood Glucose.: 198 mg/dL (08 Jan 2023 06:09)  POCT Blood Glucose.: 190 mg/dL (07 Jan 2023 22:20)      I&O's Summary    07 Jan 2023 07:01  -  08 Jan 2023 07:00  --------------------------------------------------------  IN: 2567 mL / OUT: 900 mL / NET: 1667 mL    08 Jan 2023 07:01  -  08 Jan 2023 17:08  --------------------------------------------------------  IN: 1360 mL / OUT: 1500 mL / NET: -140 mL        Physical Exam    Neuro: A+O x 3, non-focal, responsive to questioning  Pulm: coarse breath sounds bilaterally, no wheezing or rales, + trach on MV, FiO2 40%, PEEP 5, sutures in place, no active drainage  CV: RRR, +S1S2  Abd: soft, NT, ND, normoactive bowel sounds, + PEG, c/d/i  Ext: +DP Pulses b/l, +PT pulses, no edema       Brief summary:  77yMale seen and assessed at bedside.  POD # 5 s/p trach & peg.  Pt laying comfortably in hospital bed in NAD on MV.  Offers no complaints at this time.    Overnight events:  None.  Hospital course progressing as expected.        PAST MEDICAL & SURGICAL HISTORY:  HTN (hypertension)    CAD (coronary artery disease)    DM (diabetes mellitus)        Medications:  acetaminophen     Tablet .. 650 milliGRAM(s) Oral every 6 hours PRN  albuterol/ipratropium for Nebulization 3 milliLiter(s) Nebulizer every 6 hours PRN  ALPRAZolam 0.5 milliGRAM(s) Oral every 12 hours PRN  aluminum hydroxide/magnesium hydroxide/simethicone Suspension 30 milliLiter(s) Oral every 4 hours PRN  apixaban 5 milliGRAM(s) Oral every 12 hours  aspirin  chewable 81 milliGRAM(s) Oral daily  atenolol  Tablet 50 milliGRAM(s) Oral daily  atorvastatin 80 milliGRAM(s) Oral at bedtime  cefepime  Injectable.      cefepime  Injectable. 2000 milliGRAM(s) IV Push every 8 hours  chlorhexidine 0.12% Liquid 15 milliLiter(s) Oral Mucosa every 12 hours  chlorhexidine 2% Cloths 1 Application(s) Topical daily  coronavirus bivalent (EUA) Booster Vaccine (PFIZER) 0.3 milliLiter(s) IntraMuscular once  dextrose 5%. 1000 milliLiter(s) IV Continuous <Continuous>  dextrose 5%. 1000 milliLiter(s) IV Continuous <Continuous>  dextrose 50% Injectable 25 Gram(s) IV Push once  dextrose 50% Injectable 12.5 Gram(s) IV Push once  dextrose 50% Injectable 25 Gram(s) IV Push once  dextrose Oral Gel 15 Gram(s) Oral once PRN  doxazosin 2 milliGRAM(s) Oral at bedtime  glucagon  Injectable 1 milliGRAM(s) IntraMuscular once  hydrALAZINE Injectable 10 milliGRAM(s) IV Push every 2 hours PRN  influenza  Vaccine (HIGH DOSE) 0.7 milliLiter(s) IntraMuscular once  insulin glargine Injectable (LANTUS) 20 Unit(s) SubCutaneous every morning  insulin lispro (ADMELOG) corrective regimen sliding scale   SubCutaneous three times a day before meals  insulin lispro Injectable (ADMELOG) 4 Unit(s) SubCutaneous every 6 hours  labetalol Injectable 10 milliGRAM(s) IV Push every 2 hours PRN  lisinopril 10 milliGRAM(s) Oral daily  loperamide 2 milliGRAM(s) Oral three times a day PRN  loteprednol 0.5% Ophthalmic Suspension 1 Drop(s) Right EYE daily  melatonin 3 milliGRAM(s) Oral at bedtime PRN  ondansetron Injectable 4 milliGRAM(s) IV Push every 8 hours PRN  pantoprazole   Suspension 40 milliGRAM(s) Oral daily  prednisoLONE acetate 1% Suspension 1 Drop(s) Left EYE daily  simethicone 80 milliGRAM(s) Chew every 8 hours      MEDICATIONS  (PRN):  acetaminophen     Tablet .. 650 milliGRAM(s) Oral every 6 hours PRN Temp greater or equal to 38C (100.4F), Mild Pain (1 - 3)  albuterol/ipratropium for Nebulization 3 milliLiter(s) Nebulizer every 6 hours PRN Shortness of Breath and/or Wheezing  ALPRAZolam 0.5 milliGRAM(s) Oral every 12 hours PRN for agitation  aluminum hydroxide/magnesium hydroxide/simethicone Suspension 30 milliLiter(s) Oral every 4 hours PRN Dyspepsia  dextrose Oral Gel 15 Gram(s) Oral once PRN Blood Glucose LESS THAN 70 milliGRAM(s)/deciliter  hydrALAZINE Injectable 10 milliGRAM(s) IV Push every 2 hours PRN SBP >160  labetalol Injectable 10 milliGRAM(s) IV Push every 2 hours PRN SBP >160  loperamide 2 milliGRAM(s) Oral three times a day PRN Diarrhea  melatonin 3 milliGRAM(s) Oral at bedtime PRN Insomnia  ondansetron Injectable 4 milliGRAM(s) IV Push every 8 hours PRN Nausea and/or Vomiting        Daily Review:    Weight (kg): 75.5 (01-08 @ 05:00)Mode: AC/ CMV (Assist Control/ Continuous Mandatory Ventilation), RR (machine): 16, TV (machine): 400, FiO2: 40, PEEP: 6, ITime: 0.8, MAP: 13, PIP: 24               10.2   9.58  )-----------( 263      ( 08 Jan 2023 04:00 )             31.9   01-08    140  |  105  |  28.7<H>  ----------------------------<  201<H>  5.2   |  30.0<H>  |  0.93    Ca    8.2<L>      08 Jan 2023 04:00  Phos  3.4     01-08  Mg     2.2     01-08      PTT - ( 07 Jan 2023 03:10 )  PTT:61.1 sec    T(C): 37.2 (01-08-23 @ 15:24), Max: 37.2 (01-08-23 @ 15:24)  HR: 68 (01-08-23 @ 17:00) (60 - 83)  BP: 128/58 (01-08-23 @ 17:00) (97/54 - 140/57)  RR: 24 (01-08-23 @ 17:00) (19 - 33)  SpO2: 96% (01-08-23 @ 17:00) (95% - 100%)      CAPILLARY BLOOD GLUCOSE    POCT Blood Glucose.: 252 mg/dL (08 Jan 2023 16:11)  POCT Blood Glucose.: 194 mg/dL (08 Jan 2023 10:55)  POCT Blood Glucose.: 198 mg/dL (08 Jan 2023 06:09)  POCT Blood Glucose.: 190 mg/dL (07 Jan 2023 22:20)      I&O's Summary    07 Jan 2023 07:01  -  08 Jan 2023 07:00  --------------------------------------------------------  IN: 2567 mL / OUT: 900 mL / NET: 1667 mL    08 Jan 2023 07:01  -  08 Jan 2023 17:08  --------------------------------------------------------  IN: 1360 mL / OUT: 1500 mL / NET: -140 mL        Physical Exam    Neuro: A+O x 3, non-focal, responsive to questioning  Pulm: coarse breath sounds bilaterally, no wheezing or rales, + trach on MV, FiO2 40%, PEEP 5, sutures in place, no active drainage  CV: RRR, +S1S2  Abd: soft, NT, ND, normoactive bowel sounds, + PEG, c/d/i  Ext: +DP Pulses b/l, +PT pulses, no edema

## 2023-01-08 NOTE — CONSULT NOTE ADULT - SUBJECTIVE AND OBJECTIVE BOX
PULMONARY CONSULT NOTE      FABIOLA BALLARDRN-[id number]    Patient is a 77y old  Male who presents with a chief complaint of Acute stroke (07 Jan 2023 17:34)      INTERVAL HPI/OVERNIGHT EVENTS:6 y/o male with PMH of DM-2, HTN, CAD was transferred from [place] for stroke. Patient reported right sided weakness and slurred speech along with difficulty swallowing that started yesterday. Was seen at [place] today, code stroke initiated out of window for tPA. CT head: no acute finding. MRI: acute infarct with left debby. MRA head/neck: Left vertebral artery segmental stenoses and T1 hyperintensity suggesting foci of dissection and/or thrombus. As per resident, neurology from [facility] was consulted and accepted patient for further evaluation. Patient said he is upset about his condition, noted discomfort in his head otherwise no chest pain, shortness of breath, palpitation, fever, chills, nausea, vomiting, abdominal pain, change in bowel/urinary habit.  (26 Dec 2022 22:58)  Trach/Peg placed for secretions, L inf.  Now for vent unit.  Pt smoked in distant past, no prior resp issues, generally active before CVA.    MEDICATIONS  (STANDING):  albuterol/ipratropium for Nebulization 3 milliLiter(s) Nebulizer every 6 hours  apixaban 5 milliGRAM(s) Oral every 12 hours  aspirin  chewable 81 milliGRAM(s) Oral daily  atenolol  Tablet 50 milliGRAM(s) Oral daily  atorvastatin 80 milliGRAM(s) Oral at bedtime  cefepime  Injectable.      cefepime  Injectable. 2000 milliGRAM(s) IV Push every 8 hours  chlorhexidine 0.12% Liquid 15 milliLiter(s) Oral Mucosa every 12 hours  chlorhexidine 2% Cloths 1 Application(s) Topical daily  coronavirus bivalent (EUA) Booster Vaccine (PFIZER) 0.3 milliLiter(s) IntraMuscular once  dextrose 5%. 1000 milliLiter(s) (50 mL/Hr) IV Continuous <Continuous>  dextrose 5%. 1000 milliLiter(s) (100 mL/Hr) IV Continuous <Continuous>  dextrose 50% Injectable 25 Gram(s) IV Push once  dextrose 50% Injectable 12.5 Gram(s) IV Push once  dextrose 50% Injectable 25 Gram(s) IV Push once  doxazosin 2 milliGRAM(s) Oral at bedtime  glucagon  Injectable 1 milliGRAM(s) IntraMuscular once  influenza  Vaccine (HIGH DOSE) 0.7 milliLiter(s) IntraMuscular once  insulin glargine Injectable (LANTUS) 20 Unit(s) SubCutaneous every morning  insulin lispro (ADMELOG) corrective regimen sliding scale   SubCutaneous three times a day before meals  insulin lispro Injectable (ADMELOG) 4 Unit(s) SubCutaneous every 6 hours  lisinopril 10 milliGRAM(s) Oral daily  loteprednol 0.5% Ophthalmic Suspension 1 Drop(s) Right EYE daily  pantoprazole   Suspension 40 milliGRAM(s) Oral daily  prednisoLONE acetate 1% Suspension 1 Drop(s) Left EYE daily  simethicone 80 milliGRAM(s) Chew every 8 hours      MEDICATIONS  (PRN):  acetaminophen     Tablet .. 650 milliGRAM(s) Oral every 6 hours PRN Temp greater or equal to 38C (100.4F), Mild Pain (1 - 3)  ALPRAZolam 0.5 milliGRAM(s) Oral every 12 hours PRN for agitation  aluminum hydroxide/magnesium hydroxide/simethicone Suspension 30 milliLiter(s) Oral every 4 hours PRN Dyspepsia  dextrose Oral Gel 15 Gram(s) Oral once PRN Blood Glucose LESS THAN 70 milliGRAM(s)/deciliter  hydrALAZINE Injectable 10 milliGRAM(s) IV Push every 2 hours PRN SBP >160  labetalol Injectable 10 milliGRAM(s) IV Push every 2 hours PRN SBP >160  melatonin 3 milliGRAM(s) Oral at bedtime PRN Insomnia  ondansetron Injectable 4 milliGRAM(s) IV Push every 8 hours PRN Nausea and/or Vomiting      Allergies    iodine (Anaphylaxis (Mod to Severe))    Intolerances        PAST MEDICAL & SURGICAL HISTORY:  HTN (hypertension)      CAD (coronary artery disease)      DM (diabetes mellitus)          FAMILY HISTORY:  No pertinent family history in first degree relatives        SOCIAL HISTORY  Smoking History:     REVIEW OF SYSTEMS:    CONSTITUTIONAL:  As per HPI.    HEENT:  Eyes:  No diplopia or blurred vision. ENT:  No earache, sore throat or runny nose.    CARDIOVASCULAR:  No pressure, squeezing, tightness, heaviness or aching about the chest; no palpitations.    RESPIRATORY:  Per HPI    GASTROINTESTINAL:  No nausea, vomiting or diarrhea.    GENITOURINARY:  No dysuria, frequency or urgency.    MUSCULOSKELETAL:  No joint pains    SKIN:  No new lesions.    NEUROLOGIC:  No paresthesias, fasciculations, seizures or weakness.    PSYCHIATRIC:  No disorder of thought or mood.    ENDOCRINE:  No heat or cold intolerance, polyuria or polydipsia.    HEMATOLOGICAL:  No easy bruising or bleeding.     Vital Signs Last 24 Hrs  T(C): 37 (08 Jan 2023 07:54), Max: 37 (07 Jan 2023 16:09)  T(F): 98.6 (08 Jan 2023 07:54), Max: 98.6 (07 Jan 2023 16:09)  HR: 70 (08 Jan 2023 08:45) (30 - 83)  BP: 122/51 (08 Jan 2023 08:00) (111/60 - 156/77)  BP(mean): 72 (08 Jan 2023 08:00) (72 - 101)  RR: 25 (08 Jan 2023 08:00) (19 - 31)  SpO2: 98% (08 Jan 2023 08:45) (96% - 100%)    Parameters below as of 08 Jan 2023 08:45  Patient On (Oxygen Delivery Method): ventilator        PHYSICAL EXAMINATION:    GENERAL: The patient is a well-developed, well-nourished M_____in no apparent distress on vent    HEENT: Head is normocephalic and atraumatic. Extraocular muscles are intact. Mucous membranes are moist.     NECK: Supple.     LUNGS: Clear to auscultation without wheezing, rales, or rhonchi. Respirations unlabored    HEART: Regular rate and rhythm without murmur.    ABDOMEN: Soft, nontender, and nondistended.  No hepatosplenomegaly is noted.    EXTREMITIES: Without any cyanosis, clubbing, rash, lesions or edema.    NEUROLOGIC: Grossly intact.    SKIN: No ulceration or induration present.      LABS:                        10.2   9.58  )-----------( 263      ( 08 Jan 2023 04:00 )             31.9     01-08    140  |  105  |  28.7<H>  ----------------------------<  201<H>  5.2   |  30.0<H>  |  0.93    Ca    8.2<L>      08 Jan 2023 04:00  Phos  3.4     01-08  Mg     2.2     01-08      PTT - ( 07 Jan 2023 03:10 )  PTT:61.1 sec      Blood Gas Profile - Arterial (01.01.23 @ 04:40)    pH, Arterial: 7.420: As of 6/9/2020 Reference Ranges have been amended for: SAPNA, COHB, GLUWB,  HCO3, KWB, METHHB, NAWB, O2HB, PCO2.    pCO2, Arterial: 40 mmHg    pO2, Arterial: 82 mmHg    HCO3, Arterial: 26 mmol/L    Base Excess, Arterial: 1.4 mmol/L    Oxygen Saturation, Arterial: 98.2 %    FIO2, Arterial: .4    Blood Gas Comments Arterial: ac18/400/40%/+6    Blood Gas Source Arterial: Arterial        Spont breathing trial on CPAP 5 PS 10--RR 40, vt 320, mild abd breathing.  No improvement with PS 15.          Procalcitonin, Serum: 0.37 ng/mL (01-05-23 @ 13:50)      MICROBIOLOGY:    RADIOLOGY & ADDITIONAL STUDIES:< from: Xray Chest 1 View-PORTABLE IMMEDIATE (Xray Chest 1 View-PORTABLE IMMEDIATE .) (01.05.23 @ 15:19) >  Heart magnified by technique. A tracheostomy has replaced endotracheal   tube present on January 2. NG tube removed.    On January 2 there was a left base infiltrate which suggests some   improvement.    IMPRESSION: As above.    < end of copied text >

## 2023-01-08 NOTE — PROGRESS NOTE ADULT - ASSESSMENT
76 yo M with PMH HTN, DM, CAD.  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 Worsening neuro exam 12/27 - RUE plegia, worsening bulbar dysfunction.   Acute resp failure due to neurologic injury, required intubation 12/27 for airway protection.    Thoracic surgery consulted for trach and peg eval.   1/3 S/p trach & PEG   1/4 surgicel d/c  pending suture removal 1/10

## 2023-01-08 NOTE — PROGRESS NOTE ADULT - ASSESSMENT
78 yo M w/ hx CAD, DM2, HTN transferred from [facility] for CVA after presenting for right sided weakness, difficulty swallowing and slurred speech. out of window for TPA. admitted to NeuroICU, MRI with acute left debby infarct and left vertebral artery stenosis  foci of dissection vs thrombus. Underwent trach/ peg on 1/5 by ct surgery after being intubated on 12/28 for copious secretions and fevers.     acute left pontine CVA  left vertebral artery stenosis, dissection vs thrombus     eliquis x3 months repeat MRA neck with T1 fat sat in 3 months to assess for continued need of a/c.     c/w aspirin/statin    trach dependence  MSSA and morganella on sputum cultures  aspiration pneumonia?    cefepime x7 days    pulmonary following for  vent mgmt     pending suture removal 1/10 by CT surgery    PEG dependence: TF    Dm2: a1c 8    sliding scale, lantus     diarrhea: may be related to tube feeds.  cdiff negative    prn lomitil    may need TF adjustment    transfer to medical vent unit   PT/OT: JESSIE    dispo: ultimately [facility]IE       d/w wife at bedside .

## 2023-01-08 NOTE — CONSULT NOTE ADULT - ASSESSMENT
Imp--Pontine CVA, vertebral art occlusion.  Inadequate spont resp for weaning at this time--Poss due to brainstem CVA.  LLL pneumonia improved with abx.    Plan--vent support  Spont breathing trials periodically.  Wean if spont pattern improves.

## 2023-01-08 NOTE — PROGRESS NOTE ADULT - SUBJECTIVE AND OBJECTIVE BOX
seen for cva    no events  wife at bedside  abdominal discomfort  diarrhea per RN    MEDICATIONS  (STANDING):  apixaban 5 milliGRAM(s) Oral every 12 hours  aspirin  chewable 81 milliGRAM(s) Oral daily  atenolol  Tablet 50 milliGRAM(s) Oral daily  atorvastatin 80 milliGRAM(s) Oral at bedtime  cefepime  Injectable.      cefepime  Injectable. 2000 milliGRAM(s) IV Push every 8 hours  chlorhexidine 0.12% Liquid 15 milliLiter(s) Oral Mucosa every 12 hours  chlorhexidine 2% Cloths 1 Application(s) Topical daily  coronavirus bivalent (EUA) Booster Vaccine (PFIZER) 0.3 milliLiter(s) IntraMuscular once  dextrose 5%. 1000 milliLiter(s) (50 mL/Hr) IV Continuous <Continuous>  dextrose 5%. 1000 milliLiter(s) (100 mL/Hr) IV Continuous <Continuous>  dextrose 50% Injectable 25 Gram(s) IV Push once  dextrose 50% Injectable 12.5 Gram(s) IV Push once  dextrose 50% Injectable 25 Gram(s) IV Push once  doxazosin 2 milliGRAM(s) Oral at bedtime  glucagon  Injectable 1 milliGRAM(s) IntraMuscular once  influenza  Vaccine (HIGH DOSE) 0.7 milliLiter(s) IntraMuscular once  insulin glargine Injectable (LANTUS) 20 Unit(s) SubCutaneous every morning  insulin lispro (ADMELOG) corrective regimen sliding scale   SubCutaneous three times a day before meals  insulin lispro Injectable (ADMELOG) 4 Unit(s) SubCutaneous every 6 hours  lisinopril 10 milliGRAM(s) Oral daily  loteprednol 0.5% Ophthalmic Suspension 1 Drop(s) Right EYE daily  pantoprazole   Suspension 40 milliGRAM(s) Oral daily  prednisoLONE acetate 1% Suspension 1 Drop(s) Left EYE daily  simethicone 80 milliGRAM(s) Chew every 8 hours  sodium zirconium cyclosilicate 10 Gram(s) Oral once    MEDICATIONS  (PRN):  acetaminophen     Tablet .. 650 milliGRAM(s) Oral every 6 hours PRN Temp greater or equal to 38C (100.4F), Mild Pain (1 - 3)  albuterol/ipratropium for Nebulization 3 milliLiter(s) Nebulizer every 6 hours PRN Shortness of Breath and/or Wheezing  ALPRAZolam 0.5 milliGRAM(s) Oral every 12 hours PRN for agitation  aluminum hydroxide/magnesium hydroxide/simethicone Suspension 30 milliLiter(s) Oral every 4 hours PRN Dyspepsia  dextrose Oral Gel 15 Gram(s) Oral once PRN Blood Glucose LESS THAN 70 milliGRAM(s)/deciliter  hydrALAZINE Injectable 10 milliGRAM(s) IV Push every 2 hours PRN SBP >160  labetalol Injectable 10 milliGRAM(s) IV Push every 2 hours PRN SBP >160  loperamide 2 milliGRAM(s) Oral three times a day PRN Diarrhea  melatonin 3 milliGRAM(s) Oral at bedtime PRN Insomnia  ondansetron Injectable 4 milliGRAM(s) IV Push every 8 hours PRN Nausea and/or Vomiting      Allergies    iodine (Anaphylaxis (Mod to Severe))      Vital Signs Last 24 Hrs  T(C): 37 (08 Jan 2023 11:12), Max: 37 (07 Jan 2023 16:09)  T(F): 98.6 (08 Jan 2023 11:12), Max: 98.6 (07 Jan 2023 16:09)  HR: 73 (08 Jan 2023 12:03) (30 - 83)  BP: 121/60 (08 Jan 2023 12:00) (97/54 - 156/77)  BP(mean): 79 (08 Jan 2023 12:00) (68 - 101)  RR: 22 (08 Jan 2023 12:00) (19 - 33)  SpO2: 100% (08 Jan 2023 12:03) (95% - 100%)    Parameters below as of 08 Jan 2023 08:45  Patient On (Oxygen Delivery Method): ventilator        PHYSICAL EXAM:    GENERAL: NAD  NECK: trach  CHEST/LUNG: Clear to ausculation bilaterally  HEART: Regular rate and rhythm; S1 S2  ABDOMEN: Soft,  Bowel sounds present +PEG   EXTREMITIES:  no edema  NERVOUS SYSTEM:  awake, responds appropriately, right hemiparesis   LABS:                        10.2   9.58  )-----------( 263      ( 08 Jan 2023 04:00 )             31.9     01-08    140  |  105  |  28.7<H>  ----------------------------<  201<H>  5.2   |  30.0<H>  |  0.93    Ca    8.2<L>      08 Jan 2023 04:00  Phos  3.4     01-08  Mg     2.2     01-08      PTT - ( 07 Jan 2023 03:10 )  PTT:61.1 sec      CAPILLARY BLOOD GLUCOSE      POCT Blood Glucose.: 194 mg/dL (08 Jan 2023 10:55)  POCT Blood Glucose.: 198 mg/dL (08 Jan 2023 06:09)  POCT Blood Glucose.: 190 mg/dL (07 Jan 2023 22:20)  POCT Blood Glucose.: 242 mg/dL (07 Jan 2023 16:41)        RADIOLOGY & ADDITIONAL TESTS:

## 2023-01-09 LAB
ANION GAP SERPL CALC-SCNC: 6 MMOL/L — SIGNIFICANT CHANGE UP (ref 5–17)
BUN SERPL-MCNC: 29.8 MG/DL — HIGH (ref 8–20)
CALCIUM SERPL-MCNC: 8.5 MG/DL — SIGNIFICANT CHANGE UP (ref 8.4–10.5)
CHLORIDE SERPL-SCNC: 100 MMOL/L — SIGNIFICANT CHANGE UP (ref 96–108)
CO2 SERPL-SCNC: 29 MMOL/L — SIGNIFICANT CHANGE UP (ref 22–29)
CREAT SERPL-MCNC: 0.85 MG/DL — SIGNIFICANT CHANGE UP (ref 0.5–1.3)
EGFR: 90 ML/MIN/1.73M2 — SIGNIFICANT CHANGE UP
GLUCOSE BLDC GLUCOMTR-MCNC: 176 MG/DL — HIGH (ref 70–99)
GLUCOSE BLDC GLUCOMTR-MCNC: 179 MG/DL — HIGH (ref 70–99)
GLUCOSE BLDC GLUCOMTR-MCNC: 226 MG/DL — HIGH (ref 70–99)
GLUCOSE BLDC GLUCOMTR-MCNC: 233 MG/DL — HIGH (ref 70–99)
GLUCOSE BLDC GLUCOMTR-MCNC: 274 MG/DL — HIGH (ref 70–99)
GLUCOSE BLDC GLUCOMTR-MCNC: 276 MG/DL — HIGH (ref 70–99)
GLUCOSE SERPL-MCNC: 270 MG/DL — HIGH (ref 70–99)
HCT VFR BLD CALC: 31.2 % — LOW (ref 39–50)
HGB BLD-MCNC: 10.2 G/DL — LOW (ref 13–17)
MAGNESIUM SERPL-MCNC: 2.1 MG/DL — SIGNIFICANT CHANGE UP (ref 1.6–2.6)
MCHC RBC-ENTMCNC: 29.7 PG — SIGNIFICANT CHANGE UP (ref 27–34)
MCHC RBC-ENTMCNC: 32.7 GM/DL — SIGNIFICANT CHANGE UP (ref 32–36)
MCV RBC AUTO: 90.7 FL — SIGNIFICANT CHANGE UP (ref 80–100)
PLATELET # BLD AUTO: 290 K/UL — SIGNIFICANT CHANGE UP (ref 150–400)
POTASSIUM SERPL-MCNC: 4.8 MMOL/L — SIGNIFICANT CHANGE UP (ref 3.5–5.3)
POTASSIUM SERPL-SCNC: 4.8 MMOL/L — SIGNIFICANT CHANGE UP (ref 3.5–5.3)
RBC # BLD: 3.44 M/UL — LOW (ref 4.2–5.8)
RBC # FLD: 13.2 % — SIGNIFICANT CHANGE UP (ref 10.3–14.5)
SODIUM SERPL-SCNC: 135 MMOL/L — SIGNIFICANT CHANGE UP (ref 135–145)
WBC # BLD: 10.29 K/UL — SIGNIFICANT CHANGE UP (ref 3.8–10.5)
WBC # FLD AUTO: 10.29 K/UL — SIGNIFICANT CHANGE UP (ref 3.8–10.5)

## 2023-01-09 PROCEDURE — 99231 SBSQ HOSP IP/OBS SF/LOW 25: CPT

## 2023-01-09 PROCEDURE — 99233 SBSQ HOSP IP/OBS HIGH 50: CPT

## 2023-01-09 PROCEDURE — 99232 SBSQ HOSP IP/OBS MODERATE 35: CPT

## 2023-01-09 RX ORDER — INSULIN GLARGINE 100 [IU]/ML
25 INJECTION, SOLUTION SUBCUTANEOUS EVERY MORNING
Refills: 0 | Status: DISCONTINUED | OUTPATIENT
Start: 2023-01-09 | End: 2023-01-29

## 2023-01-09 RX ORDER — INSULIN LISPRO 100/ML
8 VIAL (ML) SUBCUTANEOUS EVERY 6 HOURS
Refills: 0 | Status: DISCONTINUED | OUTPATIENT
Start: 2023-01-09 | End: 2023-01-29

## 2023-01-09 RX ORDER — APIXABAN 2.5 MG/1
5 TABLET, FILM COATED ORAL EVERY 12 HOURS
Refills: 0 | Status: DISCONTINUED | OUTPATIENT
Start: 2023-01-09 | End: 2023-01-10

## 2023-01-09 RX ORDER — LOPERAMIDE HCL 2 MG
2 TABLET ORAL THREE TIMES A DAY
Refills: 0 | Status: DISCONTINUED | OUTPATIENT
Start: 2023-01-09 | End: 2023-01-10

## 2023-01-09 RX ADMIN — Medication 6: at 06:24

## 2023-01-09 RX ADMIN — PANTOPRAZOLE SODIUM 40 MILLIGRAM(S): 20 TABLET, DELAYED RELEASE ORAL at 11:48

## 2023-01-09 RX ADMIN — CHLORHEXIDINE GLUCONATE 15 MILLILITER(S): 213 SOLUTION TOPICAL at 06:17

## 2023-01-09 RX ADMIN — CEFEPIME 2000 MILLIGRAM(S): 1 INJECTION, POWDER, FOR SOLUTION INTRAMUSCULAR; INTRAVENOUS at 15:18

## 2023-01-09 RX ADMIN — LISINOPRIL 10 MILLIGRAM(S): 2.5 TABLET ORAL at 06:20

## 2023-01-09 RX ADMIN — APIXABAN 5 MILLIGRAM(S): 2.5 TABLET, FILM COATED ORAL at 06:19

## 2023-01-09 RX ADMIN — Medication 4 UNIT(S): at 00:01

## 2023-01-09 RX ADMIN — ATORVASTATIN CALCIUM 80 MILLIGRAM(S): 80 TABLET, FILM COATED ORAL at 23:31

## 2023-01-09 RX ADMIN — ATENOLOL 50 MILLIGRAM(S): 25 TABLET ORAL at 06:20

## 2023-01-09 RX ADMIN — SIMETHICONE 80 MILLIGRAM(S): 80 TABLET, CHEWABLE ORAL at 06:17

## 2023-01-09 RX ADMIN — Medication 2 MILLIGRAM(S): at 15:20

## 2023-01-09 RX ADMIN — Medication 4: at 11:52

## 2023-01-09 RX ADMIN — CEFEPIME 2000 MILLIGRAM(S): 1 INJECTION, POWDER, FOR SOLUTION INTRAMUSCULAR; INTRAVENOUS at 23:31

## 2023-01-09 RX ADMIN — INSULIN GLARGINE 20 UNIT(S): 100 INJECTION, SOLUTION SUBCUTANEOUS at 08:40

## 2023-01-09 RX ADMIN — Medication 2 MILLIGRAM(S): at 23:32

## 2023-01-09 RX ADMIN — CHLORHEXIDINE GLUCONATE 15 MILLILITER(S): 213 SOLUTION TOPICAL at 16:48

## 2023-01-09 RX ADMIN — CEFEPIME 2000 MILLIGRAM(S): 1 INJECTION, POWDER, FOR SOLUTION INTRAMUSCULAR; INTRAVENOUS at 06:19

## 2023-01-09 RX ADMIN — Medication 2: at 16:47

## 2023-01-09 RX ADMIN — Medication 0.5 MILLIGRAM(S): at 23:32

## 2023-01-09 RX ADMIN — Medication 8 UNIT(S): at 16:47

## 2023-01-09 RX ADMIN — Medication 81 MILLIGRAM(S): at 11:48

## 2023-01-09 RX ADMIN — Medication 4 UNIT(S): at 06:23

## 2023-01-09 RX ADMIN — Medication 1 DROP(S): at 06:16

## 2023-01-09 RX ADMIN — Medication 30 MILLILITER(S): at 06:16

## 2023-01-09 RX ADMIN — CHLORHEXIDINE GLUCONATE 1 APPLICATION(S): 213 SOLUTION TOPICAL at 11:55

## 2023-01-09 RX ADMIN — Medication 4 UNIT(S): at 11:50

## 2023-01-09 RX ADMIN — LOTEPREDNOL ETABONATE 1 DROP(S): 2 SUSPENSION/ DROPS OPHTHALMIC at 06:17

## 2023-01-09 RX ADMIN — APIXABAN 5 MILLIGRAM(S): 2.5 TABLET, FILM COATED ORAL at 16:49

## 2023-01-09 NOTE — PROGRESS NOTE ADULT - ASSESSMENT
78 yo M with PMH HTN, DM, CAD.  Transferred from [place] 12/26 with an acute L pontine CVA, possible L vert dissection. Worsening neuro exam 12/27 - RUE plegia, worsening bulbar dysfunction.   Acute resp failure due to neurologic injury, required intubation 12/27 for airway protection.    Thoracic surgery consulted for trach and peg eval.   1/3 S/p trach & PEG   1/4 surgicel d/c  pending suture removal 1/10

## 2023-01-09 NOTE — PROGRESS NOTE ADULT - ASSESSMENT
Imp--Pontine CVA, vertebral art occlusion.  Inadequate spont resp for weaning at this time--Poss due to brainstem CVA.  LLL pneumonia improved with abx.  Sputum culture: 12/30 - staph aureus MSSA, 1/5/23 - Morganella Morgani   trach and peg 1/3/2023    - continue SBT daily   - if patient can tolerates > 3-4 hours of SBT, please try trach collar   - continue antibiotic to complete course for pneumonia, tentatively 7 to 10 days   - chest PT twice daily  - aspiration precaution   - continue management per medicine team  - pulmonary will follow

## 2023-01-09 NOTE — PROGRESS NOTE ADULT - ASSESSMENT
76 yo M w/ hx CAD, DM2, HTN transferred from [facility] for CVA after presenting for right sided weakness, difficulty swallowing and slurred speech. out of window for TPA. admitted to NeuroICU, MRI with acute left debby infarct and left vertebral artery stenosis  foci of dissection vs thrombus. Underwent trach/ peg on 1/5 by ct surgery after being intubated on 12/28 for copious secretions and fevers.     acute left pontine CVA  left vertebral artery stenosis, dissection vs thrombus     eliquis x3 months repeat MRA neck with T1 fat sat in 3 months to assess for continued need of a/c.     c/w aspirin/statin    trach dependence  MSSA and morganella on sputum cultures  aspiration pneumonia?    cefepime x7 days    pulmonary following for  vent mgmt     pending suture removal 1/10 by CT surgery    PEG dependence: TF    Dm2: a1c 8    sliding scale    lantus/premeal adjusted for hyperglycemia    diarrhea: may be related to tube feeds.  cdiff negative    atc/prn lomitil    nutrition re eval for TF adjustment if needed     transfer to medical vent unit   PT/OT: JESSIE    dispo: ultimately vent facility  dc planning       d/w wife at bedside .

## 2023-01-09 NOTE — PROGRESS NOTE ADULT - SUBJECTIVE AND OBJECTIVE BOX
PULMONARY CONSULT NOTE      FABIOLA BALLARDKIRSTY-[id number]      Subjective:   No acute event overnight  being transferred out from NSICU  patient failed sbt yesterday, but tolerates 1 hour of sbt today, before he became tachypnic to 40s  denies acute complaint           Patient is a 77y old  Male who presents with a chief complaint of Acute stroke (07 Jan 2023 17:34)      INTERVAL HPI/OVERNIGHT EVENTS:6 y/o male with PMH of DM-2, HTN, CAD was transferred from [place] for stroke. Patient reported right sided weakness and slurred speech along with difficulty swallowing that started yesterday. Was seen at [place] today, code stroke initiated out of window for tPA. CT head: no acute finding. MRI: acute infarct with left debby. MRA head/neck: Left vertebral artery segmental stenoses and T1 hyperintensity suggesting foci of dissection and/or thrombus. As per resident, neurology from [facility] was consulted and accepted patient for further evaluation. Patient said he is upset about his condition, noted discomfort in his head otherwise no chest pain, shortness of breath, palpitation, fever, chills, nausea, vomiting, abdominal pain, change in bowel/urinary habit.  (26 Dec 2022 22:58)  Trach/Peg placed for secretions, L inf.  Now for vent unit.  Pt smoked in distant past, no prior resp issues, generally active before CVA.    MEDICATIONS  (STANDING):  albuterol/ipratropium for Nebulization 3 milliLiter(s) Nebulizer every 6 hours  apixaban 5 milliGRAM(s) Oral every 12 hours  aspirin  chewable 81 milliGRAM(s) Oral daily  atenolol  Tablet 50 milliGRAM(s) Oral daily  atorvastatin 80 milliGRAM(s) Oral at bedtime  cefepime  Injectable.      cefepime  Injectable. 2000 milliGRAM(s) IV Push every 8 hours  chlorhexidine 0.12% Liquid 15 milliLiter(s) Oral Mucosa every 12 hours  chlorhexidine 2% Cloths 1 Application(s) Topical daily  coronavirus bivalent (EUA) Booster Vaccine (PFIZER) 0.3 milliLiter(s) IntraMuscular once  dextrose 5%. 1000 milliLiter(s) (50 mL/Hr) IV Continuous <Continuous>  dextrose 5%. 1000 milliLiter(s) (100 mL/Hr) IV Continuous <Continuous>  dextrose 50% Injectable 25 Gram(s) IV Push once  dextrose 50% Injectable 12.5 Gram(s) IV Push once  dextrose 50% Injectable 25 Gram(s) IV Push once  doxazosin 2 milliGRAM(s) Oral at bedtime  glucagon  Injectable 1 milliGRAM(s) IntraMuscular once  influenza  Vaccine (HIGH DOSE) 0.7 milliLiter(s) IntraMuscular once  insulin glargine Injectable (LANTUS) 20 Unit(s) SubCutaneous every morning  insulin lispro (ADMELOG) corrective regimen sliding scale   SubCutaneous three times a day before meals  insulin lispro Injectable (ADMELOG) 4 Unit(s) SubCutaneous every 6 hours  lisinopril 10 milliGRAM(s) Oral daily  loteprednol 0.5% Ophthalmic Suspension 1 Drop(s) Right EYE daily  pantoprazole   Suspension 40 milliGRAM(s) Oral daily  prednisoLONE acetate 1% Suspension 1 Drop(s) Left EYE daily  simethicone 80 milliGRAM(s) Chew every 8 hours      MEDICATIONS  (PRN):  acetaminophen     Tablet .. 650 milliGRAM(s) Oral every 6 hours PRN Temp greater or equal to 38C (100.4F), Mild Pain (1 - 3)  ALPRAZolam 0.5 milliGRAM(s) Oral every 12 hours PRN for agitation  aluminum hydroxide/magnesium hydroxide/simethicone Suspension 30 milliLiter(s) Oral every 4 hours PRN Dyspepsia  dextrose Oral Gel 15 Gram(s) Oral once PRN Blood Glucose LESS THAN 70 milliGRAM(s)/deciliter  hydrALAZINE Injectable 10 milliGRAM(s) IV Push every 2 hours PRN SBP >160  labetalol Injectable 10 milliGRAM(s) IV Push every 2 hours PRN SBP >160  melatonin 3 milliGRAM(s) Oral at bedtime PRN Insomnia  ondansetron Injectable 4 milliGRAM(s) IV Push every 8 hours PRN Nausea and/or Vomiting      Allergies    iodine (Anaphylaxis (Mod to Severe))    Intolerances        PAST MEDICAL & SURGICAL HISTORY:  HTN (hypertension)      CAD (coronary artery disease)      DM (diabetes mellitus)          FAMILY HISTORY:  No pertinent family history in first degree relatives        SOCIAL HISTORY  Smoking History:     REVIEW OF SYSTEMS:    on ventilator, cannot obtain ROS       Vital Signs Last 24 Hrs  T(C): 37 (08 Jan 2023 07:54), Max: 37 (07 Jan 2023 16:09)  T(F): 98.6 (08 Jan 2023 07:54), Max: 98.6 (07 Jan 2023 16:09)  HR: 70 (08 Jan 2023 08:45) (30 - 83)  BP: 122/51 (08 Jan 2023 08:00) (111/60 - 156/77)  BP(mean): 72 (08 Jan 2023 08:00) (72 - 101)  RR: 25 (08 Jan 2023 08:00) (19 - 31)  SpO2: 98% (08 Jan 2023 08:45) (96% - 100%)    Parameters below as of 08 Jan 2023 08:45  Patient On (Oxygen Delivery Method): ventilator        PHYSICAL EXAMINATION:    GENERAL: NAD on vent,     HEENT: Head is normocephalic and atraumatic. Extraocular muscles are intact. Mucous membranes are moist.     NECK: Supple.  trach site clean, no oozing     LUNGS: Clear to auscultation without wheezing, rales, or rhonchi. Respirations unlabored    HEART: Regular rate and rhythm without murmur.    ABDOMEN: Soft, nontender, and nondistended.  No hepatosplenomegaly is noted.    EXTREMITIES: Without any cyanosis, clubbing, rash, lesions or edema.    NEUROLOGIC: unable to move right side upper and lower extremity, normal left side     SKIN: No ulceration or induration present.      LABS:                        10.2   9.58  )-----------( 263      ( 08 Jan 2023 04:00 )             31.9     01-08    140  |  105  |  28.7<H>  ----------------------------<  201<H>  5.2   |  30.0<H>  |  0.93    Ca    8.2<L>      08 Jan 2023 04:00  Phos  3.4     01-08  Mg     2.2     01-08      PTT - ( 07 Jan 2023 03:10 )  PTT:61.1 sec      Blood Gas Profile - Arterial (01.01.23 @ 04:40)    pH, Arterial: 7.420: As of 6/9/2020 Reference Ranges have been amended for: SAPNA, COHB, GLUWB,  HCO3, KWB, METHHB, NAWB, O2HB, PCO2.    pCO2, Arterial: 40 mmHg    pO2, Arterial: 82 mmHg    HCO3, Arterial: 26 mmol/L    Base Excess, Arterial: 1.4 mmol/L    Oxygen Saturation, Arterial: 98.2 %    FIO2, Arterial: .4    Blood Gas Comments Arterial: ac18/400/40%/+6    Blood Gas Source Arterial: Arterial        Spont breathing trial on CPAP 5 PS 10--RR 40, vt 320, mild abd breathing.  No improvement with PS 15.          Procalcitonin, Serum: 0.37 ng/mL (01-05-23 @ 13:50)      MICROBIOLOGY:    RADIOLOGY & ADDITIONAL STUDIES:< from: Xray Chest 1 View-PORTABLE IMMEDIATE (Xray Chest 1 View-PORTABLE IMMEDIATE .) (01.05.23 @ 15:19) >  Heart magnified by technique. A tracheostomy has replaced endotracheal   tube present on January 2. NG tube removed.    On January 2 there was a left base infiltrate which suggests some   improvement.    IMPRESSION: As above.    < end of copied text >

## 2023-01-09 NOTE — PROGRESS NOTE ADULT - SUBJECTIVE AND OBJECTIVE BOX
Brief summary:  77yMale seen and assessed at bedside.  POD #6 s/p trach/peg.  Pt laying comfortably in hospital bed in NAD on MV.  Pt indicates he has no current physical complaints at this time.  ROS neg x 10 except as indicated in HPI.    Overnight events:  None.  Hospital course progressing as expected.        PAST MEDICAL & SURGICAL HISTORY:  HTN (hypertension)    CAD (coronary artery disease)    DM (diabetes mellitus)        Medications:  acetaminophen     Tablet .. 650 milliGRAM(s) Oral every 6 hours PRN  albuterol/ipratropium for Nebulization 3 milliLiter(s) Nebulizer every 6 hours PRN  ALPRAZolam 0.5 milliGRAM(s) Oral every 12 hours PRN  aluminum hydroxide/magnesium hydroxide/simethicone Suspension 30 milliLiter(s) Oral every 4 hours PRN  apixaban 5 milliGRAM(s) Oral every 12 hours  aspirin  chewable 81 milliGRAM(s) Oral daily  atenolol  Tablet 50 milliGRAM(s) Oral daily  atorvastatin 80 milliGRAM(s) Oral at bedtime  cefepime  Injectable.      cefepime  Injectable. 2000 milliGRAM(s) IV Push every 8 hours  chlorhexidine 0.12% Liquid 15 milliLiter(s) Oral Mucosa every 12 hours  chlorhexidine 2% Cloths 1 Application(s) Topical daily  coronavirus bivalent (EUA) Booster Vaccine (PFIZER) 0.3 milliLiter(s) IntraMuscular once  dextrose 5%. 1000 milliLiter(s) IV Continuous <Continuous>  dextrose 5%. 1000 milliLiter(s) IV Continuous <Continuous>  dextrose 50% Injectable 25 Gram(s) IV Push once  dextrose 50% Injectable 12.5 Gram(s) IV Push once  dextrose 50% Injectable 25 Gram(s) IV Push once  dextrose Oral Gel 15 Gram(s) Oral once PRN  doxazosin 2 milliGRAM(s) Oral at bedtime  glucagon  Injectable 1 milliGRAM(s) IntraMuscular once  hydrALAZINE Injectable 10 milliGRAM(s) IV Push every 2 hours PRN  influenza  Vaccine (HIGH DOSE) 0.7 milliLiter(s) IntraMuscular once  insulin glargine Injectable (LANTUS) 20 Unit(s) SubCutaneous every morning  insulin lispro (ADMELOG) corrective regimen sliding scale   SubCutaneous three times a day before meals  insulin lispro Injectable (ADMELOG) 4 Unit(s) SubCutaneous every 6 hours  labetalol Injectable 10 milliGRAM(s) IV Push every 2 hours PRN  lisinopril 10 milliGRAM(s) Oral daily  loperamide 2 milliGRAM(s) Oral three times a day PRN  loteprednol 0.5% Ophthalmic Suspension 1 Drop(s) Right EYE daily  melatonin 3 milliGRAM(s) Oral at bedtime PRN  ondansetron Injectable 4 milliGRAM(s) IV Push every 8 hours PRN  pantoprazole   Suspension 40 milliGRAM(s) Oral daily  prednisoLONE acetate 1% Suspension 1 Drop(s) Left EYE daily      MEDICATIONS  (PRN):  acetaminophen     Tablet .. 650 milliGRAM(s) Oral every 6 hours PRN Temp greater or equal to 38C (100.4F), Mild Pain (1 - 3)  albuterol/ipratropium for Nebulization 3 milliLiter(s) Nebulizer every 6 hours PRN Shortness of Breath and/or Wheezing  ALPRAZolam 0.5 milliGRAM(s) Oral every 12 hours PRN for agitation  aluminum hydroxide/magnesium hydroxide/simethicone Suspension 30 milliLiter(s) Oral every 4 hours PRN Dyspepsia  dextrose Oral Gel 15 Gram(s) Oral once PRN Blood Glucose LESS THAN 70 milliGRAM(s)/deciliter  hydrALAZINE Injectable 10 milliGRAM(s) IV Push every 2 hours PRN SBP >160  labetalol Injectable 10 milliGRAM(s) IV Push every 2 hours PRN SBP >160  loperamide 2 milliGRAM(s) Oral three times a day PRN Diarrhea  melatonin 3 milliGRAM(s) Oral at bedtime PRN Insomnia  ondansetron Injectable 4 milliGRAM(s) IV Push every 8 hours PRN Nausea and/or Vomiting        Daily Review:    Mode: CPAP with PS, FiO2: 40, PEEP: 5, PS: 15, MAP: 10, PIP: 43               10.2   10.29 )-----------( 290      ( 09 Jan 2023 03:00 )             31.2   01-09    135  |  100  |  29.8<H>  ----------------------------<  270<H>  4.8   |  29.0  |  0.85    Ca    8.5      09 Jan 2023 03:00  Phos  3.4     01-08  Mg     2.1     01-09      T(C): 36.9 (01-09-23 @ 07:10), Max: 37.6 (01-08-23 @ 23:13)  HR: 65 (01-09-23 @ 08:00) (58 - 77)  BP: 121/59 (01-09-23 @ 08:00) (97/54 - 157/69)  RR: 28 (01-09-23 @ 08:00) (19 - 33)  SpO2: 98% (01-09-23 @ 08:00) (95% - 100%)      CAPILLARY BLOOD GLUCOSE    POCT Blood Glucose.: 276 mg/dL (09 Jan 2023 08:37)  POCT Blood Glucose.: 274 mg/dL (09 Jan 2023 06:22)  POCT Blood Glucose.: 233 mg/dL (08 Jan 2023 23:59)  POCT Blood Glucose.: 252 mg/dL (08 Jan 2023 16:11)  POCT Blood Glucose.: 194 mg/dL (08 Jan 2023 10:55)      I&O's Summary    08 Jan 2023 07:01  -  09 Jan 2023 07:00  --------------------------------------------------------  IN: 2010 mL / OUT: 2950 mL / NET: -940 mL        Physical Exam    Neuro: A+O x 3, non-focal, able to answer yes/no questions  Pulm: coarse breath sounds bilaterally, no wheezing or rales, + trach, c/d/i, on MV, sutures intact, no drainage  CV: RRR, +S1S2  Abd: soft, NT, ND, normoactive bowel sounds, + PEG c/d/i  Ext: +DP Pulses b/l, +PT pulses, no edema

## 2023-01-09 NOTE — PROGRESS NOTE ADULT - SUBJECTIVE AND OBJECTIVE BOX
seen for cva    no events  watery stool in rectal tube bag  wife at bedside  on vent     MEDICATIONS  (STANDING):  apixaban 5 milliGRAM(s) Oral every 12 hours  aspirin  chewable 81 milliGRAM(s) Oral daily  atenolol  Tablet 50 milliGRAM(s) Oral daily  atorvastatin 80 milliGRAM(s) Oral at bedtime  cefepime  Injectable.      cefepime  Injectable. 2000 milliGRAM(s) IV Push every 8 hours  chlorhexidine 0.12% Liquid 15 milliLiter(s) Oral Mucosa every 12 hours  chlorhexidine 2% Cloths 1 Application(s) Topical daily  coronavirus bivalent (EUA) Booster Vaccine (PFIZER) 0.3 milliLiter(s) IntraMuscular once  dextrose 5%. 1000 milliLiter(s) (50 mL/Hr) IV Continuous <Continuous>  dextrose 5%. 1000 milliLiter(s) (100 mL/Hr) IV Continuous <Continuous>  dextrose 50% Injectable 25 Gram(s) IV Push once  dextrose 50% Injectable 12.5 Gram(s) IV Push once  dextrose 50% Injectable 25 Gram(s) IV Push once  doxazosin 2 milliGRAM(s) Oral at bedtime  glucagon  Injectable 1 milliGRAM(s) IntraMuscular once  influenza  Vaccine (HIGH DOSE) 0.7 milliLiter(s) IntraMuscular once  insulin glargine Injectable (LANTUS) 25 Unit(s) SubCutaneous every morning  insulin lispro (ADMELOG) corrective regimen sliding scale   SubCutaneous three times a day before meals  insulin lispro Injectable (ADMELOG) 8 Unit(s) SubCutaneous every 6 hours  lisinopril 10 milliGRAM(s) Oral daily  loperamide 2 milliGRAM(s) Oral three times a day  loteprednol 0.5% Ophthalmic Suspension 1 Drop(s) Right EYE daily  pantoprazole   Suspension 40 milliGRAM(s) Oral daily  prednisoLONE acetate 1% Suspension 1 Drop(s) Left EYE daily    MEDICATIONS  (PRN):  acetaminophen     Tablet .. 650 milliGRAM(s) Oral every 6 hours PRN Temp greater or equal to 38C (100.4F), Mild Pain (1 - 3)  albuterol/ipratropium for Nebulization 3 milliLiter(s) Nebulizer every 6 hours PRN Shortness of Breath and/or Wheezing  ALPRAZolam 0.5 milliGRAM(s) Oral every 12 hours PRN for agitation  aluminum hydroxide/magnesium hydroxide/simethicone Suspension 30 milliLiter(s) Oral every 4 hours PRN Dyspepsia  dextrose Oral Gel 15 Gram(s) Oral once PRN Blood Glucose LESS THAN 70 milliGRAM(s)/deciliter  hydrALAZINE Injectable 10 milliGRAM(s) IV Push every 2 hours PRN SBP >160  labetalol Injectable 10 milliGRAM(s) IV Push every 2 hours PRN SBP >160  loperamide 2 milliGRAM(s) Oral three times a day PRN Diarrhea  melatonin 3 milliGRAM(s) Oral at bedtime PRN Insomnia  ondansetron Injectable 4 milliGRAM(s) IV Push every 8 hours PRN Nausea and/or Vomiting      Allergies    iodine (Anaphylaxis (Mod to Severe))        Vital Signs Last 24 Hrs  T(C): 36.7 (09 Jan 2023 12:00), Max: 37.6 (08 Jan 2023 23:13)  T(F): 98 (09 Jan 2023 12:00), Max: 99.6 (08 Jan 2023 23:13)  HR: 64 (09 Jan 2023 09:30) (58 - 73)  BP: 121/59 (09 Jan 2023 08:00) (111/50 - 157/69)  BP(mean): 78 (09 Jan 2023 08:00) (69 - 96)  RR: 28 (09 Jan 2023 08:00) (19 - 31)  SpO2: 98% (09 Jan 2023 09:30) (95% - 100%)    Parameters below as of 09 Jan 2023 09:30  Patient On (Oxygen Delivery Method): ventilator,40%        PHYSICAL EXAM:    GENERAL: NAD  CHEST/LUNG: Clear to ausculation bilaterall  HEART: Regular rate and rhythm; S1 S2  ABDOMEN: Soft, Bowel sounds present  EXTREMITIES:  no edema  gu tierney  NERVOUS SYSTEM:  Alert alert, follows simple commands.  5/5 strength left side. 0/5 RUE/RLE    LABS:                        10.2   10.29 )-----------( 290      ( 09 Jan 2023 03:00 )             31.2     01-09    135  |  100  |  29.8<H>  ----------------------------<  270<H>  4.8   |  29.0  |  0.85    Ca    8.5      09 Jan 2023 03:00  Phos  3.4     01-08  Mg     2.1     01-09            CAPILLARY BLOOD GLUCOSE      POCT Blood Glucose.: 226 mg/dL (09 Jan 2023 11:45)  POCT Blood Glucose.: 276 mg/dL (09 Jan 2023 08:37)  POCT Blood Glucose.: 274 mg/dL (09 Jan 2023 06:22)  POCT Blood Glucose.: 233 mg/dL (08 Jan 2023 23:59)  POCT Blood Glucose.: 252 mg/dL (08 Jan 2023 16:11)        RADIOLOGY & ADDITIONAL TESTS:

## 2023-01-10 LAB
ANION GAP SERPL CALC-SCNC: 10 MMOL/L — SIGNIFICANT CHANGE UP (ref 5–17)
BUN SERPL-MCNC: 30 MG/DL — HIGH (ref 8–20)
CALCIUM SERPL-MCNC: 8.3 MG/DL — LOW (ref 8.4–10.5)
CHLORIDE SERPL-SCNC: 101 MMOL/L — SIGNIFICANT CHANGE UP (ref 96–108)
CO2 SERPL-SCNC: 26 MMOL/L — SIGNIFICANT CHANGE UP (ref 22–29)
CREAT SERPL-MCNC: 0.83 MG/DL — SIGNIFICANT CHANGE UP (ref 0.5–1.3)
CULTURE RESULTS: SIGNIFICANT CHANGE UP
CULTURE RESULTS: SIGNIFICANT CHANGE UP
EGFR: 90 ML/MIN/1.73M2 — SIGNIFICANT CHANGE UP
GLUCOSE BLDC GLUCOMTR-MCNC: 153 MG/DL — HIGH (ref 70–99)
GLUCOSE BLDC GLUCOMTR-MCNC: 205 MG/DL — HIGH (ref 70–99)
GLUCOSE BLDC GLUCOMTR-MCNC: 217 MG/DL — HIGH (ref 70–99)
GLUCOSE BLDC GLUCOMTR-MCNC: 226 MG/DL — HIGH (ref 70–99)
GLUCOSE SERPL-MCNC: 180 MG/DL — HIGH (ref 70–99)
HCT VFR BLD CALC: 31.3 % — LOW (ref 39–50)
HGB BLD-MCNC: 9.8 G/DL — LOW (ref 13–17)
MAGNESIUM SERPL-MCNC: 2 MG/DL — SIGNIFICANT CHANGE UP (ref 1.6–2.6)
MCHC RBC-ENTMCNC: 28.7 PG — SIGNIFICANT CHANGE UP (ref 27–34)
MCHC RBC-ENTMCNC: 31.3 GM/DL — LOW (ref 32–36)
MCV RBC AUTO: 91.5 FL — SIGNIFICANT CHANGE UP (ref 80–100)
PHOSPHATE SERPL-MCNC: 3.7 MG/DL — SIGNIFICANT CHANGE UP (ref 2.4–4.7)
PLATELET # BLD AUTO: 341 K/UL — SIGNIFICANT CHANGE UP (ref 150–400)
POTASSIUM SERPL-MCNC: 5.1 MMOL/L — SIGNIFICANT CHANGE UP (ref 3.5–5.3)
POTASSIUM SERPL-SCNC: 5.1 MMOL/L — SIGNIFICANT CHANGE UP (ref 3.5–5.3)
RBC # BLD: 3.42 M/UL — LOW (ref 4.2–5.8)
RBC # FLD: 13.1 % — SIGNIFICANT CHANGE UP (ref 10.3–14.5)
SODIUM SERPL-SCNC: 137 MMOL/L — SIGNIFICANT CHANGE UP (ref 135–145)
SPECIMEN SOURCE: SIGNIFICANT CHANGE UP
SPECIMEN SOURCE: SIGNIFICANT CHANGE UP
WBC # BLD: 12.21 K/UL — HIGH (ref 3.8–10.5)
WBC # FLD AUTO: 12.21 K/UL — HIGH (ref 3.8–10.5)

## 2023-01-10 PROCEDURE — 99231 SBSQ HOSP IP/OBS SF/LOW 25: CPT

## 2023-01-10 PROCEDURE — 99232 SBSQ HOSP IP/OBS MODERATE 35: CPT

## 2023-01-10 PROCEDURE — 99233 SBSQ HOSP IP/OBS HIGH 50: CPT

## 2023-01-10 RX ORDER — APIXABAN 2.5 MG/1
5 TABLET, FILM COATED ORAL EVERY 12 HOURS
Refills: 0 | Status: DISCONTINUED | OUTPATIENT
Start: 2023-01-10 | End: 2023-01-30

## 2023-01-10 RX ORDER — LOPERAMIDE HCL 2 MG
2 TABLET ORAL THREE TIMES A DAY
Refills: 0 | Status: DISCONTINUED | OUTPATIENT
Start: 2023-01-10 | End: 2023-01-30

## 2023-01-10 RX ADMIN — Medication 0.5 MILLIGRAM(S): at 14:31

## 2023-01-10 RX ADMIN — Medication 8 UNIT(S): at 00:05

## 2023-01-10 RX ADMIN — Medication 650 MILLIGRAM(S): at 14:30

## 2023-01-10 RX ADMIN — CEFEPIME 2000 MILLIGRAM(S): 1 INJECTION, POWDER, FOR SOLUTION INTRAMUSCULAR; INTRAVENOUS at 23:18

## 2023-01-10 RX ADMIN — LISINOPRIL 10 MILLIGRAM(S): 2.5 TABLET ORAL at 05:38

## 2023-01-10 RX ADMIN — Medication 650 MILLIGRAM(S): at 15:00

## 2023-01-10 RX ADMIN — Medication 4: at 17:22

## 2023-01-10 RX ADMIN — INSULIN GLARGINE 25 UNIT(S): 100 INJECTION, SOLUTION SUBCUTANEOUS at 11:14

## 2023-01-10 RX ADMIN — CHLORHEXIDINE GLUCONATE 15 MILLILITER(S): 213 SOLUTION TOPICAL at 05:39

## 2023-01-10 RX ADMIN — Medication 8 UNIT(S): at 23:17

## 2023-01-10 RX ADMIN — CEFEPIME 2000 MILLIGRAM(S): 1 INJECTION, POWDER, FOR SOLUTION INTRAMUSCULAR; INTRAVENOUS at 13:12

## 2023-01-10 RX ADMIN — Medication 2 MILLIGRAM(S): at 05:38

## 2023-01-10 RX ADMIN — Medication 8 UNIT(S): at 05:45

## 2023-01-10 RX ADMIN — CEFEPIME 2000 MILLIGRAM(S): 1 INJECTION, POWDER, FOR SOLUTION INTRAMUSCULAR; INTRAVENOUS at 05:38

## 2023-01-10 RX ADMIN — Medication 8 UNIT(S): at 18:19

## 2023-01-10 RX ADMIN — LOTEPREDNOL ETABONATE 1 DROP(S): 2 SUSPENSION/ DROPS OPHTHALMIC at 05:37

## 2023-01-10 RX ADMIN — Medication 4: at 11:15

## 2023-01-10 RX ADMIN — Medication 1 DROP(S): at 05:37

## 2023-01-10 RX ADMIN — Medication 2 MILLIGRAM(S): at 13:12

## 2023-01-10 RX ADMIN — Medication 2: at 05:47

## 2023-01-10 RX ADMIN — APIXABAN 5 MILLIGRAM(S): 2.5 TABLET, FILM COATED ORAL at 05:38

## 2023-01-10 RX ADMIN — ATENOLOL 50 MILLIGRAM(S): 25 TABLET ORAL at 05:39

## 2023-01-10 RX ADMIN — PANTOPRAZOLE SODIUM 40 MILLIGRAM(S): 20 TABLET, DELAYED RELEASE ORAL at 11:15

## 2023-01-10 RX ADMIN — Medication 2 MILLIGRAM(S): at 23:18

## 2023-01-10 RX ADMIN — APIXABAN 5 MILLIGRAM(S): 2.5 TABLET, FILM COATED ORAL at 17:22

## 2023-01-10 RX ADMIN — CHLORHEXIDINE GLUCONATE 1 APPLICATION(S): 213 SOLUTION TOPICAL at 17:22

## 2023-01-10 RX ADMIN — ATORVASTATIN CALCIUM 80 MILLIGRAM(S): 80 TABLET, FILM COATED ORAL at 23:18

## 2023-01-10 RX ADMIN — Medication 2 MILLIGRAM(S): at 23:19

## 2023-01-10 RX ADMIN — Medication 81 MILLIGRAM(S): at 11:15

## 2023-01-10 NOTE — PROGRESS NOTE ADULT - ASSESSMENT
76 yo M with PMH HTN, DM, CAD.  Transferred from [facility] 12/26 with an acute L pontine CVA, possible L vert dissection. Worsening neuro exam 12/27 - RUE plegia, worsening bulbar dysfunction.   Acute resp failure due to neurologic injury, required intubation 12/27 for airway protection.    Thoracic surgery consulted for trach and peg eval.   1/3 S/p trach & PEG   1/4 surgicel d/c   suture removal 1/10

## 2023-01-10 NOTE — PROGRESS NOTE ADULT - PROBLEM SELECTOR PLAN 1
Admitted with Acute Left Pontine CVA.  Intubated 12/27 for airway protection.  S/p Trach/PEG 1/3  Surgicel removed 1/4.  Sutures removed 1/10    Maintain gauze/drain sponge between trach flange and skin to prevent skin breakdown. Change as needed.  Continue trach care and suctioning.   Continue tube feeds as tolerated.   Thoracic surgery to continue to follow.    Plan discussed with Thoracic attendings during AM rounds.

## 2023-01-10 NOTE — PROGRESS NOTE ADULT - NS ATTEND AMEND GEN_ALL_CORE FT
Patient was seen and examined by me. History and exam as documented above by PA/NP was confirmed by me.  Agree with plan as outlined above.
Seen and examined with the PA  Plan discussed with PA and I agree with as written above with modifications as below    Pontine CVA  Await MRA with T1 fat sat to determine thrombus presence    will follow with you    Kiran Bermudez MD PhD   [phone]
Seen and examined with the PA  Plan discussed with PA and I agree with as written above with modifications as below    Await further imaging to determine need for continued anticoagualtion    will follow with you    Kiran Bermudez MD PhD   [phone]

## 2023-01-10 NOTE — PROGRESS NOTE ADULT - SUBJECTIVE AND OBJECTIVE BOX
Subjective - patient seen and evaluated bedside. Sitting comfortably in bed. Able to nod head yes and no to questions. Denies CP, SOB, HA, dizziness, n/v/d    Review of Systems: negative x 10 systems except as noted above    Brief summary:  77yMale POD# 7 Trach/PEG    Significant/[id number] events: none      PAST MEDICAL & SURGICAL HISTORY:  HTN (hypertension)      CAD (coronary artery disease)      DM (diabetes mellitus)        acetaminophen     Tablet .. 650 milliGRAM(s) Oral every 6 hours PRN  albuterol/ipratropium for Nebulization 3 milliLiter(s) Nebulizer every 6 hours PRN  ALPRAZolam 0.5 milliGRAM(s) Oral every 12 hours PRN  aluminum hydroxide/magnesium hydroxide/simethicone Suspension 30 milliLiter(s) Oral every 4 hours PRN  apixaban 5 milliGRAM(s) Oral every 12 hours  aspirin  chewable 81 milliGRAM(s) Oral daily  atenolol  Tablet 50 milliGRAM(s) Oral daily  atorvastatin 80 milliGRAM(s) Oral at bedtime  cefepime  Injectable.      cefepime  Injectable. 2000 milliGRAM(s) IV Push every 8 hours  chlorhexidine 0.12% Liquid 15 milliLiter(s) Oral Mucosa every 12 hours  chlorhexidine 2% Cloths 1 Application(s) Topical daily  coronavirus bivalent (EUA) Booster Vaccine (PFIZER) 0.3 milliLiter(s) IntraMuscular once  dextrose 5%. 1000 milliLiter(s) IV Continuous <Continuous>  dextrose 5%. 1000 milliLiter(s) IV Continuous <Continuous>  dextrose 50% Injectable 25 Gram(s) IV Push once  dextrose 50% Injectable 12.5 Gram(s) IV Push once  dextrose 50% Injectable 25 Gram(s) IV Push once  dextrose Oral Gel 15 Gram(s) Oral once PRN  doxazosin 2 milliGRAM(s) Oral at bedtime  glucagon  Injectable 1 milliGRAM(s) IntraMuscular once  hydrALAZINE Injectable 10 milliGRAM(s) IV Push every 2 hours PRN  influenza  Vaccine (HIGH DOSE) 0.7 milliLiter(s) IntraMuscular once  insulin glargine Injectable (LANTUS) 25 Unit(s) SubCutaneous every morning  insulin lispro (ADMELOG) corrective regimen sliding scale   SubCutaneous three times a day before meals  insulin lispro Injectable (ADMELOG) 8 Unit(s) SubCutaneous every 6 hours  labetalol Injectable 10 milliGRAM(s) IV Push every 2 hours PRN  lisinopril 10 milliGRAM(s) Oral daily  loperamide 2 milliGRAM(s) Oral three times a day  loteprednol 0.5% Ophthalmic Suspension 1 Drop(s) Right EYE daily  melatonin 3 milliGRAM(s) Oral at bedtime PRN  ondansetron Injectable 4 milliGRAM(s) IV Push every 8 hours PRN  pantoprazole   Suspension 40 milliGRAM(s) Oral daily  prednisoLONE acetate 1% Suspension 1 Drop(s) Left EYE daily  MEDICATIONS  (PRN):  acetaminophen     Tablet .. 650 milliGRAM(s) Oral every 6 hours PRN Temp greater or equal to 38C (100.4F), Mild Pain (1 - 3)  albuterol/ipratropium for Nebulization 3 milliLiter(s) Nebulizer every 6 hours PRN Shortness of Breath and/or Wheezing  ALPRAZolam 0.5 milliGRAM(s) Oral every 12 hours PRN for agitation  aluminum hydroxide/magnesium hydroxide/simethicone Suspension 30 milliLiter(s) Oral every 4 hours PRN Dyspepsia  dextrose Oral Gel 15 Gram(s) Oral once PRN Blood Glucose LESS THAN 70 milliGRAM(s)/deciliter  hydrALAZINE Injectable 10 milliGRAM(s) IV Push every 2 hours PRN SBP >160  labetalol Injectable 10 milliGRAM(s) IV Push every 2 hours PRN SBP >160  melatonin 3 milliGRAM(s) Oral at bedtime PRN Insomnia  ondansetron Injectable 4 milliGRAM(s) IV Push every 8 hours PRN Nausea and/or Vomiting    Mode: AC/ CMV (Assist Control/ Continuous Mandatory Ventilation), RR (machine): 16, TV (machine): 400, FiO2: 40, PEEP: 5, ITime: , MAP: 12, PIP: 32  Daily     Daily                           9.8    12.21 )-----------( 341      ( 10 Lewis 2023 05:40 )             31.3   01-10    137  |  101  |  30.0<H>  ----------------------------<  180<H>  5.1   |  26.0  |  0.83    Ca    8.3<L>      10 Lewis 2023 05:40  Phos  3.7     01-10  Mg     2.0     01-10        Objective:  T(C): 37.6 (01-10-23 @ 04:28), Max: 37.6 (01-10-23 @ 04:28)  HR: 81 (01-10-23 @ 09:03) (59 - 81)  BP: 144/72 (01-10-23 @ 04:28) (121/58 - 155/73)  RR: 18 (01-10-23 @ 04:28) (17 - 24)  SpO2: 98% (01-10-23 @ 09:03) (96% - 99%)  Wt(kg): --CAPILLARY BLOOD GLUCOSE      POCT Blood Glucose.: 153 mg/dL (10 Lewis 2023 05:41)  POCT Blood Glucose.: 179 mg/dL (09 Jan 2023 23:49)  POCT Blood Glucose.: 176 mg/dL (09 Jan 2023 16:45)  POCT Blood Glucose.: 226 mg/dL (09 Jan 2023 11:45)  I&O's Summary    09 Jan 2023 07:01  -  10 Lewis 2023 07:00  --------------------------------------------------------  IN: 1240 mL / OUT: 600 mL / NET: 640 mL        Physical Exam  General: NAD  Neuro: Awake, alert, following commands   Pulm: CTA, equal bilaterally +trach in place c/d/i  CV: RRR,  +S1S2  Abd: soft, NT, ND, +BS +PEG          Imaging:    < from: Xray Chest 1 View-PORTABLE IMMEDIATE (Xray Chest 1 View-PORTABLE IMMEDIATE .) (01.05.23 @ 15:19) >  INTERPRETATION:  AP semierect chest on January 5, 2023 at 2:24 PM.   Patient has fever.    Heart magnified by technique. A tracheostomy has replaced endotracheal   tube present on January 2. NG tube removed.    On January 2 there was a left base infiltrate which suggests some   improvement.    IMPRESSION: As above.    < end of copied text >

## 2023-01-10 NOTE — CHART NOTE - NSCHARTNOTEFT_GEN_A_CORE
Source: Patient [ ]  Family [x ]   other [ ] Wife at bedside    Current Diet:   Diet, NPO with Tube Feed:   Tube Feeding Modality: Gastrostomy  Glucerna 1.5 Benny (GLUCERNA1.5)  Total Volume for 24 Hours (mL): 1440  Continuous  Starting Tube Feed Rate {mL per Hour}: 60  Until Goal Tube Feed Rate (mL per Hour): 60  Tube Feed Duration (in Hours): 24  Tube Feed Start Time: 11:00  Banatrol TF     Qty per Day:  3 (01-09-23 @ 11:57)    Patient reports [ ] nausea  [ ] vomiting [ x] diarrhea [ ] constipation  [ ]chewing problems [ ] swallowing issues  [ ] other:     Enteral /Parenteral Nutrition: Current diet order reads for Glucerna 1.5 running @ goal rate of 60 ml/hr (x20 hrs) to provide 1200 ml, 1800 kcal, 99g protein, 911 ml free water, and >100% of RDIs for vitamins/minerals.     Current Weight:   (1/8)     166.4 lbs  (1/3)     174.1 lbs  (12/27) 171.1 lbs    % Weight Change: Unclear accuracy of weight 2/2 inconsistency/fluid fluctuations, will continue to monitor     Pertinent Medications: MEDICATIONS  (STANDING):  apixaban 5 milliGRAM(s) Oral every 12 hours  aspirin  chewable 81 milliGRAM(s) Oral daily  atenolol  Tablet 50 milliGRAM(s) Oral daily  atorvastatin 80 milliGRAM(s) Oral at bedtime  cefepime  Injectable.      cefepime  Injectable. 2000 milliGRAM(s) IV Push every 8 hours  chlorhexidine 0.12% Liquid 15 milliLiter(s) Oral Mucosa every 12 hours  chlorhexidine 2% Cloths 1 Application(s) Topical daily  coronavirus bivalent (EUA) Booster Vaccine (PFIZER) 0.3 milliLiter(s) IntraMuscular once  dextrose 5%. 1000 milliLiter(s) (50 mL/Hr) IV Continuous <Continuous>  dextrose 5%. 1000 milliLiter(s) (100 mL/Hr) IV Continuous <Continuous>  dextrose 50% Injectable 25 Gram(s) IV Push once  dextrose 50% Injectable 12.5 Gram(s) IV Push once  dextrose 50% Injectable 25 Gram(s) IV Push once  doxazosin 2 milliGRAM(s) Oral at bedtime  glucagon  Injectable 1 milliGRAM(s) IntraMuscular once  influenza  Vaccine (HIGH DOSE) 0.7 milliLiter(s) IntraMuscular once  insulin glargine Injectable (LANTUS) 25 Unit(s) SubCutaneous every morning  insulin lispro (ADMELOG) corrective regimen sliding scale   SubCutaneous three times a day before meals  insulin lispro Injectable (ADMELOG) 8 Unit(s) SubCutaneous every 6 hours  lisinopril 10 milliGRAM(s) Oral daily  loperamide 2 milliGRAM(s) Oral three times a day  loteprednol 0.5% Ophthalmic Suspension 1 Drop(s) Right EYE daily  pantoprazole   Suspension 40 milliGRAM(s) Oral daily  prednisoLONE acetate 1% Suspension 1 Drop(s) Left EYE daily    MEDICATIONS  (PRN):  acetaminophen     Tablet .. 650 milliGRAM(s) Oral every 6 hours PRN Temp greater or equal to 38C (100.4F), Mild Pain (1 - 3)  albuterol/ipratropium for Nebulization 3 milliLiter(s) Nebulizer every 6 hours PRN Shortness of Breath and/or Wheezing  ALPRAZolam 0.5 milliGRAM(s) Oral every 12 hours PRN for agitation  aluminum hydroxide/magnesium hydroxide/simethicone Suspension 30 milliLiter(s) Oral every 4 hours PRN Dyspepsia  dextrose Oral Gel 15 Gram(s) Oral once PRN Blood Glucose LESS THAN 70 milliGRAM(s)/deciliter  hydrALAZINE Injectable 10 milliGRAM(s) IV Push every 2 hours PRN SBP >160  labetalol Injectable 10 milliGRAM(s) IV Push every 2 hours PRN SBP >160  melatonin 3 milliGRAM(s) Oral at bedtime PRN Insomnia  ondansetron Injectable 4 milliGRAM(s) IV Push every 8 hours PRN Nausea and/or Vomiting    Pertinent Labs: CBC Full  -  ( 10 Lewis 2023 05:40 )  WBC Count : 12.21 K/uL  RBC Count : 3.42 M/uL  Hemoglobin : 9.8 g/dL  Hematocrit : 31.3 %  Platelet Count - Automated : 341 K/uL  Mean Cell Volume : 91.5 fl  Mean Cell Hemoglobin : 28.7 pg  Mean Cell Hemoglobin Concentration : 31.3 gm/dL  01-10 Na137 mmol/L Glu 180 mg/dL<H> K+ 5.1 mmol/L Cr  0.83 mg/dL BUN 30.0 mg/dL<H> Phos 3.7 mg/dL Alb n/a   PAB n/a       Skin: IAD    Nutrition focused physical exam conducted - found signs of malnutrition [ ]absent [ x]present    Subcutaneous fat loss: [ ] Orbital fat pads region, [ ]Buccal fat region, [ ]Triceps region,  [ ]Ribs region    Muscle wasting: [x ]Temples region, [ ]Clavicle region, [ ]Shoulder region, [ ]Scapula region, [ ]Interosseous region,  [ ]thigh region, [ ]Calf region    Estimated Needs:   [x ] no change since previous assessment  [ ] recalculated:     Current Nutrition Diagnosis: Pt remains at nutrition risk secondary to increased nutrient needs related to increased physiological demand for nutrient as evidenced by acute L pontine CVA, 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 Pt s/p trach/PEG (1/3), remains vent dependent. Pt noted with liquid BMs, some complaints of gas. Banatrol added TID to regimen. Continue on current regimen, monitor bowel function, if no improvement will consider elemental formula.     Recommendations:   1) Continue current TF regimen with Banatrol  2) Monitor bowel function  3) Monitor weights daily for trend/accuracy   4) Monitor electrolytes, correct PRN    Monitoring and Evaluation:   [x ] PO intake [ x] Tolerance to diet prescription [X] Weights  [X] Follow up per protocol [X] Labs:

## 2023-01-10 NOTE — PROGRESS NOTE ADULT - ASSESSMENT
78 y/o M w/ PMH of CAD, DM2, HTN, transferred from [facility] for CVA after presenting for Rt-sided weakness, difficulty swallowing and slurred speech.  Pt was out of window for TPA.  CTH was (-) but MRI brain was significant for an acute L-debby infarct and MRA was significant for L-vertebral artery stenosis vs dissection vs thrombus.  Pt was admitted to neuroICU.  He was intubated 12/28 for air way protection.  He eventually required trach/peg'd 01/03.  Pts hospital course complicated by LLL PNA w/ Scxs growing MSSA 12/30 and morganella 01/05.  Neurology and neurosurgery following.          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   - Pt on eliquis x3 months given MRA findings are suggestive of a thrombus   - MRA neck w/ T1 fat sat will be repeated in 3 months to reassess need for AC   - c/w ASA and statin therapy   - Lipid panel reviewed   - TTE reviewed and noted above; trance valvulopathies, EF 60% and no evidence of PFO reported   - Continue monitoring on telemetry   - May benifit from ILR to r/o cardioembolic source   - Continues requiring full vent support and not tolerating weaning trials; likely result of brainstem CVA   - c/w daily spontaneous breathing trials   - Pending suture removal 1/10 by CT surgery  - Neuro checks and VS q4h   - Pulmonary following and recs noted   - Neurology and nurosurg following and recs noted       VAP  - Scxs from 12/30 growing MSSA   - Scxs from 01/05 growing Morganibrahima Magañai  - Leukocytosis today but afebrile, VS stable and nontoxic appearing   - c/w Cefepime day 4  - Trend CBC and monitor temperature       Normocytic anemia   - Hb 14 on admission   - Has been trending down slowly; 9.8 today   - Hemodynamically stable and no active bleeding reported   - Pt is on AC for suspected vertebral a. thombus  - Trend CBC and transfuse as needed       PEG dependence  - c/w tube feeds as tolerated   - Diarrhea likely from tube feeds   - Added banatrol which can take a few days to work   - Cdiff (-)  - Nutrition following       DM II   - A1c 8  - On basal/bolus insulin regimen   - Titrate insulin to goal BG<180  - ISS and hypoglycemic protocol in place         VTE ppx: on eliquis     Dispo: Pending clinical course.  Will eventually need vent facility.

## 2023-01-10 NOTE — PROGRESS NOTE ADULT - SUBJECTIVE AND OBJECTIVE BOX
Chief Complaint:  acute cva    SUBJECTIVE / OVERNIGHT EVENTS:  No acute events reported overnight.  Pt offers no acute complaints at this time.  Patient denies chest pain, SOB, abd pain, N/V.      I&O's Summary    09 Jan 2023 07:01  -  10 Lewis 2023 07:00  --------------------------------------------------------  IN: 1240 mL / OUT: 600 mL / NET: 640 mL        PHYSICAL EXAM:  Vital Signs Last 24 Hrs  T(C): 37.6 (10 Lewis 2023 04:28), Max: 37.6 (10 Lewis 2023 04:28)  T(F): 99.6 (10 Lewis 2023 04:28), Max: 99.6 (10 Lewis 2023 04:28)  HR: 81 (10 Lewis 2023 09:03) (59 - 81)  BP: 144/72 (10 Lewis 2023 04:28) (132/60 - 155/73)  BP(mean): --  RR: 18 (10 Lewis 2023 04:28) (17 - 18)  SpO2: 98% (10 Lewis 2023 09:03) (96% - 99%)    Parameters below as of 09 Jan 2023 21:58  Patient On (Oxygen Delivery Method): ventilator,40%      GENERAL: pt examined bedside, laying comfortably in bed in NAD  HEENT: NC/AT, moist oral mucosa, clear conjunctiva, sclera nonicteric  NECK: +trach   RESPIRATORY: Normal respiratory effort, no wheezing, rhonchi, rales  CARDIOVASCULAR: RRR, normal S1 and S2  ABDOMEN: soft, NT/ND, +BS, no rebound/guarding, +PEG, +dignasheild  EXTREMITIES: No cynaosis, no clubbing, no lower extremity edema  NEUROLOGY: A+Ox3, rt facial palsy, strength 5/5, sensation intact   SKIN: No rashes or no palpable lesions        LABS:                        9.8    12.21 )-----------( 341      ( 10 Lewis 2023 05:40 )             31.3     01-10    137  |  101  |  30.0<H>  ----------------------------<  180<H>  5.1   |  26.0  |  0.83    Ca    8.3<L>      10 Lewis 2023 05:40  Phos  3.7     01-10  Mg     2.0     01-10        < from: MR Angio Neck No Cont (01.06.23 @ 13:15) >  IMPRESSION:    Brain MRI: Area of restricted diffusion in the left debby suggesting   acute/subacute infarct, increased in size compared to prior exam.    Brain MRA: Minimal flow related signal in the left vertebral artery V4   segment.    Neck MRA: On axial T1 fat sat sequence, there is abnormal T1   hyperintensity in the left cervical vertebral artery raising suspicion   for dissection or occlusion. MRAneck demonstrates minimal flow related   signal in the left cervical vertebral artery.    < end of copied text >      < from: US Duplex Venous Lower Ext Complete, Bilateral (01.05.23 @ 17:47) >  IMPRESSION:  No evidence of deep venous thrombosis in either lower extremity.    < end of copied text >      < from: CT Head No Cont (12.29.22 @ 09:57) >  IMPRESSION:  No evidence of an acute intracranial hemorrhage, midline shift, or   hydrocephalus. Known acute left pontine infarct partly obscured by   artifact.    < end of copied text >        < from: TTE Echo Complete w/ Contrast w/ Doppler (12.28.22 @ 12:44) >  Summary:   1. Left ventricular ejection fraction, by visual estimation, is 60 to   65%.   2. Normal global left ventricular systolic function.   3. Spectral Doppler shows impaired relaxation pattern of left   ventricular myocardial filling (Grade I diastolic dysfunction).   4. Normal left atrial size.   5. Trace mitral valve regurgitation.   6. Sclerotic aortic valve with decreased opening.   7. Endocardial visualization was enhanced with intravenous echo contrast.    < end of copied text >      CAPILLARY BLOOD GLUCOSE      POCT Blood Glucose.: 205 mg/dL (10 Lewis 2023 11:13)  POCT Blood Glucose.: 153 mg/dL (10 Lewis 2023 05:41)  POCT Blood Glucose.: 179 mg/dL (09 Jan 2023 23:49)  POCT Blood Glucose.: 176 mg/dL (09 Jan 2023 16:45)        RADIOLOGY & ADDITIONAL TESTS:          MEDICATIONS  (STANDING):  apixaban 5 milliGRAM(s) Oral every 12 hours  aspirin  chewable 81 milliGRAM(s) Oral daily  atenolol  Tablet 50 milliGRAM(s) Oral daily  atorvastatin 80 milliGRAM(s) Oral at bedtime  cefepime  Injectable.      cefepime  Injectable. 2000 milliGRAM(s) IV Push every 8 hours  chlorhexidine 0.12% Liquid 15 milliLiter(s) Oral Mucosa every 12 hours  chlorhexidine 2% Cloths 1 Application(s) Topical daily  coronavirus bivalent (EUA) Booster Vaccine (PFIZER) 0.3 milliLiter(s) IntraMuscular once  dextrose 5%. 1000 milliLiter(s) (50 mL/Hr) IV Continuous <Continuous>  dextrose 5%. 1000 milliLiter(s) (100 mL/Hr) IV Continuous <Continuous>  dextrose 50% Injectable 25 Gram(s) IV Push once  dextrose 50% Injectable 12.5 Gram(s) IV Push once  dextrose 50% Injectable 25 Gram(s) IV Push once  doxazosin 2 milliGRAM(s) Oral at bedtime  glucagon  Injectable 1 milliGRAM(s) IntraMuscular once  influenza  Vaccine (HIGH DOSE) 0.7 milliLiter(s) IntraMuscular once  insulin glargine Injectable (LANTUS) 25 Unit(s) SubCutaneous every morning  insulin lispro (ADMELOG) corrective regimen sliding scale   SubCutaneous three times a day before meals  insulin lispro Injectable (ADMELOG) 8 Unit(s) SubCutaneous every 6 hours  lisinopril 10 milliGRAM(s) Oral daily  loperamide 2 milliGRAM(s) Oral three times a day  loteprednol 0.5% Ophthalmic Suspension 1 Drop(s) Right EYE daily  pantoprazole   Suspension 40 milliGRAM(s) Oral daily  prednisoLONE acetate 1% Suspension 1 Drop(s) Left EYE daily    MEDICATIONS  (PRN):  acetaminophen     Tablet .. 650 milliGRAM(s) Oral every 6 hours PRN Temp greater or equal to 38C (100.4F), Mild Pain (1 - 3)  albuterol/ipratropium for Nebulization 3 milliLiter(s) Nebulizer every 6 hours PRN Shortness of Breath and/or Wheezing  ALPRAZolam 0.5 milliGRAM(s) Oral every 12 hours PRN for agitation  aluminum hydroxide/magnesium hydroxide/simethicone Suspension 30 milliLiter(s) Oral every 4 hours PRN Dyspepsia  dextrose Oral Gel 15 Gram(s) Oral once PRN Blood Glucose LESS THAN 70 milliGRAM(s)/deciliter  hydrALAZINE Injectable 10 milliGRAM(s) IV Push every 2 hours PRN SBP >160  labetalol Injectable 10 milliGRAM(s) IV Push every 2 hours PRN SBP >160  melatonin 3 milliGRAM(s) Oral at bedtime PRN Insomnia  ondansetron Injectable 4 milliGRAM(s) IV Push every 8 hours PRN Nausea and/or Vomiting

## 2023-01-10 NOTE — PROGRESS NOTE ADULT - SUBJECTIVE AND OBJECTIVE BOX
Preliminary note, offical recommendations pending attending review/signature   HPI:  76 y/o male with PMH of DM-2, HTN, CAD was transferred from [place] for stroke. Patient reported right sided weakness and slurred speech along with difficulty swallowing that started day prior to admission. Was seen at [place] day of admission, code stroke initiated out of window for tPA. CT head: no acute finding. MRI: acute infarct with left debby. MRA head/neck: Left vertebral artery segmental stenoses and T1 hyperintensity suggesting foci of dissection and/or thrombus. As per resident, neurology from [facility] was consulted and accepted patient for further evaluation.      SUBJECTIVE: No events overnight.  No new neurologic complaints.  ROS reported negative unless otherwise noted.    acetaminophen     Tablet .. 650 milliGRAM(s) Oral every 6 hours PRN  albuterol/ipratropium for Nebulization 3 milliLiter(s) Nebulizer every 6 hours PRN  ALPRAZolam 0.5 milliGRAM(s) Oral every 12 hours PRN  aluminum hydroxide/magnesium hydroxide/simethicone Suspension 30 milliLiter(s) Oral every 4 hours PRN  apixaban 5 milliGRAM(s) Oral every 12 hours  aspirin  chewable 81 milliGRAM(s) Oral daily  atenolol  Tablet 50 milliGRAM(s) Oral daily  atorvastatin 80 milliGRAM(s) Oral at bedtime  cefepime  Injectable.      cefepime  Injectable. 2000 milliGRAM(s) IV Push every 8 hours  chlorhexidine 0.12% Liquid 15 milliLiter(s) Oral Mucosa every 12 hours  chlorhexidine 2% Cloths 1 Application(s) Topical daily  coronavirus bivalent (EUA) Booster Vaccine (PFIZER) 0.3 milliLiter(s) IntraMuscular once  dextrose 5%. 1000 milliLiter(s) IV Continuous <Continuous>  dextrose 5%. 1000 milliLiter(s) IV Continuous <Continuous>  dextrose 50% Injectable 25 Gram(s) IV Push once  dextrose 50% Injectable 12.5 Gram(s) IV Push once  dextrose 50% Injectable 25 Gram(s) IV Push once  dextrose Oral Gel 15 Gram(s) Oral once PRN  doxazosin 2 milliGRAM(s) Oral at bedtime  glucagon  Injectable 1 milliGRAM(s) IntraMuscular once  hydrALAZINE Injectable 10 milliGRAM(s) IV Push every 2 hours PRN  influenza  Vaccine (HIGH DOSE) 0.7 milliLiter(s) IntraMuscular once  insulin glargine Injectable (LANTUS) 25 Unit(s) SubCutaneous every morning  insulin lispro (ADMELOG) corrective regimen sliding scale   SubCutaneous three times a day before meals  insulin lispro Injectable (ADMELOG) 8 Unit(s) SubCutaneous every 6 hours  labetalol Injectable 10 milliGRAM(s) IV Push every 2 hours PRN  lisinopril 10 milliGRAM(s) Oral daily  loperamide 2 milliGRAM(s) Oral three times a day  loteprednol 0.5% Ophthalmic Suspension 1 Drop(s) Right EYE daily  melatonin 3 milliGRAM(s) Oral at bedtime PRN  ondansetron Injectable 4 milliGRAM(s) IV Push every 8 hours PRN  pantoprazole   Suspension 40 milliGRAM(s) Oral daily  prednisoLONE acetate 1% Suspension 1 Drop(s) Left EYE daily      PHYSICAL EXAM:   Vital Signs Last 24 Hrs  T(C): 37.6 (10 Lewis 2023 04:28), Max: 37.6 (10 Lewis 2023 04:28)  T(F): 99.6 (10 Lewis 2023 04:28), Max: 99.6 (10 Lewis 2023 04:28)  HR: 81 (10 Lewis 2023 09:03) (59 - 81)  BP: 144/72 (10 Lewis 2023 04:28) (132/60 - 155/73)  BP(mean): --  RR: 18 (10 Lewis 2023 04:28) (17 - 18)  SpO2: 98% (10 Lewis 2023 09:03) (96% - 99%)    Parameters below as of 09 Jan 2023 21:58  Patient On (Oxygen Delivery Method): ventilator,40%      General: NAD       Mental status: The patient is awake and alert, no verbal output, able follow commands. Oriented with choices.     Cranial nerves: . There is no visual field deficit to confrontation, ? left inferior quadrantopia . left gaze palsy,  right facial palsy     Motor:    right hemiplegia   Strength is 5/5 in the left arm and leg with prompting     Sensation: Intact to light touch and pin in 4 extremities, no sensory extinction     Gait : deferred      LABS:                        9.8    12.21 )-----------( 341      ( 10 Lewis 2023 05:40 )             31.3    01-10    137  |  101  |  30.0<H>  ----------------------------<  180<H>  5.1   |  26.0  |  0.83    Ca    8.3<L>      10 Jan 2023 05:40  Phos  3.7     01-10  Mg     2.0     01-10          IMAGING: Reviewed by me.       MRI/A  (01.06.23 @ 13:15)    Brain MRI: Area of restricted diffusion in the left debby suggesting   acute/subacute infarct, increased in size compared to prior exam.    Brain MRA: Minimal flow related signal in the left vertebral artery V4   segment.    Neck MRA: On axial T1 fat sat sequence, there is abnormal T1   hyperintensity in the left cervical vertebral artery raising suspicion   for dissection or occlusion. MRA neck demonstrates minimal flow related   signal in the left cervical vertebral artery.        CT Head No Cont (12.29.22 @ 09:57)   No evidence of an acute intracranial hemorrhage, midline shift, or   hydrocephalus. Known acute left pontine infarct partly obscured by   artifact.     MRI/MRA Head/neck 12/26/22:  1. RIGHT CAROTID NECK CIRCULATION: Intact.  2. LEFT CAROTID NECK CIRCULATION: Intact.  3. VERTEBRAL NECK CIRCULATION: Right vertebral artery is intact. Left  vertebral artery demonstrates segmental stenoses and T1 hyperintensity  suggesting foci of dissection and/or thrombus. Flow appears improved  compared to time-of-flight angiography earlier same date  4. ANTERIOR INTRACRANIAL CIRCULATION: Intracranial atherosclerosis  cavernous carotid segments internal carotid arteries, at least mild, and  right MCA M1 segment, moderate.  5. POSTERIOR INTRACRANIAL CIRCULATION: Right vertebral artery focal  stenosis just proximal to the basilar formation, moderate. Attenuated  segmentally nonvisualized left vertebral artery. Flow appears improved  compared to earlier this same date. Intact basilar artery and posterior  cerebral arteries.  TTE    1. Left ventricular ejection fraction, by visual estimation, is 60 to   65%.   2. Normal global left ventricular systolic function.   3. Spectral Doppler shows impaired relaxation pattern of left   ventricular myocardial filling (Grade I diastolic dysfunction).   4. Normal left atrial size.   5. Trace mitral valve regurgitation.   6. Sclerotic aortic valve with decreased opening.   7. Endocardial visualization was enhanced with intravenous echo contrast.       HPI:  78 y/o male with PMH of DM-2, HTN, CAD was transferred from [place] for stroke. Patient reported right sided weakness and slurred speech along with difficulty swallowing that started day prior to admission. Was seen at [place] day of admission, code stroke initiated out of window for tPA. CT head: no acute finding. MRI: acute infarct with left debby. MRA head/neck: Left vertebral artery segmental stenoses and T1 hyperintensity suggesting foci of dissection and/or thrombus. As per resident, neurology from [facility] was consulted and accepted patient for further evaluation.      SUBJECTIVE: No events overnight.  No new neurologic complaints.  ROS reported negative unless otherwise noted.    acetaminophen     Tablet .. 650 milliGRAM(s) Oral every 6 hours PRN  albuterol/ipratropium for Nebulization 3 milliLiter(s) Nebulizer every 6 hours PRN  ALPRAZolam 0.5 milliGRAM(s) Oral every 12 hours PRN  aluminum hydroxide/magnesium hydroxide/simethicone Suspension 30 milliLiter(s) Oral every 4 hours PRN  apixaban 5 milliGRAM(s) Oral every 12 hours  aspirin  chewable 81 milliGRAM(s) Oral daily  atenolol  Tablet 50 milliGRAM(s) Oral daily  atorvastatin 80 milliGRAM(s) Oral at bedtime  cefepime  Injectable.      cefepime  Injectable. 2000 milliGRAM(s) IV Push every 8 hours  chlorhexidine 0.12% Liquid 15 milliLiter(s) Oral Mucosa every 12 hours  chlorhexidine 2% Cloths 1 Application(s) Topical daily  coronavirus bivalent (EUA) Booster Vaccine (PFIZER) 0.3 milliLiter(s) IntraMuscular once  dextrose 5%. 1000 milliLiter(s) IV Continuous <Continuous>  dextrose 5%. 1000 milliLiter(s) IV Continuous <Continuous>  dextrose 50% Injectable 25 Gram(s) IV Push once  dextrose 50% Injectable 12.5 Gram(s) IV Push once  dextrose 50% Injectable 25 Gram(s) IV Push once  dextrose Oral Gel 15 Gram(s) Oral once PRN  doxazosin 2 milliGRAM(s) Oral at bedtime  glucagon  Injectable 1 milliGRAM(s) IntraMuscular once  hydrALAZINE Injectable 10 milliGRAM(s) IV Push every 2 hours PRN  influenza  Vaccine (HIGH DOSE) 0.7 milliLiter(s) IntraMuscular once  insulin glargine Injectable (LANTUS) 25 Unit(s) SubCutaneous every morning  insulin lispro (ADMELOG) corrective regimen sliding scale   SubCutaneous three times a day before meals  insulin lispro Injectable (ADMELOG) 8 Unit(s) SubCutaneous every 6 hours  labetalol Injectable 10 milliGRAM(s) IV Push every 2 hours PRN  lisinopril 10 milliGRAM(s) Oral daily  loperamide 2 milliGRAM(s) Oral three times a day  loteprednol 0.5% Ophthalmic Suspension 1 Drop(s) Right EYE daily  melatonin 3 milliGRAM(s) Oral at bedtime PRN  ondansetron Injectable 4 milliGRAM(s) IV Push every 8 hours PRN  pantoprazole   Suspension 40 milliGRAM(s) Oral daily  prednisoLONE acetate 1% Suspension 1 Drop(s) Left EYE daily      PHYSICAL EXAM:   Vital Signs Last 24 Hrs  T(C): 37.6 (10 Lewis 2023 04:28), Max: 37.6 (10 Lewis 2023 04:28)  T(F): 99.6 (10 Lewis 2023 04:28), Max: 99.6 (10 Lewis 2023 04:28)  HR: 81 (10 Lewis 2023 09:03) (59 - 81)  BP: 144/72 (10 Lewis 2023 04:28) (132/60 - 155/73)  BP(mean): --  RR: 18 (10 Lewis 2023 04:28) (17 - 18)  SpO2: 98% (10 Lewis 2023 09:03) (96% - 99%)    Parameters below as of 09 Jan 2023 21:58  Patient On (Oxygen Delivery Method): ventilator,40%      General: NAD       Mental status: The patient is awake and alert, no verbal output, able follow commands. Oriented with choices.     Cranial nerves: . There is no visual field deficit to confrontation, ? left inferior quadrantopia . left gaze palsy,  right facial palsy     Motor:    right hemiplegia   Strength is 5/5 in the left arm and leg with prompting     Sensation: Intact to light touch and pin in 4 extremities, no sensory extinction     Gait : deferred      LABS:                        9.8    12.21 )-----------( 341      ( 10 Lewis 2023 05:40 )             31.3    01-10    137  |  101  |  30.0<H>  ----------------------------<  180<H>  5.1   |  26.0  |  0.83    Ca    8.3<L>      10 Jan 2023 05:40  Phos  3.7     01-10  Mg     2.0     01-10          IMAGING: Reviewed by me.       MRI/A  (01.06.23 @ 13:15)    Brain MRI: Area of restricted diffusion in the left debby suggesting   acute/subacute infarct, increased in size compared to prior exam.    Brain MRA: Minimal flow related signal in the left vertebral artery V4   segment.    Neck MRA: On axial T1 fat sat sequence, there is abnormal T1   hyperintensity in the left cervical vertebral artery raising suspicion   for dissection or occlusion. MRA neck demonstrates minimal flow related   signal in the left cervical vertebral artery.        CT Head No Cont (12.29.22 @ 09:57)   No evidence of an acute intracranial hemorrhage, midline shift, or   hydrocephalus. Known acute left pontine infarct partly obscured by   artifact.     MRI/MRA Head/neck 12/26/22:  1. RIGHT CAROTID NECK CIRCULATION: Intact.  2. LEFT CAROTID NECK CIRCULATION: Intact.  3. VERTEBRAL NECK CIRCULATION: Right vertebral artery is intact. Left  vertebral artery demonstrates segmental stenoses and T1 hyperintensity  suggesting foci of dissection and/or thrombus. Flow appears improved  compared to time-of-flight angiography earlier same date  4. ANTERIOR INTRACRANIAL CIRCULATION: Intracranial atherosclerosis  cavernous carotid segments internal carotid arteries, at least mild, and  right MCA M1 segment, moderate.  5. POSTERIOR INTRACRANIAL CIRCULATION: Right vertebral artery focal  stenosis just proximal to the basilar formation, moderate. Attenuated  segmentally nonvisualized left vertebral artery. Flow appears improved  compared to earlier this same date. Intact basilar artery and posterior  cerebral arteries.  TTE    1. Left ventricular ejection fraction, by visual estimation, is 60 to   65%.   2. Normal global left ventricular systolic function.   3. Spectral Doppler shows impaired relaxation pattern of left   ventricular myocardial filling (Grade I diastolic dysfunction).   4. Normal left atrial size.   5. Trace mitral valve regurgitation.   6. Sclerotic aortic valve with decreased opening.   7. Endocardial visualization was enhanced with intravenous echo contrast.

## 2023-01-10 NOTE — PROGRESS NOTE ADULT - ASSESSMENT
ASSESSMENT: 76 y/o male with PMH of DM-2, HTN, CAD was transferred from [place] for stroke. Patient reported right sided weakness and slurred speech along with difficulty swallowing that started day prior to admission. Was seen at [place] day of admission, code stroke initiated out of window for tPA. CT head: no acute finding. MRI: acute infarct with left debby. MRA head/neck: Left vertebral artery segmental stenoses and T1 hyperintensity suggesting foci of dissection and/or thrombus.  Etiology, MRA T1 Fat Sat suggests thrombus, embolic stroke of undetermined source.      NEURO:   -Continue close monitoring q4 stroke neuro checks   -Gradual normotension as tolerated   -Titrate statin to LDL goal less than 70  - MRA T1 Fat Sat suggests thrombus (discussed with Dr Macario previously per team)      - will need at [facility] 3 months anticoagualtion- repeat MRA neck with T1 fat sat in 3 months to assess for continued need of a/c.  -Physical therapy/OT/Speech eval/treatment.   ANTITHROMBOTIC THERAPY: Apixaban 5mg bid   - TTE as noted , cardiac monitoring,  consider embolic cardiac workup (JAMIE/ILR)                    - TF per primary team   - Maintain adequate hydration   - DVT ppx : on apixaban therapeutic ac   - Age and risk appropriate malignancy screenings          DISPOSITION: Anticipate rehab once stable and workup complete.       CORE MEASURES:        Admission NIHSS: 13     TPA: [] YES [x] NO      LDL/HDL/A1C: 143/42/8.0     Depression Screen: na      Statin Therapy: as noted      Dysphagia Screen: [] PASS [x] FAIL     Smoking [] YES [x] NO      Afib [] YES [x] NO     Stroke Education [x] YES [] NO

## 2023-01-11 LAB
ANION GAP SERPL CALC-SCNC: 10 MMOL/L — SIGNIFICANT CHANGE UP (ref 5–17)
BASOPHILS # BLD AUTO: 0.05 K/UL — SIGNIFICANT CHANGE UP (ref 0–0.2)
BASOPHILS NFR BLD AUTO: 0.4 % — SIGNIFICANT CHANGE UP (ref 0–2)
BUN SERPL-MCNC: 32.9 MG/DL — HIGH (ref 8–20)
CALCIUM SERPL-MCNC: 9.2 MG/DL — SIGNIFICANT CHANGE UP (ref 8.4–10.5)
CHLORIDE SERPL-SCNC: 99 MMOL/L — SIGNIFICANT CHANGE UP (ref 96–108)
CO2 SERPL-SCNC: 27 MMOL/L — SIGNIFICANT CHANGE UP (ref 22–29)
CREAT SERPL-MCNC: 0.8 MG/DL — SIGNIFICANT CHANGE UP (ref 0.5–1.3)
EGFR: 91 ML/MIN/1.73M2 — SIGNIFICANT CHANGE UP
EOSINOPHIL # BLD AUTO: 0.5 K/UL — SIGNIFICANT CHANGE UP (ref 0–0.5)
EOSINOPHIL NFR BLD AUTO: 4.1 % — SIGNIFICANT CHANGE UP (ref 0–6)
GLUCOSE BLDC GLUCOMTR-MCNC: 106 MG/DL — HIGH (ref 70–99)
GLUCOSE BLDC GLUCOMTR-MCNC: 154 MG/DL — HIGH (ref 70–99)
GLUCOSE BLDC GLUCOMTR-MCNC: 73 MG/DL — SIGNIFICANT CHANGE UP (ref 70–99)
GLUCOSE BLDC GLUCOMTR-MCNC: 92 MG/DL — SIGNIFICANT CHANGE UP (ref 70–99)
GLUCOSE BLDC GLUCOMTR-MCNC: 94 MG/DL — SIGNIFICANT CHANGE UP (ref 70–99)
GLUCOSE SERPL-MCNC: 155 MG/DL — HIGH (ref 70–99)
HCT VFR BLD CALC: 31.4 % — LOW (ref 39–50)
HGB BLD-MCNC: 9.9 G/DL — LOW (ref 13–17)
IMM GRANULOCYTES NFR BLD AUTO: 1 % — HIGH (ref 0–0.9)
LYMPHOCYTES # BLD AUTO: 1.51 K/UL — SIGNIFICANT CHANGE UP (ref 1–3.3)
LYMPHOCYTES # BLD AUTO: 12.5 % — LOW (ref 13–44)
MAGNESIUM SERPL-MCNC: 2 MG/DL — SIGNIFICANT CHANGE UP (ref 1.6–2.6)
MCHC RBC-ENTMCNC: 28.9 PG — SIGNIFICANT CHANGE UP (ref 27–34)
MCHC RBC-ENTMCNC: 31.5 GM/DL — LOW (ref 32–36)
MCV RBC AUTO: 91.5 FL — SIGNIFICANT CHANGE UP (ref 80–100)
MONOCYTES # BLD AUTO: 1.08 K/UL — HIGH (ref 0–0.9)
MONOCYTES NFR BLD AUTO: 8.9 % — SIGNIFICANT CHANGE UP (ref 2–14)
NEUTROPHILS # BLD AUTO: 8.85 K/UL — HIGH (ref 1.8–7.4)
NEUTROPHILS NFR BLD AUTO: 73.1 % — SIGNIFICANT CHANGE UP (ref 43–77)
PLATELET # BLD AUTO: 435 K/UL — HIGH (ref 150–400)
POTASSIUM SERPL-MCNC: 5.2 MMOL/L — SIGNIFICANT CHANGE UP (ref 3.5–5.3)
POTASSIUM SERPL-SCNC: 5.2 MMOL/L — SIGNIFICANT CHANGE UP (ref 3.5–5.3)
RBC # BLD: 3.43 M/UL — LOW (ref 4.2–5.8)
RBC # FLD: 13.1 % — SIGNIFICANT CHANGE UP (ref 10.3–14.5)
SODIUM SERPL-SCNC: 136 MMOL/L — SIGNIFICANT CHANGE UP (ref 135–145)
WBC # BLD: 12.11 K/UL — HIGH (ref 3.8–10.5)
WBC # FLD AUTO: 12.11 K/UL — HIGH (ref 3.8–10.5)

## 2023-01-11 PROCEDURE — 99231 SBSQ HOSP IP/OBS SF/LOW 25: CPT

## 2023-01-11 PROCEDURE — 99233 SBSQ HOSP IP/OBS HIGH 50: CPT

## 2023-01-11 RX ORDER — OLANZAPINE 15 MG/1
5 TABLET, FILM COATED ORAL AT BEDTIME
Refills: 0 | Status: DISCONTINUED | OUTPATIENT
Start: 2023-01-11 | End: 2023-01-23

## 2023-01-11 RX ORDER — OLANZAPINE 15 MG/1
2.5 TABLET, FILM COATED ORAL ONCE
Refills: 0 | Status: COMPLETED | OUTPATIENT
Start: 2023-01-11 | End: 2023-01-11

## 2023-01-11 RX ORDER — LANOLIN ALCOHOL/MO/W.PET/CERES
5 CREAM (GRAM) TOPICAL AT BEDTIME
Refills: 0 | Status: DISCONTINUED | OUTPATIENT
Start: 2023-01-11 | End: 2023-01-30

## 2023-01-11 RX ADMIN — OLANZAPINE 2.5 MILLIGRAM(S): 15 TABLET, FILM COATED ORAL at 06:49

## 2023-01-11 RX ADMIN — CEFEPIME 2000 MILLIGRAM(S): 1 INJECTION, POWDER, FOR SOLUTION INTRAMUSCULAR; INTRAVENOUS at 05:53

## 2023-01-11 RX ADMIN — CHLORHEXIDINE GLUCONATE 1 APPLICATION(S): 213 SOLUTION TOPICAL at 11:41

## 2023-01-11 RX ADMIN — ATENOLOL 50 MILLIGRAM(S): 25 TABLET ORAL at 05:52

## 2023-01-11 RX ADMIN — CHLORHEXIDINE GLUCONATE 15 MILLILITER(S): 213 SOLUTION TOPICAL at 05:52

## 2023-01-11 RX ADMIN — CHLORHEXIDINE GLUCONATE 15 MILLILITER(S): 213 SOLUTION TOPICAL at 17:55

## 2023-01-11 RX ADMIN — Medication 2 MILLIGRAM(S): at 05:52

## 2023-01-11 RX ADMIN — OLANZAPINE 2.5 MILLIGRAM(S): 15 TABLET, FILM COATED ORAL at 10:18

## 2023-01-11 RX ADMIN — Medication 650 MILLIGRAM(S): at 09:55

## 2023-01-11 RX ADMIN — LISINOPRIL 10 MILLIGRAM(S): 2.5 TABLET ORAL at 05:53

## 2023-01-11 RX ADMIN — Medication 2 MILLIGRAM(S): at 21:19

## 2023-01-11 RX ADMIN — Medication 81 MILLIGRAM(S): at 11:41

## 2023-01-11 RX ADMIN — Medication 1 DROP(S): at 06:02

## 2023-01-11 RX ADMIN — LOTEPREDNOL ETABONATE 1 DROP(S): 2 SUSPENSION/ DROPS OPHTHALMIC at 06:02

## 2023-01-11 RX ADMIN — Medication 2 MILLIGRAM(S): at 13:17

## 2023-01-11 RX ADMIN — Medication 8 UNIT(S): at 05:59

## 2023-01-11 RX ADMIN — INSULIN GLARGINE 25 UNIT(S): 100 INJECTION, SOLUTION SUBCUTANEOUS at 09:28

## 2023-01-11 RX ADMIN — Medication 2 MILLIGRAM(S): at 21:13

## 2023-01-11 RX ADMIN — CEFEPIME 2000 MILLIGRAM(S): 1 INJECTION, POWDER, FOR SOLUTION INTRAMUSCULAR; INTRAVENOUS at 21:19

## 2023-01-11 RX ADMIN — PANTOPRAZOLE SODIUM 40 MILLIGRAM(S): 20 TABLET, DELAYED RELEASE ORAL at 11:41

## 2023-01-11 RX ADMIN — Medication 8 UNIT(S): at 11:40

## 2023-01-11 RX ADMIN — CEFEPIME 2000 MILLIGRAM(S): 1 INJECTION, POWDER, FOR SOLUTION INTRAMUSCULAR; INTRAVENOUS at 13:17

## 2023-01-11 RX ADMIN — Medication 5 MILLIGRAM(S): at 21:19

## 2023-01-11 RX ADMIN — APIXABAN 5 MILLIGRAM(S): 2.5 TABLET, FILM COATED ORAL at 17:52

## 2023-01-11 RX ADMIN — ATORVASTATIN CALCIUM 80 MILLIGRAM(S): 80 TABLET, FILM COATED ORAL at 21:13

## 2023-01-11 RX ADMIN — APIXABAN 5 MILLIGRAM(S): 2.5 TABLET, FILM COATED ORAL at 05:53

## 2023-01-11 NOTE — PROGRESS NOTE ADULT - ASSESSMENT
76 yo M with PMH HTN, DM, CAD.  Transferred from [place] 12/26 with an acute L pontine CVA, possible L vert dissection. Worsening neuro exam 12/27 - RUE plegia, worsening bulbar dysfunction.   Acute resp failure due to neurologic injury, required intubation 12/27 for airway protection.    Thoracic surgery consulted for trach and peg eval.   1/3 S/p trach & PEG   1/4 surgicel d/c   suture removal 1/10

## 2023-01-11 NOTE — PROGRESS NOTE ADULT - SUBJECTIVE AND OBJECTIVE BOX
Subjective - patient seen and evaluated bedside. Sitting in bed. Unable to obtain HPI or ROS at this time due to clinical condition.     Review of Systems: negative x 10 systems except as noted above    Brief summary:  77yMale POD# 8 trach/peg    Significant/[id number] events: trach sutures removed      PAST MEDICAL & SURGICAL HISTORY:  HTN (hypertension)      CAD (coronary artery disease)      DM (diabetes mellitus)            acetaminophen     Tablet .. 650 milliGRAM(s) Oral every 6 hours PRN  albuterol/ipratropium for Nebulization 3 milliLiter(s) Nebulizer every 6 hours PRN  aluminum hydroxide/magnesium hydroxide/simethicone Suspension 30 milliLiter(s) Oral every 4 hours PRN  apixaban 5 milliGRAM(s) Enteral Tube every 12 hours  aspirin  chewable 81 milliGRAM(s) Oral daily  atenolol  Tablet 50 milliGRAM(s) Oral daily  atorvastatin 80 milliGRAM(s) Oral at bedtime  cefepime  Injectable.      cefepime  Injectable. 2000 milliGRAM(s) IV Push every 8 hours  chlorhexidine 0.12% Liquid 15 milliLiter(s) Oral Mucosa every 12 hours  chlorhexidine 2% Cloths 1 Application(s) Topical daily  coronavirus bivalent (EUA) Booster Vaccine (PFIZER) 0.3 milliLiter(s) IntraMuscular once  dextrose 5%. 1000 milliLiter(s) IV Continuous <Continuous>  dextrose 5%. 1000 milliLiter(s) IV Continuous <Continuous>  dextrose 50% Injectable 25 Gram(s) IV Push once  dextrose 50% Injectable 12.5 Gram(s) IV Push once  dextrose 50% Injectable 25 Gram(s) IV Push once  dextrose Oral Gel 15 Gram(s) Oral once PRN  doxazosin 2 milliGRAM(s) Oral at bedtime  glucagon  Injectable 1 milliGRAM(s) IntraMuscular once  hydrALAZINE Injectable 10 milliGRAM(s) IV Push every 2 hours PRN  influenza  Vaccine (HIGH DOSE) 0.7 milliLiter(s) IntraMuscular once  insulin glargine Injectable (LANTUS) 25 Unit(s) SubCutaneous every morning  insulin lispro (ADMELOG) corrective regimen sliding scale   SubCutaneous three times a day before meals  insulin lispro Injectable (ADMELOG) 8 Unit(s) SubCutaneous every 6 hours  labetalol Injectable 10 milliGRAM(s) IV Push every 2 hours PRN  lisinopril 10 milliGRAM(s) Oral daily  loperamide 2 milliGRAM(s) Oral three times a day  loteprednol 0.5% Ophthalmic Suspension 1 Drop(s) Right EYE daily  melatonin 3 milliGRAM(s) Oral at bedtime PRN  ondansetron Injectable 4 milliGRAM(s) IV Push every 8 hours PRN  pantoprazole   Suspension 40 milliGRAM(s) Oral daily  prednisoLONE acetate 1% Suspension 1 Drop(s) Left EYE daily  MEDICATIONS  (PRN):  acetaminophen     Tablet .. 650 milliGRAM(s) Oral every 6 hours PRN Temp greater or equal to 38C (100.4F), Mild Pain (1 - 3)  albuterol/ipratropium for Nebulization 3 milliLiter(s) Nebulizer every 6 hours PRN Shortness of Breath and/or Wheezing  aluminum hydroxide/magnesium hydroxide/simethicone Suspension 30 milliLiter(s) Oral every 4 hours PRN Dyspepsia  dextrose Oral Gel 15 Gram(s) Oral once PRN Blood Glucose LESS THAN 70 milliGRAM(s)/deciliter  hydrALAZINE Injectable 10 milliGRAM(s) IV Push every 2 hours PRN SBP >160  labetalol Injectable 10 milliGRAM(s) IV Push every 2 hours PRN SBP >160  melatonin 3 milliGRAM(s) Oral at bedtime PRN Insomnia  ondansetron Injectable 4 milliGRAM(s) IV Push every 8 hours PRN Nausea and/or Vomiting      Weight (kg): 75.5 (01-10 @ 10:56)Mode: AC/ CMV (Assist Control/ Continuous Mandatory Ventilation), RR (machine): 16, TV (machine): 400, FiO2: 40, PEEP: 5, MAP: 10, PIP: 30  Daily     Daily                               9.9    12.11 )-----------( 435      ( 11 Jan 2023 05:30 )             31.4   01-11    136  |  99  |  32.9<H>  ----------------------------<  155<H>  5.2   |  27.0  |  0.80    Ca    9.2      11 Jan 2023 05:30  Phos  3.7     01-10  Mg     2.0     01-11              Objective:  T(C): 36.9 (01-11-23 @ 04:26), Max: 37.1 (01-10-23 @ 15:00)  HR: 70 (01-11-23 @ 09:23) (60 - 108)  BP: 135/64 (01-11-23 @ 04:26) (135/64 - 161/67)  RR: 18 (01-11-23 @ 04:26) (18 - 18)  SpO2: 98% (01-11-23 @ 09:23) (96% - 100%)  Wt(kg): --CAPILLARY BLOOD GLUCOSE      POCT Blood Glucose.: 92 mg/dL (11 Jan 2023 09:25)  POCT Blood Glucose.: 154 mg/dL (11 Jan 2023 05:57)  POCT Blood Glucose.: 217 mg/dL (10 Lewis 2023 23:16)  POCT Blood Glucose.: 226 mg/dL (10 Lewis 2023 17:20)  POCT Blood Glucose.: 205 mg/dL (10 Lewis 2023 11:13)  I&O's Summary    10 Lewis 2023 07:01  -  11 Jan 2023 07:00  --------------------------------------------------------  IN: 1180 mL / OUT: 1000 mL / NET: 180 mL        Physical Exam  General: NAD  Neuro: Awake, alert, following commands.   Pulm: CTA, equal bilaterally +trach c/d/i   CV: RRR, irregularly irregular, +S1S2  Abd: soft, NT, ND, +PEG c/d/i          Imaging:      < from: Xray Chest 1 View-PORTABLE IMMEDIATE (Xray Chest 1 View-PORTABLE IMMEDIATE .) (01.05.23 @ 15:19) >    INTERPRETATION:  AP semierect chest on January 5, 2023 at 2:24 PM.   Patient has fever.    Heart magnified by technique. A tracheostomy has replaced endotracheal   tube present on January 2. NG tube removed.    On January 2 there was a left base infiltrate which suggests some   improvement.    IMPRESSION: As above.    --- End of Report ---    < end of copied text >

## 2023-01-11 NOTE — PROGRESS NOTE ADULT - SUBJECTIVE AND OBJECTIVE BOX
Chief Complaint:  acute cva    SUBJECTIVE / OVERNIGHT EVENTS:  Agitated overnight and trhying to pull at trach. 1x dose of low dose zyprexa given and improved.  Pt is a poor historian but offers no acute complaints at this time.  Patient denies chest pain, SOB, abd pain, N/V.      I&O's Summary    09 Jan 2023 07:01  -  10 Lewis 2023 07:00  --------------------------------------------------------  IN: 1240 mL / OUT: 600 mL / NET: 640 mL        PHYSICAL EXAM:  Vital Signs Last 24 Hrs  T(C): 36.9 (11 Jan 2023 04:26), Max: 37.1 (10 Lewis 2023 15:00)  T(F): 98.4 (11 Jan 2023 04:26), Max: 98.7 (10 Lewis 2023 15:00)  HR: 70 (11 Jan 2023 04:26) (60 - 108)  BP: 135/64 (11 Jan 2023 04:26) (135/64 - 161/67)  BP(mean): --  RR: 18 (11 Jan 2023 04:26) (18 - 18)  SpO2: 96% (11 Jan 2023 04:26) (96% - 100%)    Parameters below as of 11 Jan 2023 04:26  Patient On (Oxygen Delivery Method): ventilator        GENERAL: pt examined bedside, laying comfortably in bed in NAD  HEENT: NC/AT, moist oral mucosa, clear conjunctiva, sclera nonicteric  NECK: +trach   RESPIRATORY: Normal respiratory effort, no wheezing, rhonchi, rales  CARDIOVASCULAR: RRR, normal S1 and S2  ABDOMEN: soft, NT/ND, +BS, no rebound/guarding, +PEG, +dignasheild  EXTREMITIES: No cynaosis, no clubbing, no lower extremity edema  NEUROLOGY: A+Ox3, rt facial palsy, strength 5/5, sensation intact   SKIN: No rashes or no palpable lesions        LABS:                                   9.9    12.11 )-----------( 435      ( 11 Jan 2023 05:30 )             31.4       01-11    136  |  99  |  32.9<H>  ----------------------------<  155<H>  5.2   |  27.0  |  0.80    Ca    9.2      11 Jan 2023 05:30  Phos  3.7     01-10  Mg     2.0     01-11        Brain MRI: Area of restricted diffusion in the left debby suggesting   acute/subacute infarct, increased in size compared to prior exam.    Brain MRA: Minimal flow related signal in the left vertebral artery V4   segment.    Neck MRA: On axial T1 fat sat sequence, there is abnormal T1   hyperintensity in the left cervical vertebral artery raising suspicion   for dissection or occlusion. MRAneck demonstrates minimal flow related   signal in the left cervical vertebral artery.    < end of copied text >      < from: US Duplex Venous Lower Ext Complete, Bilateral (01.05.23 @ 17:47) >  IMPRESSION:  No evidence of deep venous thrombosis in either lower extremity.    < end of copied text >      < from: CT Head No Cont (12.29.22 @ 09:57) >  IMPRESSION:  No evidence of an acute intracranial hemorrhage, midline shift, or   hydrocephalus. Known acute left pontine infarct partly obscured by   artifact.    < end of copied text >        < from: TTE Echo Complete w/ Contrast w/ Doppler (12.28.22 @ 12:44) >  Summary:   1. Left ventricular ejection fraction, by visual estimation, is 60 to   65%.   2. Normal global left ventricular systolic function.   3. Spectral Doppler shows impaired relaxation pattern of left   ventricular myocardial filling (Grade I diastolic dysfunction).   4. Normal left atrial size.   5. Trace mitral valve regurgitation.   6. Sclerotic aortic valve with decreased opening.   7. Endocardial visualization was enhanced with intravenous echo contrast.    < end of copied text >      CAPILLARY BLOOD GLUCOSE      POCT Blood Glucose.: 205 mg/dL (10 Lewis 2023 11:13)  POCT Blood Glucose.: 153 mg/dL (10 Lewis 2023 05:41)  POCT Blood Glucose.: 179 mg/dL (09 Jan 2023 23:49)  POCT Blood Glucose.: 176 mg/dL (09 Jan 2023 16:45)        RADIOLOGY & ADDITIONAL TESTS:          MEDICATIONS  (STANDING):  apixaban 5 milliGRAM(s) Oral every 12 hours  aspirin  chewable 81 milliGRAM(s) Oral daily  atenolol  Tablet 50 milliGRAM(s) Oral daily  atorvastatin 80 milliGRAM(s) Oral at bedtime  cefepime  Injectable.      cefepime  Injectable. 2000 milliGRAM(s) IV Push every 8 hours  chlorhexidine 0.12% Liquid 15 milliLiter(s) Oral Mucosa every 12 hours  chlorhexidine 2% Cloths 1 Application(s) Topical daily  coronavirus bivalent (EUA) Booster Vaccine (PFIZER) 0.3 milliLiter(s) IntraMuscular once  dextrose 5%. 1000 milliLiter(s) (50 mL/Hr) IV Continuous <Continuous>  dextrose 5%. 1000 milliLiter(s) (100 mL/Hr) IV Continuous <Continuous>  dextrose 50% Injectable 25 Gram(s) IV Push once  dextrose 50% Injectable 12.5 Gram(s) IV Push once  dextrose 50% Injectable 25 Gram(s) IV Push once  doxazosin 2 milliGRAM(s) Oral at bedtime  glucagon  Injectable 1 milliGRAM(s) IntraMuscular once  influenza  Vaccine (HIGH DOSE) 0.7 milliLiter(s) IntraMuscular once  insulin glargine Injectable (LANTUS) 25 Unit(s) SubCutaneous every morning  insulin lispro (ADMELOG) corrective regimen sliding scale   SubCutaneous three times a day before meals  insulin lispro Injectable (ADMELOG) 8 Unit(s) SubCutaneous every 6 hours  lisinopril 10 milliGRAM(s) Oral daily  loperamide 2 milliGRAM(s) Oral three times a day  loteprednol 0.5% Ophthalmic Suspension 1 Drop(s) Right EYE daily  pantoprazole   Suspension 40 milliGRAM(s) Oral daily  prednisoLONE acetate 1% Suspension 1 Drop(s) Left EYE daily    MEDICATIONS  (PRN):  acetaminophen     Tablet .. 650 milliGRAM(s) Oral every 6 hours PRN Temp greater or equal to 38C (100.4F), Mild Pain (1 - 3)  albuterol/ipratropium for Nebulization 3 milliLiter(s) Nebulizer every 6 hours PRN Shortness of Breath and/or Wheezing  ALPRAZolam 0.5 milliGRAM(s) Oral every 12 hours PRN for agitation  aluminum hydroxide/magnesium hydroxide/simethicone Suspension 30 milliLiter(s) Oral every 4 hours PRN Dyspepsia  dextrose Oral Gel 15 Gram(s) Oral once PRN Blood Glucose LESS THAN 70 milliGRAM(s)/deciliter  hydrALAZINE Injectable 10 milliGRAM(s) IV Push every 2 hours PRN SBP >160  labetalol Injectable 10 milliGRAM(s) IV Push every 2 hours PRN SBP >160  melatonin 3 milliGRAM(s) Oral at bedtime PRN Insomnia  ondansetron Injectable 4 milliGRAM(s) IV Push every 8 hours PRN Nausea and/or Vomiting

## 2023-01-11 NOTE — CHART NOTE - NSCHARTNOTEFT_GEN_A_CORE
I have evaluated this patient and have determined that restraints are warranted to optimize medical care. Patient was assessed for current physical and psychological risk factors as well as special needs. There are no medical conditions or limitations that would place this patient at risk while in restraints. Dr. Moore made aware.

## 2023-01-11 NOTE — PROGRESS NOTE ADULT - ASSESSMENT
76 y/o M w/ PMH of CAD, DM2, HTN, transferred from [facility] for CVA after presenting for Rt-sided weakness, difficulty swallowing and slurred speech.  Pt was out of window for TPA.  CTH was (-) but MRI brain was significant for an acute L-debby infarct and MRA was significant for L-vertebral artery stenosis vs dissection vs thrombus.  Pt was admitted to neuroICU.  He was intubated 12/28 for air way protection.  He eventually required trach/peg'd 01/03.  Pts hospital course complicated by LLL PNA w/ Scxs growing MSSA 12/30 and morganella 01/05.  Neurology and neurosurgery following.          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   - Pt on eliquis x3 months given MRA findings are suggestive of a thrombus   - MRA neck w/ T1 fat sat will be repeated in 3 months to reassess need for AC   - c/w ASA and statin therapy   - Lipid panel reviewed   - TTE reviewed and noted above; trance valvulopathies, EF 60% and no evidence of PFO reported   - Continue monitoring on telemetry   - May benifit from ILR to r/o cardioembolic source   - Continues requiring full vent support and not tolerating weaning trials; likely result of brainstem CVA   - c/w daily spontaneous breathing trials   - s/p suture removal 1/10 by CT surgery  - Neuro checks and VS q4h   - Pulmonary following and recs noted   - Neurology and nurosurg following and recs noted       VAP  - Scxs from 12/30 growing MSSA   - Scxs from 01/05 growing Morganella Morgani  - Leukocytosis persists but no L-shift, remains afebrile and nontoxic appearing   - c/w Cefepime day 5  - Trend CBC and monitor temperature       Normocytic anemia   - Hb 14 on admission   - H/h remains low but stable   - Hemodynamically stable and no active bleeding reported   - Pt is on AC for suspected vertebral a. thombus  - Trend CBC and transfuse as needed       PEG dependence  - c/w tube feeds as tolerated   - Diarrhea likely from tube feeds   - Added banatrol which can take a few days to work   - Cdiff (-)  - Nutrition following       DM II   - A1c 8  - On basal/bolus insulin regimen   - Titrate insulin to goal BG<180  - ISS and hypoglycemic protocol in place         VTE ppx: on eliquis     Dispo: Pending clinical course.  Will eventually need vent facility.

## 2023-01-12 LAB
ANION GAP SERPL CALC-SCNC: 12 MMOL/L — SIGNIFICANT CHANGE UP (ref 5–17)
ANION GAP SERPL CALC-SCNC: 9 MMOL/L — SIGNIFICANT CHANGE UP (ref 5–17)
BASOPHILS # BLD AUTO: 0.07 K/UL — SIGNIFICANT CHANGE UP (ref 0–0.2)
BASOPHILS NFR BLD AUTO: 0.7 % — SIGNIFICANT CHANGE UP (ref 0–2)
BUN SERPL-MCNC: 37.3 MG/DL — HIGH (ref 8–20)
BUN SERPL-MCNC: 38.7 MG/DL — HIGH (ref 8–20)
CALCIUM SERPL-MCNC: 8.7 MG/DL — SIGNIFICANT CHANGE UP (ref 8.4–10.5)
CALCIUM SERPL-MCNC: 9 MG/DL — SIGNIFICANT CHANGE UP (ref 8.4–10.5)
CHLORIDE SERPL-SCNC: 100 MMOL/L — SIGNIFICANT CHANGE UP (ref 96–108)
CHLORIDE SERPL-SCNC: 100 MMOL/L — SIGNIFICANT CHANGE UP (ref 96–108)
CO2 SERPL-SCNC: 25 MMOL/L — SIGNIFICANT CHANGE UP (ref 22–29)
CO2 SERPL-SCNC: 27 MMOL/L — SIGNIFICANT CHANGE UP (ref 22–29)
CREAT SERPL-MCNC: 0.84 MG/DL — SIGNIFICANT CHANGE UP (ref 0.5–1.3)
CREAT SERPL-MCNC: 0.89 MG/DL — SIGNIFICANT CHANGE UP (ref 0.5–1.3)
EGFR: 88 ML/MIN/1.73M2 — SIGNIFICANT CHANGE UP
EGFR: 90 ML/MIN/1.73M2 — SIGNIFICANT CHANGE UP
EOSINOPHIL # BLD AUTO: 0.47 K/UL — SIGNIFICANT CHANGE UP (ref 0–0.5)
EOSINOPHIL NFR BLD AUTO: 4.7 % — SIGNIFICANT CHANGE UP (ref 0–6)
GLUCOSE BLDC GLUCOMTR-MCNC: 105 MG/DL — HIGH (ref 70–99)
GLUCOSE BLDC GLUCOMTR-MCNC: 119 MG/DL — HIGH (ref 70–99)
GLUCOSE BLDC GLUCOMTR-MCNC: 126 MG/DL — HIGH (ref 70–99)
GLUCOSE BLDC GLUCOMTR-MCNC: 152 MG/DL — HIGH (ref 70–99)
GLUCOSE BLDC GLUCOMTR-MCNC: 92 MG/DL — SIGNIFICANT CHANGE UP (ref 70–99)
GLUCOSE SERPL-MCNC: 113 MG/DL — HIGH (ref 70–99)
GLUCOSE SERPL-MCNC: 143 MG/DL — HIGH (ref 70–99)
HCT VFR BLD CALC: 33 % — LOW (ref 39–50)
HGB BLD-MCNC: 10.3 G/DL — LOW (ref 13–17)
IMM GRANULOCYTES NFR BLD AUTO: 0.9 % — SIGNIFICANT CHANGE UP (ref 0–0.9)
LYMPHOCYTES # BLD AUTO: 1.09 K/UL — SIGNIFICANT CHANGE UP (ref 1–3.3)
LYMPHOCYTES # BLD AUTO: 10.9 % — LOW (ref 13–44)
MAGNESIUM SERPL-MCNC: 2.1 MG/DL — SIGNIFICANT CHANGE UP (ref 1.6–2.6)
MCHC RBC-ENTMCNC: 28.5 PG — SIGNIFICANT CHANGE UP (ref 27–34)
MCHC RBC-ENTMCNC: 31.2 GM/DL — LOW (ref 32–36)
MCV RBC AUTO: 91.2 FL — SIGNIFICANT CHANGE UP (ref 80–100)
MONOCYTES # BLD AUTO: 1.1 K/UL — HIGH (ref 0–0.9)
MONOCYTES NFR BLD AUTO: 11 % — SIGNIFICANT CHANGE UP (ref 2–14)
NEUTROPHILS # BLD AUTO: 7.14 K/UL — SIGNIFICANT CHANGE UP (ref 1.8–7.4)
NEUTROPHILS NFR BLD AUTO: 71.8 % — SIGNIFICANT CHANGE UP (ref 43–77)
PLATELET # BLD AUTO: 487 K/UL — HIGH (ref 150–400)
POTASSIUM SERPL-MCNC: 5 MMOL/L — SIGNIFICANT CHANGE UP (ref 3.5–5.3)
POTASSIUM SERPL-MCNC: 5.6 MMOL/L — HIGH (ref 3.5–5.3)
POTASSIUM SERPL-SCNC: 5 MMOL/L — SIGNIFICANT CHANGE UP (ref 3.5–5.3)
POTASSIUM SERPL-SCNC: 5.6 MMOL/L — HIGH (ref 3.5–5.3)
RBC # BLD: 3.62 M/UL — LOW (ref 4.2–5.8)
RBC # FLD: 13.1 % — SIGNIFICANT CHANGE UP (ref 10.3–14.5)
SODIUM SERPL-SCNC: 136 MMOL/L — SIGNIFICANT CHANGE UP (ref 135–145)
SODIUM SERPL-SCNC: 137 MMOL/L — SIGNIFICANT CHANGE UP (ref 135–145)
WBC # BLD: 9.96 K/UL — SIGNIFICANT CHANGE UP (ref 3.8–10.5)
WBC # FLD AUTO: 9.96 K/UL — SIGNIFICANT CHANGE UP (ref 3.8–10.5)

## 2023-01-12 PROCEDURE — 99231 SBSQ HOSP IP/OBS SF/LOW 25: CPT

## 2023-01-12 PROCEDURE — 99233 SBSQ HOSP IP/OBS HIGH 50: CPT

## 2023-01-12 RX ORDER — SODIUM ZIRCONIUM CYCLOSILICATE 10 G/10G
10 POWDER, FOR SUSPENSION ORAL ONCE
Refills: 0 | Status: COMPLETED | OUTPATIENT
Start: 2023-01-12 | End: 2023-01-12

## 2023-01-12 RX ADMIN — CHLORHEXIDINE GLUCONATE 1 APPLICATION(S): 213 SOLUTION TOPICAL at 17:21

## 2023-01-12 RX ADMIN — CEFEPIME 2000 MILLIGRAM(S): 1 INJECTION, POWDER, FOR SOLUTION INTRAMUSCULAR; INTRAVENOUS at 12:59

## 2023-01-12 RX ADMIN — Medication 8 UNIT(S): at 06:45

## 2023-01-12 RX ADMIN — LOTEPREDNOL ETABONATE 1 DROP(S): 2 SUSPENSION/ DROPS OPHTHALMIC at 06:33

## 2023-01-12 RX ADMIN — CEFEPIME 2000 MILLIGRAM(S): 1 INJECTION, POWDER, FOR SOLUTION INTRAMUSCULAR; INTRAVENOUS at 06:32

## 2023-01-12 RX ADMIN — Medication 2 MILLIGRAM(S): at 06:34

## 2023-01-12 RX ADMIN — SODIUM ZIRCONIUM CYCLOSILICATE 10 GRAM(S): 10 POWDER, FOR SUSPENSION ORAL at 13:00

## 2023-01-12 RX ADMIN — OLANZAPINE 5 MILLIGRAM(S): 15 TABLET, FILM COATED ORAL at 21:22

## 2023-01-12 RX ADMIN — PANTOPRAZOLE SODIUM 40 MILLIGRAM(S): 20 TABLET, DELAYED RELEASE ORAL at 13:00

## 2023-01-12 RX ADMIN — APIXABAN 5 MILLIGRAM(S): 2.5 TABLET, FILM COATED ORAL at 06:45

## 2023-01-12 RX ADMIN — LISINOPRIL 10 MILLIGRAM(S): 2.5 TABLET ORAL at 06:34

## 2023-01-12 RX ADMIN — Medication 1 DROP(S): at 06:33

## 2023-01-12 RX ADMIN — Medication 2 MILLIGRAM(S): at 21:22

## 2023-01-12 RX ADMIN — APIXABAN 5 MILLIGRAM(S): 2.5 TABLET, FILM COATED ORAL at 17:22

## 2023-01-12 RX ADMIN — Medication 81 MILLIGRAM(S): at 12:59

## 2023-01-12 RX ADMIN — ATORVASTATIN CALCIUM 80 MILLIGRAM(S): 80 TABLET, FILM COATED ORAL at 21:21

## 2023-01-12 RX ADMIN — Medication 2 MILLIGRAM(S): at 13:00

## 2023-01-12 RX ADMIN — CHLORHEXIDINE GLUCONATE 15 MILLILITER(S): 213 SOLUTION TOPICAL at 17:22

## 2023-01-12 RX ADMIN — ATENOLOL 50 MILLIGRAM(S): 25 TABLET ORAL at 06:33

## 2023-01-12 RX ADMIN — Medication 5 MILLIGRAM(S): at 21:22

## 2023-01-12 RX ADMIN — CEFEPIME 2000 MILLIGRAM(S): 1 INJECTION, POWDER, FOR SOLUTION INTRAMUSCULAR; INTRAVENOUS at 21:21

## 2023-01-12 RX ADMIN — CHLORHEXIDINE GLUCONATE 15 MILLILITER(S): 213 SOLUTION TOPICAL at 06:34

## 2023-01-12 RX ADMIN — Medication 8 UNIT(S): at 12:15

## 2023-01-12 RX ADMIN — INSULIN GLARGINE 25 UNIT(S): 100 INJECTION, SOLUTION SUBCUTANEOUS at 08:35

## 2023-01-12 NOTE — PROGRESS NOTE ADULT - SUBJECTIVE AND OBJECTIVE BOX
Chief Complaint:  acute cva    SUBJECTIVE / OVERNIGHT EVENTS:  Reported again to be agitated overnight and restraint placed.  Pt is a poor historian but appears calm, not agitated and will d/c restraint.        I&O's Summary    09 Jan 2023 07:01  -  10 Lewis 2023 07:00  --------------------------------------------------------  IN: 1240 mL / OUT: 600 mL / NET: 640 mL        PHYSICAL EXAM:  Vital Signs Last 24 Hrs  T(C): 37.1 (12 Jan 2023 04:06), Max: 37.1 (12 Jan 2023 04:06)  T(F): 98.7 (12 Jan 2023 04:06), Max: 98.7 (12 Jan 2023 04:06)  HR: 68 (12 Jan 2023 04:06) (57 - 76)  BP: 138/80 (12 Jan 2023 04:06) (114/61 - 141/82)  BP(mean): --  RR: 20 (12 Jan 2023 04:06) (18 - 20)  SpO2: 100% (12 Jan 2023 04:40) (94% - 100%)    Parameters below as of 12 Jan 2023 04:06  Patient On (Oxygen Delivery Method): ventilator        GENERAL: pt examined bedside, laying comfortably in bed in NAD  HEENT: NC/AT, moist oral mucosa, clear conjunctiva, sclera nonicteric  NECK: +trach   RESPIRATORY: Normal respiratory effort, no wheezing, rhonchi, rales  CARDIOVASCULAR: RRR, normal S1 and S2  ABDOMEN: soft, NT/ND, +BS, no rebound/guarding, +PEG, +dignasheild  EXTREMITIES: No cynaosis, no clubbing, no lower extremity edema  NEUROLOGY: A+Ox3, rt facial palsy, strength 5/5, sensation intact   SKIN: No rashes or no palpable lesions        LABS:                                          10.3   9.96  )-----------( 487      ( 12 Jan 2023 06:00 )             33.0       01-12    137  |  100  |  37.3<H>  ----------------------------<  143<H>  5.6<H>   |  25.0  |  0.84    Ca    8.7      12 Jan 2023 06:00  Mg     2.1     01-12          Brain MRI: Area of restricted diffusion in the left debby suggesting   acute/subacute infarct, increased in size compared to prior exam.    Brain MRA: Minimal flow related signal in the left vertebral artery V4   segment.    Neck MRA: On axial T1 fat sat sequence, there is abnormal T1   hyperintensity in the left cervical vertebral artery raising suspicion   for dissection or occlusion. MRAneck demonstrates minimal flow related   signal in the left cervical vertebral artery.    < end of copied text >      < from: US Duplex Venous Lower Ext Complete, Bilateral (01.05.23 @ 17:47) >  IMPRESSION:  No evidence of deep venous thrombosis in either lower extremity.    < end of copied text >      < from: CT Head No Cont (12.29.22 @ 09:57) >  IMPRESSION:  No evidence of an acute intracranial hemorrhage, midline shift, or   hydrocephalus. Known acute left pontine infarct partly obscured by   artifact.    < end of copied text >        < from: TTE Echo Complete w/ Contrast w/ Doppler (12.28.22 @ 12:44) >  Summary:   1. Left ventricular ejection fraction, by visual estimation, is 60 to   65%.   2. Normal global left ventricular systolic function.   3. Spectral Doppler shows impaired relaxation pattern of left   ventricular myocardial filling (Grade I diastolic dysfunction).   4. Normal left atrial size.   5. Trace mitral valve regurgitation.   6. Sclerotic aortic valve with decreased opening.   7. Endocardial visualization was enhanced with intravenous echo contrast.    < end of copied text >      CAPILLARY BLOOD GLUCOSE      POCT Blood Glucose.: 205 mg/dL (10 Lewis 2023 11:13)  POCT Blood Glucose.: 153 mg/dL (10 Lewis 2023 05:41)  POCT Blood Glucose.: 179 mg/dL (09 Jan 2023 23:49)  POCT Blood Glucose.: 176 mg/dL (09 Jan 2023 16:45)        RADIOLOGY & ADDITIONAL TESTS:          MEDICATIONS  (STANDING):  apixaban 5 milliGRAM(s) Oral every 12 hours  aspirin  chewable 81 milliGRAM(s) Oral daily  atenolol  Tablet 50 milliGRAM(s) Oral daily  atorvastatin 80 milliGRAM(s) Oral at bedtime  cefepime  Injectable.      cefepime  Injectable. 2000 milliGRAM(s) IV Push every 8 hours  chlorhexidine 0.12% Liquid 15 milliLiter(s) Oral Mucosa every 12 hours  chlorhexidine 2% Cloths 1 Application(s) Topical daily  coronavirus bivalent (EUA) Booster Vaccine (PFIZER) 0.3 milliLiter(s) IntraMuscular once  dextrose 5%. 1000 milliLiter(s) (50 mL/Hr) IV Continuous <Continuous>  dextrose 5%. 1000 milliLiter(s) (100 mL/Hr) IV Continuous <Continuous>  dextrose 50% Injectable 25 Gram(s) IV Push once  dextrose 50% Injectable 12.5 Gram(s) IV Push once  dextrose 50% Injectable 25 Gram(s) IV Push once  doxazosin 2 milliGRAM(s) Oral at bedtime  glucagon  Injectable 1 milliGRAM(s) IntraMuscular once  influenza  Vaccine (HIGH DOSE) 0.7 milliLiter(s) IntraMuscular once  insulin glargine Injectable (LANTUS) 25 Unit(s) SubCutaneous every morning  insulin lispro (ADMELOG) corrective regimen sliding scale   SubCutaneous three times a day before meals  insulin lispro Injectable (ADMELOG) 8 Unit(s) SubCutaneous every 6 hours  lisinopril 10 milliGRAM(s) Oral daily  loperamide 2 milliGRAM(s) Oral three times a day  loteprednol 0.5% Ophthalmic Suspension 1 Drop(s) Right EYE daily  pantoprazole   Suspension 40 milliGRAM(s) Oral daily  prednisoLONE acetate 1% Suspension 1 Drop(s) Left EYE daily    MEDICATIONS  (PRN):  acetaminophen     Tablet .. 650 milliGRAM(s) Oral every 6 hours PRN Temp greater or equal to 38C (100.4F), Mild Pain (1 - 3)  albuterol/ipratropium for Nebulization 3 milliLiter(s) Nebulizer every 6 hours PRN Shortness of Breath and/or Wheezing  ALPRAZolam 0.5 milliGRAM(s) Oral every 12 hours PRN for agitation  aluminum hydroxide/magnesium hydroxide/simethicone Suspension 30 milliLiter(s) Oral every 4 hours PRN Dyspepsia  dextrose Oral Gel 15 Gram(s) Oral once PRN Blood Glucose LESS THAN 70 milliGRAM(s)/deciliter  hydrALAZINE Injectable 10 milliGRAM(s) IV Push every 2 hours PRN SBP >160  labetalol Injectable 10 milliGRAM(s) IV Push every 2 hours PRN SBP >160  melatonin 3 milliGRAM(s) Oral at bedtime PRN Insomnia  ondansetron Injectable 4 milliGRAM(s) IV Push every 8 hours PRN Nausea and/or Vomiting

## 2023-01-12 NOTE — PROGRESS NOTE ADULT - ASSESSMENT
78 yo M with PMH HTN, DM, CAD.  Transferred from [place] 12/26 with an acute L pontine CVA, possible L vert dissection. Worsening neuro exam 12/27 - RUE plegia, worsening bulbar dysfunction.   Acute resp failure due to neurologic injury, required intubation 12/27 for airway protection.    Thoracic surgery consulted for trach and peg eval.   1/3 S/p trach & PEG   1/4 surgicel d/c   suture removal 1/10

## 2023-01-12 NOTE — PROGRESS NOTE ADULT - ASSESSMENT
76 y/o M w/ PMH of CAD, DM2, HTN, transferred from [facility] for CVA after presenting for Rt-sided weakness, difficulty swallowing and slurred speech.  Pt was out of window for TPA.  CTH was (-) but MRI brain was significant for an acute L-debby infarct and MRA was significant for L-vertebral artery stenosis vs dissection vs thrombus.  Pt was admitted to neuroICU.  He was intubated 12/28 for air way protection.  He eventually required trach/peg'd 01/03.  Pts hospital course complicated by LLL PNA w/ Scxs growing MSSA 12/30 and morganella 01/05.  Neurology and neurosurgery following.          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   - Pt on eliquis x3 months given MRA findings are suggestive of a thrombus   - MRA neck w/ T1 fat sat will be repeated in 3 months to reassess need for AC   - c/w ASA and statin therapy   - Lipid panel reviewed   - TTE reviewed and noted above; trance valvulopathies, EF 60% and no evidence of PFO reported   - Continue monitoring on telemetry   - May benifit from ILR to r/o cardioembolic source   - Continues requiring full vent support and not tolerating weaning trials; likely result of brainstem CVA   - c/w daily spontaneous breathing trials   - s/p suture removal 1/10 by CT surgery  - Neuro checks and VS q4h   - Pulmonary following and recs noted   - Neurology and nurosurg following and recs noted       VAP  - Scxs from 12/30 growing MSSA   - Scxs from 01/05 growing Morganella Morgani  - Leukocytosis persists but no L-shift, remains afebrile and nontoxic appearing   - c/w Cefepime day 6  - Trend CBC and monitor temperature       Normocytic anemia   - Hb 14 on admission   - H/h remains low but stable   - Hemodynamically stable and no active bleeding reported   - Pt is on AC for suspected vertebral artery thombus  - Trend CBC and transfuse as needed       PEG dependence  - c/w tube feeds as tolerated   - Diarrhea likely from tube feeds   - Cdiff (-)  - Added banatrol but no improvement; will discuss w/ nutrition       DM II   - A1c 8  - On basal/bolus insulin regimen   - Titrate insulin to goal BG<180  - ISS and hypoglycemic protocol in place         VTE ppx: on eliquis     Dispo: Pending clinical course.  Will eventually need vent facility.

## 2023-01-12 NOTE — PROGRESS NOTE ADULT - SUBJECTIVE AND OBJECTIVE BOX
Patient seen and examined.  Remains on vent support  T(C): 37.1 (01-12-23 @ 04:06)  T(F): 98.7 (01-12-23 @ 04:06)  HR: 68 (01-12-23 @ 04:06)  BP: 138/80 (01-12-23 @ 04:06)    RR: 20 (01-12-23 @ 04:06)  SpO2: 100% (01-12-23 @ 04:40)    Mode: AC/ CMV (Assist Control/ Continuous Mandatory Ventilation), RR (machine): 16, TV (machine): 400, FiO2: 40, PEEP: 5, MAP: 10, PIP: 30    Physical Exam:  Gen:  open eyes to voice  Pulm:  CTA b/l, no r/r/w  CV:  S1S2, RRR, no m/r/g  Abd: +BS, soft, NT, ND  Ext:  +DP b/l, no c/c/e  Incision:  c/d/i , no exudate trach and PEG sites    I&O's Detail    11 Jan 2023 07:01  -  12 Jan 2023 07:00  --------------------------------------------------------  IN:  Total IN: 0 mL    OUT:    Rectal Tube (mL): 300 mL  Total OUT: 300 mL    Total NET: -300 mL                              10.3   9.96  )-----------( 487      ( 12 Jan 2023 06:00 )             33.0   01-12    137  |  100  |  37.3<H>  ----------------------------<  143<H>  5.6<H>   |  25.0  |  0.84    Ca    8.7      12 Jan 2023 06:00  Mg     2.1     01-12        CAPILLARY BLOOD GLUCOSE      POCT Blood Glucose.: 119 mg/dL (12 Jan 2023 08:11)        Medications:  acetaminophen     Tablet .. 650 milliGRAM(s) Oral every 6 hours PRN  albuterol/ipratropium for Nebulization 3 milliLiter(s) Nebulizer every 6 hours PRN  aluminum hydroxide/magnesium hydroxide/simethicone Suspension 30 milliLiter(s) Oral every 4 hours PRN  apixaban 5 milliGRAM(s) Enteral Tube every 12 hours  aspirin  chewable 81 milliGRAM(s) Oral daily  atenolol  Tablet 50 milliGRAM(s) Oral daily  atorvastatin 80 milliGRAM(s) Oral at bedtime  cefepime  Injectable.      cefepime  Injectable. 2000 milliGRAM(s) IV Push every 8 hours  chlorhexidine 0.12% Liquid 15 milliLiter(s) Oral Mucosa every 12 hours  chlorhexidine 2% Cloths 1 Application(s) Topical daily  coronavirus bivalent (EUA) Booster Vaccine (PFIZER) 0.3 milliLiter(s) IntraMuscular once  dextrose 5%. 1000 milliLiter(s) IV Continuous <Continuous>  dextrose 5%. 1000 milliLiter(s) IV Continuous <Continuous>  dextrose 50% Injectable 25 Gram(s) IV Push once  dextrose 50% Injectable 12.5 Gram(s) IV Push once  dextrose 50% Injectable 25 Gram(s) IV Push once  dextrose Oral Gel 15 Gram(s) Oral once PRN  doxazosin 2 milliGRAM(s) Oral at bedtime  glucagon  Injectable 1 milliGRAM(s) IntraMuscular once  hydrALAZINE Injectable 10 milliGRAM(s) IV Push every 2 hours PRN  influenza  Vaccine (HIGH DOSE) 0.7 milliLiter(s) IntraMuscular once  insulin glargine Injectable (LANTUS) 25 Unit(s) SubCutaneous every morning  insulin lispro (ADMELOG) corrective regimen sliding scale   SubCutaneous three times a day before meals  insulin lispro Injectable (ADMELOG) 8 Unit(s) SubCutaneous every 6 hours  labetalol Injectable 10 milliGRAM(s) IV Push every 2 hours PRN  lisinopril 10 milliGRAM(s) Oral daily  loperamide 2 milliGRAM(s) Oral three times a day  loteprednol 0.5% Ophthalmic Suspension 1 Drop(s) Right EYE daily  melatonin 5 milliGRAM(s) Oral at bedtime  OLANZapine 5 milliGRAM(s) Oral at bedtime  ondansetron Injectable 4 milliGRAM(s) IV Push every 8 hours PRN  pantoprazole   Suspension 40 milliGRAM(s) Oral daily  prednisoLONE acetate 1% Suspension 1 Drop(s) Left EYE daily  sodium zirconium cyclosilicate 10 Gram(s) Oral once

## 2023-01-13 LAB
ANION GAP SERPL CALC-SCNC: 11 MMOL/L — SIGNIFICANT CHANGE UP (ref 5–17)
BUN SERPL-MCNC: 40.1 MG/DL — HIGH (ref 8–20)
CALCIUM SERPL-MCNC: 8.8 MG/DL — SIGNIFICANT CHANGE UP (ref 8.4–10.5)
CHLORIDE SERPL-SCNC: 97 MMOL/L — SIGNIFICANT CHANGE UP (ref 96–108)
CO2 SERPL-SCNC: 26 MMOL/L — SIGNIFICANT CHANGE UP (ref 22–29)
CREAT SERPL-MCNC: 0.91 MG/DL — SIGNIFICANT CHANGE UP (ref 0.5–1.3)
EGFR: 87 ML/MIN/1.73M2 — SIGNIFICANT CHANGE UP
GLUCOSE BLDC GLUCOMTR-MCNC: 127 MG/DL — HIGH (ref 70–99)
GLUCOSE BLDC GLUCOMTR-MCNC: 132 MG/DL — HIGH (ref 70–99)
GLUCOSE BLDC GLUCOMTR-MCNC: 155 MG/DL — HIGH (ref 70–99)
GLUCOSE BLDC GLUCOMTR-MCNC: 222 MG/DL — HIGH (ref 70–99)
GLUCOSE SERPL-MCNC: 151 MG/DL — HIGH (ref 70–99)
HCT VFR BLD CALC: 31.3 % — LOW (ref 39–50)
HGB BLD-MCNC: 9.9 G/DL — LOW (ref 13–17)
MAGNESIUM SERPL-MCNC: 2.1 MG/DL — SIGNIFICANT CHANGE UP (ref 1.6–2.6)
MCHC RBC-ENTMCNC: 28.9 PG — SIGNIFICANT CHANGE UP (ref 27–34)
MCHC RBC-ENTMCNC: 31.6 GM/DL — LOW (ref 32–36)
MCV RBC AUTO: 91.5 FL — SIGNIFICANT CHANGE UP (ref 80–100)
PHOSPHATE SERPL-MCNC: 3.7 MG/DL — SIGNIFICANT CHANGE UP (ref 2.4–4.7)
PLATELET # BLD AUTO: 514 K/UL — HIGH (ref 150–400)
POTASSIUM SERPL-MCNC: 4.3 MMOL/L — SIGNIFICANT CHANGE UP (ref 3.5–5.3)
POTASSIUM SERPL-SCNC: 4.3 MMOL/L — SIGNIFICANT CHANGE UP (ref 3.5–5.3)
RBC # BLD: 3.42 M/UL — LOW (ref 4.2–5.8)
RBC # FLD: 13.1 % — SIGNIFICANT CHANGE UP (ref 10.3–14.5)
SODIUM SERPL-SCNC: 134 MMOL/L — LOW (ref 135–145)
WBC # BLD: 8.24 K/UL — SIGNIFICANT CHANGE UP (ref 3.8–10.5)
WBC # FLD AUTO: 8.24 K/UL — SIGNIFICANT CHANGE UP (ref 3.8–10.5)

## 2023-01-13 PROCEDURE — 99233 SBSQ HOSP IP/OBS HIGH 50: CPT

## 2023-01-13 PROCEDURE — 99232 SBSQ HOSP IP/OBS MODERATE 35: CPT

## 2023-01-13 PROCEDURE — 99231 SBSQ HOSP IP/OBS SF/LOW 25: CPT

## 2023-01-13 RX ORDER — TRAZODONE HCL 50 MG
50 TABLET ORAL AT BEDTIME
Refills: 0 | Status: DISCONTINUED | OUTPATIENT
Start: 2023-01-13 | End: 2023-01-22

## 2023-01-13 RX ADMIN — CHLORHEXIDINE GLUCONATE 1 APPLICATION(S): 213 SOLUTION TOPICAL at 13:34

## 2023-01-13 RX ADMIN — CEFEPIME 2000 MILLIGRAM(S): 1 INJECTION, POWDER, FOR SOLUTION INTRAMUSCULAR; INTRAVENOUS at 05:01

## 2023-01-13 RX ADMIN — Medication 1 DROP(S): at 05:01

## 2023-01-13 RX ADMIN — PANTOPRAZOLE SODIUM 40 MILLIGRAM(S): 20 TABLET, DELAYED RELEASE ORAL at 13:35

## 2023-01-13 RX ADMIN — CEFEPIME 2000 MILLIGRAM(S): 1 INJECTION, POWDER, FOR SOLUTION INTRAMUSCULAR; INTRAVENOUS at 14:57

## 2023-01-13 RX ADMIN — Medication 8 UNIT(S): at 05:02

## 2023-01-13 RX ADMIN — Medication 8 UNIT(S): at 00:02

## 2023-01-13 RX ADMIN — Medication 4: at 13:42

## 2023-01-13 RX ADMIN — Medication 81 MILLIGRAM(S): at 13:37

## 2023-01-13 RX ADMIN — APIXABAN 5 MILLIGRAM(S): 2.5 TABLET, FILM COATED ORAL at 05:01

## 2023-01-13 RX ADMIN — ATORVASTATIN CALCIUM 80 MILLIGRAM(S): 80 TABLET, FILM COATED ORAL at 21:10

## 2023-01-13 RX ADMIN — OLANZAPINE 5 MILLIGRAM(S): 15 TABLET, FILM COATED ORAL at 21:10

## 2023-01-13 RX ADMIN — CEFEPIME 2000 MILLIGRAM(S): 1 INJECTION, POWDER, FOR SOLUTION INTRAMUSCULAR; INTRAVENOUS at 21:09

## 2023-01-13 RX ADMIN — CHLORHEXIDINE GLUCONATE 15 MILLILITER(S): 213 SOLUTION TOPICAL at 05:02

## 2023-01-13 RX ADMIN — Medication 2 MILLIGRAM(S): at 21:13

## 2023-01-13 RX ADMIN — Medication 2 MILLIGRAM(S): at 05:01

## 2023-01-13 RX ADMIN — ATENOLOL 50 MILLIGRAM(S): 25 TABLET ORAL at 05:01

## 2023-01-13 RX ADMIN — Medication 8 UNIT(S): at 13:42

## 2023-01-13 RX ADMIN — LOTEPREDNOL ETABONATE 1 DROP(S): 2 SUSPENSION/ DROPS OPHTHALMIC at 05:02

## 2023-01-13 RX ADMIN — INSULIN GLARGINE 25 UNIT(S): 100 INJECTION, SOLUTION SUBCUTANEOUS at 08:43

## 2023-01-13 RX ADMIN — Medication 2 MILLIGRAM(S): at 21:10

## 2023-01-13 RX ADMIN — Medication 5 MILLIGRAM(S): at 21:09

## 2023-01-13 RX ADMIN — LISINOPRIL 10 MILLIGRAM(S): 2.5 TABLET ORAL at 05:01

## 2023-01-13 RX ADMIN — Medication 2 MILLIGRAM(S): at 13:37

## 2023-01-13 RX ADMIN — Medication 50 MILLIGRAM(S): at 21:10

## 2023-01-13 NOTE — CHART NOTE - NSCHARTNOTEFT_GEN_A_CORE
I have evaluated this patient and have determined that restraints are warranted to optimize medical care. Patient was assessed for current physical and psychological risk factors as well as special needs. There are no medical conditions or limitations that would place this patient at risk while in restraints. Dr. Mclain made aware. I have evaluated this patient and have determined that restraints are warranted to optimize medical care. Pt was given zyprexa and continues to pull at trach. Patient was assessed for current physical and psychological risk factors as well as special needs. There are no medical conditions or limitations that would place this patient at risk while in restraints. Dr. Mclain made aware.

## 2023-01-13 NOTE — PROGRESS NOTE ADULT - SUBJECTIVE AND OBJECTIVE BOX
Subjective:    T(C): 36.9 (01-13-23 @ 04:25), Max: 37.6 (01-12-23 @ 11:00)  HR: 68 (01-13-23 @ 05:45) (59 - 68)  BP: 126/60 (01-13-23 @ 04:25) (126/60 - 149/69)  RR: 18 (01-13-23 @ 04:25) (18 - 18)  SpO2: 97% (01-13-23 @ 05:45) (94% - 99%)  PA: --  Mode: CPAP with PS  FiO2: 40  PEEP: 5  PS: 10    01-13    134<L>  |  97  |  40.1<H>  ----------------------------<  151<H>  4.3   |  26.0  |  0.91    Ca    8.8      13 Jan 2023 05:42  Phos  3.7     01-13  Mg     2.1     01-13                                 9.9    8.24  )-----------( 514      ( 13 Jan 2023 05:42 )             31.3                    CAPILLARY BLOOD GLUCOSE  POCT Blood Glucose.: 155 mg/dL (13 Jan 2023 04:56)  POCT Blood Glucose.: 105 mg/dL (12 Jan 2023 23:22)  POCT Blood Glucose.: 92 mg/dL (12 Jan 2023 17:20)  POCT Blood Glucose.: 126 mg/dL (12 Jan 2023 12:13)  POCT Blood Glucose.: 119 mg/dL (12 Jan 2023 08:11)    I&O's Detail    12 Jan 2023 07:01  -  13 Jan 2023 07:00  --------------------------------------------------------  IN:  Total IN: 0 mL    OUT:    Rectal Tube (mL): 300 mL  Total OUT: 300 mL  Total NET: -300 mL    Drug Dosing Weight  Height (cm): 157.5 (26 Dec 2022 21:23)  Weight (kg): 75.5 (11 Jan 2023 09:53)  BMI (kg/m2): 30.4 (11 Jan 2023 09:53)  BSA (m2): 1.77 (11 Jan 2023 09:53)    MEDICATIONS  (STANDING):  apixaban 5 milliGRAM(s) Enteral Tube every 12 hours  aspirin  chewable 81 milliGRAM(s) Oral daily  atenolol  Tablet 50 milliGRAM(s) Oral daily  atorvastatin 80 milliGRAM(s) Oral at bedtime  cefepime  Injectable.      cefepime  Injectable. 2000 milliGRAM(s) IV Push every 8 hours  chlorhexidine 0.12% Liquid 15 milliLiter(s) Oral Mucosa every 12 hours  chlorhexidine 2% Cloths 1 Application(s) Topical daily  coronavirus bivalent (EUA) Booster Vaccine (PFIZER) 0.3 milliLiter(s) IntraMuscular once  dextrose 5%. 1000 milliLiter(s) (50 mL/Hr) IV Continuous <Continuous>  dextrose 5%. 1000 milliLiter(s) (100 mL/Hr) IV Continuous <Continuous>  dextrose 50% Injectable 25 Gram(s) IV Push once  dextrose 50% Injectable 12.5 Gram(s) IV Push once  dextrose 50% Injectable 25 Gram(s) IV Push once  doxazosin 2 milliGRAM(s) Oral at bedtime  glucagon  Injectable 1 milliGRAM(s) IntraMuscular once  influenza  Vaccine (HIGH DOSE) 0.7 milliLiter(s) IntraMuscular once  insulin glargine Injectable (LANTUS) 25 Unit(s) SubCutaneous every morning  insulin lispro (ADMELOG) corrective regimen sliding scale   SubCutaneous three times a day before meals  insulin lispro Injectable (ADMELOG) 8 Unit(s) SubCutaneous every 6 hours  lisinopril 10 milliGRAM(s) Oral daily  loperamide 2 milliGRAM(s) Oral three times a day  loteprednol 0.5% Ophthalmic Suspension 1 Drop(s) Right EYE daily  melatonin 5 milliGRAM(s) Oral at bedtime  OLANZapine 5 milliGRAM(s) Oral at bedtime  pantoprazole   Suspension 40 milliGRAM(s) Oral daily  prednisoLONE acetate 1% Suspension 1 Drop(s) Left EYE daily    MEDICATIONS  (PRN):  acetaminophen     Tablet .. 650 milliGRAM(s) Oral every 6 hours PRN Temp greater or equal to 38C (100.4F), Mild Pain (1 - 3)  albuterol/ipratropium for Nebulization 3 milliLiter(s) Nebulizer every 6 hours PRN Shortness of Breath and/or Wheezing  aluminum hydroxide/magnesium hydroxide/simethicone Suspension 30 milliLiter(s) Oral every 4 hours PRN Dyspepsia  dextrose Oral Gel 15 Gram(s) Oral once PRN Blood Glucose LESS THAN 70 milliGRAM(s)/deciliter  hydrALAZINE Injectable 10 milliGRAM(s) IV Push every 2 hours PRN SBP >160  labetalol Injectable 10 milliGRAM(s) IV Push every 2 hours PRN SBP >160  ondansetron Injectable 4 milliGRAM(s) IV Push every 8 hours PRN Nausea and/or Vomiting    Physical Exam  Gen:   Neuro:   Pulm:   CV:   Abd:   Extrem/MS:  Incision(s):        Subjective: no complaints limited ROS nods no to CP/SOB, dizziness, N/V, abd pain    T(C): 36.9 (01-13-23 @ 04:25), Max: 37.6 (01-12-23 @ 11:00)  HR: 68 (01-13-23 @ 05:45) (59 - 68)  BP: 126/60 (01-13-23 @ 04:25) (126/60 - 149/69)  RR: 18 (01-13-23 @ 04:25) (18 - 18)  SpO2: 97% (01-13-23 @ 05:45) (94% - 99%)  PA: --  Mode: CPAP with PS  FiO2: 40  PEEP: 5  PS: 10    01-13    134<L>  |  97  |  40.1<H>  ----------------------------<  151<H>  4.3   |  26.0  |  0.91    Ca    8.8      13 Jan 2023 05:42  Phos  3.7     01-13  Mg     2.1     01-13                                 9.9    8.24  )-----------( 514      ( 13 Jan 2023 05:42 )             31.3                    CAPILLARY BLOOD GLUCOSE  POCT Blood Glucose.: 155 mg/dL (13 Jan 2023 04:56)  POCT Blood Glucose.: 105 mg/dL (12 Jan 2023 23:22)  POCT Blood Glucose.: 92 mg/dL (12 Jan 2023 17:20)  POCT Blood Glucose.: 126 mg/dL (12 Jan 2023 12:13)  POCT Blood Glucose.: 119 mg/dL (12 Jan 2023 08:11)    I&O's Detail    12 Jan 2023 07:01  -  13 Jan 2023 07:00  --------------------------------------------------------  IN:  Total IN: 0 mL    OUT:    Rectal Tube (mL): 300 mL  Total OUT: 300 mL  Total NET: -300 mL    Drug Dosing Weight  Height (cm): 157.5 (26 Dec 2022 21:23)  Weight (kg): 75.5 (11 Jan 2023 09:53)  BMI (kg/m2): 30.4 (11 Jan 2023 09:53)  BSA (m2): 1.77 (11 Jan 2023 09:53)    MEDICATIONS  (STANDING):  apixaban 5 milliGRAM(s) Enteral Tube every 12 hours  aspirin  chewable 81 milliGRAM(s) Oral daily  atenolol  Tablet 50 milliGRAM(s) Oral daily  atorvastatin 80 milliGRAM(s) Oral at bedtime  cefepime  Injectable.      cefepime  Injectable. 2000 milliGRAM(s) IV Push every 8 hours  chlorhexidine 0.12% Liquid 15 milliLiter(s) Oral Mucosa every 12 hours  chlorhexidine 2% Cloths 1 Application(s) Topical daily  coronavirus bivalent (EUA) Booster Vaccine (PFIZER) 0.3 milliLiter(s) IntraMuscular once  dextrose 5%. 1000 milliLiter(s) (50 mL/Hr) IV Continuous <Continuous>  dextrose 5%. 1000 milliLiter(s) (100 mL/Hr) IV Continuous <Continuous>  dextrose 50% Injectable 25 Gram(s) IV Push once  dextrose 50% Injectable 12.5 Gram(s) IV Push once  dextrose 50% Injectable 25 Gram(s) IV Push once  doxazosin 2 milliGRAM(s) Oral at bedtime  glucagon  Injectable 1 milliGRAM(s) IntraMuscular once  influenza  Vaccine (HIGH DOSE) 0.7 milliLiter(s) IntraMuscular once  insulin glargine Injectable (LANTUS) 25 Unit(s) SubCutaneous every morning  insulin lispro (ADMELOG) corrective regimen sliding scale   SubCutaneous three times a day before meals  insulin lispro Injectable (ADMELOG) 8 Unit(s) SubCutaneous every 6 hours  lisinopril 10 milliGRAM(s) Oral daily  loperamide 2 milliGRAM(s) Oral three times a day  loteprednol 0.5% Ophthalmic Suspension 1 Drop(s) Right EYE daily  melatonin 5 milliGRAM(s) Oral at bedtime  OLANZapine 5 milliGRAM(s) Oral at bedtime  pantoprazole   Suspension 40 milliGRAM(s) Oral daily  prednisoLONE acetate 1% Suspension 1 Drop(s) Left EYE daily    MEDICATIONS  (PRN):  acetaminophen     Tablet .. 650 milliGRAM(s) Oral every 6 hours PRN Temp greater or equal to 38C (100.4F), Mild Pain (1 - 3)  albuterol/ipratropium for Nebulization 3 milliLiter(s) Nebulizer every 6 hours PRN Shortness of Breath and/or Wheezing  aluminum hydroxide/magnesium hydroxide/simethicone Suspension 30 milliLiter(s) Oral every 4 hours PRN Dyspepsia  dextrose Oral Gel 15 Gram(s) Oral once PRN Blood Glucose LESS THAN 70 milliGRAM(s)/deciliter  hydrALAZINE Injectable 10 milliGRAM(s) IV Push every 2 hours PRN SBP >160  labetalol Injectable 10 milliGRAM(s) IV Push every 2 hours PRN SBP >160  ondansetron Injectable 4 milliGRAM(s) IV Push every 8 hours PRN Nausea and/or Vomiting    Physical Exam  Gen: NAD  Neuro: A&Ox3  Pulm: coarse BS b/l no wheezing trach site intact  CV: S1S2 RRR  Abd: +BS soft NT ND +PEG site intact, no drainage  Extrem/MS: no edema/cyanosis

## 2023-01-13 NOTE — PROGRESS NOTE ADULT - SUBJECTIVE AND OBJECTIVE BOX
Chief Complaint:  acute cva    SUBJECTIVE / OVERNIGHT EVENTS:  Reported again to be agitated overnight and restraint (mitten) placed back on L-hand as he was reported to be grabbing for the trach.  He was also given a 1x dose of Zyprexa, which helped calm him down.  Pt is a poor historian but appears calm, not agitated.  He offers no acute complaints at this time.          I&O's Summary    09 Jan 2023 07:01  -  10 Lewis 2023 07:00  --------------------------------------------------------  IN: 1240 mL / OUT: 600 mL / NET: 640 mL        PHYSICAL EXAM:  Vital Signs Last 24 Hrs  T(C): 36.9 (13 Jan 2023 04:25), Max: 37.6 (12 Jan 2023 11:00)  T(F): 98.4 (13 Jan 2023 04:25), Max: 99.7 (12 Jan 2023 16:58)  HR: 68 (13 Jan 2023 05:45) (59 - 68)  BP: 126/60 (13 Jan 2023 04:25) (126/60 - 149/69)  BP(mean): --  RR: 18 (13 Jan 2023 04:25) (18 - 18)  SpO2: 97% (13 Jan 2023 05:45) (94% - 99%)    Parameters below as of 13 Jan 2023 04:25  Patient On (Oxygen Delivery Method): ventilator      GENERAL: pt examined bedside, laying comfortably in bed in NAD  HEENT: NC/AT, moist oral mucosa, clear conjunctiva, sclera nonicteric  NECK: +trach   RESPIRATORY: Normal respiratory effort, no wheezing, rhonchi, rales  CARDIOVASCULAR: RRR, normal S1 and S2  ABDOMEN: soft, NT/ND, +BS, no rebound/guarding, +PEG, +dignasheild  EXTREMITIES: No cynaosis, no clubbing, no lower extremity edema  NEUROLOGY: A+Ox3, rt facial palsy, strength 5/5, sensation intact   SKIN: No rashes or no palpable lesions        LABS:                                         9.9    8.24  )-----------( 514      ( 13 Jan 2023 05:42 )             31.3       01-13    134<L>  |  97  |  40.1<H>  ----------------------------<  151<H>  4.3   |  26.0  |  0.91    Ca    8.8      13 Jan 2023 05:42  Phos  3.7     01-13  Mg     2.1     01-13          Brain MRI: Area of restricted diffusion in the left debby suggesting   acute/subacute infarct, increased in size compared to prior exam.    Brain MRA: Minimal flow related signal in the left vertebral artery V4   segment.    Neck MRA: On axial T1 fat sat sequence, there is abnormal T1   hyperintensity in the left cervical vertebral artery raising suspicion   for dissection or occlusion. MRAneck demonstrates minimal flow related   signal in the left cervical vertebral artery.    < end of copied text >      < from: US Duplex Venous Lower Ext Complete, Bilateral (01.05.23 @ 17:47) >  IMPRESSION:  No evidence of deep venous thrombosis in either lower extremity.    < end of copied text >      < from: CT Head No Cont (12.29.22 @ 09:57) >  IMPRESSION:  No evidence of an acute intracranial hemorrhage, midline shift, or   hydrocephalus. Known acute left pontine infarct partly obscured by   artifact.    < end of copied text >        < from: TTE Echo Complete w/ Contrast w/ Doppler (12.28.22 @ 12:44) >  Summary:   1. Left ventricular ejection fraction, by visual estimation, is 60 to   65%.   2. Normal global left ventricular systolic function.   3. Spectral Doppler shows impaired relaxation pattern of left   ventricular myocardial filling (Grade I diastolic dysfunction).   4. Normal left atrial size.   5. Trace mitral valve regurgitation.   6. Sclerotic aortic valve with decreased opening.   7. Endocardial visualization was enhanced with intravenous echo contrast.    < end of copied text >      CAPILLARY BLOOD GLUCOSE      POCT Blood Glucose.: 205 mg/dL (10 Lewis 2023 11:13)  POCT Blood Glucose.: 153 mg/dL (10 Lewis 2023 05:41)  POCT Blood Glucose.: 179 mg/dL (09 Jan 2023 23:49)  POCT Blood Glucose.: 176 mg/dL (09 Jan 2023 16:45)        RADIOLOGY & ADDITIONAL TESTS:    < from: MR Angio Neck No Cont (01.06.23 @ 13:15) >  IMPRESSION:    Brain MRI: Area of restricted diffusion in the left debby suggesting   acute/subacute infarct, increased in size compared to prior exam.    Brain MRA: Minimal flow related signal in the left vertebral artery V4   segment.    Neck MRA: On axial T1 fat sat sequence, there is abnormal T1   hyperintensity in the left cervical vertebral artery raising suspicion   for dissection or occlusion. MRAneck demonstrates minimal flow related   signal in the left cervical vertebral artery.    < end of copied text >      < from: TTE Echo Complete w/ Contrast w/ Doppler (12.28.22 @ 12:44) >  Summary:   1. Left ventricular ejection fraction, by visual estimation, is 60 to   65%.   2. Normal global left ventricular systolic function.   3. Spectral Doppler shows impaired relaxation pattern of left   ventricular myocardial filling (Grade I diastolic dysfunction).   4. Normal left atrial size.   5. Trace mitral valve regurgitation.   6. Sclerotic aortic valve with decreased opening.   7. Endocardial visualization was enhanced with intravenous echo contrast.    < end of copied text >      MEDICATIONS  (STANDING):  apixaban 5 milliGRAM(s) Oral every 12 hours  aspirin  chewable 81 milliGRAM(s) Oral daily  atenolol  Tablet 50 milliGRAM(s) Oral daily  atorvastatin 80 milliGRAM(s) Oral at bedtime  cefepime  Injectable.      cefepime  Injectable. 2000 milliGRAM(s) IV Push every 8 hours  chlorhexidine 0.12% Liquid 15 milliLiter(s) Oral Mucosa every 12 hours  chlorhexidine 2% Cloths 1 Application(s) Topical daily  coronavirus bivalent (EUA) Booster Vaccine (PFIZER) 0.3 milliLiter(s) IntraMuscular once  dextrose 5%. 1000 milliLiter(s) (50 mL/Hr) IV Continuous <Continuous>  dextrose 5%. 1000 milliLiter(s) (100 mL/Hr) IV Continuous <Continuous>  dextrose 50% Injectable 25 Gram(s) IV Push once  dextrose 50% Injectable 12.5 Gram(s) IV Push once  dextrose 50% Injectable 25 Gram(s) IV Push once  doxazosin 2 milliGRAM(s) Oral at bedtime  glucagon  Injectable 1 milliGRAM(s) IntraMuscular once  influenza  Vaccine (HIGH DOSE) 0.7 milliLiter(s) IntraMuscular once  insulin glargine Injectable (LANTUS) 25 Unit(s) SubCutaneous every morning  insulin lispro (ADMELOG) corrective regimen sliding scale   SubCutaneous three times a day before meals  insulin lispro Injectable (ADMELOG) 8 Unit(s) SubCutaneous every 6 hours  lisinopril 10 milliGRAM(s) Oral daily  loperamide 2 milliGRAM(s) Oral three times a day  loteprednol 0.5% Ophthalmic Suspension 1 Drop(s) Right EYE daily  pantoprazole   Suspension 40 milliGRAM(s) Oral daily  prednisoLONE acetate 1% Suspension 1 Drop(s) Left EYE daily    MEDICATIONS  (PRN):  acetaminophen     Tablet .. 650 milliGRAM(s) Oral every 6 hours PRN Temp greater or equal to 38C (100.4F), Mild Pain (1 - 3)  albuterol/ipratropium for Nebulization 3 milliLiter(s) Nebulizer every 6 hours PRN Shortness of Breath and/or Wheezing  ALPRAZolam 0.5 milliGRAM(s) Oral every 12 hours PRN for agitation  aluminum hydroxide/magnesium hydroxide/simethicone Suspension 30 milliLiter(s) Oral every 4 hours PRN Dyspepsia  dextrose Oral Gel 15 Gram(s) Oral once PRN Blood Glucose LESS THAN 70 milliGRAM(s)/deciliter  hydrALAZINE Injectable 10 milliGRAM(s) IV Push every 2 hours PRN SBP >160  labetalol Injectable 10 milliGRAM(s) IV Push every 2 hours PRN SBP >160  melatonin 3 milliGRAM(s) Oral at bedtime PRN Insomnia  ondansetron Injectable 4 milliGRAM(s) IV Push every 8 hours PRN Nausea and/or Vomiting

## 2023-01-13 NOTE — PROGRESS NOTE ADULT - TIME BILLING
greater than 50% of time spent reviewing labs, notes, orders and radiographs, coordinating care  discussed with nursing, primary team.
greater than 50% of time spent reviewing labs, notes, orders and radiographs, coordinating care  discussed with nursing, primary team.
review of relevant history, clinical examination, review of data and images, discussion of treatment with the multidisciplinary team and any consultants involved in this patient’s care.
trach and peg management

## 2023-01-13 NOTE — PROGRESS NOTE ADULT - ASSESSMENT
77M, PMH HTN, DM, CAD.  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 Worsening neuro exam 12/27 - RUE plegia, worsening bulbar dysfunction, Acute resp failure due to neurologic injury, required intubation 12/27 for airway protection now s/p trach/peg 1/3, surgicel d/c 1/4, sutures removed 1/10.

## 2023-01-13 NOTE — PROGRESS NOTE ADULT - ASSESSMENT
Imp--Pontine CVA, vertebral art occlusion.  improving tolerance to cpap trial   LLL pneumonia improved with abx.  Sputum culture: 12/30 - staph aureus MSSA, 1/5/23 - Morganella Morgani   trach and peg 1/3/2023    - can start trying trach collar as tolerate daily if patient tolerates SBT  - continue SBT daily  - continue antibiotic to complete course for pneumonia, tentatively 7 to 10 days   - chest PT twice daily  - aspiration precaution   - continue management per medicine team  - pulmonary will follow intermittently

## 2023-01-13 NOTE — PROGRESS NOTE ADULT - SUBJECTIVE AND OBJECTIVE BOX
PULMONARY CONSULT NOTE      FABIOLA BALLARDRANGELO-[id number]      Subjective:   patient has been having delirious episode, trying to remove trach, s/p zyprexa   patient has been tolerating cpap trial        Patient is a 77y old  Male who presents with a chief complaint of Acute stroke (07 Jan 2023 17:34)      INTERVAL HPI/OVERNIGHT EVENTS:8 y/o male with PMH of DM-2, HTN, CAD was transferred from [place] for stroke. Patient reported right sided weakness and slurred speech along with difficulty swallowing that started yesterday. Was seen at [place] today, code stroke initiated out of window for tPA. CT head: no acute finding. MRI: acute infarct with left debby. MRA head/neck: Left vertebral artery segmental stenoses and T1 hyperintensity suggesting foci of dissection and/or thrombus. As per resident, neurology from [facility] was consulted and accepted patient for further evaluation. Patient said he is upset about his condition, noted discomfort in his head otherwise no chest pain, shortness of breath, palpitation, fever, chills, nausea, vomiting, abdominal pain, change in bowel/urinary habit.  (26 Dec 2022 22:58)  Trach/Peg placed for secretions, L inf.  Now for vent unit.  Pt smoked in distant past, no prior resp issues, generally active before CVA.    MEDICATIONS  (STANDING):  albuterol/ipratropium for Nebulization 3 milliLiter(s) Nebulizer every 6 hours  apixaban 5 milliGRAM(s) Oral every 12 hours  aspirin  chewable 81 milliGRAM(s) Oral daily  atenolol  Tablet 50 milliGRAM(s) Oral daily  atorvastatin 80 milliGRAM(s) Oral at bedtime  cefepime  Injectable.      cefepime  Injectable. 2000 milliGRAM(s) IV Push every 8 hours  chlorhexidine 0.12% Liquid 15 milliLiter(s) Oral Mucosa every 12 hours  chlorhexidine 2% Cloths 1 Application(s) Topical daily  coronavirus bivalent (EUA) Booster Vaccine (PFIZER) 0.3 milliLiter(s) IntraMuscular once  dextrose 5%. 1000 milliLiter(s) (50 mL/Hr) IV Continuous <Continuous>  dextrose 5%. 1000 milliLiter(s) (100 mL/Hr) IV Continuous <Continuous>  dextrose 50% Injectable 25 Gram(s) IV Push once  dextrose 50% Injectable 12.5 Gram(s) IV Push once  dextrose 50% Injectable 25 Gram(s) IV Push once  doxazosin 2 milliGRAM(s) Oral at bedtime  glucagon  Injectable 1 milliGRAM(s) IntraMuscular once  influenza  Vaccine (HIGH DOSE) 0.7 milliLiter(s) IntraMuscular once  insulin glargine Injectable (LANTUS) 20 Unit(s) SubCutaneous every morning  insulin lispro (ADMELOG) corrective regimen sliding scale   SubCutaneous three times a day before meals  insulin lispro Injectable (ADMELOG) 4 Unit(s) SubCutaneous every 6 hours  lisinopril 10 milliGRAM(s) Oral daily  loteprednol 0.5% Ophthalmic Suspension 1 Drop(s) Right EYE daily  pantoprazole   Suspension 40 milliGRAM(s) Oral daily  prednisoLONE acetate 1% Suspension 1 Drop(s) Left EYE daily  simethicone 80 milliGRAM(s) Chew every 8 hours      MEDICATIONS  (PRN):  acetaminophen     Tablet .. 650 milliGRAM(s) Oral every 6 hours PRN Temp greater or equal to 38C (100.4F), Mild Pain (1 - 3)  ALPRAZolam 0.5 milliGRAM(s) Oral every 12 hours PRN for agitation  aluminum hydroxide/magnesium hydroxide/simethicone Suspension 30 milliLiter(s) Oral every 4 hours PRN Dyspepsia  dextrose Oral Gel 15 Gram(s) Oral once PRN Blood Glucose LESS THAN 70 milliGRAM(s)/deciliter  hydrALAZINE Injectable 10 milliGRAM(s) IV Push every 2 hours PRN SBP >160  labetalol Injectable 10 milliGRAM(s) IV Push every 2 hours PRN SBP >160  melatonin 3 milliGRAM(s) Oral at bedtime PRN Insomnia  ondansetron Injectable 4 milliGRAM(s) IV Push every 8 hours PRN Nausea and/or Vomiting      Allergies    iodine (Anaphylaxis (Mod to Severe))    Intolerances        PAST MEDICAL & SURGICAL HISTORY:  HTN (hypertension)      CAD (coronary artery disease)      DM (diabetes mellitus)          FAMILY HISTORY:  No pertinent family history in first degree relatives        SOCIAL HISTORY  Smoking History:     REVIEW OF SYSTEMS:    on ventilator, cannot obtain ROS       Vital Signs Last 24 Hrs  T(C): 37 (08 Jan 2023 07:54), Max: 37 (07 Jan 2023 16:09)  T(F): 98.6 (08 Jan 2023 07:54), Max: 98.6 (07 Jan 2023 16:09)  HR: 70 (08 Jan 2023 08:45) (30 - 83)  BP: 122/51 (08 Jan 2023 08:00) (111/60 - 156/77)  BP(mean): 72 (08 Jan 2023 08:00) (72 - 101)  RR: 25 (08 Jan 2023 08:00) (19 - 31)  SpO2: 98% (08 Jan 2023 08:45) (96% - 100%)    Parameters below as of 08 Jan 2023 08:45  Patient On (Oxygen Delivery Method): ventilator        PHYSICAL EXAMINATION:    GENERAL: NAD on vent,     HEENT: Head is normocephalic and atraumatic. Extraocular muscles are intact. Mucous membranes are moist.     NECK: Supple.  trach site clean, no oozing     LUNGS: Clear to auscultation without wheezing, rales, or rhonchi. Respirations unlabored    HEART: Regular rate and rhythm without murmur.    ABDOMEN: Soft, nontender, and nondistended.  No hepatosplenomegaly is noted.    EXTREMITIES: Without any cyanosis, clubbing, rash, lesions or edema.    NEUROLOGIC: unable to move right side upper and lower extremity, normal left side     SKIN: No ulceration or induration present.      LABS:                        10.2   9.58  )-----------( 263      ( 08 Jan 2023 04:00 )             31.9     01-08    140  |  105  |  28.7<H>  ----------------------------<  201<H>  5.2   |  30.0<H>  |  0.93    Ca    8.2<L>      08 Jan 2023 04:00  Phos  3.4     01-08  Mg     2.2     01-08      PTT - ( 07 Jan 2023 03:10 )  PTT:61.1 sec      Blood Gas Profile - Arterial (01.01.23 @ 04:40)    pH, Arterial: 7.420: As of 6/9/2020 Reference Ranges have been amended for: SAPNA, COHB, GLUWB,  HCO3, KWB, METHHB, NAWB, O2HB, PCO2.    pCO2, Arterial: 40 mmHg    pO2, Arterial: 82 mmHg    HCO3, Arterial: 26 mmol/L    Base Excess, Arterial: 1.4 mmol/L    Oxygen Saturation, Arterial: 98.2 %    FIO2, Arterial: .4    Blood Gas Comments Arterial: ac18/400/40%/+6    Blood Gas Source Arterial: Arterial        Spont breathing trial on CPAP 5 PS 10--RR 40, vt 320, mild abd breathing.  No improvement with PS 15.          Procalcitonin, Serum: 0.37 ng/mL (01-05-23 @ 13:50)      MICROBIOLOGY:    RADIOLOGY & ADDITIONAL STUDIES:< from: Xray Chest 1 View-PORTABLE IMMEDIATE (Xray Chest 1 View-PORTABLE IMMEDIATE .) (01.05.23 @ 15:19) >  Heart magnified by technique. A tracheostomy has replaced endotracheal   tube present on January 2. NG tube removed.    On January 2 there was a left base infiltrate which suggests some   improvement.    IMPRESSION: As above.    < end of copied text >

## 2023-01-13 NOTE — PROGRESS NOTE ADULT - ASSESSMENT
76 y/o M w/ PMH of CAD, DM2, HTN, transferred from [facility] for CVA after presenting for Rt-sided weakness, difficulty swallowing and slurred speech.  Pt was out of window for TPA.  CTH was (-) but MRI brain was significant for an acute L-debby infarct and MRA was significant for L-vertebral artery stenosis vs dissection vs thrombus.  Pt was admitted to neuroICU.  He was intubated 12/28 for air way protection.  He eventually required trach/peg'd 01/03.  Pts hospital course complicated by LLL PNA w/ Scxs growing MSSA 12/30 and morganella 01/05.  Neurology and neurosurgery following.          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   - Pt on eliquis x3 months given MRA findings are suggestive of a thrombus   - MRA neck w/ T1 fat sat will be repeated in 3 months to reassess need for AC   - c/w ASA and statin therapy   - Lipid panel reviewed   - TTE reviewed and noted above; trance valvulopathies, EF 60% and no evidence of PFO reported   - Continue monitoring on telemetry   - May benifit from ILR to r/o cardioembolic source   - Continues requiring full vent support and not tolerating weaning trials; likely result of brainstem CVA   - c/w daily spontaneous breathing trials   - s/p suture removal 1/10 by CT surgery  - Neuro checks and VS q4h   - Pulmonary following and recs noted   - Neurology and nurosurg following and recs noted       VAP  - Scxs from 12/30 growing MSSA   - Scxs from 01/05 growing Morganella Morgani  - Leukocytosis persists but no L-shift, remains afebrile and nontoxic appearing   - c/w Cefepime through 01/13  - Trend CBC and monitor temperature       Normocytic anemia   - Hb 14 on admission   - H/h remains low but stable   - Hemodynamically stable and no active bleeding reported   - Pt is on AC for suspected vertebral artery thombus  - Trend CBC and transfuse as needed       PEG dependence  - c/w tube feeds as tolerated   - Diarrhea likely from tube feeds   - Cdiff (-)  - Added banatrol but may need TF changed   - Nutrition following       DM II   - A1c 8  - On basal/bolus insulin regimen   - Titrate insulin to goal BG<180  - ISS and hypoglycemic protocol in place         VTE ppx: on eliquis     Dispo: Pending clinical course.  Will eventually need vent facility.

## 2023-01-14 DIAGNOSIS — I63.9 CEREBRAL INFARCTION, UNSPECIFIED: ICD-10-CM

## 2023-01-14 LAB
ANION GAP SERPL CALC-SCNC: 8 MMOL/L — SIGNIFICANT CHANGE UP (ref 5–17)
BUN SERPL-MCNC: 44.9 MG/DL — HIGH (ref 8–20)
CALCIUM SERPL-MCNC: 8.6 MG/DL — SIGNIFICANT CHANGE UP (ref 8.4–10.5)
CHLORIDE SERPL-SCNC: 98 MMOL/L — SIGNIFICANT CHANGE UP (ref 96–108)
CO2 SERPL-SCNC: 26 MMOL/L — SIGNIFICANT CHANGE UP (ref 22–29)
CREAT SERPL-MCNC: 0.92 MG/DL — SIGNIFICANT CHANGE UP (ref 0.5–1.3)
EGFR: 86 ML/MIN/1.73M2 — SIGNIFICANT CHANGE UP
GLUCOSE BLDC GLUCOMTR-MCNC: 111 MG/DL — HIGH (ref 70–99)
GLUCOSE BLDC GLUCOMTR-MCNC: 123 MG/DL — HIGH (ref 70–99)
GLUCOSE BLDC GLUCOMTR-MCNC: 135 MG/DL — HIGH (ref 70–99)
GLUCOSE BLDC GLUCOMTR-MCNC: 148 MG/DL — HIGH (ref 70–99)
GLUCOSE BLDC GLUCOMTR-MCNC: 150 MG/DL — HIGH (ref 70–99)
GLUCOSE SERPL-MCNC: 132 MG/DL — HIGH (ref 70–99)
HCT VFR BLD CALC: 31.3 % — LOW (ref 39–50)
HGB BLD-MCNC: 9.8 G/DL — LOW (ref 13–17)
MAGNESIUM SERPL-MCNC: 2.2 MG/DL — SIGNIFICANT CHANGE UP (ref 1.8–2.6)
MCHC RBC-ENTMCNC: 28.8 PG — SIGNIFICANT CHANGE UP (ref 27–34)
MCHC RBC-ENTMCNC: 31.3 GM/DL — LOW (ref 32–36)
MCV RBC AUTO: 92.1 FL — SIGNIFICANT CHANGE UP (ref 80–100)
PLATELET # BLD AUTO: 509 K/UL — HIGH (ref 150–400)
POTASSIUM SERPL-MCNC: 5.6 MMOL/L — HIGH (ref 3.5–5.3)
POTASSIUM SERPL-SCNC: 5.6 MMOL/L — HIGH (ref 3.5–5.3)
RBC # BLD: 3.4 M/UL — LOW (ref 4.2–5.8)
RBC # FLD: 13 % — SIGNIFICANT CHANGE UP (ref 10.3–14.5)
SODIUM SERPL-SCNC: 132 MMOL/L — LOW (ref 135–145)
WBC # BLD: 10.15 K/UL — SIGNIFICANT CHANGE UP (ref 3.8–10.5)
WBC # FLD AUTO: 10.15 K/UL — SIGNIFICANT CHANGE UP (ref 3.8–10.5)

## 2023-01-14 PROCEDURE — 99233 SBSQ HOSP IP/OBS HIGH 50: CPT

## 2023-01-14 PROCEDURE — 99231 SBSQ HOSP IP/OBS SF/LOW 25: CPT

## 2023-01-14 RX ADMIN — Medication 1 DROP(S): at 05:20

## 2023-01-14 RX ADMIN — APIXABAN 5 MILLIGRAM(S): 2.5 TABLET, FILM COATED ORAL at 16:49

## 2023-01-14 RX ADMIN — CHLORHEXIDINE GLUCONATE 15 MILLILITER(S): 213 SOLUTION TOPICAL at 05:21

## 2023-01-14 RX ADMIN — Medication 5 MILLIGRAM(S): at 22:09

## 2023-01-14 RX ADMIN — OLANZAPINE 5 MILLIGRAM(S): 15 TABLET, FILM COATED ORAL at 22:12

## 2023-01-14 RX ADMIN — PANTOPRAZOLE SODIUM 40 MILLIGRAM(S): 20 TABLET, DELAYED RELEASE ORAL at 13:10

## 2023-01-14 RX ADMIN — Medication 2 MILLIGRAM(S): at 22:09

## 2023-01-14 RX ADMIN — INSULIN GLARGINE 25 UNIT(S): 100 INJECTION, SOLUTION SUBCUTANEOUS at 08:10

## 2023-01-14 RX ADMIN — Medication 81 MILLIGRAM(S): at 13:10

## 2023-01-14 RX ADMIN — Medication 8 UNIT(S): at 05:45

## 2023-01-14 RX ADMIN — Medication 2 MILLIGRAM(S): at 05:20

## 2023-01-14 RX ADMIN — Medication 2 MILLIGRAM(S): at 13:09

## 2023-01-14 RX ADMIN — Medication 8 UNIT(S): at 11:41

## 2023-01-14 RX ADMIN — CHLORHEXIDINE GLUCONATE 15 MILLILITER(S): 213 SOLUTION TOPICAL at 16:49

## 2023-01-14 RX ADMIN — CEFEPIME 2000 MILLIGRAM(S): 1 INJECTION, POWDER, FOR SOLUTION INTRAMUSCULAR; INTRAVENOUS at 13:10

## 2023-01-14 RX ADMIN — APIXABAN 5 MILLIGRAM(S): 2.5 TABLET, FILM COATED ORAL at 05:20

## 2023-01-14 RX ADMIN — Medication 50 MILLIGRAM(S): at 22:10

## 2023-01-14 RX ADMIN — ATORVASTATIN CALCIUM 80 MILLIGRAM(S): 80 TABLET, FILM COATED ORAL at 22:10

## 2023-01-14 RX ADMIN — CHLORHEXIDINE GLUCONATE 1 APPLICATION(S): 213 SOLUTION TOPICAL at 13:10

## 2023-01-14 RX ADMIN — Medication 8 UNIT(S): at 01:18

## 2023-01-14 RX ADMIN — CEFEPIME 2000 MILLIGRAM(S): 1 INJECTION, POWDER, FOR SOLUTION INTRAMUSCULAR; INTRAVENOUS at 05:20

## 2023-01-14 RX ADMIN — LOTEPREDNOL ETABONATE 1 DROP(S): 2 SUSPENSION/ DROPS OPHTHALMIC at 05:21

## 2023-01-14 RX ADMIN — Medication 8 UNIT(S): at 16:49

## 2023-01-14 NOTE — PROGRESS NOTE ADULT - SUBJECTIVE AND OBJECTIVE BOX
HPI:  76 y/o male with PMH of DM-2, HTN, CAD was transferred from [place] for stroke. Patient reported right sided weakness and slurred speech along with difficulty swallowing that started yesterday. Was seen at [place] today, code stroke initiated out of window for tPA. CT head: no acute finding. MRI: acute infarct with left debby. MRA head/neck: Left vertebral artery segmental stenoses and T1 hyperintensity suggesting foci of dissection and/or thrombus. As per resident, neurology from [facility] was consulted and accepted patient for further evaluation. Patient said he is upset about his condition, noted discomfort in his head otherwise no chest pain, shortness of breath, palpitation, fever, chills, nausea, vomiting, abdominal pain, change in bowel/urinary habit.  (26 Dec 2022 22:58)    PAST MEDICAL & SURGICAL HISTORY:  HTN (hypertension)    CAD (coronary artery disease)    DM (diabetes mellitus)    Allergies    iodine (Anaphylaxis (Mod to Severe))    FAMILY HISTORY:  No pertinent family history in first degree relatives    Social History:  No cigarette, alcohol or illicit drug use. , lives with family (26 Dec 2022 22:58)      Vitals    T(C): 36.8 (14 Jan 2023 11:00), Max: 36.8 (14 Jan 2023 04:29)  T(F): 98.2 (14 Jan 2023 11:00), Max: 98.2 (14 Jan 2023 04:29)  HR: 50 (14 Jan 2023 11:00) (50 - 65)  BP: 151/71 (14 Jan 2023 11:00) (115/68 - 151/71)  BP(mean): --  ABP: --  ABP(mean): --  RR: 17 (14 Jan 2023 11:00) (17 - 18)  SpO2: 100% (14 Jan 2023 11:00) (94% - 100%)    O2 Parameters below as of 14 Jan 2023 11:00  Patient On (Oxygen Delivery Method): ventilator    Labs:    |01-14-23 @ 05:50            9.8<L>  10.15>--------------<509<H>            31.3<L>      132<L>  |  98  |  44.9<H>  ----------------------------<132<H>  5.6<H>  |  8.6  |  0.92    Mg 2.2                MEDICATIONS  (STANDING):  apixaban 5 milliGRAM(s) Enteral Tube every 12 hours  aspirin  chewable 81 milliGRAM(s) Oral daily  atenolol  Tablet 50 milliGRAM(s) Oral daily  atorvastatin 80 milliGRAM(s) Oral at bedtime  cefepime  Injectable.      cefepime  Injectable. 2000 milliGRAM(s) IV Push every 8 hours  chlorhexidine 0.12% Liquid 15 milliLiter(s) Oral Mucosa every 12 hours  chlorhexidine 2% Cloths 1 Application(s) Topical daily  coronavirus bivalent (EUA) Booster Vaccine (PFIZER) 0.3 milliLiter(s) IntraMuscular once  dextrose 5%. 1000 milliLiter(s) (50 mL/Hr) IV Continuous <Continuous>  dextrose 5%. 1000 milliLiter(s) (100 mL/Hr) IV Continuous <Continuous>  dextrose 50% Injectable 25 Gram(s) IV Push once  dextrose 50% Injectable 12.5 Gram(s) IV Push once  dextrose 50% Injectable 25 Gram(s) IV Push once  doxazosin 2 milliGRAM(s) Oral at bedtime  glucagon  Injectable 1 milliGRAM(s) IntraMuscular once  influenza  Vaccine (HIGH DOSE) 0.7 milliLiter(s) IntraMuscular once  insulin glargine Injectable (LANTUS) 25 Unit(s) SubCutaneous every morning  insulin lispro (ADMELOG) corrective regimen sliding scale   SubCutaneous three times a day before meals  insulin lispro Injectable (ADMELOG) 8 Unit(s) SubCutaneous every 6 hours  lisinopril 10 milliGRAM(s) Oral daily  loperamide 2 milliGRAM(s) Oral three times a day  loteprednol 0.5% Ophthalmic Suspension 1 Drop(s) Right EYE daily  melatonin 5 milliGRAM(s) Oral at bedtime  OLANZapine 5 milliGRAM(s) Oral at bedtime  pantoprazole   Suspension 40 milliGRAM(s) Oral daily  prednisoLONE acetate 1% Suspension 1 Drop(s) Left EYE daily    MEDICATIONS  (PRN):  acetaminophen     Tablet .. 650 milliGRAM(s) Oral every 6 hours PRN Temp greater or equal to 38C (100.4F), Mild Pain (1 - 3)  albuterol/ipratropium for Nebulization 3 milliLiter(s) Nebulizer every 6 hours PRN Shortness of Breath and/or Wheezing  aluminum hydroxide/magnesium hydroxide/simethicone Suspension 30 milliLiter(s) Oral every 4 hours PRN Dyspepsia  dextrose Oral Gel 15 Gram(s) Oral once PRN Blood Glucose LESS THAN 70 milliGRAM(s)/deciliter  hydrALAZINE Injectable 10 milliGRAM(s) IV Push every 2 hours PRN SBP >160  labetalol Injectable 10 milliGRAM(s) IV Push every 2 hours PRN SBP >160  ondansetron Injectable 4 milliGRAM(s) IV Push every 8 hours PRN Nausea and/or Vomiting    Physical Exam  Gen: Resting comfortably, NAD  Neuro: Follows commands and appropriately nods to questions  Pulm: No wheezing trach site C/D/I    CV: S1S2 RRR  Abd: +BS soft NT ND +PEG site intact, no drainage  Extremeties: no edema/cyanosis

## 2023-01-14 NOTE — PROGRESS NOTE ADULT - SUBJECTIVE AND OBJECTIVE BOX
Patient is a 77y old  Male who presents with a chief complaint of Acute stroke (13 Jan 2023 20:12)      INTERVAL HPI/OVERNIGHT EVENTS: pt seen and examined. He is not able to provide any ROS as he was sleeping and very drowsy when woken up   Per RN, pt was not able to sleep the whole night and was very agitated     MEDICATIONS  (STANDING):  apixaban 5 milliGRAM(s) Enteral Tube every 12 hours  aspirin  chewable 81 milliGRAM(s) Oral daily  atenolol  Tablet 50 milliGRAM(s) Oral daily  atorvastatin 80 milliGRAM(s) Oral at bedtime  cefepime  Injectable.      cefepime  Injectable. 2000 milliGRAM(s) IV Push every 8 hours  chlorhexidine 0.12% Liquid 15 milliLiter(s) Oral Mucosa every 12 hours  chlorhexidine 2% Cloths 1 Application(s) Topical daily  coronavirus bivalent (EUA) Booster Vaccine (PFIZER) 0.3 milliLiter(s) IntraMuscular once  dextrose 5%. 1000 milliLiter(s) (100 mL/Hr) IV Continuous <Continuous>  dextrose 5%. 1000 milliLiter(s) (50 mL/Hr) IV Continuous <Continuous>  dextrose 50% Injectable 25 Gram(s) IV Push once  dextrose 50% Injectable 12.5 Gram(s) IV Push once  dextrose 50% Injectable 25 Gram(s) IV Push once  doxazosin 2 milliGRAM(s) Oral at bedtime  glucagon  Injectable 1 milliGRAM(s) IntraMuscular once  influenza  Vaccine (HIGH DOSE) 0.7 milliLiter(s) IntraMuscular once  insulin glargine Injectable (LANTUS) 25 Unit(s) SubCutaneous every morning  insulin lispro (ADMELOG) corrective regimen sliding scale   SubCutaneous three times a day before meals  insulin lispro Injectable (ADMELOG) 8 Unit(s) SubCutaneous every 6 hours  lisinopril 10 milliGRAM(s) Oral daily  loperamide 2 milliGRAM(s) Oral three times a day  loteprednol 0.5% Ophthalmic Suspension 1 Drop(s) Right EYE daily  melatonin 5 milliGRAM(s) Oral at bedtime  OLANZapine 5 milliGRAM(s) Oral at bedtime  pantoprazole   Suspension 40 milliGRAM(s) Oral daily  prednisoLONE acetate 1% Suspension 1 Drop(s) Left EYE daily  traZODone 50 milliGRAM(s) Oral at bedtime    MEDICATIONS  (PRN):  acetaminophen     Tablet .. 650 milliGRAM(s) Oral every 6 hours PRN Temp greater or equal to 38C (100.4F), Mild Pain (1 - 3)  albuterol/ipratropium for Nebulization 3 milliLiter(s) Nebulizer every 6 hours PRN Shortness of Breath and/or Wheezing  aluminum hydroxide/magnesium hydroxide/simethicone Suspension 30 milliLiter(s) Oral every 4 hours PRN Dyspepsia  dextrose Oral Gel 15 Gram(s) Oral once PRN Blood Glucose LESS THAN 70 milliGRAM(s)/deciliter  hydrALAZINE Injectable 10 milliGRAM(s) IV Push every 2 hours PRN SBP >160  labetalol Injectable 10 milliGRAM(s) IV Push every 2 hours PRN SBP >160  ondansetron Injectable 4 milliGRAM(s) IV Push every 8 hours PRN Nausea and/or Vomiting      Allergies    iodine (Anaphylaxis (Mod to Severe))    Intolerances        REVIEW OF SYSTEMS: not able to obtain       Vital Signs Last 24 Hrs  T(C): 36.8 (14 Jan 2023 11:00), Max: 36.8 (14 Jan 2023 04:29)  T(F): 98.2 (14 Jan 2023 11:00), Max: 98.2 (14 Jan 2023 04:29)  HR: 50 (14 Jan 2023 11:00) (50 - 65)  BP: 151/71 (14 Jan 2023 11:00) (115/68 - 151/71)  BP(mean): --  RR: 17 (14 Jan 2023 11:00) (17 - 18)  SpO2: 100% (14 Jan 2023 11:00) (94% - 100%)    Parameters below as of 14 Jan 2023 11:00  Patient On (Oxygen Delivery Method): ventilator        PHYSICAL EXAM:  GENERAL: awake, but drowsy and does not follow any commands   HEAD:  Atraumatic, Normocephalic  EYES: EOMI, PERRLA, conjunctiva and sclera clear  NECK: Supple, No JVD, trached   NERVOUS SYSTEM:  Drowsy, full exam not done   CHEST/LUNG: Clear to percussion bilaterally; No rales, rhonchi, wheezing, or rubs  HEART: Regular rate and rhythm; No murmurs, rubs, or gallops  ABDOMEN: Soft, Nontender, Nondistended; Bowel sounds present  EXTREMITIES:  No clubbing, cyanosis, or edema      LABS:                        9.8    10.15 )-----------( 509      ( 14 Jan 2023 05:50 )             31.3     01-14    132<L>  |  98  |  44.9<H>  ----------------------------<  132<H>  5.6<H>   |  26.0  |  0.92    Ca    8.6      14 Jan 2023 05:50  Phos  3.7     01-13  Mg     2.2     01-14          CAPILLARY BLOOD GLUCOSE      POCT Blood Glucose.: 150 mg/dL (14 Jan 2023 11:39)  POCT Blood Glucose.: 111 mg/dL (14 Jan 2023 08:10)  POCT Blood Glucose.: 148 mg/dL (14 Jan 2023 05:43)  POCT Blood Glucose.: 123 mg/dL (14 Jan 2023 00:49)  POCT Blood Glucose.: 132 mg/dL (13 Jan 2023 18:48)      RADIOLOGY & ADDITIONAL TESTS:    Imaging Personally Reviewed:  [ x] YES  [ ] NO    Consultant(s) Notes Reviewed:  [ x] YES  [ ] NO    Care Discussed with Consultants/Other Providers [ ] YES  [ ] NO    Plan of Care discussed with Housestaff [x ]YES [ ] NO

## 2023-01-14 NOTE — PROGRESS NOTE ADULT - ASSESSMENT
77M, PMH HTN, DM, CAD.  Transferred from [place] 12/26 with an acute L pontine CVA, possible L vert dissection. Worsening neuro exam 12/27 - RUE plegia, worsening bulbar dysfunction, Acute resp failure due to neurologic injury, required intubation 12/27 for airway protection now s/p trach/peg 1/3, surgicel d/c 1/4, sutures removed 1/10.

## 2023-01-14 NOTE — PROGRESS NOTE ADULT - ASSESSMENT
78 y/o M w/ PMH of CAD, DM2, HTN, transferred from [facility] for CVA after presenting for Rt-sided weakness, difficulty swallowing and slurred speech.  Pt was out of window for TPA.  CTH was (-) but MRI brain was significant for an acute L-debby infarct and MRA was significant for L-vertebral artery stenosis vs dissection vs thrombus.  Pt was admitted to neuroICU.  He was intubated 12/28 for air way protection.  He eventually required trach/peg'd 01/03.  Pts hospital course complicated by LLL PNA w/ Scxs growing MSSA 12/30 and morganella 01/05.  Neurology and neurosurgery following.          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   - c/w ASA and atorvastatin     - Continues requiring full vent support and not tolerating weaning trials; likely result of brainstem CVA   - c/w daily spontaneous breathing trials   - s/p suture removal 1/10 by CT surgery  - Pulmonary following and recs noted   - Neurology and nurosurg following and recs noted       VAP  - Scxs from 12/30 growing MSSA   - Scxs from 01/05 growing Morganella Morgani  - Leukocytosis persists but no L-shift, remains afebrile and nontoxic appearing   Completed course of Cefepime through 01/14      Normocytic anemia   - Hb 14 on admission   - H/h remains low but stable   - Pt is on AC for suspected vertebral artery thombus  - Trend CBC and transfuse as needed       PEG dependence  - c/w tube feeds as tolerated   - Diarrhea likely from tube feeds   - Cdiff (-)  - Added banatrol but may need TF changed   - Nutrition following       DM II   - A1c 8  - On basal/bolus insulin regimen   - Titrate insulin to goal BG<180  - ISS and hypoglycemic protocol in place         VTE ppx: on eliquis     Dispo: Pending clinical course.  Will eventually need vent facility.     76 y/o M w/ PMH of CAD, DM2, HTN, transferred from [facility] for CVA after presenting for Rt-sided weakness, difficulty swallowing and slurred speech.  Pt was out of window for TPA.  CTH was (-) but MRI brain was significant for an acute L-debby infarct and MRA was significant for L-vertebral artery stenosis vs dissection vs thrombus.  Pt was admitted to neuroICU.  He was intubated 12/28 for air way protection.  He eventually required trach/peg'd 01/03.  Pts hospital course complicated by LLL PNA w/ Scxs growing MSSA 12/30 and morganella 01/05.  Neurology and neurosurgery following.          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   - c/w ASA and atorvastatin    -c/w Eliquis for thrombosis    - Continues requiring full vent support and not tolerating weaning trials; likely result of brainstem CVA   - c/w daily spontaneous breathing trials   - s/p suture removal 1/10 by CT surgery  - Pulmonary following and recs noted   - Neurology and nurosurg following and recs noted       VAP  - Scxs from 12/30 growing MSSA   - Scxs from 01/05 growing Morganella Morgani  - Leukocytosis persists but no L-shift, remains afebrile and nontoxic appearing   Completed course of Cefepime through 01/14      Normocytic anemia   - Hb 14 on admission   - H/h remains low but stable   - Pt is on AC for suspected vertebral artery thombus  - Trend CBC and transfuse as needed       PEG dependence  - c/w tube feeds as tolerated   - Diarrhea likely from tube feeds   - Cdiff (-)  - Added banatrol but may need TF changed   - Nutrition following       DM II   - A1c 8  - On basal/bolus insulin regimen   - Titrate insulin to goal BG<180  - ISS and hypoglycemic protocol in place         VTE ppx: on eliquis     Dispo: Pending clinical course.  Will eventually need vent facility.

## 2023-01-14 NOTE — PROGRESS NOTE ADULT - PROBLEM SELECTOR PLAN 1
Admitted with Acute Left Pontine CVA.  Intubated 12/27 for airway protection.  S/p Trach/PEG 1/3  Surgicel removed 1/4.  Sutures removed 1/10  Both trach and PEG sites C/D/I    Maintain gauze/drain sponge between trach flange and skin to prevent skin breakdown. Change as needed.  Continue trach care and suctioning.   Continue tube feeds as tolerated.   Thoracic surgery to continue to follow.

## 2023-01-15 LAB
GLUCOSE BLDC GLUCOMTR-MCNC: 120 MG/DL — HIGH (ref 70–99)
GLUCOSE BLDC GLUCOMTR-MCNC: 125 MG/DL — HIGH (ref 70–99)
GLUCOSE BLDC GLUCOMTR-MCNC: 131 MG/DL — HIGH (ref 70–99)
GLUCOSE BLDC GLUCOMTR-MCNC: 139 MG/DL — HIGH (ref 70–99)
GLUCOSE BLDC GLUCOMTR-MCNC: 162 MG/DL — HIGH (ref 70–99)
GLUCOSE BLDC GLUCOMTR-MCNC: 177 MG/DL — HIGH (ref 70–99)

## 2023-01-15 PROCEDURE — 99233 SBSQ HOSP IP/OBS HIGH 50: CPT

## 2023-01-15 PROCEDURE — 99231 SBSQ HOSP IP/OBS SF/LOW 25: CPT

## 2023-01-15 RX ADMIN — Medication 81 MILLIGRAM(S): at 11:13

## 2023-01-15 RX ADMIN — INSULIN GLARGINE 25 UNIT(S): 100 INJECTION, SOLUTION SUBCUTANEOUS at 09:26

## 2023-01-15 RX ADMIN — Medication 2 MILLIGRAM(S): at 21:54

## 2023-01-15 RX ADMIN — CHLORHEXIDINE GLUCONATE 15 MILLILITER(S): 213 SOLUTION TOPICAL at 18:12

## 2023-01-15 RX ADMIN — Medication 5 MILLIGRAM(S): at 21:55

## 2023-01-15 RX ADMIN — CHLORHEXIDINE GLUCONATE 15 MILLILITER(S): 213 SOLUTION TOPICAL at 05:36

## 2023-01-15 RX ADMIN — APIXABAN 5 MILLIGRAM(S): 2.5 TABLET, FILM COATED ORAL at 18:06

## 2023-01-15 RX ADMIN — OLANZAPINE 5 MILLIGRAM(S): 15 TABLET, FILM COATED ORAL at 21:54

## 2023-01-15 RX ADMIN — Medication 2: at 11:11

## 2023-01-15 RX ADMIN — PANTOPRAZOLE SODIUM 40 MILLIGRAM(S): 20 TABLET, DELAYED RELEASE ORAL at 11:17

## 2023-01-15 RX ADMIN — Medication 2 MILLIGRAM(S): at 13:41

## 2023-01-15 RX ADMIN — CHLORHEXIDINE GLUCONATE 1 APPLICATION(S): 213 SOLUTION TOPICAL at 11:10

## 2023-01-15 RX ADMIN — Medication 8 UNIT(S): at 05:36

## 2023-01-15 RX ADMIN — ATORVASTATIN CALCIUM 80 MILLIGRAM(S): 80 TABLET, FILM COATED ORAL at 21:54

## 2023-01-15 RX ADMIN — Medication 50 MILLIGRAM(S): at 21:54

## 2023-01-15 RX ADMIN — Medication 8 UNIT(S): at 18:12

## 2023-01-15 RX ADMIN — Medication 8 UNIT(S): at 11:11

## 2023-01-15 RX ADMIN — APIXABAN 5 MILLIGRAM(S): 2.5 TABLET, FILM COATED ORAL at 05:35

## 2023-01-15 RX ADMIN — LOTEPREDNOL ETABONATE 1 DROP(S): 2 SUSPENSION/ DROPS OPHTHALMIC at 05:38

## 2023-01-15 RX ADMIN — LISINOPRIL 10 MILLIGRAM(S): 2.5 TABLET ORAL at 05:35

## 2023-01-15 RX ADMIN — Medication 8 UNIT(S): at 00:03

## 2023-01-15 RX ADMIN — Medication 2 MILLIGRAM(S): at 05:35

## 2023-01-15 RX ADMIN — Medication 1 DROP(S): at 05:36

## 2023-01-15 NOTE — PROGRESS NOTE ADULT - SUBJECTIVE AND OBJECTIVE BOX
Subjective - patient seen and evaluated bedside. Sitting comfortably in bed. Follows basic commands. Unable to participate in HPI or ROS due to clinical condition.     Brief summary:  77yMale with resp failure s/p trach/PEG    Significant/[id number] events: none      PAST MEDICAL & SURGICAL HISTORY:  HTN (hypertension)      CAD (coronary artery disease)      DM (diabetes mellitus)            acetaminophen     Tablet .. 650 milliGRAM(s) Oral every 6 hours PRN  albuterol/ipratropium for Nebulization 3 milliLiter(s) Nebulizer every 6 hours PRN  aluminum hydroxide/magnesium hydroxide/simethicone Suspension 30 milliLiter(s) Oral every 4 hours PRN  apixaban 5 milliGRAM(s) Enteral Tube every 12 hours  aspirin  chewable 81 milliGRAM(s) Oral daily  atenolol  Tablet 50 milliGRAM(s) Oral daily  atorvastatin 80 milliGRAM(s) Oral at bedtime  chlorhexidine 0.12% Liquid 15 milliLiter(s) Oral Mucosa every 12 hours  chlorhexidine 2% Cloths 1 Application(s) Topical daily  coronavirus bivalent (EUA) Booster Vaccine (PFIZER) 0.3 milliLiter(s) IntraMuscular once  dextrose 5%. 1000 milliLiter(s) IV Continuous <Continuous>  dextrose 5%. 1000 milliLiter(s) IV Continuous <Continuous>  dextrose 50% Injectable 25 Gram(s) IV Push once  dextrose 50% Injectable 12.5 Gram(s) IV Push once  dextrose 50% Injectable 25 Gram(s) IV Push once  dextrose Oral Gel 15 Gram(s) Oral once PRN  doxazosin 2 milliGRAM(s) Oral at bedtime  glucagon  Injectable 1 milliGRAM(s) IntraMuscular once  hydrALAZINE Injectable 10 milliGRAM(s) IV Push every 2 hours PRN  influenza  Vaccine (HIGH DOSE) 0.7 milliLiter(s) IntraMuscular once  insulin glargine Injectable (LANTUS) 25 Unit(s) SubCutaneous every morning  insulin lispro (ADMELOG) corrective regimen sliding scale   SubCutaneous three times a day before meals  insulin lispro Injectable (ADMELOG) 8 Unit(s) SubCutaneous every 6 hours  labetalol Injectable 10 milliGRAM(s) IV Push every 2 hours PRN  lisinopril 10 milliGRAM(s) Oral daily  loperamide 2 milliGRAM(s) Oral three times a day  loteprednol 0.5% Ophthalmic Suspension 1 Drop(s) Right EYE daily  melatonin 5 milliGRAM(s) Oral at bedtime  OLANZapine 5 milliGRAM(s) Oral at bedtime  ondansetron Injectable 4 milliGRAM(s) IV Push every 8 hours PRN  pantoprazole   Suspension 40 milliGRAM(s) Oral daily  prednisoLONE acetate 1% Suspension 1 Drop(s) Left EYE daily  traZODone 50 milliGRAM(s) Oral at bedtime  MEDICATIONS  (PRN):  acetaminophen     Tablet .. 650 milliGRAM(s) Oral every 6 hours PRN Temp greater or equal to 38C (100.4F), Mild Pain (1 - 3)  albuterol/ipratropium for Nebulization 3 milliLiter(s) Nebulizer every 6 hours PRN Shortness of Breath and/or Wheezing  aluminum hydroxide/magnesium hydroxide/simethicone Suspension 30 milliLiter(s) Oral every 4 hours PRN Dyspepsia  dextrose Oral Gel 15 Gram(s) Oral once PRN Blood Glucose LESS THAN 70 milliGRAM(s)/deciliter  hydrALAZINE Injectable 10 milliGRAM(s) IV Push every 2 hours PRN SBP >160  labetalol Injectable 10 milliGRAM(s) IV Push every 2 hours PRN SBP >160  ondansetron Injectable 4 milliGRAM(s) IV Push every 8 hours PRN Nausea and/or Vomiting    Mode: AC/ CMV (Assist Control/ Continuous Mandatory Ventilation), RR (machine): 16, TV (machine): 400, FiO2: 40, PEEP: 5, MAP: 9, PIP: 27  Daily     Daily                               9.8    10.15 )-----------( 509      ( 14 Jan 2023 05:50 )             31.3   01-14    132<L>  |  98  |  44.9<H>  ----------------------------<  132<H>  5.6<H>   |  26.0  |  0.92    Ca    8.6      14 Jan 2023 05:50  Mg     2.2     01-14              Objective:  T(C): 36.8 (01-15-23 @ 12:00), Max: 36.8 (01-15-23 @ 12:00)  HR: 59 (01-15-23 @ 12:00) (55 - 72)  BP: 131/69 (01-15-23 @ 12:00) (131/69 - 155/80)  RR: 17 (01-15-23 @ 12:00) (17 - 18)  SpO2: 100% (01-15-23 @ 12:00) (97% - 100%)  Wt(kg): --CAPILLARY BLOOD GLUCOSE      POCT Blood Glucose.: 177 mg/dL (15 Lewis 2023 11:07)  POCT Blood Glucose.: 162 mg/dL (15 Lewis 2023 07:57)  POCT Blood Glucose.: 139 mg/dL (15 Lewis 2023 04:59)  POCT Blood Glucose.: 131 mg/dL (15 Lewis 2023 00:01)  POCT Blood Glucose.: 135 mg/dL (14 Jan 2023 16:47)  I&O's Summary      Physical Exam  General: NAD  Neuro: A+O x 3, non-focal, speech clear and intact  Pulm: CTA, equal bilaterally, +trach c/d/i  CV: RRR, +S1S2  Abd: soft, NT, ND, +PEG c.d.i  Ext: +DP Pulses b/l, no edema  Skin: Warm, dry, intact        Imaging:    < from: Xray Chest 1 View-PORTABLE IMMEDIATE (Xray Chest 1 View-PORTABLE IMMEDIATE .) (01.05.23 @ 15:19) >    INTERPRETATION:  AP semierect chest on January 5, 2023 at 2:24 PM.   Patient has fever.    Heart magnified by technique. A tracheostomy has replaced endotracheal   tube present on January 2. NG tube removed.    On January 2 there was a left base infiltrate which suggests some   improvement.    IMPRESSION: As above.    < end of copied text >

## 2023-01-15 NOTE — PROGRESS NOTE ADULT - ASSESSMENT
77M, PMH HTN, DM, CAD.  Transferred from [place] 12/26 with an acute L pontine CVA, possible L vert dissection. Worsening neuro exam 12/27 - RUE plegia, worsening bulbar dysfunction, Acute resp failure due to neurologic injury, required intubation 12/27 for airway protection now s/p trach/peg 1/3, surgicel d/c 1/4, sutures removed 1/10.

## 2023-01-15 NOTE — PROGRESS NOTE ADULT - SUBJECTIVE AND OBJECTIVE BOX
Patient is a 77y old  Male who presents with a chief complaint of Acute stroke (13 Jan 2023 20:12)      INTERVAL HPI/OVERNIGHT EVENTS: pt seen and examined. Wife at bedside. Today he is awake and following all commands.   Denies any pain or discomfort   On trial of CPAP       MEDICATIONS  (STANDING):  apixaban 5 milliGRAM(s) Enteral Tube every 12 hours  aspirin  chewable 81 milliGRAM(s) Oral daily  atenolol  Tablet 50 milliGRAM(s) Oral daily  atorvastatin 80 milliGRAM(s) Oral at bedtime  cefepime  Injectable.      cefepime  Injectable. 2000 milliGRAM(s) IV Push every 8 hours  chlorhexidine 0.12% Liquid 15 milliLiter(s) Oral Mucosa every 12 hours  chlorhexidine 2% Cloths 1 Application(s) Topical daily  coronavirus bivalent (EUA) Booster Vaccine (PFIZER) 0.3 milliLiter(s) IntraMuscular once  dextrose 5%. 1000 milliLiter(s) (100 mL/Hr) IV Continuous <Continuous>  dextrose 5%. 1000 milliLiter(s) (50 mL/Hr) IV Continuous <Continuous>  dextrose 50% Injectable 25 Gram(s) IV Push once  dextrose 50% Injectable 12.5 Gram(s) IV Push once  dextrose 50% Injectable 25 Gram(s) IV Push once  doxazosin 2 milliGRAM(s) Oral at bedtime  glucagon  Injectable 1 milliGRAM(s) IntraMuscular once  influenza  Vaccine (HIGH DOSE) 0.7 milliLiter(s) IntraMuscular once  insulin glargine Injectable (LANTUS) 25 Unit(s) SubCutaneous every morning  insulin lispro (ADMELOG) corrective regimen sliding scale   SubCutaneous three times a day before meals  insulin lispro Injectable (ADMELOG) 8 Unit(s) SubCutaneous every 6 hours  lisinopril 10 milliGRAM(s) Oral daily  loperamide 2 milliGRAM(s) Oral three times a day  loteprednol 0.5% Ophthalmic Suspension 1 Drop(s) Right EYE daily  melatonin 5 milliGRAM(s) Oral at bedtime  OLANZapine 5 milliGRAM(s) Oral at bedtime  pantoprazole   Suspension 40 milliGRAM(s) Oral daily  prednisoLONE acetate 1% Suspension 1 Drop(s) Left EYE daily  traZODone 50 milliGRAM(s) Oral at bedtime    MEDICATIONS  (PRN):  acetaminophen     Tablet .. 650 milliGRAM(s) Oral every 6 hours PRN Temp greater or equal to 38C (100.4F), Mild Pain (1 - 3)  albuterol/ipratropium for Nebulization 3 milliLiter(s) Nebulizer every 6 hours PRN Shortness of Breath and/or Wheezing  aluminum hydroxide/magnesium hydroxide/simethicone Suspension 30 milliLiter(s) Oral every 4 hours PRN Dyspepsia  dextrose Oral Gel 15 Gram(s) Oral once PRN Blood Glucose LESS THAN 70 milliGRAM(s)/deciliter  hydrALAZINE Injectable 10 milliGRAM(s) IV Push every 2 hours PRN SBP >160  labetalol Injectable 10 milliGRAM(s) IV Push every 2 hours PRN SBP >160  ondansetron Injectable 4 milliGRAM(s) IV Push every 8 hours PRN Nausea and/or Vomiting      Allergies    iodine (Anaphylaxis (Mod to Severe))    Intolerances        REVIEW OF SYSTEMS: not able to obtain       Vital Signs Last 24 Hrs  T(C): 36.8 (14 Jan 2023 11:00), Max: 36.8 (14 Jan 2023 04:29)  T(F): 98.2 (14 Jan 2023 11:00), Max: 98.2 (14 Jan 2023 04:29)  HR: 50 (14 Jan 2023 11:00) (50 - 65)  BP: 151/71 (14 Jan 2023 11:00) (115/68 - 151/71)  BP(mean): --  RR: 17 (14 Jan 2023 11:00) (17 - 18)  SpO2: 100% (14 Jan 2023 11:00) (94% - 100%)    Parameters below as of 14 Jan 2023 11:00  Patient On (Oxygen Delivery Method): ventilator        PHYSICAL EXAM:  GENERAL: awake, following commands   HEAD:  Atraumatic, Normocephalic  EYES: EOMI, PERRLA, conjunctiva and sclera clear  NECK: Supple, No JVD, trached   NERVOUS SYSTEM:  awake, right sided weakness    CHEST/LUNG: Clear to percussion bilaterally; No rales, rhonchi, wheezing, or rubs  HEART: Regular rate and rhythm; No murmurs, rubs, or gallops  ABDOMEN: Soft, Nontender, Nondistended; Bowel sounds present, PEG tube   EXTREMITIES:  No clubbing, cyanosis, or edema      LABS:                        9.8    10.15 )-----------( 509      ( 14 Jan 2023 05:50 )             31.3     01-14    132<L>  |  98  |  44.9<H>  ----------------------------<  132<H>  5.6<H>   |  26.0  |  0.92    Ca    8.6      14 Jan 2023 05:50  Phos  3.7     01-13  Mg     2.2     01-14          CAPILLARY BLOOD GLUCOSE      POCT Blood Glucose.: 150 mg/dL (14 Jan 2023 11:39)  POCT Blood Glucose.: 111 mg/dL (14 Jan 2023 08:10)  POCT Blood Glucose.: 148 mg/dL (14 Jan 2023 05:43)  POCT Blood Glucose.: 123 mg/dL (14 Jan 2023 00:49)  POCT Blood Glucose.: 132 mg/dL (13 Jan 2023 18:48)      RADIOLOGY & ADDITIONAL TESTS:    Imaging Personally Reviewed:  [ x] YES  [ ] NO    Consultant(s) Notes Reviewed:  [ x] YES  [ ] NO    Care Discussed with Consultants/Other Providers [ ] YES  [ ] NO    Plan of Care discussed with Housestaff [x ]YES [ ] NO

## 2023-01-15 NOTE — PROGRESS NOTE ADULT - ASSESSMENT
76 y/o M w/ PMH of CAD, DM2, HTN, transferred from [facility] for CVA after presenting for Rt-sided weakness, difficulty swallowing and slurred speech.  Pt was out of window for TPA.  CTH was (-) but MRI brain was significant for an acute L-debby infarct and MRA was significant for L-vertebral artery stenosis vs dissection vs thrombus.  Pt was admitted to neuroICU.  He was intubated 12/28 for air way protection.  He eventually required trach/peg'd 01/03.  Pts hospital course complicated by LLL PNA w/ Scxs growing MSSA 12/30 and morganella 01/05.  Neurology and neurosurgery following.          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   - c/w ASA and atorvastatin    -c/w Eliquis for thrombosis   On trial of CPAP    - c/w daily spontaneous breathing trials   - s/p suture removal 1/10 by CT surgery  - Pulmonary following and recs noted   - Neurology and nurosurg following and recs noted       VAP  - Scxs from 12/30 growing MSSA   - Scxs from 01/05 growing Morganella Morgani  - Leukocytosis persists but no L-shift, remains afebrile and nontoxic appearing   Completed course of Cefepime through 01/14      Normocytic anemia   - Hb 14 on admission   - H/h remains low but stable   - Pt is on AC for suspected vertebral artery thombus  - Trend CBC and transfuse as needed       PEG dependence  - c/w tube feeds as tolerated   - Diarrhea likely from tube feeds   - Cdiff (-)  - Added banatrol but may need TF changed   - Nutrition following       DM II   - A1c 8  - On basal/bolus insulin regimen   - Titrate insulin to goal BG<180  - ISS and hypoglycemic protocol in place       VTE ppx: on eliquis     Dispo:  Will eventually need vent facility.    Plan discussed with patients wife at bedside

## 2023-01-16 LAB
ANION GAP SERPL CALC-SCNC: 8 MMOL/L — SIGNIFICANT CHANGE UP (ref 5–17)
BASOPHILS # BLD AUTO: 0.06 K/UL — SIGNIFICANT CHANGE UP (ref 0–0.2)
BASOPHILS NFR BLD AUTO: 0.6 % — SIGNIFICANT CHANGE UP (ref 0–2)
BUN SERPL-MCNC: 39.9 MG/DL — HIGH (ref 8–20)
CALCIUM SERPL-MCNC: 8.6 MG/DL — SIGNIFICANT CHANGE UP (ref 8.4–10.5)
CHLORIDE SERPL-SCNC: 96 MMOL/L — SIGNIFICANT CHANGE UP (ref 96–108)
CO2 SERPL-SCNC: 29 MMOL/L — SIGNIFICANT CHANGE UP (ref 22–29)
CREAT SERPL-MCNC: 0.88 MG/DL — SIGNIFICANT CHANGE UP (ref 0.5–1.3)
EGFR: 89 ML/MIN/1.73M2 — SIGNIFICANT CHANGE UP
EOSINOPHIL # BLD AUTO: 0.59 K/UL — HIGH (ref 0–0.5)
EOSINOPHIL NFR BLD AUTO: 6.1 % — HIGH (ref 0–6)
GLUCOSE BLDC GLUCOMTR-MCNC: 101 MG/DL — HIGH (ref 70–99)
GLUCOSE BLDC GLUCOMTR-MCNC: 109 MG/DL — HIGH (ref 70–99)
GLUCOSE BLDC GLUCOMTR-MCNC: 112 MG/DL — HIGH (ref 70–99)
GLUCOSE BLDC GLUCOMTR-MCNC: 113 MG/DL — HIGH (ref 70–99)
GLUCOSE BLDC GLUCOMTR-MCNC: 75 MG/DL — SIGNIFICANT CHANGE UP (ref 70–99)
GLUCOSE SERPL-MCNC: 101 MG/DL — HIGH (ref 70–99)
HCT VFR BLD CALC: 31.1 % — LOW (ref 39–50)
HGB BLD-MCNC: 9.7 G/DL — LOW (ref 13–17)
IMM GRANULOCYTES NFR BLD AUTO: 0.7 % — SIGNIFICANT CHANGE UP (ref 0–0.9)
LYMPHOCYTES # BLD AUTO: 1.61 K/UL — SIGNIFICANT CHANGE UP (ref 1–3.3)
LYMPHOCYTES # BLD AUTO: 16.5 % — SIGNIFICANT CHANGE UP (ref 13–44)
MAGNESIUM SERPL-MCNC: 2.1 MG/DL — SIGNIFICANT CHANGE UP (ref 1.6–2.6)
MCHC RBC-ENTMCNC: 28.1 PG — SIGNIFICANT CHANGE UP (ref 27–34)
MCHC RBC-ENTMCNC: 31.2 GM/DL — LOW (ref 32–36)
MCV RBC AUTO: 90.1 FL — SIGNIFICANT CHANGE UP (ref 80–100)
MONOCYTES # BLD AUTO: 0.99 K/UL — HIGH (ref 0–0.9)
MONOCYTES NFR BLD AUTO: 10.2 % — SIGNIFICANT CHANGE UP (ref 2–14)
NEUTROPHILS # BLD AUTO: 6.43 K/UL — SIGNIFICANT CHANGE UP (ref 1.8–7.4)
NEUTROPHILS NFR BLD AUTO: 65.9 % — SIGNIFICANT CHANGE UP (ref 43–77)
PLATELET # BLD AUTO: 515 K/UL — HIGH (ref 150–400)
POTASSIUM SERPL-MCNC: 5 MMOL/L — SIGNIFICANT CHANGE UP (ref 3.5–5.3)
POTASSIUM SERPL-SCNC: 5 MMOL/L — SIGNIFICANT CHANGE UP (ref 3.5–5.3)
RBC # BLD: 3.45 M/UL — LOW (ref 4.2–5.8)
RBC # FLD: 12.8 % — SIGNIFICANT CHANGE UP (ref 10.3–14.5)
SODIUM SERPL-SCNC: 133 MMOL/L — LOW (ref 135–145)
WBC # BLD: 9.75 K/UL — SIGNIFICANT CHANGE UP (ref 3.8–10.5)
WBC # FLD AUTO: 9.75 K/UL — SIGNIFICANT CHANGE UP (ref 3.8–10.5)

## 2023-01-16 PROCEDURE — 99233 SBSQ HOSP IP/OBS HIGH 50: CPT

## 2023-01-16 PROCEDURE — 99231 SBSQ HOSP IP/OBS SF/LOW 25: CPT

## 2023-01-16 RX ADMIN — Medication 2 MILLIGRAM(S): at 07:04

## 2023-01-16 RX ADMIN — Medication 2 MILLIGRAM(S): at 21:47

## 2023-01-16 RX ADMIN — ATENOLOL 50 MILLIGRAM(S): 25 TABLET ORAL at 05:32

## 2023-01-16 RX ADMIN — CHLORHEXIDINE GLUCONATE 1 APPLICATION(S): 213 SOLUTION TOPICAL at 11:36

## 2023-01-16 RX ADMIN — INSULIN GLARGINE 25 UNIT(S): 100 INJECTION, SOLUTION SUBCUTANEOUS at 08:17

## 2023-01-16 RX ADMIN — PANTOPRAZOLE SODIUM 40 MILLIGRAM(S): 20 TABLET, DELAYED RELEASE ORAL at 11:35

## 2023-01-16 RX ADMIN — CHLORHEXIDINE GLUCONATE 15 MILLILITER(S): 213 SOLUTION TOPICAL at 04:36

## 2023-01-16 RX ADMIN — LISINOPRIL 10 MILLIGRAM(S): 2.5 TABLET ORAL at 05:32

## 2023-01-16 RX ADMIN — Medication 81 MILLIGRAM(S): at 11:36

## 2023-01-16 RX ADMIN — Medication 2 MILLIGRAM(S): at 11:35

## 2023-01-16 RX ADMIN — Medication 8 UNIT(S): at 05:31

## 2023-01-16 RX ADMIN — Medication 1 DROP(S): at 05:33

## 2023-01-16 RX ADMIN — LOTEPREDNOL ETABONATE 1 DROP(S): 2 SUSPENSION/ DROPS OPHTHALMIC at 07:05

## 2023-01-16 RX ADMIN — CHLORHEXIDINE GLUCONATE 15 MILLILITER(S): 213 SOLUTION TOPICAL at 17:15

## 2023-01-16 RX ADMIN — APIXABAN 5 MILLIGRAM(S): 2.5 TABLET, FILM COATED ORAL at 17:15

## 2023-01-16 RX ADMIN — OLANZAPINE 5 MILLIGRAM(S): 15 TABLET, FILM COATED ORAL at 21:47

## 2023-01-16 RX ADMIN — Medication 50 MILLIGRAM(S): at 21:47

## 2023-01-16 RX ADMIN — APIXABAN 5 MILLIGRAM(S): 2.5 TABLET, FILM COATED ORAL at 05:32

## 2023-01-16 RX ADMIN — Medication 5 MILLIGRAM(S): at 21:47

## 2023-01-16 RX ADMIN — ATORVASTATIN CALCIUM 80 MILLIGRAM(S): 80 TABLET, FILM COATED ORAL at 21:47

## 2023-01-16 RX ADMIN — Medication 8 UNIT(S): at 11:04

## 2023-01-16 RX ADMIN — Medication 8 UNIT(S): at 00:52

## 2023-01-16 NOTE — PROGRESS NOTE ADULT - SUBJECTIVE AND OBJECTIVE BOX
Patient is a 77y old  Male who presents with a chief complaint of Acute stroke (13 Jan 2023 20:12)      INTERVAL HPI/OVERNIGHT EVENTS: pt seen and examined. Wife at bedside. Denies any pain or discomfort   On trial of CPAP       MEDICATIONS  (STANDING):  apixaban 5 milliGRAM(s) Enteral Tube every 12 hours  aspirin  chewable 81 milliGRAM(s) Oral daily  atenolol  Tablet 50 milliGRAM(s) Oral daily  atorvastatin 80 milliGRAM(s) Oral at bedtime  cefepime  Injectable.      cefepime  Injectable. 2000 milliGRAM(s) IV Push every 8 hours  chlorhexidine 0.12% Liquid 15 milliLiter(s) Oral Mucosa every 12 hours  chlorhexidine 2% Cloths 1 Application(s) Topical daily  coronavirus bivalent (EUA) Booster Vaccine (PFIZER) 0.3 milliLiter(s) IntraMuscular once  dextrose 5%. 1000 milliLiter(s) (100 mL/Hr) IV Continuous <Continuous>  dextrose 5%. 1000 milliLiter(s) (50 mL/Hr) IV Continuous <Continuous>  dextrose 50% Injectable 25 Gram(s) IV Push once  dextrose 50% Injectable 12.5 Gram(s) IV Push once  dextrose 50% Injectable 25 Gram(s) IV Push once  doxazosin 2 milliGRAM(s) Oral at bedtime  glucagon  Injectable 1 milliGRAM(s) IntraMuscular once  influenza  Vaccine (HIGH DOSE) 0.7 milliLiter(s) IntraMuscular once  insulin glargine Injectable (LANTUS) 25 Unit(s) SubCutaneous every morning  insulin lispro (ADMELOG) corrective regimen sliding scale   SubCutaneous three times a day before meals  insulin lispro Injectable (ADMELOG) 8 Unit(s) SubCutaneous every 6 hours  lisinopril 10 milliGRAM(s) Oral daily  loperamide 2 milliGRAM(s) Oral three times a day  loteprednol 0.5% Ophthalmic Suspension 1 Drop(s) Right EYE daily  melatonin 5 milliGRAM(s) Oral at bedtime  OLANZapine 5 milliGRAM(s) Oral at bedtime  pantoprazole   Suspension 40 milliGRAM(s) Oral daily  prednisoLONE acetate 1% Suspension 1 Drop(s) Left EYE daily  traZODone 50 milliGRAM(s) Oral at bedtime    MEDICATIONS  (PRN):  acetaminophen     Tablet .. 650 milliGRAM(s) Oral every 6 hours PRN Temp greater or equal to 38C (100.4F), Mild Pain (1 - 3)  albuterol/ipratropium for Nebulization 3 milliLiter(s) Nebulizer every 6 hours PRN Shortness of Breath and/or Wheezing  aluminum hydroxide/magnesium hydroxide/simethicone Suspension 30 milliLiter(s) Oral every 4 hours PRN Dyspepsia  dextrose Oral Gel 15 Gram(s) Oral once PRN Blood Glucose LESS THAN 70 milliGRAM(s)/deciliter  hydrALAZINE Injectable 10 milliGRAM(s) IV Push every 2 hours PRN SBP >160  labetalol Injectable 10 milliGRAM(s) IV Push every 2 hours PRN SBP >160  ondansetron Injectable 4 milliGRAM(s) IV Push every 8 hours PRN Nausea and/or Vomiting      Allergies    iodine (Anaphylaxis (Mod to Severe))    Intolerances        REVIEW OF SYSTEMS: not able to obtain       Vital Signs Last 24 Hrs  T(C): 36.8 (14 Jan 2023 11:00), Max: 36.8 (14 Jan 2023 04:29)  T(F): 98.2 (14 Jan 2023 11:00), Max: 98.2 (14 Jan 2023 04:29)  HR: 50 (14 Jan 2023 11:00) (50 - 65)  BP: 151/71 (14 Jan 2023 11:00) (115/68 - 151/71)  BP(mean): --  RR: 17 (14 Jan 2023 11:00) (17 - 18)  SpO2: 100% (14 Jan 2023 11:00) (94% - 100%)    Parameters below as of 14 Jan 2023 11:00  Patient On (Oxygen Delivery Method): ventilator        PHYSICAL EXAM:  GENERAL: awake, following commands   HEAD:  Atraumatic, Normocephalic  EYES: EOMI, PERRLA, conjunctiva and sclera clear  NECK: Supple, No JVD, trached   NERVOUS SYSTEM:  awake, right sided weakness    CHEST/LUNG: Clear to percussion bilaterally; No rales, rhonchi, wheezing, or rubs  HEART: Regular rate and rhythm; No murmurs, rubs, or gallops  ABDOMEN: Soft, Nontender, Nondistended; Bowel sounds present, PEG tube   EXTREMITIES:  No clubbing, cyanosis, or edema      LABS:                        9.8    10.15 )-----------( 509      ( 14 Jan 2023 05:50 )             31.3     01-14    132<L>  |  98  |  44.9<H>  ----------------------------<  132<H>  5.6<H>   |  26.0  |  0.92    Ca    8.6      14 Jan 2023 05:50  Phos  3.7     01-13  Mg     2.2     01-14          CAPILLARY BLOOD GLUCOSE      POCT Blood Glucose.: 150 mg/dL (14 Jan 2023 11:39)  POCT Blood Glucose.: 111 mg/dL (14 Jan 2023 08:10)  POCT Blood Glucose.: 148 mg/dL (14 Jan 2023 05:43)  POCT Blood Glucose.: 123 mg/dL (14 Jan 2023 00:49)  POCT Blood Glucose.: 132 mg/dL (13 Jan 2023 18:48)      RADIOLOGY & ADDITIONAL TESTS:    Imaging Personally Reviewed:  [ x] YES  [ ] NO    Consultant(s) Notes Reviewed:  [ x] YES  [ ] NO    Care Discussed with Consultants/Other Providers [ ] YES  [ ] NO    Plan of Care discussed with Housestaff [x ]YES [ ] NO

## 2023-01-16 NOTE — PROGRESS NOTE ADULT - ASSESSMENT
77M, PMH HTN, DM, CAD.  Transferred from [place] 12/26 with an acute L pontine CVA, possible L vert dissection. Worsening neuro exam 12/27 - RUE plegia, worsening bulbar dysfunction, Acute resp failure due to neurologic injury, required intubation 12/27 for airway protection now s/p trach/peg 1/3, surgicel d/c 1/4, sutures removed 1/10.

## 2023-01-16 NOTE — PROGRESS NOTE ADULT - ASSESSMENT
76 y/o M w/ PMH of CAD, DM2, HTN, transferred from [facility] for CVA after presenting for Rt-sided weakness, difficulty swallowing and slurred speech.  Pt was out of window for TPA.  CTH was (-) but MRI brain was significant for an acute L-debby infarct and MRA was significant for L-vertebral artery stenosis vs dissection vs thrombus.  Pt was admitted to neuroICU.  He was intubated 12/28 for air way protection.  He eventually required trach/peg'd 01/03.  Pts hospital course complicated by LLL PNA w/ Scxs growing MSSA 12/30 and morganella 01/05.  Neurology and neurosurgery following.          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   - c/w ASA and atorvastatin    -c/w Eliquis for thrombosis   On trial of CPAP    - c/w daily spontaneous breathing trials   - s/p suture removal 1/10 by CT surgery  - Pulmonary following and recs noted   - Neurology and nurosurg following and recs noted       VAP  - Scxs from 12/30 growing MSSA   - Scxs from 01/05 growing Morganella Morgani  - Leukocytosis persists but no L-shift, remains afebrile and nontoxic appearing   Completed course of Cefepime through 01/14      Normocytic anemia   - Hb 14 on admission   - H/h remains low but stable   - Pt is on AC for suspected vertebral artery thombus  - Trend CBC and transfuse as needed       PEG dependence  - c/w tube feeds as tolerated   - Diarrhea likely from tube feeds   - Cdiff (-)  - Added banatrol but may need TF changed   - Nutrition following       DM II   - A1c 8  - On basal/bolus insulin regimen   - Titrate insulin to goal BG<180  - ISS and hypoglycemic protocol in place       VTE ppx: on eliquis     Dispo: Plan discussed with patients wife at bedside

## 2023-01-16 NOTE — PROGRESS NOTE ADULT - SUBJECTIVE AND OBJECTIVE BOX
Subjective - patient seen and evaluated bedside. Unable to fully participate in HPI or ROS due to clinical condition.     Brief summary:  77yMale s/p trach/PEG    Significant/[id number] events: none      PAST MEDICAL & SURGICAL HISTORY:  HTN (hypertension)      CAD (coronary artery disease)      DM (diabetes mellitus)            acetaminophen     Tablet .. 650 milliGRAM(s) Oral every 6 hours PRN  albuterol/ipratropium for Nebulization 3 milliLiter(s) Nebulizer every 6 hours PRN  aluminum hydroxide/magnesium hydroxide/simethicone Suspension 30 milliLiter(s) Oral every 4 hours PRN  apixaban 5 milliGRAM(s) Enteral Tube every 12 hours  aspirin  chewable 81 milliGRAM(s) Oral daily  atenolol  Tablet 50 milliGRAM(s) Oral daily  atorvastatin 80 milliGRAM(s) Oral at bedtime  chlorhexidine 0.12% Liquid 15 milliLiter(s) Oral Mucosa every 12 hours  chlorhexidine 2% Cloths 1 Application(s) Topical daily  coronavirus bivalent (EUA) Booster Vaccine (PFIZER) 0.3 milliLiter(s) IntraMuscular once  dextrose 5%. 1000 milliLiter(s) IV Continuous <Continuous>  dextrose 5%. 1000 milliLiter(s) IV Continuous <Continuous>  dextrose 50% Injectable 25 Gram(s) IV Push once  dextrose 50% Injectable 12.5 Gram(s) IV Push once  dextrose 50% Injectable 25 Gram(s) IV Push once  dextrose Oral Gel 15 Gram(s) Oral once PRN  doxazosin 2 milliGRAM(s) Oral at bedtime  glucagon  Injectable 1 milliGRAM(s) IntraMuscular once  hydrALAZINE Injectable 10 milliGRAM(s) IV Push every 2 hours PRN  influenza  Vaccine (HIGH DOSE) 0.7 milliLiter(s) IntraMuscular once  insulin glargine Injectable (LANTUS) 25 Unit(s) SubCutaneous every morning  insulin lispro (ADMELOG) corrective regimen sliding scale   SubCutaneous three times a day before meals  insulin lispro Injectable (ADMELOG) 8 Unit(s) SubCutaneous every 6 hours  labetalol Injectable 10 milliGRAM(s) IV Push every 2 hours PRN  lisinopril 10 milliGRAM(s) Oral daily  loperamide 2 milliGRAM(s) Oral three times a day  loteprednol 0.5% Ophthalmic Suspension 1 Drop(s) Right EYE daily  melatonin 5 milliGRAM(s) Oral at bedtime  OLANZapine 5 milliGRAM(s) Oral at bedtime  ondansetron Injectable 4 milliGRAM(s) IV Push every 8 hours PRN  pantoprazole   Suspension 40 milliGRAM(s) Oral daily  prednisoLONE acetate 1% Suspension 1 Drop(s) Left EYE daily  traZODone 50 milliGRAM(s) Oral at bedtime  MEDICATIONS  (PRN):  acetaminophen     Tablet .. 650 milliGRAM(s) Oral every 6 hours PRN Temp greater or equal to 38C (100.4F), Mild Pain (1 - 3)  albuterol/ipratropium for Nebulization 3 milliLiter(s) Nebulizer every 6 hours PRN Shortness of Breath and/or Wheezing  aluminum hydroxide/magnesium hydroxide/simethicone Suspension 30 milliLiter(s) Oral every 4 hours PRN Dyspepsia  dextrose Oral Gel 15 Gram(s) Oral once PRN Blood Glucose LESS THAN 70 milliGRAM(s)/deciliter  hydrALAZINE Injectable 10 milliGRAM(s) IV Push every 2 hours PRN SBP >160  labetalol Injectable 10 milliGRAM(s) IV Push every 2 hours PRN SBP >160  ondansetron Injectable 4 milliGRAM(s) IV Push every 8 hours PRN Nausea and/or Vomiting    Mode: CPAP with PS, FiO2: 40, PEEP: 5, PS: 10, MAP: 9  Daily     Daily                               9.7    9.75  )-----------( 515      ( 16 Jan 2023 06:05 )             31.1   01-16    133<L>  |  96  |  39.9<H>  ----------------------------<  101<H>  5.0   |  29.0  |  0.88    Ca    8.6      16 Jan 2023 06:05  Mg     2.1     01-16              Objective:  T(C): 36.6 (01-16-23 @ 11:00), Max: 36.8 (01-15-23 @ 12:00)  HR: 55 (01-16-23 @ 11:00) (55 - 80)  BP: 144/75 (01-16-23 @ 11:00) (123/64 - 144/75)  RR: 18 (01-16-23 @ 04:17) (17 - 18)  SpO2: 99% (01-16-23 @ 11:00) (94% - 100%)  Wt(kg): --CAPILLARY BLOOD GLUCOSE      POCT Blood Glucose.: 109 mg/dL (16 Jan 2023 10:52)  POCT Blood Glucose.: 101 mg/dL (16 Jan 2023 05:31)  POCT Blood Glucose.: 113 mg/dL (16 Jan 2023 00:10)  POCT Blood Glucose.: 125 mg/dL (15 Lewis 2023 18:10)  POCT Blood Glucose.: 120 mg/dL (15 Lewis 2023 16:46)  I&O's Summary    15 Lewis 2023 07:01  -  16 Jan 2023 07:00  --------------------------------------------------------  IN: 1750 mL / OUT: 0 mL / NET: 1750 mL        Physical Exam  General: NAD  Neuro: Awake, following commands  HEENT:  NCAT, No conjuctival edema or icterus, no thrush.  Pulm: CTA, equal bilaterally +trach c/d/i  CV: RRR, , +S1S2  Abd: soft, NT, ND, +BS + PEG c/d/i  Ext: +DP Pulses b/l, no edema  Skin: Warm, dry, intact          Imaging:    < from: Xray Chest 1 View-PORTABLE IMMEDIATE (Xray Chest 1 View-PORTABLE IMMEDIATE .) (01.05.23 @ 15:19) >    INTERPRETATION:  AP semierect chest on January 5, 2023 at 2:24 PM.   Patient has fever.    Heart magnified by technique. A tracheostomy has replaced endotracheal   tube present on January 2. NG tube removed.    On January 2 there was a left base infiltrate which suggests some   improvement.    IMPRESSION: As above.    --- End of Report ---    < end of copied text >

## 2023-01-17 ENCOUNTER — TRANSCRIPTION ENCOUNTER (OUTPATIENT)
Age: 78
End: 2023-01-17

## 2023-01-17 LAB
GLUCOSE BLDC GLUCOMTR-MCNC: 113 MG/DL — HIGH (ref 70–99)
GLUCOSE BLDC GLUCOMTR-MCNC: 121 MG/DL — HIGH (ref 70–99)
GLUCOSE BLDC GLUCOMTR-MCNC: 132 MG/DL — HIGH (ref 70–99)
GLUCOSE BLDC GLUCOMTR-MCNC: 98 MG/DL — SIGNIFICANT CHANGE UP (ref 70–99)
SARS-COV-2 RNA SPEC QL NAA+PROBE: DETECTED

## 2023-01-17 PROCEDURE — 99233 SBSQ HOSP IP/OBS HIGH 50: CPT

## 2023-01-17 PROCEDURE — 99231 SBSQ HOSP IP/OBS SF/LOW 25: CPT

## 2023-01-17 RX ORDER — OLANZAPINE 15 MG/1
2.5 TABLET, FILM COATED ORAL ONCE
Refills: 0 | Status: COMPLETED | OUTPATIENT
Start: 2023-01-17 | End: 2023-01-17

## 2023-01-17 RX ORDER — LIDOCAINE 4 G/100G
1 CREAM TOPICAL EVERY 6 HOURS
Refills: 0 | Status: DISCONTINUED | OUTPATIENT
Start: 2023-01-17 | End: 2023-01-30

## 2023-01-17 RX ADMIN — LISINOPRIL 10 MILLIGRAM(S): 2.5 TABLET ORAL at 05:19

## 2023-01-17 RX ADMIN — ATORVASTATIN CALCIUM 80 MILLIGRAM(S): 80 TABLET, FILM COATED ORAL at 21:11

## 2023-01-17 RX ADMIN — Medication 1 DROP(S): at 05:18

## 2023-01-17 RX ADMIN — Medication 5 MILLIGRAM(S): at 21:11

## 2023-01-17 RX ADMIN — LOTEPREDNOL ETABONATE 1 DROP(S): 2 SUSPENSION/ DROPS OPHTHALMIC at 05:19

## 2023-01-17 RX ADMIN — Medication 2 MILLIGRAM(S): at 21:11

## 2023-01-17 RX ADMIN — Medication 8 UNIT(S): at 05:20

## 2023-01-17 RX ADMIN — INSULIN GLARGINE 25 UNIT(S): 100 INJECTION, SOLUTION SUBCUTANEOUS at 08:10

## 2023-01-17 RX ADMIN — ATENOLOL 50 MILLIGRAM(S): 25 TABLET ORAL at 05:19

## 2023-01-17 RX ADMIN — OLANZAPINE 2.5 MILLIGRAM(S): 15 TABLET, FILM COATED ORAL at 18:11

## 2023-01-17 RX ADMIN — Medication 2 MILLIGRAM(S): at 21:12

## 2023-01-17 RX ADMIN — OLANZAPINE 5 MILLIGRAM(S): 15 TABLET, FILM COATED ORAL at 21:11

## 2023-01-17 RX ADMIN — CHLORHEXIDINE GLUCONATE 15 MILLILITER(S): 213 SOLUTION TOPICAL at 05:19

## 2023-01-17 RX ADMIN — APIXABAN 5 MILLIGRAM(S): 2.5 TABLET, FILM COATED ORAL at 21:11

## 2023-01-17 RX ADMIN — APIXABAN 5 MILLIGRAM(S): 2.5 TABLET, FILM COATED ORAL at 05:19

## 2023-01-17 RX ADMIN — Medication 50 MILLIGRAM(S): at 21:12

## 2023-01-17 RX ADMIN — Medication 2 MILLIGRAM(S): at 05:18

## 2023-01-17 NOTE — DISCHARGE NOTE PROVIDER - INSTRUCTIONS
NPO with tube feeds via PEG  Glucerna 1.5 juve  24 hour total volume 1440 mL  Continuous starting feed rate 60 mL/hour  Goal rate 60 mL/hour  Duration 24  hours

## 2023-01-17 NOTE — DISCHARGE NOTE PROVIDER - HOSPITAL COURSE
77 year old male with PMHx of DM-2, HTN, CAD, who presented to [facility] with c/o right sided weakness, slurred speech, and dysphagia starting the day prior. Code stroke was called, outside the window for tPA. CTH negative for acute pathology, MRI Head/MRA with acute infarct with left debby, left vertebral artery segmental stenoses and T1 hyperintensity suggesting foci of dissection and/or thrombus. Transferred to [facility] for management of acute CVA with L. vertebral artery occlusion and admitted to NSICU. Patient was intubated on 12/28 for airway protection. During ICU stay noted to have increased secretions and fevers, unable to tolerate extubation or CPAP trials. CT surgery was consulted for trach/PEG which were placed on 1/2/2023. Course c/b pneumonia, sputum cultures positive for morganella morganii, initiated on Cefepime. Patient downgraded to medicine. Patient tolerated CPAP, daily spontaneous breathing trials. Completed course of IV ABX on 1/14. Remained afebrile and nontoxic. Noted to have normocytic anemia, H/H monitored closely and remained stable. During course patient developed diarrhea, likely secondary to tube feeds. CDiff returned negative, banatrol added. At this time, patient is medically stable for discharge to [facility] with close outpatient follow up. 76 y/o M w/ PMH of CAD, DM2, HTN, transferred from [facility] for CVA after presenting for Rt-sided weakness, difficulty swallowing and slurred speech.  Pt was out of window for TPA.  CTH was (-) but MRI brain was significant for an acute L-debby infarct and MRA was significant for L-vertebral artery stenosis vs dissection vs thrombus. Pt was evaluated by vascular neurologist, was advised to managed conservatively with asa, statins, eliquis for L vertebral artery dissection with thrombosis.  Hospital course c/b respiratory failure due to  LLL PNA w/ Scxs growing MSSA 12/30 and morganella 01/05 , requiring intubation  and eventually required trach/peg'd 01/03. Pt was transferred to hospital service on 01/07,  completed course of Cefepime through 01/14 and and currently tolerating cpap setting on vent. Trach site stiches was removed, and continue to received trach care with gauze/drain sponge between trach flange and skin to prevent skin breakdown and continue vent weaning. Pt also noted to have urinary retention, and failed several TOV, Dickson was replaced on 01/26. Pt also during hospital stay tested + for covid however remained with stable respiratory status, and now completed isolation period. In regards of DM, A1c 8, and pt euglycemic while on lantus 32, and MDSS, and current TF regiment ( Glucerna @ 60 cc/hr, totall 1440 ml, free water flashes 300 q6h).

## 2023-01-17 NOTE — DISCHARGE NOTE PROVIDER - NSDCFUADDAPPT_GEN_ALL_CORE_FT
Pt to be discharged to preferred [facility] facility, [facility].

## 2023-01-17 NOTE — DISCHARGE NOTE PROVIDER - PROVIDER TOKENS
PROVIDER:[TOKEN:[[id number]],FOLLOWUP:[1-3 days]],FREE:[LAST:[PCP],PHONE:[(   )    -],FAX:[(   )    -],FOLLOWUP:[1-3 days]],PROVIDER:[TOKEN:[[id number]],FOLLOWUP:[1-3 days]]

## 2023-01-17 NOTE — DISCHARGE NOTE PROVIDER - NSDCMRMEDTOKEN_GEN_ALL_CORE_FT
Aspirin Low Dose 81 mg oral tablet, chewable: 1 tab(s) orally once a day  atenolol 50 mg oral tablet: 1 tab(s) orally once a day  atorvastatin 40 mg oral tablet: 1 tab(s) orally once a day  Jardiance 25 mg oral tablet: 1 tab(s) orally once a day (in the morning)  lisinopril 10 mg oral tablet: 1 tab(s) orally once a day  metFORMIN 1000 mg oral tablet: 1 tab(s) orally 2 times a day  Plavix 75 mg oral tablet: 1 tab(s) orally once a day   apixaban 5 mg oral tablet: 1 tab(s) orally every 12 hours  Aspirin Low Dose 81 mg oral tablet, chewable: 1 tab(s) orally once a day  atenolol 50 mg oral tablet: 1 tab(s) orally once a day  atorvastatin 80 mg oral tablet: 1 tab(s) orally once a day (at bedtime)  doxazosin 2 mg oral tablet: 1 tab(s) orally once a day (at bedtime)  famotidine 40 mg/5 mL oral suspension: 5 milliliter(s) orally 2 times a day   insulin glargine 100 units/mL subcutaneous solution: 30 unit(s) subcutaneous once a day (at bedtime)  ipratropium-albuterol 0.5 mg-2.5 mg/3 mL inhalation solution: 3 milliliter(s) inhaled every 6 hours, As needed, Shortness of Breath and/or Wheezing  loteprednol 0.5% ophthalmic suspension: 1 drop(s) to each affected eye every 12 hours  loteprednol 0.5% ophthalmic suspension: 1 drop(s) to each affected eye once a day  melatonin 5 mg oral tablet: 1 tab(s) orally once a day (at bedtime)  prednisoLONE acetate 1% ophthalmic suspension: 1 drop(s) to each affected eye once a day  psyllium 3.4 g/7 g oral powder for reconstitution: 1 packet(s) by gastrostomy tube 2 times a day  traZODone 100 mg oral tablet: 1 tab(s) orally once a day (at bedtime)

## 2023-01-17 NOTE — PROGRESS NOTE ADULT - ASSESSMENT
77M, PMH HTN, DM, CAD.  Transferred from [place] 12/26 with an acute L pontine CVA, possible L vert dissection. Worsening neuro exam 12/27 - RUE plegia, worsening bulbar dysfunction, Acute resp failure due to neurologic injury, required intubation 12/27 for airway protection now s/p trach/peg 1/3, surgicel d/c 1/4, sutures removed 1/10.

## 2023-01-17 NOTE — CHART NOTE - NSCHARTNOTEFT_GEN_A_CORE
Alerted by RN patient w/ increased agitation, attempted to decannulate himself. Seen and assessed at bedside, patient keenly alert and responsive. Appears agitated, restless in bed. Using communication board is able to endorse hunger/thirst. Denies any pain, new weakness/numbness, or dyspnea.  Additionally denies any discomfort around trach. VSS, Glucose 113. On exam, respirations appear unlabored, not in any acute distress. Patient otherwise uncooperative with exam, grabbing examiner and RN in room, scratching, and eventually whacking staff with communication board. Baseline right hemiparesis. Patient is moving both SCOTT and LLE against gravity. Not following commands.     A/P Agitation   - Tube feeds restarted for endorsed hunger, previously patient had been refusing.   - 2.5 mg IM zyprexa x1.  - Attempts made to call wife, unable to get in contact at this time.   - RN to continue to monitor, will alert provider with any change in patient status. PMD aware of above.

## 2023-01-17 NOTE — DISCHARGE NOTE PROVIDER - CARE PROVIDER_API CALL
Milagro Curry (NP; RN)  NP in Adult Health  [address] [place] [address]  Phone: [phone]  Fax: [phone]  Follow Up Time: 1-3 days    PCP,   Phone: (   )    -  Fax: (   )    -  Follow Up Time: 1-3 days    Vamsi Mtz)  Internal Medicine  [address] [place] [address]  Phone: [phone]  Fax: [phone]  Follow Up Time: 1-3 days

## 2023-01-17 NOTE — PROGRESS NOTE ADULT - ASSESSMENT
78 y/o M w/ PMH of CAD, DM2, HTN, transferred from [facility] for CVA after presenting for Rt-sided weakness, difficulty swallowing and slurred speech.  Pt was out of window for TPA.  CTH was (-) but MRI brain was significant for an acute L-debby infarct and MRA was significant for L-vertebral artery stenosis vs dissection vs thrombus.  Pt was admitted to neuroICU.  He was intubated 12/28 for air way protection.  He eventually required trach/peg'd 01/03.  Pts hospital course complicated by LLL PNA w/ Scxs growing MSSA 12/30 and morganella 01/05.  Neurology and neurosurgery following.          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   - c/w ASA and atorvastatin    -c/w Eliquis for thrombosis   On trial of CPAP    - c/w daily spontaneous breathing trials   - s/p suture removal 1/10 by CT surgery  - Pulmonary following and recs noted   - Neurology and nurosurg following and recs noted       VAP  - Scxs from 12/30 growing MSSA   - Scxs from 01/05 growing Morganella Morgani  - Leukocytosis persists but no L-shift, remains afebrile and nontoxic appearing   Completed course of Cefepime through 01/14      Normocytic anemia   - Hb 14 on admission   - H/h remains low but stable   - Pt is on AC for suspected vertebral artery thombus  - Trend CBC and transfuse as needed       PEG dependence  - c/w tube feeds as tolerated   - Diarrhea likely from tube feeds   - Cdiff (-)  - Added banatrol but may need TF changed   - Nutrition following       DM II   - A1c 8  - On basal/bolus insulin regimen   - Titrate insulin to goal BG<180  - ISS and hypoglycemic protocol in place       VTE ppx: on eliquis     Dispo: Stable for discharge to rehab

## 2023-01-17 NOTE — PROGRESS NOTE ADULT - SUBJECTIVE AND OBJECTIVE BOX
Subjective: Patient seen and assessed with trach/peg. Patient states  At time of exam,    Pertinent events of the past 24 hours: Uneventful, recovering as expected    VITAL SIGNS  Vital Signs Last 24 Hrs  T(C): 36.7 (01-17-23 @ 08:16), Max: 37.1 (01-17-23 @ 04:29)  T(F): 98.1 (01-17-23 @ 08:16), Max: 98.8 (01-17-23 @ 04:29)  HR: 57 (01-17-23 @ 08:16) (55 - 68)  BP: 117/64 (01-17-23 @ 08:16) (117/64 - 144/75)  RR: 18 (01-17-23 @ 04:29) (18 - 18)  SpO2: 98% (01-17-23 @ 08:16) (96% - 100%)  on (O2)              MEDICATIONS  acetaminophen     Tablet .. 650 milliGRAM(s) Oral every 6 hours PRN  albuterol/ipratropium for Nebulization 3 milliLiter(s) Nebulizer every 6 hours PRN  aluminum hydroxide/magnesium hydroxide/simethicone Suspension 30 milliLiter(s) Oral every 4 hours PRN  apixaban 5 milliGRAM(s) Enteral Tube every 12 hours  aspirin  chewable 81 milliGRAM(s) Oral daily  atenolol  Tablet 50 milliGRAM(s) Oral daily  atorvastatin 80 milliGRAM(s) Oral at bedtime  chlorhexidine 0.12% Liquid 15 milliLiter(s) Oral Mucosa every 12 hours  chlorhexidine 2% Cloths 1 Application(s) Topical daily  coronavirus bivalent (EUA) Booster Vaccine (PFIZER) 0.3 milliLiter(s) IntraMuscular once  doxazosin 2 milliGRAM(s) Oral at bedtime  hydrALAZINE Injectable 10 milliGRAM(s) IV Push every 2 hours PRN  influenza  Vaccine (HIGH DOSE) 0.7 milliLiter(s) IntraMuscular once  insulin glargine Injectable (LANTUS) 25 Unit(s) SubCutaneous every morning  insulin lispro (ADMELOG) corrective regimen sliding scale   SubCutaneous three times a day before meals  insulin lispro Injectable (ADMELOG) 8 Unit(s) SubCutaneous every 6 hours  labetalol Injectable 10 milliGRAM(s) IV Push every 2 hours PRN  lisinopril 10 milliGRAM(s) Oral daily  loperamide 2 milliGRAM(s) Oral three times a day  loteprednol 0.5% Ophthalmic Suspension 1 Drop(s) Right EYE daily  melatonin 5 milliGRAM(s) Oral at bedtime  OLANZapine 5 milliGRAM(s) Oral at bedtime  ondansetron Injectable 4 milliGRAM(s) IV Push every 8 hours PRN  pantoprazole   Suspension 40 milliGRAM(s) Oral daily  prednisoLONE acetate 1% Suspension 1 Drop(s) Left EYE daily  traZODone 50 milliGRAM(s) Oral at bedtime    PHYSICAL EXAM  to follow    01-16 @ 07:01  -  01-17 @ 07:00  --------------------------------------------------------  IN: 2250 mL / OUT: 0 mL / NET: 2250 mL    Weights:  Daily     Daily   Admit Wt: Drug Dosing Weight  Height (cm): 157.5 (26 Dec 2022 21:23)  Weight (kg): 75.5 (11 Jan 2023 09:53)  BMI (kg/m2): 30.4 (11 Jan 2023 09:53)  BSA (m2): 1.77 (11 Jan 2023 09:53)    All laboratory results, radiology and medications reviewed.    LABS  01-16    133<L>  |  96  |  39.9<H>  ----------------------------<  101<H>  5.0   |  29.0  |  0.88    Ca    8.6      16 Jan 2023 06:05  Mg     2.1     01-16                                   9.7    9.75  )-----------( 515      ( 16 Jan 2023 06:05 )             31.1              CAPILLARY BLOOD GLUCOSE  POCT Blood Glucose.: 98 mg/dL (17 Jan 2023 07:30)  POCT Blood Glucose.: 121 mg/dL (17 Jan 2023 05:17)  POCT Blood Glucose.: 132 mg/dL (16 Jan 2023 23:59)  POCT Blood Glucose.: 112 mg/dL (16 Jan 2023 19:32)  POCT Blood Glucose.: 75 mg/dL (16 Jan 2023 16:56)  POCT Blood Glucose.: 109 mg/dL (16 Jan 2023 10:52)         < from: Xray Chest 1 View- PORTABLE-Urgent (Xray Chest 1 View- PORTABLE-Urgent .) (01.02.23 @ 12:13) >    FINDINGS:  CATHETERS AND TUBES: ET tube tip above tracheal bifurcation.  NG tube tip beyond GE junction.    PULMONARY: A LEFT retrograde airspace consolidation and/or effusion. An  LEFT upperzone and RIGHT lung parenchyma clear..   No pneumothorax.    HEART/VASCULAR: The heart is mildly enlarged in transverse diameter.  .    BONES: Visualized osseous structures are intact.    IMPRESSION:   Catheters and tubes in place.  Residual LEFT retrocardiac airspace consolidation and/or effusion..    < end of copied text >      Subjective: Patient seen and assessed with trach/peg. Patient states "i'm okay" At time of exam, NAD.     Pertinent events of the past 24 hours: Uneventful, recovering as expected    VITAL SIGNS  Vital Signs Last 24 Hrs  T(C): 36.7 (01-17-23 @ 08:16), Max: 37.1 (01-17-23 @ 04:29)  T(F): 98.1 (01-17-23 @ 08:16), Max: 98.8 (01-17-23 @ 04:29)  HR: 57 (01-17-23 @ 08:16) (55 - 68)  BP: 117/64 (01-17-23 @ 08:16) (117/64 - 144/75)  RR: 18 (01-17-23 @ 04:29) (18 - 18)  SpO2: 98% (01-17-23 @ 08:16) (96% - 100%)  on (O2)              MEDICATIONS  acetaminophen     Tablet .. 650 milliGRAM(s) Oral every 6 hours PRN  albuterol/ipratropium for Nebulization 3 milliLiter(s) Nebulizer every 6 hours PRN  aluminum hydroxide/magnesium hydroxide/simethicone Suspension 30 milliLiter(s) Oral every 4 hours PRN  apixaban 5 milliGRAM(s) Enteral Tube every 12 hours  aspirin  chewable 81 milliGRAM(s) Oral daily  atenolol  Tablet 50 milliGRAM(s) Oral daily  atorvastatin 80 milliGRAM(s) Oral at bedtime  chlorhexidine 0.12% Liquid 15 milliLiter(s) Oral Mucosa every 12 hours  chlorhexidine 2% Cloths 1 Application(s) Topical daily  coronavirus bivalent (EUA) Booster Vaccine (PFIZER) 0.3 milliLiter(s) IntraMuscular once  doxazosin 2 milliGRAM(s) Oral at bedtime  hydrALAZINE Injectable 10 milliGRAM(s) IV Push every 2 hours PRN  influenza  Vaccine (HIGH DOSE) 0.7 milliLiter(s) IntraMuscular once  insulin glargine Injectable (LANTUS) 25 Unit(s) SubCutaneous every morning  insulin lispro (ADMELOG) corrective regimen sliding scale   SubCutaneous three times a day before meals  insulin lispro Injectable (ADMELOG) 8 Unit(s) SubCutaneous every 6 hours  labetalol Injectable 10 milliGRAM(s) IV Push every 2 hours PRN  lisinopril 10 milliGRAM(s) Oral daily  loperamide 2 milliGRAM(s) Oral three times a day  loteprednol 0.5% Ophthalmic Suspension 1 Drop(s) Right EYE daily  melatonin 5 milliGRAM(s) Oral at bedtime  OLANZapine 5 milliGRAM(s) Oral at bedtime  ondansetron Injectable 4 milliGRAM(s) IV Push every 8 hours PRN  pantoprazole   Suspension 40 milliGRAM(s) Oral daily  prednisoLONE acetate 1% Suspension 1 Drop(s) Left EYE daily  traZODone 50 milliGRAM(s) Oral at bedtime    PHYSICAL EXAM  Constitutional: NAD  Neuro: alert, opens eyes, mouths words,  Pulm/chest: +trach midline c/d/i, tolerating CPAP   Abd: +PEG c/d/i, +BS, soft, NT, ND  Skin: warm, well perfused  Psych: calm    01-16 @ 07:01  -  01-17 @ 07:00  --------------------------------------------------------  IN: 2250 mL / OUT: 0 mL / NET: 2250 mL    Weights:  Daily     Daily   Admit Wt: Drug Dosing Weight  Height (cm): 157.5 (26 Dec 2022 21:23)  Weight (kg): 75.5 (11 Jan 2023 09:53)  BMI (kg/m2): 30.4 (11 Jan 2023 09:53)  BSA (m2): 1.77 (11 Jan 2023 09:53)    All laboratory results, radiology and medications reviewed.    LABS  01-16    133<L>  |  96  |  39.9<H>  ----------------------------<  101<H>  5.0   |  29.0  |  0.88    Ca    8.6      16 Jan 2023 06:05  Mg     2.1     01-16                                   9.7    9.75  )-----------( 515      ( 16 Jan 2023 06:05 )             31.1           CAPILLARY BLOOD GLUCOSE  POCT Blood Glucose.: 98 mg/dL (17 Jan 2023 07:30)  POCT Blood Glucose.: 121 mg/dL (17 Jan 2023 05:17)  POCT Blood Glucose.: 132 mg/dL (16 Jan 2023 23:59)  POCT Blood Glucose.: 112 mg/dL (16 Jan 2023 19:32)  POCT Blood Glucose.: 75 mg/dL (16 Jan 2023 16:56)  POCT Blood Glucose.: 109 mg/dL (16 Jan 2023 10:52)         < from: Xray Chest 1 View- PORTABLE-Urgent (Xray Chest 1 View- PORTABLE-Urgent .) (01.02.23 @ 12:13) >    FINDINGS:  CATHETERS AND TUBES: ET tube tip above tracheal bifurcation.  NG tube tip beyond GE junction.    PULMONARY: A LEFT retrograde airspace consolidation and/or effusion. An  LEFT upperzone and RIGHT lung parenchyma clear..   No pneumothorax.    HEART/VASCULAR: The heart is mildly enlarged in transverse diameter.  .    BONES: Visualized osseous structures are intact.    IMPRESSION:   Catheters and tubes in place.  Residual LEFT retrocardiac airspace consolidation and/or effusion..    < end of copied text >

## 2023-01-17 NOTE — PROGRESS NOTE ADULT - SUBJECTIVE AND OBJECTIVE BOX
Patient is a 77y old  Male who presents with a chief complaint of Acute stroke (13 Jan 2023 20:12)      INTERVAL HPI/OVERNIGHT EVENTS: pt seen and examined.  Denies any pain or discomfort   On trial of CPAP   No over night events       MEDICATIONS  (STANDING):  apixaban 5 milliGRAM(s) Enteral Tube every 12 hours  aspirin  chewable 81 milliGRAM(s) Oral daily  atenolol  Tablet 50 milliGRAM(s) Oral daily  atorvastatin 80 milliGRAM(s) Oral at bedtime  cefepime  Injectable.      cefepime  Injectable. 2000 milliGRAM(s) IV Push every 8 hours  chlorhexidine 0.12% Liquid 15 milliLiter(s) Oral Mucosa every 12 hours  chlorhexidine 2% Cloths 1 Application(s) Topical daily  coronavirus bivalent (EUA) Booster Vaccine (PFIZER) 0.3 milliLiter(s) IntraMuscular once  dextrose 5%. 1000 milliLiter(s) (100 mL/Hr) IV Continuous <Continuous>  dextrose 5%. 1000 milliLiter(s) (50 mL/Hr) IV Continuous <Continuous>  dextrose 50% Injectable 25 Gram(s) IV Push once  dextrose 50% Injectable 12.5 Gram(s) IV Push once  dextrose 50% Injectable 25 Gram(s) IV Push once  doxazosin 2 milliGRAM(s) Oral at bedtime  glucagon  Injectable 1 milliGRAM(s) IntraMuscular once  influenza  Vaccine (HIGH DOSE) 0.7 milliLiter(s) IntraMuscular once  insulin glargine Injectable (LANTUS) 25 Unit(s) SubCutaneous every morning  insulin lispro (ADMELOG) corrective regimen sliding scale   SubCutaneous three times a day before meals  insulin lispro Injectable (ADMELOG) 8 Unit(s) SubCutaneous every 6 hours  lisinopril 10 milliGRAM(s) Oral daily  loperamide 2 milliGRAM(s) Oral three times a day  loteprednol 0.5% Ophthalmic Suspension 1 Drop(s) Right EYE daily  melatonin 5 milliGRAM(s) Oral at bedtime  OLANZapine 5 milliGRAM(s) Oral at bedtime  pantoprazole   Suspension 40 milliGRAM(s) Oral daily  prednisoLONE acetate 1% Suspension 1 Drop(s) Left EYE daily  traZODone 50 milliGRAM(s) Oral at bedtime    MEDICATIONS  (PRN):  acetaminophen     Tablet .. 650 milliGRAM(s) Oral every 6 hours PRN Temp greater or equal to 38C (100.4F), Mild Pain (1 - 3)  albuterol/ipratropium for Nebulization 3 milliLiter(s) Nebulizer every 6 hours PRN Shortness of Breath and/or Wheezing  aluminum hydroxide/magnesium hydroxide/simethicone Suspension 30 milliLiter(s) Oral every 4 hours PRN Dyspepsia  dextrose Oral Gel 15 Gram(s) Oral once PRN Blood Glucose LESS THAN 70 milliGRAM(s)/deciliter  hydrALAZINE Injectable 10 milliGRAM(s) IV Push every 2 hours PRN SBP >160  labetalol Injectable 10 milliGRAM(s) IV Push every 2 hours PRN SBP >160  ondansetron Injectable 4 milliGRAM(s) IV Push every 8 hours PRN Nausea and/or Vomiting      Allergies    iodine (Anaphylaxis (Mod to Severe))    Intolerances        REVIEW OF SYSTEMS: not able to obtain       Vital Signs Last 24 Hrs  T(C): 36.8 (14 Jan 2023 11:00), Max: 36.8 (14 Jan 2023 04:29)  T(F): 98.2 (14 Jan 2023 11:00), Max: 98.2 (14 Jan 2023 04:29)  HR: 50 (14 Jan 2023 11:00) (50 - 65)  BP: 151/71 (14 Jan 2023 11:00) (115/68 - 151/71)  BP(mean): --  RR: 17 (14 Jan 2023 11:00) (17 - 18)  SpO2: 100% (14 Jan 2023 11:00) (94% - 100%)    Parameters below as of 14 Jan 2023 11:00  Patient On (Oxygen Delivery Method): ventilator        PHYSICAL EXAM:  GENERAL: awake, following commands   HEAD:  Atraumatic, Normocephalic  EYES: EOMI, PERRLA, conjunctiva and sclera clear  NECK: Supple, No JVD, trached   NERVOUS SYSTEM:  awake, right sided weakness    CHEST/LUNG: Clear to percussion bilaterally; No rales, rhonchi, wheezing, or rubs  HEART: Regular rate and rhythm; No murmurs, rubs, or gallops  ABDOMEN: Soft, Nontender, Nondistended; Bowel sounds present, PEG tube   EXTREMITIES:  No clubbing, cyanosis, or edema      LABS:                        9.8    10.15 )-----------( 509      ( 14 Jan 2023 05:50 )             31.3     01-14    132<L>  |  98  |  44.9<H>  ----------------------------<  132<H>  5.6<H>   |  26.0  |  0.92    Ca    8.6      14 Jan 2023 05:50  Phos  3.7     01-13  Mg     2.2     01-14          CAPILLARY BLOOD GLUCOSE      POCT Blood Glucose.: 150 mg/dL (14 Jan 2023 11:39)  POCT Blood Glucose.: 111 mg/dL (14 Jan 2023 08:10)  POCT Blood Glucose.: 148 mg/dL (14 Jan 2023 05:43)  POCT Blood Glucose.: 123 mg/dL (14 Jan 2023 00:49)  POCT Blood Glucose.: 132 mg/dL (13 Jan 2023 18:48)      RADIOLOGY & ADDITIONAL TESTS:    Imaging Personally Reviewed:  [ x] YES  [ ] NO    Consultant(s) Notes Reviewed:  [ x] YES  [ ] NO    Care Discussed with Consultants/Other Providers [ ] YES  [ ] NO    Plan of Care discussed with Housestaff [x ]YES [ ] NO

## 2023-01-17 NOTE — DISCHARGE NOTE PROVIDER - NSDCCPCAREPLAN_GEN_ALL_CORE_FT
PRINCIPAL DISCHARGE DIAGNOSIS  Diagnosis: Acute CVA (cerebrovascular accident)  Assessment and Plan of Treatment: - Admitted to neurosurgical ICU for acute stroke with left vertebral artery thrombus vs. stenosis. Continue with medication as directed.   - Follow up outpatient with your PCP and neurology for montioring.      SECONDARY DISCHARGE DIAGNOSES  Diagnosis: Vertebral artery thrombosis, left  Assessment and Plan of Treatment: - Treated with anticoagulation. Continue all medication as directed and follow up outpatient with your PCP and neurologist.    Diagnosis: VAP (ventilator-associated pneumonia)  Assessment and Plan of Treatment: - Sputum cultures returned positive for bacterial growth. Treated and improving with IV antibiotics.    Diagnosis: Anemia  Assessment and Plan of Treatment: - Remained stable, no active bleeding.   - Follow up outpatient with your PCP.    Diagnosis: Tracheostomy dependent  Assessment and Plan of Treatment: - Trach placed this admission. Tolerating CPAP and spontaneous breathing trials.    Diagnosis: PEG (percutaneous endoscopic gastrostomy) status  Assessment and Plan of Treatment: - PEG placed this admission. Tolerating tube feeds. Formula adjusted to avoid diarrhea.     PRINCIPAL DISCHARGE DIAGNOSIS  Diagnosis: Acute CVA (cerebrovascular accident)  Assessment and Plan of Treatment: - Admitted to neurosurgical ICU for acute stroke with left vertebral artery thrombus vs. stenosis. Continue with medication as directed.   - Follow up outpatient with your PCP and neurology for montioring.      SECONDARY DISCHARGE DIAGNOSES  Diagnosis: Vertebral artery thrombosis, left  Assessment and Plan of Treatment: - Treated with anticoagulation. Continue all medication as directed and follow up outpatient with your PCP and neurologist.    Diagnosis: VAP (ventilator-associated pneumonia)  Assessment and Plan of Treatment: - Sputum cultures returned positive for bacterial growth. Treated and improvED with IV antibiotics.    Diagnosis: Anemia  Assessment and Plan of Treatment: - Remained stable, no active bleeding.   - Follow up outpatient with your PCP.    Diagnosis: Tracheostomy dependent  Assessment and Plan of Treatment: - Trach placed this admission. Tolerating CPAP and spontaneous breathing trials.  - Continue to wean off ventilator    Diagnosis: PEG (percutaneous endoscopic gastrostomy) status  Assessment and Plan of Treatment: - PEG placed this admission. Tolerating tube feeds. Formula adjusted to avoid diarrhea.    Diagnosis: DM (diabetes mellitus), type 2  Assessment and Plan of Treatment: - continue insulin as directed, may adjust base on POC

## 2023-01-18 LAB
GLUCOSE BLDC GLUCOMTR-MCNC: 103 MG/DL — HIGH (ref 70–99)
GLUCOSE BLDC GLUCOMTR-MCNC: 120 MG/DL — HIGH (ref 70–99)
GLUCOSE BLDC GLUCOMTR-MCNC: 144 MG/DL — HIGH (ref 70–99)
GLUCOSE BLDC GLUCOMTR-MCNC: 90 MG/DL — SIGNIFICANT CHANGE UP (ref 70–99)

## 2023-01-18 PROCEDURE — 99231 SBSQ HOSP IP/OBS SF/LOW 25: CPT

## 2023-01-18 PROCEDURE — 99233 SBSQ HOSP IP/OBS HIGH 50: CPT

## 2023-01-18 RX ADMIN — Medication 2 MILLIGRAM(S): at 21:46

## 2023-01-18 RX ADMIN — APIXABAN 5 MILLIGRAM(S): 2.5 TABLET, FILM COATED ORAL at 17:02

## 2023-01-18 RX ADMIN — LOTEPREDNOL ETABONATE 1 DROP(S): 2 SUSPENSION/ DROPS OPHTHALMIC at 06:57

## 2023-01-18 RX ADMIN — Medication 5 MILLIGRAM(S): at 21:46

## 2023-01-18 RX ADMIN — PANTOPRAZOLE SODIUM 40 MILLIGRAM(S): 20 TABLET, DELAYED RELEASE ORAL at 12:33

## 2023-01-18 RX ADMIN — ATORVASTATIN CALCIUM 80 MILLIGRAM(S): 80 TABLET, FILM COATED ORAL at 21:46

## 2023-01-18 RX ADMIN — Medication 1 DROP(S): at 05:08

## 2023-01-18 RX ADMIN — LISINOPRIL 10 MILLIGRAM(S): 2.5 TABLET ORAL at 05:08

## 2023-01-18 RX ADMIN — Medication 81 MILLIGRAM(S): at 12:33

## 2023-01-18 RX ADMIN — Medication 2 MILLIGRAM(S): at 05:06

## 2023-01-18 RX ADMIN — CHLORHEXIDINE GLUCONATE 1 APPLICATION(S): 213 SOLUTION TOPICAL at 12:35

## 2023-01-18 RX ADMIN — OLANZAPINE 5 MILLIGRAM(S): 15 TABLET, FILM COATED ORAL at 21:46

## 2023-01-18 RX ADMIN — Medication 2 MILLIGRAM(S): at 14:30

## 2023-01-18 RX ADMIN — APIXABAN 5 MILLIGRAM(S): 2.5 TABLET, FILM COATED ORAL at 05:10

## 2023-01-18 RX ADMIN — Medication 50 MILLIGRAM(S): at 21:47

## 2023-01-18 RX ADMIN — Medication 8 UNIT(S): at 05:06

## 2023-01-18 RX ADMIN — CHLORHEXIDINE GLUCONATE 15 MILLILITER(S): 213 SOLUTION TOPICAL at 17:03

## 2023-01-18 RX ADMIN — Medication 8 UNIT(S): at 12:33

## 2023-01-18 RX ADMIN — Medication 8 UNIT(S): at 17:06

## 2023-01-18 RX ADMIN — INSULIN GLARGINE 25 UNIT(S): 100 INJECTION, SOLUTION SUBCUTANEOUS at 12:34

## 2023-01-18 RX ADMIN — ATENOLOL 50 MILLIGRAM(S): 25 TABLET ORAL at 05:06

## 2023-01-18 RX ADMIN — CHLORHEXIDINE GLUCONATE 15 MILLILITER(S): 213 SOLUTION TOPICAL at 05:06

## 2023-01-18 NOTE — PROGRESS NOTE ADULT - ASSESSMENT
76 y/o M w/ PMH of CAD, DM2, HTN, transferred from [facility] for CVA after presenting for Rt-sided weakness, difficulty swallowing and slurred speech.  Pt was out of window for TPA.  CTH was (-) but MRI brain was significant for an acute L-debby infarct and MRA was significant for L-vertebral artery stenosis vs dissection vs thrombus.  Pt was admitted to neuroICU.  He was intubated 12/28 for air way protection.  He eventually required trach/peg'd 01/03.  Pts hospital course complicated by LLL PNA w/ Scxs growing MSSA 12/30 and morganella 01/05.  Neurology and neurosurgery following.          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   - c/w ASA and atorvastatin    -c/w Eliquis for thrombosis   On trial of CPAP    - c/w daily spontaneous breathing trials   - s/p suture removal 1/10 by CT surgery  - Pulmonary following and recs noted   - Neurology and nurosurg following and recs noted       VAP  - Scxs from 12/30 growing MSSA   - Scxs from 01/05 growing Morganella Morgani  - Leukocytosis persists but no L-shift, remains afebrile and nontoxic appearing   Completed course of Cefepime through 01/14      Normocytic anemia   - Hb 14 on admission   - H/h remains low but stable   - Pt is on AC for suspected vertebral artery thombus  - Trend CBC and transfuse as needed       PEG dependence  - c/w tube feeds as tolerated   - Diarrhea likely from tube feeds   - Cdiff (-)  - Added banatrol but may need TF changed   - Nutrition following       DM II   - A1c 8  - On basal/bolus insulin regimen   - Titrate insulin to goal BG<180  - ISS and hypoglycemic protocol in place       VTE ppx: on eliquis     Dispo: Discharge postponed, tested positive for COVID

## 2023-01-18 NOTE — PROGRESS NOTE ADULT - SUBJECTIVE AND OBJECTIVE BOX
Patient is a 77y old  Male who presents with a chief complaint of Acute stroke (13 Jan 2023 20:12)      INTERVAL HPI/OVERNIGHT EVENTS: pt seen and examined. Today he is reporting  not doing well. Tested positive for COVID.       MEDICATIONS  (STANDING):  apixaban 5 milliGRAM(s) Enteral Tube every 12 hours  aspirin  chewable 81 milliGRAM(s) Oral daily  atenolol  Tablet 50 milliGRAM(s) Oral daily  atorvastatin 80 milliGRAM(s) Oral at bedtime  cefepime  Injectable.      cefepime  Injectable. 2000 milliGRAM(s) IV Push every 8 hours  chlorhexidine 0.12% Liquid 15 milliLiter(s) Oral Mucosa every 12 hours  chlorhexidine 2% Cloths 1 Application(s) Topical daily  coronavirus bivalent (EUA) Booster Vaccine (PFIZER) 0.3 milliLiter(s) IntraMuscular once  dextrose 5%. 1000 milliLiter(s) (100 mL/Hr) IV Continuous <Continuous>  dextrose 5%. 1000 milliLiter(s) (50 mL/Hr) IV Continuous <Continuous>  dextrose 50% Injectable 25 Gram(s) IV Push once  dextrose 50% Injectable 12.5 Gram(s) IV Push once  dextrose 50% Injectable 25 Gram(s) IV Push once  doxazosin 2 milliGRAM(s) Oral at bedtime  glucagon  Injectable 1 milliGRAM(s) IntraMuscular once  influenza  Vaccine (HIGH DOSE) 0.7 milliLiter(s) IntraMuscular once  insulin glargine Injectable (LANTUS) 25 Unit(s) SubCutaneous every morning  insulin lispro (ADMELOG) corrective regimen sliding scale   SubCutaneous three times a day before meals  insulin lispro Injectable (ADMELOG) 8 Unit(s) SubCutaneous every 6 hours  lisinopril 10 milliGRAM(s) Oral daily  loperamide 2 milliGRAM(s) Oral three times a day  loteprednol 0.5% Ophthalmic Suspension 1 Drop(s) Right EYE daily  melatonin 5 milliGRAM(s) Oral at bedtime  OLANZapine 5 milliGRAM(s) Oral at bedtime  pantoprazole   Suspension 40 milliGRAM(s) Oral daily  prednisoLONE acetate 1% Suspension 1 Drop(s) Left EYE daily  traZODone 50 milliGRAM(s) Oral at bedtime    MEDICATIONS  (PRN):  acetaminophen     Tablet .. 650 milliGRAM(s) Oral every 6 hours PRN Temp greater or equal to 38C (100.4F), Mild Pain (1 - 3)  albuterol/ipratropium for Nebulization 3 milliLiter(s) Nebulizer every 6 hours PRN Shortness of Breath and/or Wheezing  aluminum hydroxide/magnesium hydroxide/simethicone Suspension 30 milliLiter(s) Oral every 4 hours PRN Dyspepsia  dextrose Oral Gel 15 Gram(s) Oral once PRN Blood Glucose LESS THAN 70 milliGRAM(s)/deciliter  hydrALAZINE Injectable 10 milliGRAM(s) IV Push every 2 hours PRN SBP >160  labetalol Injectable 10 milliGRAM(s) IV Push every 2 hours PRN SBP >160  ondansetron Injectable 4 milliGRAM(s) IV Push every 8 hours PRN Nausea and/or Vomiting      Allergies    iodine (Anaphylaxis (Mod to Severe))    Intolerances        REVIEW OF SYSTEMS: not able to obtain       Vital Signs Last 24 Hrs  T(C): 36.8 (14 Jan 2023 11:00), Max: 36.8 (14 Jan 2023 04:29)  T(F): 98.2 (14 Jan 2023 11:00), Max: 98.2 (14 Jan 2023 04:29)  HR: 50 (14 Jan 2023 11:00) (50 - 65)  BP: 151/71 (14 Jan 2023 11:00) (115/68 - 151/71)  BP(mean): --  RR: 17 (14 Jan 2023 11:00) (17 - 18)  SpO2: 100% (14 Jan 2023 11:00) (94% - 100%)    Parameters below as of 14 Jan 2023 11:00  Patient On (Oxygen Delivery Method): ventilator        PHYSICAL EXAM:  GENERAL: awake, following commands   HEAD:  Atraumatic, Normocephalic  EYES: EOMI, PERRLA, conjunctiva and sclera clear  NECK: Supple, No JVD, trached   NERVOUS SYSTEM:  awake, right sided weakness    CHEST/LUNG: Clear to percussion bilaterally; No rales, rhonchi, wheezing, or rubs  HEART: Regular rate and rhythm; No murmurs, rubs, or gallops  ABDOMEN: Soft, Nontender, Nondistended; Bowel sounds present, PEG tube   EXTREMITIES:  No clubbing, cyanosis, or edema      LABS:                        9.8    10.15 )-----------( 509      ( 14 Jan 2023 05:50 )             31.3     01-14    132<L>  |  98  |  44.9<H>  ----------------------------<  132<H>  5.6<H>   |  26.0  |  0.92    Ca    8.6      14 Jan 2023 05:50  Phos  3.7     01-13  Mg     2.2     01-14          CAPILLARY BLOOD GLUCOSE      POCT Blood Glucose.: 150 mg/dL (14 Jan 2023 11:39)  POCT Blood Glucose.: 111 mg/dL (14 Jan 2023 08:10)  POCT Blood Glucose.: 148 mg/dL (14 Jan 2023 05:43)  POCT Blood Glucose.: 123 mg/dL (14 Jan 2023 00:49)  POCT Blood Glucose.: 132 mg/dL (13 Jan 2023 18:48)      RADIOLOGY & ADDITIONAL TESTS:    Imaging Personally Reviewed:  [ x] YES  [ ] NO    Consultant(s) Notes Reviewed:  [ x] YES  [ ] NO    Care Discussed with Consultants/Other Providers [ ] YES  [ ] NO    Plan of Care discussed with Housestaff [x ]YES [ ] NO

## 2023-01-18 NOTE — CHART NOTE - NSCHARTNOTEFT_GEN_A_CORE
Source: Patient [ ]  Family [ ]   other [ x] EMR, staff and ID rounds     Current Diet:   Diet, NPO with Tube Feed:   Tube Feeding Modality: Gastrostomy  Glucerna 1.5 Benny (GLUCERNA1.5)  Total Volume for 24 Hours (mL): 1440  Continuous  Starting Tube Feed Rate {mL per Hour}: 60  Until Goal Tube Feed Rate (mL per Hour): 60  Tube Feed Duration (in Hours): 24  Tube Feed Start Time: 11:00  Banatrol TF     Qty per Day:  4 (01-13-23 @ 14:01)    Patient reports [ ] nausea  [ ] vomiting [ ] diarrhea [ ] constipation  [ ]chewing problems [ ] swallowing issues  [ ] other: denies    Enteral /Parenteral Nutrition: Current diet order reads for Glucerna 1.5 running @ goal rate of 60 ml/hr (x20 hrs) to provide 1200 ml, 1800 kcal, 99g protein, 911 ml free water, and >100% of RDIs for vitamins/minerals.     Current Weight:   (1/11)   166.4 lbs  (1/8)     166.4 lbs  (1/3)     174.1 lbs  (12/27) 171.1 lbs    % Weight Change: Recent weights consistent, will continue to monitor     Pertinent Medications: MEDICATIONS  (STANDING):  apixaban 5 milliGRAM(s) Enteral Tube every 12 hours  aspirin  chewable 81 milliGRAM(s) Oral daily  atenolol  Tablet 50 milliGRAM(s) Oral daily  atorvastatin 80 milliGRAM(s) Oral at bedtime  chlorhexidine 0.12% Liquid 15 milliLiter(s) Oral Mucosa every 12 hours  chlorhexidine 2% Cloths 1 Application(s) Topical daily  coronavirus bivalent (EUA) Booster Vaccine (PFIZER) 0.3 milliLiter(s) IntraMuscular once  dextrose 5%. 1000 milliLiter(s) (50 mL/Hr) IV Continuous <Continuous>  dextrose 5%. 1000 milliLiter(s) (100 mL/Hr) IV Continuous <Continuous>  dextrose 50% Injectable 25 Gram(s) IV Push once  dextrose 50% Injectable 12.5 Gram(s) IV Push once  dextrose 50% Injectable 25 Gram(s) IV Push once  doxazosin 2 milliGRAM(s) Oral at bedtime  glucagon  Injectable 1 milliGRAM(s) IntraMuscular once  influenza  Vaccine (HIGH DOSE) 0.7 milliLiter(s) IntraMuscular once  insulin glargine Injectable (LANTUS) 25 Unit(s) SubCutaneous every morning  insulin lispro (ADMELOG) corrective regimen sliding scale   SubCutaneous three times a day before meals  insulin lispro Injectable (ADMELOG) 8 Unit(s) SubCutaneous every 6 hours  lisinopril 10 milliGRAM(s) Oral daily  loperamide 2 milliGRAM(s) Oral three times a day  loteprednol 0.5% Ophthalmic Suspension 1 Drop(s) Right EYE daily  melatonin 5 milliGRAM(s) Oral at bedtime  OLANZapine 5 milliGRAM(s) Oral at bedtime  pantoprazole   Suspension 40 milliGRAM(s) Oral daily  prednisoLONE acetate 1% Suspension 1 Drop(s) Left EYE daily  traZODone 50 milliGRAM(s) Oral at bedtime    MEDICATIONS  (PRN):  acetaminophen     Tablet .. 650 milliGRAM(s) Oral every 6 hours PRN Temp greater or equal to 38C (100.4F), Mild Pain (1 - 3)  albuterol/ipratropium for Nebulization 3 milliLiter(s) Nebulizer every 6 hours PRN Shortness of Breath and/or Wheezing  aluminum hydroxide/magnesium hydroxide/simethicone Suspension 30 milliLiter(s) Oral every 4 hours PRN Dyspepsia  dextrose Oral Gel 15 Gram(s) Oral once PRN Blood Glucose LESS THAN 70 milliGRAM(s)/deciliter  hydrALAZINE Injectable 10 milliGRAM(s) IV Push every 2 hours PRN SBP >160  labetalol Injectable 10 milliGRAM(s) IV Push every 2 hours PRN SBP >160  lidocaine 2% Gel 1 Application(s) Topical every 6 hours PRN Pain at site  ondansetron Injectable 4 milliGRAM(s) IV Push every 8 hours PRN Nausea and/or Vomiting    Pertinent Labs: No recent nutrition-related labs     Skin: Stage 2 sacrum, skin tear penis, IAD  Edema: 2+ generalized edema     Nutrition focused physical exam conducted - found signs of malnutrition [ ]absent [ x]present    Subcutaneous fat loss: [ ] Orbital fat pads region, [ ]Buccal fat region, [ ]Triceps region,  [ ]Ribs region    Muscle wasting: [x ]Temples region, [ ]Clavicle region, [ ]Shoulder region, [ ]Scapula region, [ ]Interosseous region,  [ ]thigh region, [ ]Calf region    Estimated Needs:   [x ] no change since previous assessment  [ ] recalculated:     Current Nutrition Diagnosis: Pt remains at nutrition risk secondary to increased nutrient needs related to increased physiological demand for nutrient as evidenced by acute L pontine CVA, 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 Pt noted with loose stool previously, now formed on current TF regimen with Banatrol 4x/day. Pt was stable for d/c, now COVID+, d/c on hold.     Recommendations:   1) Continue current TF regimen with Banatrol  2) Monitor bowel function  3) Monitor weights daily for trend/accuracy   4) Monitor electrolytes, correct PRN    Monitoring and Evaluation:   [ ] PO intake [ x] Tolerance to diet prescription [X] Weights  [X] Follow up per protocol [X] Labs:

## 2023-01-18 NOTE — PROGRESS NOTE ADULT - ASSESSMENT
77M, PMH HTN, DM, CAD.  Transferred from [place] 12/26 with an acute L pontine CVA, possible L vert dissection. Worsening neuro exam 12/27 - RUE plegia, worsening bulbar dysfunction, Acute resp failure due to neurologic injury, required intubation 12/27 for airway protection now s/p trach/peg 1/3, surgicel d/c 1/4, sutures removed 1/10.

## 2023-01-18 NOTE — PROGRESS NOTE ADULT - SUBJECTIVE AND OBJECTIVE BOX
Subjective: Patient seen and assessed s/p trach/peg. Patient unable to provide appropriate HPI secondary to clinical condition.     Pertinent events of the past 24 hours: Uneventful, recovering as expected    VITAL SIGNS  Vital Signs Last 24 Hrs  T(C): 36.8 (01-18-23 @ 04:35), Max: 36.8 (01-18-23 @ 04:35)  T(F): 98.3 (01-18-23 @ 04:35), Max: 98.3 (01-18-23 @ 04:35)  HR: 68 (01-18-23 @ 04:50) (60 - 72)  BP: 152/66 (01-18-23 @ 04:35) (116/68 - 152/66)  RR: 18 (01-18-23 @ 04:35) (18 - 19)  SpO2: 99% (01-18-23 @ 04:50) (98% - 100%)  on (O2)              MEDICATIONS  acetaminophen     Tablet .. 650 milliGRAM(s) Oral every 6 hours PRN  albuterol/ipratropium for Nebulization 3 milliLiter(s) Nebulizer every 6 hours PRN  aluminum hydroxide/magnesium hydroxide/simethicone Suspension 30 milliLiter(s) Oral every 4 hours PRN  apixaban 5 milliGRAM(s) Enteral Tube every 12 hours  aspirin  chewable 81 milliGRAM(s) Oral daily  atenolol  Tablet 50 milliGRAM(s) Oral daily  atorvastatin 80 milliGRAM(s) Oral at bedtime  chlorhexidine 0.12% Liquid 15 milliLiter(s) Oral Mucosa every 12 hours  chlorhexidine 2% Cloths 1 Application(s) Topical daily  coronavirus bivalent (EUA) Booster Vaccine (PFIZER) 0.3 milliLiter(s) IntraMuscular once  doxazosin 2 milliGRAM(s) Oral at bedtime  hydrALAZINE Injectable 10 milliGRAM(s) IV Push every 2 hours PRN  influenza  Vaccine (HIGH DOSE) 0.7 milliLiter(s) IntraMuscular once  insulin glargine Injectable (LANTUS) 25 Unit(s) SubCutaneous every morning  insulin lispro (ADMELOG) corrective regimen sliding scale   SubCutaneous three times a day before meals  insulin lispro Injectable (ADMELOG) 8 Unit(s) SubCutaneous every 6 hours  labetalol Injectable 10 milliGRAM(s) IV Push every 2 hours PRN  lidocaine 2% Gel 1 Application(s) Topical every 6 hours PRN  lisinopril 10 milliGRAM(s) Oral daily  loperamide 2 milliGRAM(s) Oral three times a day  loteprednol 0.5% Ophthalmic Suspension 1 Drop(s) Right EYE daily  melatonin 5 milliGRAM(s) Oral at bedtime  OLANZapine 5 milliGRAM(s) Oral at bedtime  ondansetron Injectable 4 milliGRAM(s) IV Push every 8 hours PRN  pantoprazole   Suspension 40 milliGRAM(s) Oral daily  prednisoLONE acetate 1% Suspension 1 Drop(s) Left EYE daily  traZODone 50 milliGRAM(s) Oral at bedtime    PHYSICAL EXAM  to follow    Weights:  Daily     Daily   Admit Wt: Drug Dosing Weight  Height (cm): 157.5 (26 Dec 2022 21:23)  Weight (kg): 75.5 (11 Jan 2023 09:53)  BMI (kg/m2): 30.4 (11 Jan 2023 09:53)  BSA (m2): 1.77 (11 Jan 2023 09:53)    All laboratory results, radiology and medications reviewed.    < from: Xray Chest 1 View-PORTABLE IMMEDIATE (Xray Chest 1 View-PORTABLE IMMEDIATE .) (01.05.23 @ 15:19) >    INTERPRETATION:  AP semierect chest on January 5, 2023 at 2:24 PM.   Patient has fever.    Heart magnified by technique. A tracheostomy has replaced endotracheal   tube present on January 2. NG tube removed.    On January 2 there was a left base infiltrate which suggests some   improvement.    IMPRESSION: As above.    < end of copied text >            Subjective: Patient seen and assessed s/p trach/peg. Patient unable to provide appropriate HPI secondary to clinical condition.     Pertinent events of the past 24 hours: Uneventful, recovering as expected    VITAL SIGNS  Vital Signs Last 24 Hrs  T(C): 36.8 (01-18-23 @ 04:35), Max: 36.8 (01-18-23 @ 04:35)  T(F): 98.3 (01-18-23 @ 04:35), Max: 98.3 (01-18-23 @ 04:35)  HR: 68 (01-18-23 @ 04:50) (60 - 72)  BP: 152/66 (01-18-23 @ 04:35) (116/68 - 152/66)  RR: 18 (01-18-23 @ 04:35) (18 - 19)  SpO2: 99% (01-18-23 @ 04:50) (98% - 100%)  on (O2)              MEDICATIONS  acetaminophen     Tablet .. 650 milliGRAM(s) Oral every 6 hours PRN  albuterol/ipratropium for Nebulization 3 milliLiter(s) Nebulizer every 6 hours PRN  aluminum hydroxide/magnesium hydroxide/simethicone Suspension 30 milliLiter(s) Oral every 4 hours PRN  apixaban 5 milliGRAM(s) Enteral Tube every 12 hours  aspirin  chewable 81 milliGRAM(s) Oral daily  atenolol  Tablet 50 milliGRAM(s) Oral daily  atorvastatin 80 milliGRAM(s) Oral at bedtime  chlorhexidine 0.12% Liquid 15 milliLiter(s) Oral Mucosa every 12 hours  chlorhexidine 2% Cloths 1 Application(s) Topical daily  coronavirus bivalent (EUA) Booster Vaccine (PFIZER) 0.3 milliLiter(s) IntraMuscular once  doxazosin 2 milliGRAM(s) Oral at bedtime  hydrALAZINE Injectable 10 milliGRAM(s) IV Push every 2 hours PRN  influenza  Vaccine (HIGH DOSE) 0.7 milliLiter(s) IntraMuscular once  insulin glargine Injectable (LANTUS) 25 Unit(s) SubCutaneous every morning  insulin lispro (ADMELOG) corrective regimen sliding scale   SubCutaneous three times a day before meals  insulin lispro Injectable (ADMELOG) 8 Unit(s) SubCutaneous every 6 hours  labetalol Injectable 10 milliGRAM(s) IV Push every 2 hours PRN  lidocaine 2% Gel 1 Application(s) Topical every 6 hours PRN  lisinopril 10 milliGRAM(s) Oral daily  loperamide 2 milliGRAM(s) Oral three times a day  loteprednol 0.5% Ophthalmic Suspension 1 Drop(s) Right EYE daily  melatonin 5 milliGRAM(s) Oral at bedtime  OLANZapine 5 milliGRAM(s) Oral at bedtime  ondansetron Injectable 4 milliGRAM(s) IV Push every 8 hours PRN  pantoprazole   Suspension 40 milliGRAM(s) Oral daily  prednisoLONE acetate 1% Suspension 1 Drop(s) Left EYE daily  traZODone 50 milliGRAM(s) Oral at bedtime    PHYSICAL EXAM  Constitutional: NAD  Neuro: alert, opens eyes, mouths words,  Pulm/chest: +trach midline c/d/i, tolerating CPAP   Abd: +PEG c/d/i, +BS, soft, NT, ND  Skin: warm, well perfused  Psych: calm    Weights:  Daily     Daily   Admit Wt: Drug Dosing Weight  Height (cm): 157.5 (26 Dec 2022 21:23)  Weight (kg): 75.5 (11 Jan 2023 09:53)  BMI (kg/m2): 30.4 (11 Jan 2023 09:53)  BSA (m2): 1.77 (11 Jan 2023 09:53)    All laboratory results, radiology and medications reviewed.    < from: Xray Chest 1 View-PORTABLE IMMEDIATE (Xray Chest 1 View-PORTABLE IMMEDIATE .) (01.05.23 @ 15:19) >    INTERPRETATION:  AP semierect chest on January 5, 2023 at 2:24 PM.   Patient has fever.    Heart magnified by technique. A tracheostomy has replaced endotracheal   tube present on January 2. NG tube removed.    On January 2 there was a left base infiltrate which suggests some   improvement.    IMPRESSION: As above.    < end of copied text >

## 2023-01-19 LAB
ANION GAP SERPL CALC-SCNC: 11 MMOL/L — SIGNIFICANT CHANGE UP (ref 5–17)
BASOPHILS # BLD AUTO: 0.09 K/UL — SIGNIFICANT CHANGE UP (ref 0–0.2)
BASOPHILS NFR BLD AUTO: 0.7 % — SIGNIFICANT CHANGE UP (ref 0–2)
BUN SERPL-MCNC: 41.8 MG/DL — HIGH (ref 8–20)
CALCIUM SERPL-MCNC: 8.6 MG/DL — SIGNIFICANT CHANGE UP (ref 8.4–10.5)
CHLORIDE SERPL-SCNC: 95 MMOL/L — LOW (ref 96–108)
CO2 SERPL-SCNC: 26 MMOL/L — SIGNIFICANT CHANGE UP (ref 22–29)
CREAT SERPL-MCNC: 1.12 MG/DL — SIGNIFICANT CHANGE UP (ref 0.5–1.3)
EGFR: 68 ML/MIN/1.73M2 — SIGNIFICANT CHANGE UP
EOSINOPHIL # BLD AUTO: 0.61 K/UL — HIGH (ref 0–0.5)
EOSINOPHIL NFR BLD AUTO: 4.5 % — SIGNIFICANT CHANGE UP (ref 0–6)
GLUCOSE BLDC GLUCOMTR-MCNC: 123 MG/DL — HIGH (ref 70–99)
GLUCOSE BLDC GLUCOMTR-MCNC: 131 MG/DL — HIGH (ref 70–99)
GLUCOSE BLDC GLUCOMTR-MCNC: 200 MG/DL — HIGH (ref 70–99)
GLUCOSE BLDC GLUCOMTR-MCNC: 95 MG/DL — SIGNIFICANT CHANGE UP (ref 70–99)
GLUCOSE BLDC GLUCOMTR-MCNC: 98 MG/DL — SIGNIFICANT CHANGE UP (ref 70–99)
GLUCOSE SERPL-MCNC: 189 MG/DL — HIGH (ref 70–99)
HCT VFR BLD CALC: 32.3 % — LOW (ref 39–50)
HGB BLD-MCNC: 10.4 G/DL — LOW (ref 13–17)
IMM GRANULOCYTES NFR BLD AUTO: 0.7 % — SIGNIFICANT CHANGE UP (ref 0–0.9)
LYMPHOCYTES # BLD AUTO: 1.8 K/UL — SIGNIFICANT CHANGE UP (ref 1–3.3)
LYMPHOCYTES # BLD AUTO: 13.4 % — SIGNIFICANT CHANGE UP (ref 13–44)
MAGNESIUM SERPL-MCNC: 2 MG/DL — SIGNIFICANT CHANGE UP (ref 1.6–2.6)
MCHC RBC-ENTMCNC: 28.8 PG — SIGNIFICANT CHANGE UP (ref 27–34)
MCHC RBC-ENTMCNC: 32.2 GM/DL — SIGNIFICANT CHANGE UP (ref 32–36)
MCV RBC AUTO: 89.5 FL — SIGNIFICANT CHANGE UP (ref 80–100)
MONOCYTES # BLD AUTO: 0.86 K/UL — SIGNIFICANT CHANGE UP (ref 0–0.9)
MONOCYTES NFR BLD AUTO: 6.4 % — SIGNIFICANT CHANGE UP (ref 2–14)
NEUTROPHILS # BLD AUTO: 10.01 K/UL — HIGH (ref 1.8–7.4)
NEUTROPHILS NFR BLD AUTO: 74.3 % — SIGNIFICANT CHANGE UP (ref 43–77)
PLATELET # BLD AUTO: 481 K/UL — HIGH (ref 150–400)
POTASSIUM SERPL-MCNC: 5.9 MMOL/L — HIGH (ref 3.5–5.3)
POTASSIUM SERPL-SCNC: 5.9 MMOL/L — HIGH (ref 3.5–5.3)
RBC # BLD: 3.61 M/UL — LOW (ref 4.2–5.8)
RBC # FLD: 12.6 % — SIGNIFICANT CHANGE UP (ref 10.3–14.5)
SODIUM SERPL-SCNC: 131 MMOL/L — LOW (ref 135–145)
WBC # BLD: 13.46 K/UL — HIGH (ref 3.8–10.5)
WBC # FLD AUTO: 13.46 K/UL — HIGH (ref 3.8–10.5)

## 2023-01-19 PROCEDURE — 99231 SBSQ HOSP IP/OBS SF/LOW 25: CPT

## 2023-01-19 PROCEDURE — 99233 SBSQ HOSP IP/OBS HIGH 50: CPT

## 2023-01-19 RX ADMIN — PANTOPRAZOLE SODIUM 40 MILLIGRAM(S): 20 TABLET, DELAYED RELEASE ORAL at 13:12

## 2023-01-19 RX ADMIN — Medication 2 MILLIGRAM(S): at 22:20

## 2023-01-19 RX ADMIN — LISINOPRIL 10 MILLIGRAM(S): 2.5 TABLET ORAL at 05:45

## 2023-01-19 RX ADMIN — ATENOLOL 50 MILLIGRAM(S): 25 TABLET ORAL at 05:45

## 2023-01-19 RX ADMIN — Medication 1 DROP(S): at 05:46

## 2023-01-19 RX ADMIN — Medication 50 MILLIGRAM(S): at 22:20

## 2023-01-19 RX ADMIN — Medication 8 UNIT(S): at 17:30

## 2023-01-19 RX ADMIN — CHLORHEXIDINE GLUCONATE 15 MILLILITER(S): 213 SOLUTION TOPICAL at 17:31

## 2023-01-19 RX ADMIN — Medication 8 UNIT(S): at 05:45

## 2023-01-19 RX ADMIN — Medication 2 MILLIGRAM(S): at 13:12

## 2023-01-19 RX ADMIN — INSULIN GLARGINE 25 UNIT(S): 100 INJECTION, SOLUTION SUBCUTANEOUS at 08:05

## 2023-01-19 RX ADMIN — Medication 2 MILLIGRAM(S): at 05:46

## 2023-01-19 RX ADMIN — CHLORHEXIDINE GLUCONATE 1 APPLICATION(S): 213 SOLUTION TOPICAL at 11:51

## 2023-01-19 RX ADMIN — Medication 81 MILLIGRAM(S): at 11:51

## 2023-01-19 RX ADMIN — APIXABAN 5 MILLIGRAM(S): 2.5 TABLET, FILM COATED ORAL at 05:45

## 2023-01-19 RX ADMIN — APIXABAN 5 MILLIGRAM(S): 2.5 TABLET, FILM COATED ORAL at 17:31

## 2023-01-19 RX ADMIN — CHLORHEXIDINE GLUCONATE 15 MILLILITER(S): 213 SOLUTION TOPICAL at 05:45

## 2023-01-19 RX ADMIN — LOTEPREDNOL ETABONATE 1 DROP(S): 2 SUSPENSION/ DROPS OPHTHALMIC at 05:40

## 2023-01-19 RX ADMIN — OLANZAPINE 5 MILLIGRAM(S): 15 TABLET, FILM COATED ORAL at 22:20

## 2023-01-19 RX ADMIN — Medication 8 UNIT(S): at 11:50

## 2023-01-19 RX ADMIN — Medication 5 MILLIGRAM(S): at 22:19

## 2023-01-19 RX ADMIN — ATORVASTATIN CALCIUM 80 MILLIGRAM(S): 80 TABLET, FILM COATED ORAL at 22:19

## 2023-01-19 NOTE — PROGRESS NOTE ADULT - SUBJECTIVE AND OBJECTIVE BOX
Subjective:  Lying in bed. NAD noted                            T(F): 98.9 (01-19-23 @ 11:36), Max: 99.4 (01-19-23 @ 05:00)  HR: 63 (01-19-23 @ 11:36) (59 - 75)  BP: 112/64 (01-19-23 @ 11:36) (112/64 - 146/63)  RR: 20 (01-19-23 @ 11:36) (18 - 20)  SpO2: 100% (01-19-23 @ 11:36) (97% - 100%)    iodine (Anaphylaxis (Mod to Severe))      01-19    131<L>  |  95<L>  |  41.8<H>  ----------------------------<  189<H>  5.9<H>   |  26.0  |  1.12    Ca    8.6      19 Jan 2023 05:30  Mg     2.0     01-19                                 10.4   13.46 )-----------( 481      ( 19 Jan 2023 05:30 )             32.3               CAPILLARY BLOOD GLUCOSE  POCT Blood Glucose.: 131 mg/dL (19 Jan 2023 11:46)  POCT Blood Glucose.: 123 mg/dL (19 Jan 2023 08:00)  POCT Blood Glucose.: 200 mg/dL (19 Jan 2023 05:44)  POCT Blood Glucose.: 90 mg/dL (18 Jan 2023 22:41)  POCT Blood Glucose.: 120 mg/dL (18 Jan 2023 16:54)             I&O's Detail    18 Jan 2023 07:01  -  19 Jan 2023 07:00  --------------------------------------------------------  IN:    Enteral Tube Flush: 150 mL    Free Water: 700 mL    Glucerna 1.5: 720 mL  Total IN: 1570 mL    OUT:  Total OUT: 0 mL    Total NET: 1570 mL      Active Medications:  acetaminophen     Tablet .. 650 milliGRAM(s) Oral every 6 hours PRN  albuterol/ipratropium for Nebulization 3 milliLiter(s) Nebulizer every 6 hours PRN  aluminum hydroxide/magnesium hydroxide/simethicone Suspension 30 milliLiter(s) Oral every 4 hours PRN  apixaban 5 milliGRAM(s) Enteral Tube every 12 hours  aspirin  chewable 81 milliGRAM(s) Oral daily  atenolol  Tablet 50 milliGRAM(s) Oral daily  atorvastatin 80 milliGRAM(s) Oral at bedtime  chlorhexidine 0.12% Liquid 15 milliLiter(s) Oral Mucosa every 12 hours  chlorhexidine 2% Cloths 1 Application(s) Topical daily  coronavirus bivalent (EUA) Booster Vaccine (PFIZER) 0.3 milliLiter(s) IntraMuscular once  dextrose 5%. 1000 milliLiter(s) IV Continuous <Continuous>  dextrose 5%. 1000 milliLiter(s) IV Continuous <Continuous>  dextrose 50% Injectable 25 Gram(s) IV Push once  dextrose 50% Injectable 12.5 Gram(s) IV Push once  dextrose 50% Injectable 25 Gram(s) IV Push once  dextrose Oral Gel 15 Gram(s) Oral once PRN  doxazosin 2 milliGRAM(s) Oral at bedtime  glucagon  Injectable 1 milliGRAM(s) IntraMuscular once  hydrALAZINE Injectable 10 milliGRAM(s) IV Push every 2 hours PRN  influenza  Vaccine (HIGH DOSE) 0.7 milliLiter(s) IntraMuscular once  insulin glargine Injectable (LANTUS) 25 Unit(s) SubCutaneous every morning  insulin lispro (ADMELOG) corrective regimen sliding scale   SubCutaneous three times a day before meals  insulin lispro Injectable (ADMELOG) 8 Unit(s) SubCutaneous every 6 hours  labetalol Injectable 10 milliGRAM(s) IV Push every 2 hours PRN  lidocaine 2% Gel 1 Application(s) Topical every 6 hours PRN  lisinopril 10 milliGRAM(s) Oral daily  loperamide 2 milliGRAM(s) Oral three times a day  loteprednol 0.5% Ophthalmic Suspension 1 Drop(s) Right EYE daily  melatonin 5 milliGRAM(s) Oral at bedtime  OLANZapine 5 milliGRAM(s) Oral at bedtime  ondansetron Injectable 4 milliGRAM(s) IV Push every 8 hours PRN  pantoprazole   Suspension 40 milliGRAM(s) Oral daily  prednisoLONE acetate 1% Suspension 1 Drop(s) Left EYE daily  traZODone 50 milliGRAM(s) Oral at bedtime      Physical Exam:    Neuro: alert, opens eyes, mouths words,  Pulm/chest: +trach midline c/d/i.  Abd: +PEG c/d/i, +BS, soft, NT, ND  Skin: warm, well perfused       PAST MEDICAL & SURGICAL HISTORY:  HTN (hypertension)      CAD (coronary artery disease)      DM (diabetes mellitus)

## 2023-01-19 NOTE — PROGRESS NOTE ADULT - ASSESSMENT
77M, PMH HTN, DM, CAD.  Transferred from [place] 12/26 with an acute L pontine CVA, possible L vert dissection. Worsening neuro exam 12/27 - RUE plegia, worsening bulbar dysfunction, Acute resp failure due to neurologic injury, required intubation 12/27 for airway protection now s/p trach/peg 1/3, surgicel d/c 1/4, sutures removed 1/10.

## 2023-01-19 NOTE — PROGRESS NOTE ADULT - SUBJECTIVE AND OBJECTIVE BOX
Patient is a 77y old  Male who presents with a chief complaint of Acute stroke (13 Jan 2023 20:12)      INTERVAL HPI/OVERNIGHT EVENTS: pt seen and examined. No complains or events.   Calm and cooperative        MEDICATIONS  (STANDING):  apixaban 5 milliGRAM(s) Enteral Tube every 12 hours  aspirin  chewable 81 milliGRAM(s) Oral daily  atenolol  Tablet 50 milliGRAM(s) Oral daily  atorvastatin 80 milliGRAM(s) Oral at bedtime  cefepime  Injectable.      cefepime  Injectable. 2000 milliGRAM(s) IV Push every 8 hours  chlorhexidine 0.12% Liquid 15 milliLiter(s) Oral Mucosa every 12 hours  chlorhexidine 2% Cloths 1 Application(s) Topical daily  coronavirus bivalent (EUA) Booster Vaccine (PFIZER) 0.3 milliLiter(s) IntraMuscular once  dextrose 5%. 1000 milliLiter(s) (100 mL/Hr) IV Continuous <Continuous>  dextrose 5%. 1000 milliLiter(s) (50 mL/Hr) IV Continuous <Continuous>  dextrose 50% Injectable 25 Gram(s) IV Push once  dextrose 50% Injectable 12.5 Gram(s) IV Push once  dextrose 50% Injectable 25 Gram(s) IV Push once  doxazosin 2 milliGRAM(s) Oral at bedtime  glucagon  Injectable 1 milliGRAM(s) IntraMuscular once  influenza  Vaccine (HIGH DOSE) 0.7 milliLiter(s) IntraMuscular once  insulin glargine Injectable (LANTUS) 25 Unit(s) SubCutaneous every morning  insulin lispro (ADMELOG) corrective regimen sliding scale   SubCutaneous three times a day before meals  insulin lispro Injectable (ADMELOG) 8 Unit(s) SubCutaneous every 6 hours  lisinopril 10 milliGRAM(s) Oral daily  loperamide 2 milliGRAM(s) Oral three times a day  loteprednol 0.5% Ophthalmic Suspension 1 Drop(s) Right EYE daily  melatonin 5 milliGRAM(s) Oral at bedtime  OLANZapine 5 milliGRAM(s) Oral at bedtime  pantoprazole   Suspension 40 milliGRAM(s) Oral daily  prednisoLONE acetate 1% Suspension 1 Drop(s) Left EYE daily  traZODone 50 milliGRAM(s) Oral at bedtime    MEDICATIONS  (PRN):  acetaminophen     Tablet .. 650 milliGRAM(s) Oral every 6 hours PRN Temp greater or equal to 38C (100.4F), Mild Pain (1 - 3)  albuterol/ipratropium for Nebulization 3 milliLiter(s) Nebulizer every 6 hours PRN Shortness of Breath and/or Wheezing  aluminum hydroxide/magnesium hydroxide/simethicone Suspension 30 milliLiter(s) Oral every 4 hours PRN Dyspepsia  dextrose Oral Gel 15 Gram(s) Oral once PRN Blood Glucose LESS THAN 70 milliGRAM(s)/deciliter  hydrALAZINE Injectable 10 milliGRAM(s) IV Push every 2 hours PRN SBP >160  labetalol Injectable 10 milliGRAM(s) IV Push every 2 hours PRN SBP >160  ondansetron Injectable 4 milliGRAM(s) IV Push every 8 hours PRN Nausea and/or Vomiting      Allergies    iodine (Anaphylaxis (Mod to Severe))    Intolerances        REVIEW OF SYSTEMS: not able to obtain       Vital Signs Last 24 Hrs  T(C): 36.8 (14 Jan 2023 11:00), Max: 36.8 (14 Jan 2023 04:29)  T(F): 98.2 (14 Jan 2023 11:00), Max: 98.2 (14 Jan 2023 04:29)  HR: 50 (14 Jan 2023 11:00) (50 - 65)  BP: 151/71 (14 Jan 2023 11:00) (115/68 - 151/71)  BP(mean): --  RR: 17 (14 Jan 2023 11:00) (17 - 18)  SpO2: 100% (14 Jan 2023 11:00) (94% - 100%)    Parameters below as of 14 Jan 2023 11:00  Patient On (Oxygen Delivery Method): ventilator        PHYSICAL EXAM:  GENERAL: awake, following commands   HEAD:  Atraumatic, Normocephalic  EYES: EOMI, PERRLA, conjunctiva and sclera clear  NECK: Supple, No JVD, trached   NERVOUS SYSTEM:  awake, right sided weakness    CHEST/LUNG: Clear to percussion bilaterally; No rales, rhonchi, wheezing, or rubs  HEART: Regular rate and rhythm; No murmurs, rubs, or gallops  ABDOMEN: Soft, Nontender, Nondistended; Bowel sounds present, PEG tube   EXTREMITIES:  No clubbing, cyanosis, or edema      LABS:                        9.8    10.15 )-----------( 509      ( 14 Jan 2023 05:50 )             31.3     01-14    132<L>  |  98  |  44.9<H>  ----------------------------<  132<H>  5.6<H>   |  26.0  |  0.92    Ca    8.6      14 Jan 2023 05:50  Phos  3.7     01-13  Mg     2.2     01-14          CAPILLARY BLOOD GLUCOSE      POCT Blood Glucose.: 150 mg/dL (14 Jan 2023 11:39)  POCT Blood Glucose.: 111 mg/dL (14 Jan 2023 08:10)  POCT Blood Glucose.: 148 mg/dL (14 Jan 2023 05:43)  POCT Blood Glucose.: 123 mg/dL (14 Jan 2023 00:49)  POCT Blood Glucose.: 132 mg/dL (13 Jan 2023 18:48)      RADIOLOGY & ADDITIONAL TESTS:    Imaging Personally Reviewed:  [ x] YES  [ ] NO    Consultant(s) Notes Reviewed:  [ x] YES  [ ] NO    Care Discussed with Consultants/Other Providers [ ] YES  [ ] NO    Plan of Care discussed with Housestaff [x ]YES [ ] NO

## 2023-01-19 NOTE — PROGRESS NOTE ADULT - ASSESSMENT
78 y/o M w/ PMH of CAD, DM2, HTN, transferred from [facility] for CVA after presenting for Rt-sided weakness, difficulty swallowing and slurred speech.  Pt was out of window for TPA.  CTH was (-) but MRI brain was significant for an acute L-debby infarct and MRA was significant for L-vertebral artery stenosis vs dissection vs thrombus.  Pt was admitted to neuroICU.  He was intubated 12/28 for air way protection.  He eventually required trach/peg'd 01/03.  Pts hospital course complicated by LLL PNA w/ Scxs growing MSSA 12/30 and morganella 01/05.  Neurology and neurosurgery following.          No change in plan of care.     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   - c/w ASA and atorvastatin    -c/w Eliquis for thrombosis   On trial of CPAP    - c/w daily spontaneous breathing trials   - s/p suture removal 1/10 by CT surgery  - Pulmonary following and recs noted   - Neurology and nurosurg following and recs noted       VAP  - Scxs from 12/30 growing MSSA   - Scxs from 01/05 growing Morganella Morgani  - Leukocytosis persists but no L-shift, remains afebrile and nontoxic appearing   Completed course of Cefepime through 01/14      Normocytic anemia   - Hb 14 on admission   - H/h remains low but stable   - Pt is on AC for suspected vertebral artery thombus  - Trend CBC and transfuse as needed       PEG dependence  - c/w tube feeds as tolerated   - Diarrhea likely from tube feeds   - Cdiff (-)  - Added banatrol but may need TF changed   - Nutrition following       DM II   - A1c 8  - On basal/bolus insulin regimen   - Titrate insulin to goal BG<180  - ISS and hypoglycemic protocol in place       VTE ppx: on eliquis     Dispo: Discharge postponed, tested positive for COVID

## 2023-01-20 LAB
ANION GAP SERPL CALC-SCNC: 9 MMOL/L — SIGNIFICANT CHANGE UP (ref 5–17)
BUN SERPL-MCNC: 53.4 MG/DL — HIGH (ref 8–20)
CALCIUM SERPL-MCNC: 8.3 MG/DL — LOW (ref 8.4–10.5)
CHLORIDE SERPL-SCNC: 93 MMOL/L — LOW (ref 96–108)
CO2 SERPL-SCNC: 28 MMOL/L — SIGNIFICANT CHANGE UP (ref 22–29)
CREAT SERPL-MCNC: 1.13 MG/DL — SIGNIFICANT CHANGE UP (ref 0.5–1.3)
EGFR: 67 ML/MIN/1.73M2 — SIGNIFICANT CHANGE UP
GLUCOSE BLDC GLUCOMTR-MCNC: 107 MG/DL — HIGH (ref 70–99)
GLUCOSE BLDC GLUCOMTR-MCNC: 123 MG/DL — HIGH (ref 70–99)
GLUCOSE BLDC GLUCOMTR-MCNC: 130 MG/DL — HIGH (ref 70–99)
GLUCOSE BLDC GLUCOMTR-MCNC: 135 MG/DL — HIGH (ref 70–99)
GLUCOSE BLDC GLUCOMTR-MCNC: 173 MG/DL — HIGH (ref 70–99)
GLUCOSE SERPL-MCNC: 97 MG/DL — SIGNIFICANT CHANGE UP (ref 70–99)
HCT VFR BLD CALC: 29 % — LOW (ref 39–50)
HGB BLD-MCNC: 9.2 G/DL — LOW (ref 13–17)
MCHC RBC-ENTMCNC: 29.1 PG — SIGNIFICANT CHANGE UP (ref 27–34)
MCHC RBC-ENTMCNC: 31.7 GM/DL — LOW (ref 32–36)
MCV RBC AUTO: 91.8 FL — SIGNIFICANT CHANGE UP (ref 80–100)
PLATELET # BLD AUTO: 386 K/UL — SIGNIFICANT CHANGE UP (ref 150–400)
POTASSIUM SERPL-MCNC: 4.8 MMOL/L — SIGNIFICANT CHANGE UP (ref 3.5–5.3)
POTASSIUM SERPL-SCNC: 4.8 MMOL/L — SIGNIFICANT CHANGE UP (ref 3.5–5.3)
RBC # BLD: 3.16 M/UL — LOW (ref 4.2–5.8)
RBC # FLD: 12.8 % — SIGNIFICANT CHANGE UP (ref 10.3–14.5)
SODIUM SERPL-SCNC: 130 MMOL/L — LOW (ref 135–145)
WBC # BLD: 13.13 K/UL — HIGH (ref 3.8–10.5)
WBC # FLD AUTO: 13.13 K/UL — HIGH (ref 3.8–10.5)

## 2023-01-20 PROCEDURE — 99233 SBSQ HOSP IP/OBS HIGH 50: CPT

## 2023-01-20 PROCEDURE — 99231 SBSQ HOSP IP/OBS SF/LOW 25: CPT

## 2023-01-20 RX ADMIN — Medication 650 MILLIGRAM(S): at 21:55

## 2023-01-20 RX ADMIN — Medication 8 UNIT(S): at 17:20

## 2023-01-20 RX ADMIN — Medication 8 UNIT(S): at 05:44

## 2023-01-20 RX ADMIN — Medication 8 UNIT(S): at 11:40

## 2023-01-20 RX ADMIN — LOTEPREDNOL ETABONATE 1 DROP(S): 2 SUSPENSION/ DROPS OPHTHALMIC at 06:15

## 2023-01-20 RX ADMIN — INSULIN GLARGINE 25 UNIT(S): 100 INJECTION, SOLUTION SUBCUTANEOUS at 08:41

## 2023-01-20 RX ADMIN — ATORVASTATIN CALCIUM 80 MILLIGRAM(S): 80 TABLET, FILM COATED ORAL at 20:25

## 2023-01-20 RX ADMIN — APIXABAN 5 MILLIGRAM(S): 2.5 TABLET, FILM COATED ORAL at 05:44

## 2023-01-20 RX ADMIN — Medication 5 MILLIGRAM(S): at 20:25

## 2023-01-20 RX ADMIN — Medication 650 MILLIGRAM(S): at 00:10

## 2023-01-20 RX ADMIN — Medication 650 MILLIGRAM(S): at 20:25

## 2023-01-20 RX ADMIN — Medication 2 MILLIGRAM(S): at 05:45

## 2023-01-20 RX ADMIN — Medication 2 MILLIGRAM(S): at 20:26

## 2023-01-20 RX ADMIN — CHLORHEXIDINE GLUCONATE 15 MILLILITER(S): 213 SOLUTION TOPICAL at 05:43

## 2023-01-20 RX ADMIN — CHLORHEXIDINE GLUCONATE 15 MILLILITER(S): 213 SOLUTION TOPICAL at 17:21

## 2023-01-20 RX ADMIN — Medication 81 MILLIGRAM(S): at 11:40

## 2023-01-20 RX ADMIN — LISINOPRIL 10 MILLIGRAM(S): 2.5 TABLET ORAL at 05:43

## 2023-01-20 RX ADMIN — Medication 1 DROP(S): at 05:44

## 2023-01-20 RX ADMIN — PANTOPRAZOLE SODIUM 40 MILLIGRAM(S): 20 TABLET, DELAYED RELEASE ORAL at 13:24

## 2023-01-20 RX ADMIN — CHLORHEXIDINE GLUCONATE 1 APPLICATION(S): 213 SOLUTION TOPICAL at 11:40

## 2023-01-20 RX ADMIN — APIXABAN 5 MILLIGRAM(S): 2.5 TABLET, FILM COATED ORAL at 17:20

## 2023-01-20 RX ADMIN — Medication 50 MILLIGRAM(S): at 20:26

## 2023-01-20 RX ADMIN — Medication 650 MILLIGRAM(S): at 01:10

## 2023-01-20 RX ADMIN — Medication 2 MILLIGRAM(S): at 21:36

## 2023-01-20 RX ADMIN — OLANZAPINE 5 MILLIGRAM(S): 15 TABLET, FILM COATED ORAL at 23:23

## 2023-01-20 RX ADMIN — Medication 2 MILLIGRAM(S): at 13:25

## 2023-01-20 NOTE — PROGRESS NOTE ADULT - ASSESSMENT
78 y/o M w/ PMH of CAD, DM2, HTN, transferred from [facility] for CVA after presenting for Rt-sided weakness, difficulty swallowing and slurred speech.  Pt was out of window for TPA.  CTH was (-) but MRI brain was significant for an acute L-debby infarct and MRA was significant for L-vertebral artery stenosis vs dissection vs thrombus.  Pt was admitted to neuroICU.  He was intubated 12/28 for air way protection.  He eventually required trach/peg'd 01/03.  Pts hospital course complicated by LLL PNA w/ Scxs growing MSSA 12/30 and morganella 01/05.  Neurology and neurosurgery following.          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   - c/w ASA and atorvastatin    -c/w Eliquis for thrombosis   On trial of CPAP    - c/w daily spontaneous breathing trials   - s/p suture removal 1/10 by CT surgery  - Pulmonary following and recs noted   - Neurology and nurosurg following and recs noted   Spoke to PT, pt needs daily PT       VAP  - Scxs from 12/30 growing MSSA   - Scxs from 01/05 growing Morganella Morgani  - Leukocytosis persists but no L-shift, remains afebrile and nontoxic appearing   Completed course of Cefepime through 01/14      Normocytic anemia   - Hb 14 on admission   - H/h remains low but stable   - Pt is on AC for suspected vertebral artery thombus  - Trend CBC and transfuse as needed       PEG dependence  - c/w tube feeds as tolerated   - Diarrhea likely from tube feeds   - Cdiff (-)  - Added banatrol but may need TF changed   - Nutrition following       DM II   - A1c 8  - On basal/bolus insulin regimen   - Titrate insulin to goal BG<180  - ISS and hypoglycemic protocol in place       VTE ppx: on eliquis     Dispo: Discharge postponed, tested positive for COVID    Needs daily PT

## 2023-01-20 NOTE — PROGRESS NOTE ADULT - SUBJECTIVE AND OBJECTIVE BOX
Patient is a 77y old  Male who presents with a chief complaint of Acute stroke (13 Jan 2023 20:12)      INTERVAL HPI/OVERNIGHT EVENTS: pt seen and examined. No complains  Calm and cooperative  Per RN, pt keeps retaining urine         MEDICATIONS  (STANDING):  apixaban 5 milliGRAM(s) Enteral Tube every 12 hours  aspirin  chewable 81 milliGRAM(s) Oral daily  atenolol  Tablet 50 milliGRAM(s) Oral daily  atorvastatin 80 milliGRAM(s) Oral at bedtime  cefepime  Injectable.      cefepime  Injectable. 2000 milliGRAM(s) IV Push every 8 hours  chlorhexidine 0.12% Liquid 15 milliLiter(s) Oral Mucosa every 12 hours  chlorhexidine 2% Cloths 1 Application(s) Topical daily  coronavirus bivalent (EUA) Booster Vaccine (PFIZER) 0.3 milliLiter(s) IntraMuscular once  dextrose 5%. 1000 milliLiter(s) (100 mL/Hr) IV Continuous <Continuous>  dextrose 5%. 1000 milliLiter(s) (50 mL/Hr) IV Continuous <Continuous>  dextrose 50% Injectable 25 Gram(s) IV Push once  dextrose 50% Injectable 12.5 Gram(s) IV Push once  dextrose 50% Injectable 25 Gram(s) IV Push once  doxazosin 2 milliGRAM(s) Oral at bedtime  glucagon  Injectable 1 milliGRAM(s) IntraMuscular once  influenza  Vaccine (HIGH DOSE) 0.7 milliLiter(s) IntraMuscular once  insulin glargine Injectable (LANTUS) 25 Unit(s) SubCutaneous every morning  insulin lispro (ADMELOG) corrective regimen sliding scale   SubCutaneous three times a day before meals  insulin lispro Injectable (ADMELOG) 8 Unit(s) SubCutaneous every 6 hours  lisinopril 10 milliGRAM(s) Oral daily  loperamide 2 milliGRAM(s) Oral three times a day  loteprednol 0.5% Ophthalmic Suspension 1 Drop(s) Right EYE daily  melatonin 5 milliGRAM(s) Oral at bedtime  OLANZapine 5 milliGRAM(s) Oral at bedtime  pantoprazole   Suspension 40 milliGRAM(s) Oral daily  prednisoLONE acetate 1% Suspension 1 Drop(s) Left EYE daily  traZODone 50 milliGRAM(s) Oral at bedtime    MEDICATIONS  (PRN):  acetaminophen     Tablet .. 650 milliGRAM(s) Oral every 6 hours PRN Temp greater or equal to 38C (100.4F), Mild Pain (1 - 3)  albuterol/ipratropium for Nebulization 3 milliLiter(s) Nebulizer every 6 hours PRN Shortness of Breath and/or Wheezing  aluminum hydroxide/magnesium hydroxide/simethicone Suspension 30 milliLiter(s) Oral every 4 hours PRN Dyspepsia  dextrose Oral Gel 15 Gram(s) Oral once PRN Blood Glucose LESS THAN 70 milliGRAM(s)/deciliter  hydrALAZINE Injectable 10 milliGRAM(s) IV Push every 2 hours PRN SBP >160  labetalol Injectable 10 milliGRAM(s) IV Push every 2 hours PRN SBP >160  ondansetron Injectable 4 milliGRAM(s) IV Push every 8 hours PRN Nausea and/or Vomiting      Allergies    iodine (Anaphylaxis (Mod to Severe))    Intolerances        REVIEW OF SYSTEMS: not able to obtain       Vital Signs Last 24 Hrs  T(C): 36.8 (14 Jan 2023 11:00), Max: 36.8 (14 Jan 2023 04:29)  T(F): 98.2 (14 Jan 2023 11:00), Max: 98.2 (14 Jan 2023 04:29)  HR: 50 (14 Jan 2023 11:00) (50 - 65)  BP: 151/71 (14 Jan 2023 11:00) (115/68 - 151/71)  BP(mean): --  RR: 17 (14 Jan 2023 11:00) (17 - 18)  SpO2: 100% (14 Jan 2023 11:00) (94% - 100%)    Parameters below as of 14 Jan 2023 11:00  Patient On (Oxygen Delivery Method): ventilator        PHYSICAL EXAM:  GENERAL: awake, following commands   HEAD:  Atraumatic, Normocephalic  EYES: EOMI, PERRLA, conjunctiva and sclera clear  NECK: Supple, No JVD, trached   NERVOUS SYSTEM:  awake, right sided weakness    CHEST/LUNG: Clear to percussion bilaterally; No rales, rhonchi, wheezing, or rubs  HEART: Regular rate and rhythm; No murmurs, rubs, or gallops  ABDOMEN: Soft, Nontender, Nondistended; Bowel sounds present, PEG tube   EXTREMITIES:  No clubbing, cyanosis, or edema      LABS:                        9.8    10.15 )-----------( 509      ( 14 Jan 2023 05:50 )             31.3     01-14    132<L>  |  98  |  44.9<H>  ----------------------------<  132<H>  5.6<H>   |  26.0  |  0.92    Ca    8.6      14 Jan 2023 05:50  Phos  3.7     01-13  Mg     2.2     01-14          CAPILLARY BLOOD GLUCOSE      POCT Blood Glucose.: 150 mg/dL (14 Jan 2023 11:39)  POCT Blood Glucose.: 111 mg/dL (14 Jan 2023 08:10)  POCT Blood Glucose.: 148 mg/dL (14 Jan 2023 05:43)  POCT Blood Glucose.: 123 mg/dL (14 Jan 2023 00:49)  POCT Blood Glucose.: 132 mg/dL (13 Jan 2023 18:48)      RADIOLOGY & ADDITIONAL TESTS:    Imaging Personally Reviewed:  [ x] YES  [ ] NO    Consultant(s) Notes Reviewed:  [ x] YES  [ ] NO    Care Discussed with Consultants/Other Providers [ ] YES  [ ] NO    Plan of Care discussed with Housestaff [x ]YES [ ] NO

## 2023-01-20 NOTE — PROGRESS NOTE ADULT - SUBJECTIVE AND OBJECTIVE BOX
Subjective: Patient seen and assessed s/p trach. Patient states  At time of exam,     Pertinent events of the past 24 hours: Uneventful, recovering as expected    VITAL SIGNS  Vital Signs Last 24 Hrs  T(C): 36.4 (01-20-23 @ 05:04), Max: 37.2 (01-19-23 @ 11:36)  T(F): 97.5 (01-20-23 @ 05:04), Max: 98.9 (01-19-23 @ 11:36)  HR: 50 (01-20-23 @ 05:25) (50 - 70)  BP: 147/82 (01-20-23 @ 05:04) (101/53 - 147/82)  RR: 19 (01-20-23 @ 05:04) (18 - 20)  SpO2: 100% (01-20-23 @ 05:25) (95% - 100%)  on (O2)              MEDICATIONS  acetaminophen     Tablet .. 650 milliGRAM(s) Oral every 6 hours PRN  albuterol/ipratropium for Nebulization 3 milliLiter(s) Nebulizer every 6 hours PRN  aluminum hydroxide/magnesium hydroxide/simethicone Suspension 30 milliLiter(s) Oral every 4 hours PRN  apixaban 5 milliGRAM(s) Enteral Tube every 12 hours  aspirin  chewable 81 milliGRAM(s) Oral daily  atenolol  Tablet 50 milliGRAM(s) Oral daily  atorvastatin 80 milliGRAM(s) Oral at bedtime  chlorhexidine 0.12% Liquid 15 milliLiter(s) Oral Mucosa every 12 hours  chlorhexidine 2% Cloths 1 Application(s) Topical daily  coronavirus bivalent (EUA) Booster Vaccine (PFIZER) 0.3 milliLiter(s) IntraMuscular once  dextrose 5%. 1000 milliLiter(s) IV Continuous <Continuous>  dextrose 5%. 1000 milliLiter(s) IV Continuous <Continuous>  dextrose 50% Injectable 25 Gram(s) IV Push once  dextrose 50% Injectable 12.5 Gram(s) IV Push once  dextrose 50% Injectable 25 Gram(s) IV Push once  dextrose Oral Gel 15 Gram(s) Oral once PRN  doxazosin 2 milliGRAM(s) Oral at bedtime  glucagon  Injectable 1 milliGRAM(s) IntraMuscular once  hydrALAZINE Injectable 10 milliGRAM(s) IV Push every 2 hours PRN  influenza  Vaccine (HIGH DOSE) 0.7 milliLiter(s) IntraMuscular once  insulin glargine Injectable (LANTUS) 25 Unit(s) SubCutaneous every morning  insulin lispro (ADMELOG) corrective regimen sliding scale   SubCutaneous three times a day before meals  insulin lispro Injectable (ADMELOG) 8 Unit(s) SubCutaneous every 6 hours  labetalol Injectable 10 milliGRAM(s) IV Push every 2 hours PRN  lidocaine 2% Gel 1 Application(s) Topical every 6 hours PRN  lisinopril 10 milliGRAM(s) Oral daily  loperamide 2 milliGRAM(s) Oral three times a day  loteprednol 0.5% Ophthalmic Suspension 1 Drop(s) Right EYE daily  melatonin 5 milliGRAM(s) Oral at bedtime  OLANZapine 5 milliGRAM(s) Oral at bedtime  ondansetron Injectable 4 milliGRAM(s) IV Push every 8 hours PRN  pantoprazole   Suspension 40 milliGRAM(s) Oral daily  prednisoLONE acetate 1% Suspension 1 Drop(s) Left EYE daily  traZODone 50 milliGRAM(s) Oral at bedtime    PHYSICAL EXAM  to follow     01-19 @ 07:01  -  01-20 @ 07:00  --------------------------------------------------------  IN: 3265 mL / OUT: 1200 mL / NET: 2065 mL    Weights:  Daily     Daily   Admit Wt: Drug Dosing Weight  Height (cm): 157.5 (26 Dec 2022 21:23)  Weight (kg): 75.5 (11 Jan 2023 09:53)  BMI (kg/m2): 30.4 (11 Jan 2023 09:53)  BSA (m2): 1.77 (11 Jan 2023 09:53)    All laboratory results, radiology and medications reviewed.    LABS  01-19    131<L>  |  95<L>  |  41.8<H>  ----------------------------<  189<H>  5.9<H>   |  26.0  |  1.12    Ca    8.6      19 Jan 2023 05:30  Mg     2.0     01-19                                   10.4   13.46 )-----------( 481      ( 19 Jan 2023 05:30 )             32.3              CAPILLARY BLOOD GLUCOSE  POCT Blood Glucose.: 130 mg/dL (20 Jan 2023 07:45)  POCT Blood Glucose.: 173 mg/dL (20 Jan 2023 05:42)  POCT Blood Glucose.: 95 mg/dL (19 Jan 2023 22:18)  POCT Blood Glucose.: 98 mg/dL (19 Jan 2023 17:27)  POCT Blood Glucose.: 131 mg/dL (19 Jan 2023 11:46)  POCT Blood Glucose.: 123 mg/dL (19 Jan 2023 08:00)         < from: Xray Chest 1 View- PORTABLE-Urgent (Xray Chest 1 View- PORTABLE-Urgent .) (01.02.23 @ 12:13) >    IMPRESSION:   Catheters and tubes in place.  Residual LEFT retrocardiac airspace consolidation and/or effusion..    < end of copied text >   Subjective: Patient seen and assessed s/p trach. Patient mouths "hi" At time of exam, patient in NAD with no acute complaints.      Pertinent events of the past 24 hours: Uneventful, recovering as expected    VITAL SIGNS  Vital Signs Last 24 Hrs  T(C): 36.4 (01-20-23 @ 05:04), Max: 37.2 (01-19-23 @ 11:36)  T(F): 97.5 (01-20-23 @ 05:04), Max: 98.9 (01-19-23 @ 11:36)  HR: 50 (01-20-23 @ 05:25) (50 - 70)  BP: 147/82 (01-20-23 @ 05:04) (101/53 - 147/82)  RR: 19 (01-20-23 @ 05:04) (18 - 20)  SpO2: 100% (01-20-23 @ 05:25) (95% - 100%)  on (O2)              MEDICATIONS  acetaminophen     Tablet .. 650 milliGRAM(s) Oral every 6 hours PRN  albuterol/ipratropium for Nebulization 3 milliLiter(s) Nebulizer every 6 hours PRN  aluminum hydroxide/magnesium hydroxide/simethicone Suspension 30 milliLiter(s) Oral every 4 hours PRN  apixaban 5 milliGRAM(s) Enteral Tube every 12 hours  aspirin  chewable 81 milliGRAM(s) Oral daily  atenolol  Tablet 50 milliGRAM(s) Oral daily  atorvastatin 80 milliGRAM(s) Oral at bedtime  chlorhexidine 0.12% Liquid 15 milliLiter(s) Oral Mucosa every 12 hours  chlorhexidine 2% Cloths 1 Application(s) Topical daily  coronavirus bivalent (EUA) Booster Vaccine (PFIZER) 0.3 milliLiter(s) IntraMuscular once  dextrose 5%. 1000 milliLiter(s) IV Continuous <Continuous>  dextrose 5%. 1000 milliLiter(s) IV Continuous <Continuous>  dextrose 50% Injectable 25 Gram(s) IV Push once  dextrose 50% Injectable 12.5 Gram(s) IV Push once  dextrose 50% Injectable 25 Gram(s) IV Push once  dextrose Oral Gel 15 Gram(s) Oral once PRN  doxazosin 2 milliGRAM(s) Oral at bedtime  glucagon  Injectable 1 milliGRAM(s) IntraMuscular once  hydrALAZINE Injectable 10 milliGRAM(s) IV Push every 2 hours PRN  influenza  Vaccine (HIGH DOSE) 0.7 milliLiter(s) IntraMuscular once  insulin glargine Injectable (LANTUS) 25 Unit(s) SubCutaneous every morning  insulin lispro (ADMELOG) corrective regimen sliding scale   SubCutaneous three times a day before meals  insulin lispro Injectable (ADMELOG) 8 Unit(s) SubCutaneous every 6 hours  labetalol Injectable 10 milliGRAM(s) IV Push every 2 hours PRN  lidocaine 2% Gel 1 Application(s) Topical every 6 hours PRN  lisinopril 10 milliGRAM(s) Oral daily  loperamide 2 milliGRAM(s) Oral three times a day  loteprednol 0.5% Ophthalmic Suspension 1 Drop(s) Right EYE daily  melatonin 5 milliGRAM(s) Oral at bedtime  OLANZapine 5 milliGRAM(s) Oral at bedtime  ondansetron Injectable 4 milliGRAM(s) IV Push every 8 hours PRN  pantoprazole   Suspension 40 milliGRAM(s) Oral daily  prednisoLONE acetate 1% Suspension 1 Drop(s) Left EYE daily  traZODone 50 milliGRAM(s) Oral at bedtime    PHYSICAL EXAM  Neuro: alert, opens eyes, mouths words,  Pulm/chest: +trach midline c/d/i.  Abd: +PEG c/d/i, +BS, soft, NT, ND  Skin: warm, well perfused    01-19 @ 07:01  -  01-20 @ 07:00  --------------------------------------------------------  IN: 3265 mL / OUT: 1200 mL / NET: 2065 mL    Weights:  Daily     Daily   Admit Wt: Drug Dosing Weight  Height (cm): 157.5 (26 Dec 2022 21:23)  Weight (kg): 75.5 (11 Jan 2023 09:53)  BMI (kg/m2): 30.4 (11 Jan 2023 09:53)  BSA (m2): 1.77 (11 Jan 2023 09:53)    All laboratory results, radiology and medications reviewed.    LABS  01-19    131<L>  |  95<L>  |  41.8<H>  ----------------------------<  189<H>  5.9<H>   |  26.0  |  1.12    Ca    8.6      19 Jan 2023 05:30  Mg     2.0     01-19                                   10.4   13.46 )-----------( 481      ( 19 Jan 2023 05:30 )             32.3              CAPILLARY BLOOD GLUCOSE  POCT Blood Glucose.: 130 mg/dL (20 Jan 2023 07:45)  POCT Blood Glucose.: 173 mg/dL (20 Jan 2023 05:42)  POCT Blood Glucose.: 95 mg/dL (19 Jan 2023 22:18)  POCT Blood Glucose.: 98 mg/dL (19 Jan 2023 17:27)  POCT Blood Glucose.: 131 mg/dL (19 Jan 2023 11:46)  POCT Blood Glucose.: 123 mg/dL (19 Jan 2023 08:00)         < from: Xray Chest 1 View- PORTABLE-Urgent (Xray Chest 1 View- PORTABLE-Urgent .) (01.02.23 @ 12:13) >    IMPRESSION:   Catheters and tubes in place.  Residual LEFT retrocardiac airspace consolidation and/or effusion..    < end of copied text >

## 2023-01-20 NOTE — PROGRESS NOTE ADULT - ASSESSMENT
77M, PMH HTN, DM, CAD.  Transferred from [place] 12/26 with an acute L pontine CVA, possible L vert dissection. Worsening neuro exam 12/27 - RUE plegia, worsening bulbar dysfunction, Acute resp failure due to neurologic injury, required intubation 12/27 for airway protection now s/p trach/peg 1/3, surgicel d/c 1/4, sutures removed 1/10.

## 2023-01-21 LAB
ALBUMIN SERPL ELPH-MCNC: 2.5 G/DL — LOW (ref 3.3–5.2)
ANION GAP SERPL CALC-SCNC: 8 MMOL/L — SIGNIFICANT CHANGE UP (ref 5–17)
BUN SERPL-MCNC: 50.3 MG/DL — HIGH (ref 8–20)
CALCIUM SERPL-MCNC: 8.3 MG/DL — LOW (ref 8.4–10.5)
CHLORIDE SERPL-SCNC: 95 MMOL/L — LOW (ref 96–108)
CO2 SERPL-SCNC: 28 MMOL/L — SIGNIFICANT CHANGE UP (ref 22–29)
CREAT SERPL-MCNC: 0.99 MG/DL — SIGNIFICANT CHANGE UP (ref 0.5–1.3)
EGFR: 78 ML/MIN/1.73M2 — SIGNIFICANT CHANGE UP
GLUCOSE BLDC GLUCOMTR-MCNC: 118 MG/DL — HIGH (ref 70–99)
GLUCOSE BLDC GLUCOMTR-MCNC: 144 MG/DL — HIGH (ref 70–99)
GLUCOSE BLDC GLUCOMTR-MCNC: 170 MG/DL — HIGH (ref 70–99)
GLUCOSE BLDC GLUCOMTR-MCNC: 185 MG/DL — HIGH (ref 70–99)
GLUCOSE BLDC GLUCOMTR-MCNC: 81 MG/DL — SIGNIFICANT CHANGE UP (ref 70–99)
GLUCOSE SERPL-MCNC: 162 MG/DL — HIGH (ref 70–99)
HCT VFR BLD CALC: 28.1 % — LOW (ref 39–50)
HGB BLD-MCNC: 9.1 G/DL — LOW (ref 13–17)
MAGNESIUM SERPL-MCNC: 2.4 MG/DL — SIGNIFICANT CHANGE UP (ref 1.6–2.6)
MCHC RBC-ENTMCNC: 28.4 PG — SIGNIFICANT CHANGE UP (ref 27–34)
MCHC RBC-ENTMCNC: 32.4 GM/DL — SIGNIFICANT CHANGE UP (ref 32–36)
MCV RBC AUTO: 87.8 FL — SIGNIFICANT CHANGE UP (ref 80–100)
PHOSPHATE SERPL-MCNC: 3.5 MG/DL — SIGNIFICANT CHANGE UP (ref 2.4–4.7)
PLATELET # BLD AUTO: 379 K/UL — SIGNIFICANT CHANGE UP (ref 150–400)
POTASSIUM SERPL-MCNC: 5.1 MMOL/L — SIGNIFICANT CHANGE UP (ref 3.5–5.3)
POTASSIUM SERPL-SCNC: 5.1 MMOL/L — SIGNIFICANT CHANGE UP (ref 3.5–5.3)
RBC # BLD: 3.2 M/UL — LOW (ref 4.2–5.8)
RBC # FLD: 12.7 % — SIGNIFICANT CHANGE UP (ref 10.3–14.5)
SODIUM SERPL-SCNC: 131 MMOL/L — LOW (ref 135–145)
WBC # BLD: 10.58 K/UL — HIGH (ref 3.8–10.5)
WBC # FLD AUTO: 10.58 K/UL — HIGH (ref 3.8–10.5)

## 2023-01-21 PROCEDURE — 99231 SBSQ HOSP IP/OBS SF/LOW 25: CPT

## 2023-01-21 PROCEDURE — 99233 SBSQ HOSP IP/OBS HIGH 50: CPT

## 2023-01-21 RX ORDER — SODIUM CHLORIDE 9 MG/ML
1000 INJECTION INTRAMUSCULAR; INTRAVENOUS; SUBCUTANEOUS ONCE
Refills: 0 | Status: COMPLETED | OUTPATIENT
Start: 2023-01-21 | End: 2023-01-21

## 2023-01-21 RX ADMIN — Medication 81 MILLIGRAM(S): at 08:08

## 2023-01-21 RX ADMIN — Medication 8 UNIT(S): at 11:10

## 2023-01-21 RX ADMIN — OLANZAPINE 5 MILLIGRAM(S): 15 TABLET, FILM COATED ORAL at 21:15

## 2023-01-21 RX ADMIN — Medication 2: at 11:10

## 2023-01-21 RX ADMIN — ATORVASTATIN CALCIUM 80 MILLIGRAM(S): 80 TABLET, FILM COATED ORAL at 21:15

## 2023-01-21 RX ADMIN — SODIUM CHLORIDE 200 MILLILITER(S): 9 INJECTION INTRAMUSCULAR; INTRAVENOUS; SUBCUTANEOUS at 11:10

## 2023-01-21 RX ADMIN — CHLORHEXIDINE GLUCONATE 1 APPLICATION(S): 213 SOLUTION TOPICAL at 08:08

## 2023-01-21 RX ADMIN — Medication 650 MILLIGRAM(S): at 22:55

## 2023-01-21 RX ADMIN — PANTOPRAZOLE SODIUM 40 MILLIGRAM(S): 20 TABLET, DELAYED RELEASE ORAL at 08:08

## 2023-01-21 RX ADMIN — APIXABAN 5 MILLIGRAM(S): 2.5 TABLET, FILM COATED ORAL at 16:51

## 2023-01-21 RX ADMIN — APIXABAN 5 MILLIGRAM(S): 2.5 TABLET, FILM COATED ORAL at 05:48

## 2023-01-21 RX ADMIN — Medication 1 DROP(S): at 05:48

## 2023-01-21 RX ADMIN — Medication 2 MILLIGRAM(S): at 21:16

## 2023-01-21 RX ADMIN — Medication 2 MILLIGRAM(S): at 13:03

## 2023-01-21 RX ADMIN — Medication 2 MILLIGRAM(S): at 21:15

## 2023-01-21 RX ADMIN — CHLORHEXIDINE GLUCONATE 15 MILLILITER(S): 213 SOLUTION TOPICAL at 16:51

## 2023-01-21 RX ADMIN — Medication 2 MILLIGRAM(S): at 05:48

## 2023-01-21 RX ADMIN — CHLORHEXIDINE GLUCONATE 15 MILLILITER(S): 213 SOLUTION TOPICAL at 05:48

## 2023-01-21 RX ADMIN — ATENOLOL 50 MILLIGRAM(S): 25 TABLET ORAL at 05:48

## 2023-01-21 RX ADMIN — LISINOPRIL 10 MILLIGRAM(S): 2.5 TABLET ORAL at 05:48

## 2023-01-21 RX ADMIN — Medication 50 MILLIGRAM(S): at 21:16

## 2023-01-21 RX ADMIN — Medication 650 MILLIGRAM(S): at 21:16

## 2023-01-21 RX ADMIN — Medication 8 UNIT(S): at 23:10

## 2023-01-21 RX ADMIN — INSULIN GLARGINE 25 UNIT(S): 100 INJECTION, SOLUTION SUBCUTANEOUS at 08:06

## 2023-01-21 RX ADMIN — Medication 5 MILLIGRAM(S): at 21:15

## 2023-01-21 RX ADMIN — LOTEPREDNOL ETABONATE 1 DROP(S): 2 SUSPENSION/ DROPS OPHTHALMIC at 06:15

## 2023-01-21 NOTE — PROGRESS NOTE ADULT - PROBLEM SELECTOR PLAN 1
Admitted with Acute Left Pontine CVA.  Intubated 12/27 for airway protection.  S/p Trach/PEG 1/3  Surgicel removed 1/4.  Sutures removed 1/10  Both trach and PEG sites C/D/I    Maintain gauze/drain sponge between trach flange and skin to prevent skin breakdown. Change as needed.  Continue trach care and suctioning.   Continue tube feeds as tolerated.   Thoracic surgery to continue to follow.    Plan discussed with Dr. Hernandez.

## 2023-01-21 NOTE — PROGRESS NOTE ADULT - ASSESSMENT
77M, PMH HTN, DM, CAD.  Transferred from [place] 12/26 with an acute L pontine CVA, possible L vert dissection. Worsening neuro exam 12/27 - RUE plegia, worsening bulbar dysfunction, Acute resp failure due to neurologic injury, required intubation 12/27 for airway protection now s/p trach/peg 1/3, surgicel d/c 1/4, sutures removed 1/10.

## 2023-01-21 NOTE — PROGRESS NOTE ADULT - SUBJECTIVE AND OBJECTIVE BOX
CC: Acute stroke (20 Jan 2023 12:10)    INTERVAL HPI/OVERNIGHT EVENTS:  no acute events overnight    Vital Signs Last 24 Hrs  T(C): 36.7 (21 Jan 2023 10:20), Max: 37.2 (21 Jan 2023 04:24)  T(F): 98 (21 Jan 2023 10:20), Max: 98.9 (21 Jan 2023 04:24)  HR: 49 (21 Jan 2023 10:20) (49 - 63)  BP: 103/60 (21 Jan 2023 10:20) (103/60 - 128/86)  BP(mean): --  RR: 17 (21 Jan 2023 10:20) (17 - 18)  SpO2: 98% (21 Jan 2023 10:20) (91% - 100%)    Parameters below as of 21 Jan 2023 10:20  Patient On (Oxygen Delivery Method): room air    PHYSICAL EXAM:  General: in no acute distress  Eyes: PERRLA, EOMI; conjunctiva and sclera clear  Head: Normocephalic; atraumatic  ENMT: No nasal discharge; airway clear  Neck: Supple; non tender; no masses; trach in place  Respiratory: No wheezes, rales or rhonchi  Cardiovascular: Regular rate and rhythm. S1 and S2 Normal; No murmurs, gallops or rubs  Gastrointestinal: Soft non-tender non-distended; Normal bowel sounds; PEG in place  Vascular: Peripheral pulses palpable 2+ bilaterally  Neurological: Alert but unable to assess if oriented; follows commands  Skin: Warm and dry. No acute rash  Psychiatric: Cooperative and appropriate    I&O's Detail    20 Jan 2023 07:01  -  21 Jan 2023 07:00  --------------------------------------------------------  IN:    Free Water: 1400 mL    Glucerna 1.5: 1380 mL  Total IN: 2780 mL    OUT:    Indwelling Catheter - Urethral (mL): 1000 mL  Total OUT: 1000 mL    Total NET: 1780 mL                       9.1    10.58 )-----------( 379      ( 21 Jan 2023 09:25 )             28.1     21 Jan 2023 09:25    131    |  95     |  50.3   ----------------------------<  162    5.1     |  28.0   |  0.99     Ca    8.3        21 Jan 2023 09:25  Phos  3.5       21 Jan 2023 09:25  Mg     2.4       21 Jan 2023 09:25    TPro  x      /  Alb  2.5    /  TBili  x      /  DBili  x      /  AST  x      /  ALT  x      /  AlkPhos  x      21 Jan 2023 09:25    CAPILLARY BLOOD GLUCOSE  POCT Blood Glucose.: 185 mg/dL (21 Jan 2023 11:06)  POCT Blood Glucose.: 170 mg/dL (21 Jan 2023 08:05)  POCT Blood Glucose.: 144 mg/dL (21 Jan 2023 05:46)  POCT Blood Glucose.: 135 mg/dL (20 Jan 2023 23:11)  POCT Blood Glucose.: 123 mg/dL (20 Jan 2023 17:17)    LIVER FUNCTIONS - ( 21 Jan 2023 09:25 )  Alb: 2.5 g/dL / Pro: x     / ALK PHOS: x     / ALT: x     / AST: x     / GGT: x           MEDICATIONS  (STANDING):  apixaban 5 milliGRAM(s) Enteral Tube every 12 hours  aspirin  chewable 81 milliGRAM(s) Oral daily  atenolol  Tablet 50 milliGRAM(s) Oral daily  atorvastatin 80 milliGRAM(s) Oral at bedtime  chlorhexidine 0.12% Liquid 15 milliLiter(s) Oral Mucosa every 12 hours  chlorhexidine 2% Cloths 1 Application(s) Topical daily  coronavirus bivalent (EUA) Booster Vaccine (PFIZER) 0.3 milliLiter(s) IntraMuscular once  dextrose 5%. 1000 milliLiter(s) (100 mL/Hr) IV Continuous <Continuous>  dextrose 5%. 1000 milliLiter(s) (50 mL/Hr) IV Continuous <Continuous>  dextrose 50% Injectable 25 Gram(s) IV Push once  dextrose 50% Injectable 12.5 Gram(s) IV Push once  dextrose 50% Injectable 25 Gram(s) IV Push once  doxazosin 2 milliGRAM(s) Oral at bedtime  glucagon  Injectable 1 milliGRAM(s) IntraMuscular once  influenza  Vaccine (HIGH DOSE) 0.7 milliLiter(s) IntraMuscular once  insulin glargine Injectable (LANTUS) 25 Unit(s) SubCutaneous every morning  insulin lispro (ADMELOG) corrective regimen sliding scale   SubCutaneous three times a day before meals  insulin lispro Injectable (ADMELOG) 8 Unit(s) SubCutaneous every 6 hours  lisinopril 10 milliGRAM(s) Oral daily  loperamide 2 milliGRAM(s) Oral three times a day  loteprednol 0.5% Ophthalmic Suspension 1 Drop(s) Right EYE daily  melatonin 5 milliGRAM(s) Oral at bedtime  OLANZapine 5 milliGRAM(s) Oral at bedtime  pantoprazole   Suspension 40 milliGRAM(s) Oral daily  prednisoLONE acetate 1% Suspension 1 Drop(s) Left EYE daily  traZODone 50 milliGRAM(s) Oral at bedtime    MEDICATIONS  (PRN):  acetaminophen     Tablet .. 650 milliGRAM(s) Oral every 6 hours PRN Temp greater or equal to 38C (100.4F), Mild Pain (1 - 3)  albuterol/ipratropium for Nebulization 3 milliLiter(s) Nebulizer every 6 hours PRN Shortness of Breath and/or Wheezing  aluminum hydroxide/magnesium hydroxide/simethicone Suspension 30 milliLiter(s) Oral every 4 hours PRN Dyspepsia  dextrose Oral Gel 15 Gram(s) Oral once PRN Blood Glucose LESS THAN 70 milliGRAM(s)/deciliter  hydrALAZINE Injectable 10 milliGRAM(s) IV Push every 2 hours PRN SBP >160  labetalol Injectable 10 milliGRAM(s) IV Push every 2 hours PRN SBP >160  lidocaine 2% Gel 1 Application(s) Topical every 6 hours PRN Pain at site  ondansetron Injectable 4 milliGRAM(s) IV Push every 8 hours PRN Nausea and/or Vomiting      RADIOLOGY & ADDITIONAL TESTS:

## 2023-01-21 NOTE — PROGRESS NOTE ADULT - ASSESSMENT
76 y/o M w/ PMH of CAD, DM2, HTN, transferred from [facility] for CVA after presenting for Rt-sided weakness, difficulty swallowing and slurred speech.  Pt was out of window for TPA.  CTH was (-) but MRI brain was significant for an acute L-debby infarct and MRA was significant for L-vertebral artery stenosis vs dissection vs thrombus.  Pt was admitted to neuroICU.  He was intubated 12/28 for air way protection.  He eventually required trach/peg'd 01/03.  Pts hospital course complicated by LLL PNA w/ Scxs growing MSSA 12/30 and morganella 01/05.  Neurology and neurosurgery following.        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   - c/w ASA and atorvastatin    - c/w Eliquis for thrombosis   - c/w daily spontaneous breathing trials   - s/p suture removal 1/10 by CT surgery  - Pulmonary following and recs noted   - Neurology and neurosurg following and recs noted     #Urinary retention   - Place tierney   - c/w Doxazosin      #COVID positive   - No symptoms   - Discharge postponed     #VAP  - Scxs from 12/30 growing MSSA   - Scxs from 01/05 growing Morganella Morgani  - Leukocytosis persists but no L-shift, remains afebrile and nontoxic appearing   - Completed course of Cefepime through 01/14    #Normocytic anemia   - Hb 14 on admission   - H/h remains low but stable   - Pt is on AC for suspected vertebral artery thombus  - Trend CBC and transfuse as needed     #PEG dependence  - c/w tube feeds as tolerated   - Diarrhea likely from tube feeds - Added banatrol but may need TF changed - c diff negative  - Nutrition following     #DM II   - A1c 8  - On basal/bolus insulin regimen   - Titrate insulin to goal BG<180  - ISS and hypoglycemic protocol in place     VTE ppx: on eliquis     Dispo: Discharge postponed, tested positive for COVID - can DC once 10 day quarantine time reached

## 2023-01-21 NOTE — PROGRESS NOTE ADULT - SUBJECTIVE AND OBJECTIVE BOX
Brief summary:  77yMale seen and assessed at bedside.  Pt laying comfortably in hospital bed in NAD on MV via trach.  Indicates he has no current physical complaints at this time.  Denies f/c, n/v, chest pain, sob, abdominal pain, d/c, urinary symptoms.    Overnight events:  None.  Hospital course progressing as expected.        PAST MEDICAL & SURGICAL HISTORY:  HTN (hypertension)    CAD (coronary artery disease)    DM (diabetes mellitus)        Medications:  acetaminophen     Tablet .. 650 milliGRAM(s) Oral every 6 hours PRN  albuterol/ipratropium for Nebulization 3 milliLiter(s) Nebulizer every 6 hours PRN  aluminum hydroxide/magnesium hydroxide/simethicone Suspension 30 milliLiter(s) Oral every 4 hours PRN  apixaban 5 milliGRAM(s) Enteral Tube every 12 hours  aspirin  chewable 81 milliGRAM(s) Oral daily  atenolol  Tablet 50 milliGRAM(s) Oral daily  atorvastatin 80 milliGRAM(s) Oral at bedtime  chlorhexidine 0.12% Liquid 15 milliLiter(s) Oral Mucosa every 12 hours  chlorhexidine 2% Cloths 1 Application(s) Topical daily  coronavirus bivalent (EUA) Booster Vaccine (PFIZER) 0.3 milliLiter(s) IntraMuscular once  dextrose 5%. 1000 milliLiter(s) IV Continuous <Continuous>  dextrose 5%. 1000 milliLiter(s) IV Continuous <Continuous>  dextrose 50% Injectable 25 Gram(s) IV Push once  dextrose 50% Injectable 12.5 Gram(s) IV Push once  dextrose 50% Injectable 25 Gram(s) IV Push once  dextrose Oral Gel 15 Gram(s) Oral once PRN  doxazosin 2 milliGRAM(s) Oral at bedtime  glucagon  Injectable 1 milliGRAM(s) IntraMuscular once  hydrALAZINE Injectable 10 milliGRAM(s) IV Push every 2 hours PRN  influenza  Vaccine (HIGH DOSE) 0.7 milliLiter(s) IntraMuscular once  insulin glargine Injectable (LANTUS) 25 Unit(s) SubCutaneous every morning  insulin lispro (ADMELOG) corrective regimen sliding scale   SubCutaneous three times a day before meals  insulin lispro Injectable (ADMELOG) 8 Unit(s) SubCutaneous every 6 hours  labetalol Injectable 10 milliGRAM(s) IV Push every 2 hours PRN  lidocaine 2% Gel 1 Application(s) Topical every 6 hours PRN  lisinopril 10 milliGRAM(s) Oral daily  loperamide 2 milliGRAM(s) Oral three times a day  loteprednol 0.5% Ophthalmic Suspension 1 Drop(s) Right EYE daily  melatonin 5 milliGRAM(s) Oral at bedtime  OLANZapine 5 milliGRAM(s) Oral at bedtime  ondansetron Injectable 4 milliGRAM(s) IV Push every 8 hours PRN  pantoprazole   Suspension 40 milliGRAM(s) Oral daily  prednisoLONE acetate 1% Suspension 1 Drop(s) Left EYE daily  traZODone 50 milliGRAM(s) Oral at bedtime      MEDICATIONS  (PRN):  acetaminophen     Tablet .. 650 milliGRAM(s) Oral every 6 hours PRN Temp greater or equal to 38C (100.4F), Mild Pain (1 - 3)  albuterol/ipratropium for Nebulization 3 milliLiter(s) Nebulizer every 6 hours PRN Shortness of Breath and/or Wheezing  aluminum hydroxide/magnesium hydroxide/simethicone Suspension 30 milliLiter(s) Oral every 4 hours PRN Dyspepsia  dextrose Oral Gel 15 Gram(s) Oral once PRN Blood Glucose LESS THAN 70 milliGRAM(s)/deciliter  hydrALAZINE Injectable 10 milliGRAM(s) IV Push every 2 hours PRN SBP >160  labetalol Injectable 10 milliGRAM(s) IV Push every 2 hours PRN SBP >160  lidocaine 2% Gel 1 Application(s) Topical every 6 hours PRN Pain at site  ondansetron Injectable 4 milliGRAM(s) IV Push every 8 hours PRN Nausea and/or Vomiting        Daily Review:    Mode: CPAP with PS, FiO2: 30, PEEP: 5, PS: 10, MAP: 7               9.1    10.58 )-----------( 379      ( 21 Jan 2023 09:25 )             28.1   01-21    131<L>  |  95<L>  |  50.3<H>  ----------------------------<  162<H>  5.1   |  28.0  |  0.99    Ca    8.3<L>      21 Jan 2023 09:25  Phos  3.5     01-21  Mg     2.4     01-21    TPro  x   /  Alb  2.5<L>  /  TBili  x   /  DBili  x   /  AST  x   /  ALT  x   /  AlkPhos  x   01-21      T(C): 36.7 (01-21-23 @ 10:20), Max: 37.2 (01-21-23 @ 04:24)  HR: 51 (01-21-23 @ 12:46) (49 - 63)  BP: 103/60 (01-21-23 @ 10:20) (103/60 - 128/86)  RR: 17 (01-21-23 @ 10:20) (17 - 18)  SpO2: 98% (01-21-23 @ 12:46) (91% - 100%)      CAPILLARY BLOOD GLUCOSE    POCT Blood Glucose.: 185 mg/dL (21 Jan 2023 11:06)  POCT Blood Glucose.: 170 mg/dL (21 Jan 2023 08:05)  POCT Blood Glucose.: 144 mg/dL (21 Jan 2023 05:46)  POCT Blood Glucose.: 135 mg/dL (20 Jan 2023 23:11)  POCT Blood Glucose.: 123 mg/dL (20 Jan 2023 17:17)      I&O's Summary    20 Jan 2023 07:01  -  21 Jan 2023 07:00  --------------------------------------------------------  IN: 2780 mL / OUT: 1000 mL / NET: 1780 mL    21 Jan 2023 07:01  -  21 Jan 2023 14:22  --------------------------------------------------------  IN: 0 mL / OUT: 400 mL / NET: -400 mL        Physical Exam    Neuro: A+O x 3, non-focal, able to answer yes/no  Pulm: coarse breath sounds bilaterally, no wheezing or rales, + trach, on MV, FiO2 30%, PEEP 5, without discharge  CV: RRR, +S1S2  Abd: soft, NT, ND, normoactive bowel sounds, + PEG c/d/i, without discharge, fluctuance  Ext: +DP Pulses b/l, +PT pulses, no edema

## 2023-01-22 LAB
ANION GAP SERPL CALC-SCNC: 8 MMOL/L — SIGNIFICANT CHANGE UP (ref 5–17)
BUN SERPL-MCNC: 40.8 MG/DL — HIGH (ref 8–20)
CALCIUM SERPL-MCNC: 8.4 MG/DL — SIGNIFICANT CHANGE UP (ref 8.4–10.5)
CHLORIDE SERPL-SCNC: 96 MMOL/L — SIGNIFICANT CHANGE UP (ref 96–108)
CO2 SERPL-SCNC: 28 MMOL/L — SIGNIFICANT CHANGE UP (ref 22–29)
CREAT SERPL-MCNC: 0.97 MG/DL — SIGNIFICANT CHANGE UP (ref 0.5–1.3)
EGFR: 80 ML/MIN/1.73M2 — SIGNIFICANT CHANGE UP
GLUCOSE BLDC GLUCOMTR-MCNC: 103 MG/DL — HIGH (ref 70–99)
GLUCOSE BLDC GLUCOMTR-MCNC: 105 MG/DL — HIGH (ref 70–99)
GLUCOSE BLDC GLUCOMTR-MCNC: 122 MG/DL — HIGH (ref 70–99)
GLUCOSE BLDC GLUCOMTR-MCNC: 123 MG/DL — HIGH (ref 70–99)
GLUCOSE BLDC GLUCOMTR-MCNC: 150 MG/DL — HIGH (ref 70–99)
GLUCOSE BLDC GLUCOMTR-MCNC: 58 MG/DL — LOW (ref 70–99)
GLUCOSE BLDC GLUCOMTR-MCNC: 68 MG/DL — LOW (ref 70–99)
GLUCOSE BLDC GLUCOMTR-MCNC: 92 MG/DL — SIGNIFICANT CHANGE UP (ref 70–99)
GLUCOSE SERPL-MCNC: 101 MG/DL — HIGH (ref 70–99)
HCT VFR BLD CALC: 29 % — LOW (ref 39–50)
HGB BLD-MCNC: 9.4 G/DL — LOW (ref 13–17)
MAGNESIUM SERPL-MCNC: 2.3 MG/DL — SIGNIFICANT CHANGE UP (ref 1.6–2.6)
MCHC RBC-ENTMCNC: 28.9 PG — SIGNIFICANT CHANGE UP (ref 27–34)
MCHC RBC-ENTMCNC: 32.4 GM/DL — SIGNIFICANT CHANGE UP (ref 32–36)
MCV RBC AUTO: 89.2 FL — SIGNIFICANT CHANGE UP (ref 80–100)
PHOSPHATE SERPL-MCNC: 4.1 MG/DL — SIGNIFICANT CHANGE UP (ref 2.4–4.7)
PLATELET # BLD AUTO: 372 K/UL — SIGNIFICANT CHANGE UP (ref 150–400)
POTASSIUM SERPL-MCNC: 5.3 MMOL/L — SIGNIFICANT CHANGE UP (ref 3.5–5.3)
POTASSIUM SERPL-SCNC: 5.3 MMOL/L — SIGNIFICANT CHANGE UP (ref 3.5–5.3)
RBC # BLD: 3.25 M/UL — LOW (ref 4.2–5.8)
RBC # FLD: 12.8 % — SIGNIFICANT CHANGE UP (ref 10.3–14.5)
SODIUM SERPL-SCNC: 132 MMOL/L — LOW (ref 135–145)
WBC # BLD: 11.09 K/UL — HIGH (ref 3.8–10.5)
WBC # FLD AUTO: 11.09 K/UL — HIGH (ref 3.8–10.5)

## 2023-01-22 PROCEDURE — 99233 SBSQ HOSP IP/OBS HIGH 50: CPT

## 2023-01-22 PROCEDURE — 99231 SBSQ HOSP IP/OBS SF/LOW 25: CPT

## 2023-01-22 RX ORDER — TRAZODONE HCL 50 MG
50 TABLET ORAL AT BEDTIME
Refills: 0 | Status: DISCONTINUED | OUTPATIENT
Start: 2023-01-22 | End: 2023-01-22

## 2023-01-22 RX ORDER — TRAZODONE HCL 50 MG
100 TABLET ORAL AT BEDTIME
Refills: 0 | Status: DISCONTINUED | OUTPATIENT
Start: 2023-01-22 | End: 2023-01-30

## 2023-01-22 RX ORDER — DEXTROSE 50 % IN WATER 50 %
12.5 SYRINGE (ML) INTRAVENOUS ONCE
Refills: 0 | Status: COMPLETED | OUTPATIENT
Start: 2023-01-22 | End: 2023-01-22

## 2023-01-22 RX ORDER — PSYLLIUM SEED (WITH DEXTROSE)
1 POWDER (GRAM) ORAL
Refills: 0 | Status: DISCONTINUED | OUTPATIENT
Start: 2023-01-22 | End: 2023-01-30

## 2023-01-22 RX ADMIN — CHLORHEXIDINE GLUCONATE 15 MILLILITER(S): 213 SOLUTION TOPICAL at 06:41

## 2023-01-22 RX ADMIN — Medication 1 PACKET(S): at 17:34

## 2023-01-22 RX ADMIN — Medication 12.5 GRAM(S): at 15:58

## 2023-01-22 RX ADMIN — Medication 2 MILLIGRAM(S): at 22:37

## 2023-01-22 RX ADMIN — Medication 8 UNIT(S): at 07:02

## 2023-01-22 RX ADMIN — Medication 2 MILLIGRAM(S): at 22:38

## 2023-01-22 RX ADMIN — LOTEPREDNOL ETABONATE 1 DROP(S): 2 SUSPENSION/ DROPS OPHTHALMIC at 07:02

## 2023-01-22 RX ADMIN — ATENOLOL 50 MILLIGRAM(S): 25 TABLET ORAL at 06:42

## 2023-01-22 RX ADMIN — Medication 2 MILLIGRAM(S): at 14:16

## 2023-01-22 RX ADMIN — OLANZAPINE 5 MILLIGRAM(S): 15 TABLET, FILM COATED ORAL at 22:37

## 2023-01-22 RX ADMIN — LISINOPRIL 10 MILLIGRAM(S): 2.5 TABLET ORAL at 06:42

## 2023-01-22 RX ADMIN — Medication 81 MILLIGRAM(S): at 11:14

## 2023-01-22 RX ADMIN — Medication 100 MILLIGRAM(S): at 22:37

## 2023-01-22 RX ADMIN — INSULIN GLARGINE 25 UNIT(S): 100 INJECTION, SOLUTION SUBCUTANEOUS at 09:16

## 2023-01-22 RX ADMIN — Medication 2 MILLIGRAM(S): at 07:02

## 2023-01-22 RX ADMIN — APIXABAN 5 MILLIGRAM(S): 2.5 TABLET, FILM COATED ORAL at 17:34

## 2023-01-22 RX ADMIN — ATORVASTATIN CALCIUM 80 MILLIGRAM(S): 80 TABLET, FILM COATED ORAL at 22:38

## 2023-01-22 RX ADMIN — CHLORHEXIDINE GLUCONATE 15 MILLILITER(S): 213 SOLUTION TOPICAL at 17:34

## 2023-01-22 RX ADMIN — CHLORHEXIDINE GLUCONATE 1 APPLICATION(S): 213 SOLUTION TOPICAL at 11:20

## 2023-01-22 RX ADMIN — Medication 1 PACKET(S): at 09:56

## 2023-01-22 RX ADMIN — APIXABAN 5 MILLIGRAM(S): 2.5 TABLET, FILM COATED ORAL at 06:41

## 2023-01-22 RX ADMIN — Medication 8 UNIT(S): at 11:16

## 2023-01-22 RX ADMIN — PANTOPRAZOLE SODIUM 40 MILLIGRAM(S): 20 TABLET, DELAYED RELEASE ORAL at 11:13

## 2023-01-22 RX ADMIN — Medication 1 DROP(S): at 06:42

## 2023-01-22 RX ADMIN — Medication 5 MILLIGRAM(S): at 22:38

## 2023-01-22 NOTE — PROGRESS NOTE ADULT - SUBJECTIVE AND OBJECTIVE BOX
CC: Acute stroke (20 Jan 2023 12:10)    INTERVAL HPI/OVERNIGHT EVENTS:  poor sleep overnight  patient grabbing at tierney as per nurse  wife distraught over patient condition    Vital Signs Last 24 Hrs  T(C): 36.4 (22 Jan 2023 10:46), Max: 38.1 (22 Jan 2023 05:15)  T(F): 97.6 (22 Jan 2023 10:46), Max: 100.5 (22 Jan 2023 05:15)  HR: 85 (22 Jan 2023 10:46) (48 - 101)  BP: 147/85 (22 Jan 2023 10:46) (126/69 - 164/65)  BP(mean): 88 (21 Jan 2023 20:00) (88 - 88)  RR: 19 (22 Jan 2023 10:46) (18 - 19)  SpO2: 100% (22 Jan 2023 10:46) (93% - 100%)    Parameters below as of 22 Jan 2023 05:15  Patient On (Oxygen Delivery Method): room air    PHYSICAL EXAM:  General: in no acute distress  Eyes: PERRLA, EOMI; conjunctiva and sclera clear  Head: Normocephalic; atraumatic  ENMT: No nasal discharge; airway clear  Neck: Supple; non tender; no masses; trach in place  Respiratory: No wheezes, rales or rhonchi  Cardiovascular: Regular rate and rhythm. S1 and S2 Normal; No murmurs, gallops or rubs  Gastrointestinal: Soft non-tender non-distended; Normal bowel sounds; PEG in place  Vascular: Peripheral pulses palpable 2+ bilaterally  Neurological: Alert but unable to assess if oriented; follows commands  Skin: Warm and dry. No acute rash  Psychiatric: Cooperative and appropriate    I&O's Detail    20 Jan 2023 07:01  -  21 Jan 2023 07:00  --------------------------------------------------------  IN:    Free Water: 1400 mL    Glucerna 1.5: 1380 mL  Total IN: 2780 mL    OUT:    Indwelling Catheter - Urethral (mL): 1000 mL  Total OUT: 1000 mL    Total NET: 1780 mL                       9.1    10.58 )-----------( 379      ( 21 Jan 2023 09:25 )             28.1     21 Jan 2023 09:25    131    |  95     |  50.3   ----------------------------<  162    5.1     |  28.0   |  0.99     Ca    8.3        21 Jan 2023 09:25  Phos  3.5       21 Jan 2023 09:25  Mg     2.4       21 Jan 2023 09:25    TPro  x      /  Alb  2.5    /  TBili  x      /  DBili  x      /  AST  x      /  ALT  x      /  AlkPhos  x      21 Jan 2023 09:25    CAPILLARY BLOOD GLUCOSE  POCT Blood Glucose.: 150 mg/dL (22 Jan 2023 11:15)  POCT Blood Glucose.: 122 mg/dL (22 Jan 2023 08:00)  POCT Blood Glucose.: 92 mg/dL (22 Jan 2023 06:56)  POCT Blood Glucose.: 118 mg/dL (21 Jan 2023 21:50)  POCT Blood Glucose.: 81 mg/dL (21 Jan 2023 16:49)    MEDICATIONS  (STANDING):  apixaban 5 milliGRAM(s) Enteral Tube every 12 hours  aspirin  chewable 81 milliGRAM(s) Oral daily  atenolol  Tablet 50 milliGRAM(s) Oral daily  atorvastatin 80 milliGRAM(s) Oral at bedtime  chlorhexidine 0.12% Liquid 15 milliLiter(s) Oral Mucosa every 12 hours  chlorhexidine 2% Cloths 1 Application(s) Topical daily  coronavirus bivalent (EUA) Booster Vaccine (PFIZER) 0.3 milliLiter(s) IntraMuscular once  dextrose 5%. 1000 milliLiter(s) (100 mL/Hr) IV Continuous <Continuous>  dextrose 5%. 1000 milliLiter(s) (50 mL/Hr) IV Continuous <Continuous>  dextrose 50% Injectable 25 Gram(s) IV Push once  dextrose 50% Injectable 12.5 Gram(s) IV Push once  dextrose 50% Injectable 25 Gram(s) IV Push once  doxazosin 2 milliGRAM(s) Oral at bedtime  glucagon  Injectable 1 milliGRAM(s) IntraMuscular once  influenza  Vaccine (HIGH DOSE) 0.7 milliLiter(s) IntraMuscular once  insulin glargine Injectable (LANTUS) 25 Unit(s) SubCutaneous every morning  insulin lispro (ADMELOG) corrective regimen sliding scale   SubCutaneous three times a day before meals  insulin lispro Injectable (ADMELOG) 8 Unit(s) SubCutaneous every 6 hours  lisinopril 10 milliGRAM(s) Oral daily  loperamide 2 milliGRAM(s) Oral three times a day  loteprednol 0.5% Ophthalmic Suspension 1 Drop(s) Right EYE daily  melatonin 5 milliGRAM(s) Oral at bedtime  OLANZapine 5 milliGRAM(s) Oral at bedtime  pantoprazole   Suspension 40 milliGRAM(s) Oral daily  prednisoLONE acetate 1% Suspension 1 Drop(s) Left EYE daily  traZODone 50 milliGRAM(s) Oral at bedtime    MEDICATIONS  (PRN):  acetaminophen     Tablet .. 650 milliGRAM(s) Oral every 6 hours PRN Temp greater or equal to 38C (100.4F), Mild Pain (1 - 3)  albuterol/ipratropium for Nebulization 3 milliLiter(s) Nebulizer every 6 hours PRN Shortness of Breath and/or Wheezing  aluminum hydroxide/magnesium hydroxide/simethicone Suspension 30 milliLiter(s) Oral every 4 hours PRN Dyspepsia  dextrose Oral Gel 15 Gram(s) Oral once PRN Blood Glucose LESS THAN 70 milliGRAM(s)/deciliter  hydrALAZINE Injectable 10 milliGRAM(s) IV Push every 2 hours PRN SBP >160  labetalol Injectable 10 milliGRAM(s) IV Push every 2 hours PRN SBP >160  lidocaine 2% Gel 1 Application(s) Topical every 6 hours PRN Pain at site  ondansetron Injectable 4 milliGRAM(s) IV Push every 8 hours PRN Nausea and/or Vomiting      RADIOLOGY & ADDITIONAL TESTS:

## 2023-01-22 NOTE — PROVIDER CONTACT NOTE (HYPOGLYCEMIA EVENT) - NS PROVIDER CONTACT BACKGROUND-HYPO
Age: 77y    Gender: Male    POCT Blood Glucose:  58 mg/dL (01-22-23 @ 15:49)  68 mg/dL (01-22-23 @ 15:47)  150 mg/dL (01-22-23 @ 11:15)  122 mg/dL (01-22-23 @ 08:00)  92 mg/dL (01-22-23 @ 06:56)  118 mg/dL (01-21-23 @ 21:50)  81 mg/dL (01-21-23 @ 16:49)      eMAR:atorvastatin   80 milliGRAM(s) Oral (01-21-23 @ 21:15)    dextrose 50% Injectable   12.5 Gram(s) IV Push (01-22-23 @ 15:58)    insulin glargine Injectable (LANTUS)   25 Unit(s) SubCutaneous (01-22-23 @ 09:16)    insulin lispro Injectable (ADMELOG)   8 Unit(s) SubCutaneous (01-22-23 @ 11:16)   8 Unit(s) SubCutaneous (01-22-23 @ 07:02)   8 Unit(s) SubCutaneous (01-21-23 @ 23:10)

## 2023-01-22 NOTE — PROGRESS NOTE ADULT - SUBJECTIVE AND OBJECTIVE BOX
Brief summary:  77yMale seen and assessed at bedside.  Pt laying comfortably in hospital bed in NAD on MV via trach.  Pt's wife at bedside, states pt stiff from laying in bed.  Pt indicates no current physical complaints.  Denies f/c, n/v, chest pain, sob, abdominal pain, d/c, urinary symptoms.    Overnight events:  None.  Hospital course progressing as expected.        PAST MEDICAL & SURGICAL HISTORY:  HTN (hypertension)    CAD (coronary artery disease)    DM (diabetes mellitus)        Medications:  acetaminophen     Tablet .. 650 milliGRAM(s) Oral every 6 hours PRN  albuterol/ipratropium for Nebulization 3 milliLiter(s) Nebulizer every 6 hours PRN  aluminum hydroxide/magnesium hydroxide/simethicone Suspension 30 milliLiter(s) Oral every 4 hours PRN  apixaban 5 milliGRAM(s) Enteral Tube every 12 hours  aspirin  chewable 81 milliGRAM(s) Oral daily  atenolol  Tablet 50 milliGRAM(s) Oral daily  atorvastatin 80 milliGRAM(s) Oral at bedtime  chlorhexidine 0.12% Liquid 15 milliLiter(s) Oral Mucosa every 12 hours  chlorhexidine 2% Cloths 1 Application(s) Topical daily  coronavirus bivalent (EUA) Booster Vaccine (PFIZER) 0.3 milliLiter(s) IntraMuscular once  dextrose 5%. 1000 milliLiter(s) IV Continuous <Continuous>  dextrose 5%. 1000 milliLiter(s) IV Continuous <Continuous>  dextrose 50% Injectable 25 Gram(s) IV Push once  dextrose 50% Injectable 12.5 Gram(s) IV Push once  dextrose 50% Injectable 25 Gram(s) IV Push once  dextrose Oral Gel 15 Gram(s) Oral once PRN  doxazosin 2 milliGRAM(s) Oral at bedtime  glucagon  Injectable 1 milliGRAM(s) IntraMuscular once  hydrALAZINE Injectable 10 milliGRAM(s) IV Push every 2 hours PRN  influenza  Vaccine (HIGH DOSE) 0.7 milliLiter(s) IntraMuscular once  insulin glargine Injectable (LANTUS) 25 Unit(s) SubCutaneous every morning  insulin lispro (ADMELOG) corrective regimen sliding scale   SubCutaneous three times a day before meals  insulin lispro Injectable (ADMELOG) 8 Unit(s) SubCutaneous every 6 hours  labetalol Injectable 10 milliGRAM(s) IV Push every 2 hours PRN  lidocaine 2% Gel 1 Application(s) Topical every 6 hours PRN  lisinopril 10 milliGRAM(s) Oral daily  loperamide 2 milliGRAM(s) Oral three times a day  loteprednol 0.5% Ophthalmic Suspension 1 Drop(s) Right EYE daily  melatonin 5 milliGRAM(s) Oral at bedtime  OLANZapine 5 milliGRAM(s) Oral at bedtime  ondansetron Injectable 4 milliGRAM(s) IV Push every 8 hours PRN  pantoprazole   Suspension 40 milliGRAM(s) Oral daily  prednisoLONE acetate 1% Suspension 1 Drop(s) Left EYE daily  psyllium Powder 1 Packet(s) Oral two times a day  traZODone 100 milliGRAM(s) Oral at bedtime      MEDICATIONS  (PRN):  acetaminophen     Tablet .. 650 milliGRAM(s) Oral every 6 hours PRN Temp greater or equal to 38C (100.4F), Mild Pain (1 - 3)  albuterol/ipratropium for Nebulization 3 milliLiter(s) Nebulizer every 6 hours PRN Shortness of Breath and/or Wheezing  aluminum hydroxide/magnesium hydroxide/simethicone Suspension 30 milliLiter(s) Oral every 4 hours PRN Dyspepsia  dextrose Oral Gel 15 Gram(s) Oral once PRN Blood Glucose LESS THAN 70 milliGRAM(s)/deciliter  hydrALAZINE Injectable 10 milliGRAM(s) IV Push every 2 hours PRN SBP >160  labetalol Injectable 10 milliGRAM(s) IV Push every 2 hours PRN SBP >160  lidocaine 2% Gel 1 Application(s) Topical every 6 hours PRN Pain at site  ondansetron Injectable 4 milliGRAM(s) IV Push every 8 hours PRN Nausea and/or Vomiting        Daily Review:    Mode: AC/ CMV (Assist Control/ Continuous Mandatory Ventilation), RR (machine): 16, TV (machine): 400, FiO2: 30, PEEP: 5, MAP: 9, PIP: 26                 9.4    11.09 )-----------( 372      ( 22 Jan 2023 05:25 )             29.0   01-22    132<L>  |  96  |  40.8<H>  ----------------------------<  101<H>  5.3   |  28.0  |  0.97    Ca    8.4      22 Jan 2023 05:25  Phos  4.1     01-22  Mg     2.3     01-22    TPro  x   /  Alb  2.5<L>  /  TBili  x   /  DBili  x   /  AST  x   /  ALT  x   /  AlkPhos  x   01-21      T(C): 36.4 (01-22-23 @ 10:46), Max: 38.1 (01-22-23 @ 05:15)  HR: 85 (01-22-23 @ 10:46) (48 - 101)  BP: 147/85 (01-22-23 @ 10:46) (126/69 - 164/65)  RR: 19 (01-22-23 @ 10:46) (18 - 19)  SpO2: 100% (01-22-23 @ 10:46) (93% - 100%)      CAPILLARY BLOOD GLUCOSE    POCT Blood Glucose.: 150 mg/dL (22 Jan 2023 11:15)  POCT Blood Glucose.: 122 mg/dL (22 Jan 2023 08:00)  POCT Blood Glucose.: 92 mg/dL (22 Jan 2023 06:56)  POCT Blood Glucose.: 118 mg/dL (21 Jan 2023 21:50)  POCT Blood Glucose.: 81 mg/dL (21 Jan 2023 16:49)      I&O's Summary    21 Jan 2023 07:01  -  22 Jan 2023 07:00  --------------------------------------------------------  IN: 770 mL / OUT: 2300 mL / NET: -1530 mL        Physical Exam    Neuro: A+O x 3, non-focal, mouths words, nods yes/no  Pulm: coarse breath sounds bilaterally, no wheezing or rales, + trach on MV, no discharge, FiO2 30%, PEEP 5  CV: RRR, +S1S2  Abd: soft, NT, ND, normoactive bowel sounds, + PEG c/d/i with gauze  Ext: +DP Pulses b/l, +PT pulses, no edema

## 2023-01-22 NOTE — PROGRESS NOTE ADULT - ASSESSMENT
78 y/o M w/ PMH of CAD, DM2, HTN, transferred from [facility] for CVA after presenting for Rt-sided weakness, difficulty swallowing and slurred speech.  Pt was out of window for TPA.  CTH was (-) but MRI brain was significant for an acute L-debby infarct and MRA was significant for L-vertebral artery stenosis vs dissection vs thrombus.  Pt was admitted to neuroICU.  He was intubated 12/28 for air way protection.  He eventually required trach/peg'd 01/03.  Pts hospital course complicated by LLL PNA w/ Scxs growing MSSA 12/30 and morganella 01/05.  Neurology and neurosurgery following.        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, dissection  Acute respiratory failure likely due to brainstem CVA  Acute metabolic encephalopathy - appears to be hallucinating (pointing at spaces in the air) but patient vented and aphasic so difficult to tell  - c/w ASA and atorvastatin   - c/w Eliquis for thrombosis   - c/w daily spontaneous breathing trials   - s/p suture removal 1/10 by CT surgery  - Pulmonary following and recs noted   - Neurology and neurosurg following and recs noted  - for delirium will increase the dose of trazodone at night  - continue PRN zyprexa    #Urinary retention   - Place tierney   - c/w Doxazosin      #COVID positive   - No symptoms   - Discharge postponed     #VAP  - Scxs from 12/30 growing MSSA   - Scxs from 01/05 growing Morganella Morgani  - Leukocytosis persists but no L-shift, remains afebrile and nontoxic appearing   - Completed course of Cefepime through 01/14    #Normocytic anemia   - Hb 14 on admission   - H/h remains low but stable   - Pt is on AC for suspected vertebral artery thombus  - Trend CBC and transfuse as needed     #PEG dependence  - c/w tube feeds as tolerated   - Diarrhea likely from tube feeds - Added banatrol but may need TF changed - c diff negative  - Nutrition following     #DM II   - A1c 8  - On basal/bolus insulin regimen   - Titrate insulin to goal BG<180  - ISS and hypoglycemic protocol in place     VTE ppx: on eliquis     Dispo: Discharge postponed, tested positive for COVID - can DC once 10 day quarantine time reached   76 y/o M w/ PMH of CAD, DM2, HTN, transferred from [facility] for CVA after presenting for Rt-sided weakness, difficulty swallowing and slurred speech.  Pt was out of window for TPA.  CTH was (-) but MRI brain was significant for an acute L-debby infarct and MRA was significant for L-vertebral artery stenosis vs dissection vs thrombus.  Pt was admitted to neuroICU.  He was intubated 12/28 for air way protection.  He eventually required trach/peg'd 01/03.  Pts hospital course complicated by LLL PNA w/ Scxs growing MSSA 12/30 and morganella 01/05.  Neurology and neurosurgery following.        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, dissection  Acute respiratory failure likely due to brainstem CVA  Acute metabolic encephalopathy - appears to be hallucinating (pointing at spaces in the air) but patient vented and aphasic so difficult to tell  - c/w ASA and atorvastatin   - c/w Eliquis for thrombosis   - c/w daily spontaneous breathing trials   - s/p suture removal 1/10 by CT surgery  - Pulmonary following and recs noted   - Neurology and neurosurg following and recs noted  - for delirium will increase the dose of trazodone at night  - continue PRN zyprexa    #Urinary retention   - Placed tierney on 1/20 due to retention  - c/w Doxazosin   - can attempt TOV after 3 days - possibly tomorrow if still here    #COVID positive   - No symptoms   - Discharge postponed     #VAP  - Scxs from 12/30 growing MSSA   - Scxs from 01/05 growing Morganella Morgani  - Leukocytosis persists but no L-shift, remains afebrile and nontoxic appearing   - Completed course of Cefepime through 01/14    #Normocytic anemia   - Hb 14 on admission   - H/h remains low but stable   - Pt is on AC for suspected vertebral artery thombus  - Trend CBC and transfuse as needed     #PEG dependence  - c/w tube feeds as tolerated   - Diarrhea likely from tube feeds - Added banatrol but may need TF changed - c diff negative  - Nutrition following     #DM II   - A1c 8  - On basal/bolus insulin regimen   - Titrate insulin to goal BG<180  - ISS and hypoglycemic protocol in place     VTE ppx: on eliquis     Dispo: Discharge postponed, tested positive for COVID - can DC once 10 day quarantine time reached

## 2023-01-22 NOTE — PROGRESS NOTE ADULT - ASSESSMENT
77M, PMH HTN, DM, CAD.  Transferred from [place] 12/26 with an acute L pontine CVA, possible L vert dissection. Worsening neuro exam 12/27 - RUE plegia, worsening bulbar dysfunction, Acute resp failure due to neurologic injury, required intubation 12/27 for airway protection now s/p trach/peg 1/3, surgicel d/c 1/4, sutures removed 1/10.

## 2023-01-23 LAB
ANION GAP SERPL CALC-SCNC: 8 MMOL/L — SIGNIFICANT CHANGE UP (ref 5–17)
ANION GAP SERPL CALC-SCNC: 9 MMOL/L — SIGNIFICANT CHANGE UP (ref 5–17)
BUN SERPL-MCNC: 35.8 MG/DL — HIGH (ref 8–20)
BUN SERPL-MCNC: 37.5 MG/DL — HIGH (ref 8–20)
CALCIUM SERPL-MCNC: 8.2 MG/DL — LOW (ref 8.4–10.5)
CALCIUM SERPL-MCNC: 8.7 MG/DL — SIGNIFICANT CHANGE UP (ref 8.4–10.5)
CHLORIDE SERPL-SCNC: 97 MMOL/L — SIGNIFICANT CHANGE UP (ref 96–108)
CHLORIDE SERPL-SCNC: 98 MMOL/L — SIGNIFICANT CHANGE UP (ref 96–108)
CO2 SERPL-SCNC: 27 MMOL/L — SIGNIFICANT CHANGE UP (ref 22–29)
CO2 SERPL-SCNC: 28 MMOL/L — SIGNIFICANT CHANGE UP (ref 22–29)
CREAT SERPL-MCNC: 1.1 MG/DL — SIGNIFICANT CHANGE UP (ref 0.5–1.3)
CREAT SERPL-MCNC: 1.17 MG/DL — SIGNIFICANT CHANGE UP (ref 0.5–1.3)
EGFR: 64 ML/MIN/1.73M2 — SIGNIFICANT CHANGE UP
EGFR: 69 ML/MIN/1.73M2 — SIGNIFICANT CHANGE UP
GLUCOSE BLDC GLUCOMTR-MCNC: 100 MG/DL — HIGH (ref 70–99)
GLUCOSE BLDC GLUCOMTR-MCNC: 104 MG/DL — HIGH (ref 70–99)
GLUCOSE BLDC GLUCOMTR-MCNC: 115 MG/DL — HIGH (ref 70–99)
GLUCOSE BLDC GLUCOMTR-MCNC: 139 MG/DL — HIGH (ref 70–99)
GLUCOSE BLDC GLUCOMTR-MCNC: 169 MG/DL — HIGH (ref 70–99)
GLUCOSE BLDC GLUCOMTR-MCNC: 184 MG/DL — HIGH (ref 70–99)
GLUCOSE BLDC GLUCOMTR-MCNC: 97 MG/DL — SIGNIFICANT CHANGE UP (ref 70–99)
GLUCOSE SERPL-MCNC: 109 MG/DL — HIGH (ref 70–99)
GLUCOSE SERPL-MCNC: 182 MG/DL — HIGH (ref 70–99)
HCT VFR BLD CALC: 27 % — LOW (ref 39–50)
HCT VFR BLD CALC: 29.5 % — LOW (ref 39–50)
HGB BLD-MCNC: 8.8 G/DL — LOW (ref 13–17)
HGB BLD-MCNC: 9.8 G/DL — LOW (ref 13–17)
MAGNESIUM SERPL-MCNC: 2.4 MG/DL — SIGNIFICANT CHANGE UP (ref 1.6–2.6)
MCHC RBC-ENTMCNC: 29 PG — SIGNIFICANT CHANGE UP (ref 27–34)
MCHC RBC-ENTMCNC: 29.1 PG — SIGNIFICANT CHANGE UP (ref 27–34)
MCHC RBC-ENTMCNC: 32.6 GM/DL — SIGNIFICANT CHANGE UP (ref 32–36)
MCHC RBC-ENTMCNC: 33.2 GM/DL — SIGNIFICANT CHANGE UP (ref 32–36)
MCV RBC AUTO: 87.5 FL — SIGNIFICANT CHANGE UP (ref 80–100)
MCV RBC AUTO: 89.1 FL — SIGNIFICANT CHANGE UP (ref 80–100)
PLATELET # BLD AUTO: 307 K/UL — SIGNIFICANT CHANGE UP (ref 150–400)
PLATELET # BLD AUTO: 354 K/UL — SIGNIFICANT CHANGE UP (ref 150–400)
POTASSIUM SERPL-MCNC: 4.7 MMOL/L — SIGNIFICANT CHANGE UP (ref 3.5–5.3)
POTASSIUM SERPL-MCNC: 5 MMOL/L — SIGNIFICANT CHANGE UP (ref 3.5–5.3)
POTASSIUM SERPL-SCNC: 4.7 MMOL/L — SIGNIFICANT CHANGE UP (ref 3.5–5.3)
POTASSIUM SERPL-SCNC: 5 MMOL/L — SIGNIFICANT CHANGE UP (ref 3.5–5.3)
RBC # BLD: 3.03 M/UL — LOW (ref 4.2–5.8)
RBC # BLD: 3.37 M/UL — LOW (ref 4.2–5.8)
RBC # FLD: 12.8 % — SIGNIFICANT CHANGE UP (ref 10.3–14.5)
RBC # FLD: 13.1 % — SIGNIFICANT CHANGE UP (ref 10.3–14.5)
SODIUM SERPL-SCNC: 132 MMOL/L — LOW (ref 135–145)
SODIUM SERPL-SCNC: 135 MMOL/L — SIGNIFICANT CHANGE UP (ref 135–145)
T3 SERPL-MCNC: 75 NG/DL — LOW (ref 80–200)
T4 AB SER-ACNC: 6.8 UG/DL — SIGNIFICANT CHANGE UP (ref 4.5–12)
TSH SERPL-MCNC: 0.28 UIU/ML — SIGNIFICANT CHANGE UP (ref 0.27–4.2)
WBC # BLD: 8.05 K/UL — SIGNIFICANT CHANGE UP (ref 3.8–10.5)
WBC # BLD: 8.38 K/UL — SIGNIFICANT CHANGE UP (ref 3.8–10.5)
WBC # FLD AUTO: 8.05 K/UL — SIGNIFICANT CHANGE UP (ref 3.8–10.5)
WBC # FLD AUTO: 8.38 K/UL — SIGNIFICANT CHANGE UP (ref 3.8–10.5)

## 2023-01-23 PROCEDURE — 99232 SBSQ HOSP IP/OBS MODERATE 35: CPT

## 2023-01-23 PROCEDURE — 99231 SBSQ HOSP IP/OBS SF/LOW 25: CPT

## 2023-01-23 RX ORDER — LOTEPREDNOL ETABONATE 2 MG/ML
1 SUSPENSION/ DROPS OPHTHALMIC EVERY 12 HOURS
Refills: 0 | Status: DISCONTINUED | OUTPATIENT
Start: 2023-01-23 | End: 2023-01-30

## 2023-01-23 RX ADMIN — LOTEPREDNOL ETABONATE 1 DROP(S): 2 SUSPENSION/ DROPS OPHTHALMIC at 17:14

## 2023-01-23 RX ADMIN — CHLORHEXIDINE GLUCONATE 15 MILLILITER(S): 213 SOLUTION TOPICAL at 05:32

## 2023-01-23 RX ADMIN — Medication 2 MILLIGRAM(S): at 21:47

## 2023-01-23 RX ADMIN — Medication 2: at 11:34

## 2023-01-23 RX ADMIN — Medication 650 MILLIGRAM(S): at 15:56

## 2023-01-23 RX ADMIN — Medication 2 MILLIGRAM(S): at 13:21

## 2023-01-23 RX ADMIN — CHLORHEXIDINE GLUCONATE 1 APPLICATION(S): 213 SOLUTION TOPICAL at 11:38

## 2023-01-23 RX ADMIN — Medication 1 DROP(S): at 05:34

## 2023-01-23 RX ADMIN — APIXABAN 5 MILLIGRAM(S): 2.5 TABLET, FILM COATED ORAL at 05:31

## 2023-01-23 RX ADMIN — APIXABAN 5 MILLIGRAM(S): 2.5 TABLET, FILM COATED ORAL at 17:14

## 2023-01-23 RX ADMIN — CHLORHEXIDINE GLUCONATE 15 MILLILITER(S): 213 SOLUTION TOPICAL at 17:15

## 2023-01-23 RX ADMIN — ATORVASTATIN CALCIUM 80 MILLIGRAM(S): 80 TABLET, FILM COATED ORAL at 21:47

## 2023-01-23 RX ADMIN — Medication 2: at 08:24

## 2023-01-23 RX ADMIN — Medication 5 MILLIGRAM(S): at 21:51

## 2023-01-23 RX ADMIN — Medication 8 UNIT(S): at 16:21

## 2023-01-23 RX ADMIN — LOTEPREDNOL ETABONATE 1 DROP(S): 2 SUSPENSION/ DROPS OPHTHALMIC at 05:33

## 2023-01-23 RX ADMIN — Medication 81 MILLIGRAM(S): at 11:34

## 2023-01-23 RX ADMIN — Medication 8 UNIT(S): at 11:37

## 2023-01-23 RX ADMIN — Medication 100 MILLIGRAM(S): at 21:47

## 2023-01-23 RX ADMIN — Medication 2 MILLIGRAM(S): at 05:32

## 2023-01-23 RX ADMIN — PANTOPRAZOLE SODIUM 40 MILLIGRAM(S): 20 TABLET, DELAYED RELEASE ORAL at 11:34

## 2023-01-23 RX ADMIN — INSULIN GLARGINE 25 UNIT(S): 100 INJECTION, SOLUTION SUBCUTANEOUS at 08:25

## 2023-01-23 RX ADMIN — Medication 650 MILLIGRAM(S): at 17:00

## 2023-01-23 NOTE — CHART NOTE - NSCHARTNOTEFT_GEN_A_CORE
Called to evaluate patient for restraints. Patient has been on restraints for agitation, as patient continues to actively pull at his lines. Patient seen and examined at bedside. Agitated behavior at times, including pulling on lines and IV site. Pt is unable to be redirected and chemical restraints have been unsuccessful thus far.  Will renew Level 1 restraint order and keep patient in Left wrist restraint in the interim to prevent further harm to self and to others around him.  Other interventions attempted: de-escalation, orientation check, environment modification, medication assessment.  I have evaluated this patient and have determined that restraints are warranted to optimize medical care. Patient was assessed for current physical and psychological risk factors as well as special needs. There are no medical conditions or limitations that would place this patient at risk while in restraints.  Attending MD Dr. Penaloza made Aware. Called to evaluate patient for restraints. Patient has been on restraints for agitation, as patient continues to actively pull at his lines. Patient seen and examined at bedside. Agitated behavior at times, including pulling on lines and IV site. Pt is unable to be redirected and chemical restraints have been unsuccessful thus far.  Will renew Level 1 restraint order and keep patient in bilateral unsecured mittens restraint in the interim to prevent further harm to self and to others around him.  Other interventions attempted: de-escalation, orientation check, environment modification, medication assessment.  I have evaluated this patient and have determined that restraints are warranted to optimize medical care. Patient was assessed for current physical and psychological risk factors as well as special needs. There are no medical conditions or limitations that would place this patient at risk while in restraints.  Attending MD Dr. Penaloza made Aware.

## 2023-01-23 NOTE — PROGRESS NOTE ADULT - SUBJECTIVE AND OBJECTIVE BOX
CC: Acute stroke (20 Jan 2023 12:10)    INTERVAL HPI/OVERNIGHT EVENTS:  poor sleep overnight  patient grabbing at tierney as per nurse  wife distraught over patient condition    Vital Signs Last 24 Hrs  T(C): 37 (23 Jan 2023 11:02), Max: 37 (23 Jan 2023 11:02)  T(F): 98.6 (23 Jan 2023 11:02), Max: 98.6 (23 Jan 2023 11:02)  HR: 46 (23 Jan 2023 11:02) (45 - 74)  BP: 109/67 (23 Jan 2023 11:02) (102/57 - 114/55)  BP(mean): --  RR: 18 (23 Jan 2023 11:02) (16 - 18)  SpO2: 96% (23 Jan 2023 11:02) (96% - 100%)    PHYSICAL EXAM:  General: in no acute distress  Eyes: PERRLA, EOMI; conjunctiva and sclera clear  Head: Normocephalic; atraumatic  ENMT: No nasal discharge; airway clear  Neck: Supple; non tender; no masses; trach in place  Respiratory: No wheezes, rales or rhonchi  Cardiovascular: Regular rate and rhythm. S1 and S2 Normal; No murmurs, gallops or rubs  Gastrointestinal: Soft non-tender non-distended; Normal bowel sounds; PEG in place  Vascular: Peripheral pulses palpable 2+ bilaterally  Neurological: Alert but unable to assess if oriented; follows commands  Skin: Warm and dry. No acute rash  Psychiatric: Cooperative and appropriate    I&O's Detail    20 Jan 2023 07:01  -  21 Jan 2023 07:00  --------------------------------------------------------  IN:    Free Water: 1400 mL    Glucerna 1.5: 1380 mL  Total IN: 2780 mL    OUT:    Indwelling Catheter - Urethral (mL): 1000 mL  Total OUT: 1000 mL    Total NET: 1780 mL                       9.1    10.58 )-----------( 379      ( 21 Jan 2023 09:25 )             28.1     21 Jan 2023 09:25    131    |  95     |  50.3   ----------------------------<  162    5.1     |  28.0   |  0.99     Ca    8.3        21 Jan 2023 09:25  Phos  3.5       21 Jan 2023 09:25  Mg     2.4       21 Jan 2023 09:25    TPro  x      /  Alb  2.5    /  TBili  x      /  DBili  x      /  AST  x      /  ALT  x      /  AlkPhos  x      21 Jan 2023 09:25    CAPILLARY BLOOD GLUCOSE  POCT Blood Glucose.: 150 mg/dL (22 Jan 2023 11:15)  POCT Blood Glucose.: 122 mg/dL (22 Jan 2023 08:00)  POCT Blood Glucose.: 92 mg/dL (22 Jan 2023 06:56)  POCT Blood Glucose.: 118 mg/dL (21 Jan 2023 21:50)  POCT Blood Glucose.: 81 mg/dL (21 Jan 2023 16:49)    MEDICATIONS  (STANDING):  apixaban 5 milliGRAM(s) Enteral Tube every 12 hours  aspirin  chewable 81 milliGRAM(s) Oral daily  atenolol  Tablet 50 milliGRAM(s) Oral daily  atorvastatin 80 milliGRAM(s) Oral at bedtime  chlorhexidine 0.12% Liquid 15 milliLiter(s) Oral Mucosa every 12 hours  chlorhexidine 2% Cloths 1 Application(s) Topical daily  coronavirus bivalent (EUA) Booster Vaccine (PFIZER) 0.3 milliLiter(s) IntraMuscular once  dextrose 5%. 1000 milliLiter(s) (100 mL/Hr) IV Continuous <Continuous>  dextrose 5%. 1000 milliLiter(s) (50 mL/Hr) IV Continuous <Continuous>  dextrose 50% Injectable 25 Gram(s) IV Push once  dextrose 50% Injectable 12.5 Gram(s) IV Push once  dextrose 50% Injectable 25 Gram(s) IV Push once  doxazosin 2 milliGRAM(s) Oral at bedtime  glucagon  Injectable 1 milliGRAM(s) IntraMuscular once  influenza  Vaccine (HIGH DOSE) 0.7 milliLiter(s) IntraMuscular once  insulin glargine Injectable (LANTUS) 25 Unit(s) SubCutaneous every morning  insulin lispro (ADMELOG) corrective regimen sliding scale   SubCutaneous three times a day before meals  insulin lispro Injectable (ADMELOG) 8 Unit(s) SubCutaneous every 6 hours  lisinopril 10 milliGRAM(s) Oral daily  loperamide 2 milliGRAM(s) Oral three times a day  loteprednol 0.5% Ophthalmic Suspension 1 Drop(s) Right EYE daily  melatonin 5 milliGRAM(s) Oral at bedtime  OLANZapine 5 milliGRAM(s) Oral at bedtime  pantoprazole   Suspension 40 milliGRAM(s) Oral daily  prednisoLONE acetate 1% Suspension 1 Drop(s) Left EYE daily  traZODone 50 milliGRAM(s) Oral at bedtime    MEDICATIONS  (PRN):  acetaminophen     Tablet .. 650 milliGRAM(s) Oral every 6 hours PRN Temp greater or equal to 38C (100.4F), Mild Pain (1 - 3)  albuterol/ipratropium for Nebulization 3 milliLiter(s) Nebulizer every 6 hours PRN Shortness of Breath and/or Wheezing  aluminum hydroxide/magnesium hydroxide/simethicone Suspension 30 milliLiter(s) Oral every 4 hours PRN Dyspepsia  dextrose Oral Gel 15 Gram(s) Oral once PRN Blood Glucose LESS THAN 70 milliGRAM(s)/deciliter  hydrALAZINE Injectable 10 milliGRAM(s) IV Push every 2 hours PRN SBP >160  labetalol Injectable 10 milliGRAM(s) IV Push every 2 hours PRN SBP >160  lidocaine 2% Gel 1 Application(s) Topical every 6 hours PRN Pain at site  ondansetron Injectable 4 milliGRAM(s) IV Push every 8 hours PRN Nausea and/or Vomiting      RADIOLOGY & ADDITIONAL TESTS:

## 2023-01-23 NOTE — PROGRESS NOTE ADULT - ASSESSMENT
77M, PMH HTN, DM, CAD, Transferred from [place] 12/26 with an acute L pontine CVA, possible L vert dissection. Worsening neuro exam 12/27 - RUE plegia, worsening bulbar dysfunction, Acute resp failure due to neurologic injury, required intubation 12/27 for airway protection now s/p trach/peg 1/3, surgicel d/c 1/4, sutures removed 1/10.

## 2023-01-23 NOTE — PROGRESS NOTE ADULT - ASSESSMENT
78 y/o M w/ PMH of CAD, DM2, HTN, transferred from [facility] for CVA after presenting for Rt-sided weakness, difficulty swallowing and slurred speech.  Pt was out of window for TPA.  CTH was (-) but MRI brain was significant for an acute L-debby infarct and MRA was significant for L-vertebral artery stenosis vs dissection vs thrombus.  Pt was admitted to neuroICU.  He was intubated 12/28 for air way protection.  He eventually required trach/peg'd 01/03.  Pts hospital course complicated by LLL PNA w/ Scxs growing MSSA 12/30 and morganella 01/05.  Neurology and neurosurgery following.        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, dissection  Acute respiratory failure likely due to brainstem CVA  Acute metabolic encephalopathy - yesterday appeared to be hallucinating (pointing at spaces in the air) but patient vented and aphasic so difficult to tell - more cooperative and flat today  - c/w ASA and atorvastatin  - c/w Eliquis for thrombosis  - c/w daily spontaneous breathing trials  - s/p suture removal 1/10 by CT surgery  - Pulmonary following and recs noted  - Neurology and neurosurgery following and recs noted  - for delirium will increase the dose of trazodone at night  - continue PRN zyprexa    #Urinary retention  - Placed tierney on 1/20 due to retention  - c/w Doxazosin  - can attempt TOV tomorrow AM    #COVID positive  - No symptoms  - Discharge postponed    #VAP  - Scxs from 12/30 growing MSSA  - Scxs from 01/05 growing Morganella Morgani  - Leukocytosis persists but no L-shift, remains afebrile and nontoxic appearing  - Completed course of Cefepime through 01/14    #Normocytic anemia  - Hb 14 on admission  - H/h remains low but stable  - Pt is on AC for suspected vertebral artery thrombus  - Trend CBC and transfuse as needed    #PEG dependence  - c/w tube feeds as tolerated  - Diarrhea likely from tube feeds - Added banatrol but may need TF changed - c diff negative  - added metamucil as well  - Nutrition following    #DM II  - A1c 8  - On basal/bolus insulin regimen  - Titrate insulin to goal BG<180  - ISS and hypoglycemic protocol in place    VTE ppx: on eliquis     Dispo: Discharge postponed, tested positive for COVID - can DC once 10 day quarantine time reached

## 2023-01-23 NOTE — PROGRESS NOTE ADULT - SUBJECTIVE AND OBJECTIVE BOX
Subjective:    T(C): 36.3 (01-23-23 @ 04:00), Max: 36.9 (01-23-23 @ 00:00)  HR: 74 (01-23-23 @ 09:09) (45 - 109)  BP: 114/55 (01-23-23 @ 04:00) (102/57 - 147/85)  RR: 16 (01-23-23 @ 04:00) (16 - 19)  SpO2: 98% (01-23-23 @ 09:09) (96% - 100%)  PA: --  Mode: AC/ CMV (Assist Control/ Continuous Mandatory Ventilation)  RR (machine): 16  TV (machine): 400  FiO2: 30  PEEP: 5  MAP: 11  PIP: 27      01-23    135  |  98  |  35.8<H>  ----------------------------<  109<H>  4.7   |  28.0  |  1.10    Ca    8.7      23 Jan 2023 04:45  Phos  4.1     01-22  Mg     2.4     01-23                                 9.8    8.05  )-----------( 354      ( 23 Jan 2023 04:45 )             29.5                    CAPILLARY BLOOD GLUCOSE  POCT Blood Glucose.: 169 mg/dL (23 Jan 2023 08:16)  POCT Blood Glucose.: 100 mg/dL (23 Jan 2023 05:47)  POCT Blood Glucose.: 115 mg/dL (23 Jan 2023 05:30)  POCT Blood Glucose.: 104 mg/dL (23 Jan 2023 00:10)  POCT Blood Glucose.: 103 mg/dL (22 Jan 2023 20:23)  POCT Blood Glucose.: 105 mg/dL (22 Jan 2023 17:32)  POCT Blood Glucose.: 123 mg/dL (22 Jan 2023 16:15)  POCT Blood Glucose.: 58 mg/dL (22 Jan 2023 15:49)  POCT Blood Glucose.: 68 mg/dL (22 Jan 2023 15:47)  POCT Blood Glucose.: 150 mg/dL (22 Jan 2023 11:15)    I&O's Detail    22 Jan 2023 07:01  -  23 Jan 2023 07:00  --------------------------------------------------------  IN:    Oral Fluid: 600 mL  Total IN: 600 mL    OUT:    Indwelling Catheter - Urethral (mL): 1200 mL  Total OUT: 1200 mL  Total NET: -600 mL    Drug Dosing Weight  Height (cm): 157.5 (26 Dec 2022 21:23)  Weight (kg): 75.5 (11 Jan 2023 09:53)  BMI (kg/m2): 30.4 (11 Jan 2023 09:53)  BSA (m2): 1.77 (11 Jan 2023 09:53)    MEDICATIONS  (STANDING):  apixaban 5 milliGRAM(s) Enteral Tube every 12 hours  aspirin  chewable 81 milliGRAM(s) Oral daily  atenolol  Tablet 50 milliGRAM(s) Oral daily  atorvastatin 80 milliGRAM(s) Oral at bedtime  chlorhexidine 0.12% Liquid 15 milliLiter(s) Oral Mucosa every 12 hours  chlorhexidine 2% Cloths 1 Application(s) Topical daily  coronavirus bivalent (EUA) Booster Vaccine (PFIZER) 0.3 milliLiter(s) IntraMuscular once  dextrose 5%. 1000 milliLiter(s) (50 mL/Hr) IV Continuous <Continuous>  dextrose 5%. 1000 milliLiter(s) (100 mL/Hr) IV Continuous <Continuous>  dextrose 50% Injectable 25 Gram(s) IV Push once  dextrose 50% Injectable 12.5 Gram(s) IV Push once  dextrose 50% Injectable 25 Gram(s) IV Push once  doxazosin 2 milliGRAM(s) Oral at bedtime  glucagon  Injectable 1 milliGRAM(s) IntraMuscular once  influenza  Vaccine (HIGH DOSE) 0.7 milliLiter(s) IntraMuscular once  insulin glargine Injectable (LANTUS) 25 Unit(s) SubCutaneous every morning  insulin lispro (ADMELOG) corrective regimen sliding scale   SubCutaneous three times a day before meals  insulin lispro Injectable (ADMELOG) 8 Unit(s) SubCutaneous every 6 hours  lisinopril 10 milliGRAM(s) Oral daily  loperamide 2 milliGRAM(s) Oral three times a day  loteprednol 0.5% Ophthalmic Suspension 1 Drop(s) Right EYE daily  melatonin 5 milliGRAM(s) Oral at bedtime  OLANZapine 5 milliGRAM(s) Oral at bedtime  pantoprazole   Suspension 40 milliGRAM(s) Oral daily  prednisoLONE acetate 1% Suspension 1 Drop(s) Left EYE daily  psyllium Powder 1 Packet(s) Oral two times a day  traZODone 100 milliGRAM(s) Oral at bedtime    MEDICATIONS  (PRN):  acetaminophen     Tablet .. 650 milliGRAM(s) Oral every 6 hours PRN Temp greater or equal to 38C (100.4F), Mild Pain (1 - 3)  albuterol/ipratropium for Nebulization 3 milliLiter(s) Nebulizer every 6 hours PRN Shortness of Breath and/or Wheezing  aluminum hydroxide/magnesium hydroxide/simethicone Suspension 30 milliLiter(s) Oral every 4 hours PRN Dyspepsia  dextrose Oral Gel 15 Gram(s) Oral once PRN Blood Glucose LESS THAN 70 milliGRAM(s)/deciliter  hydrALAZINE Injectable 10 milliGRAM(s) IV Push every 2 hours PRN SBP >160  labetalol Injectable 10 milliGRAM(s) IV Push every 2 hours PRN SBP >160  lidocaine 2% Gel 1 Application(s) Topical every 6 hours PRN Pain at site  ondansetron Injectable 4 milliGRAM(s) IV Push every 8 hours PRN Nausea and/or Vomiting    Physical Exam  Gen:   Neuro:   Pulm:   CV:   Abd:   Extrem/MS:          Subjective: unable, pt sleeping, wife at bedside, states pt feels tired and asked not to wake him    T(C): 36.3 (01-23-23 @ 04:00), Max: 36.9 (01-23-23 @ 00:00)  HR: 74 (01-23-23 @ 09:09) (45 - 109)  BP: 114/55 (01-23-23 @ 04:00) (102/57 - 147/85)  RR: 16 (01-23-23 @ 04:00) (16 - 19)  SpO2: 98% (01-23-23 @ 09:09) (96% - 100%)  PA: --  Mode: AC/ CMV (Assist Control/ Continuous Mandatory Ventilation)  RR (machine): 16  TV (machine): 400  FiO2: 30  PEEP: 5  MAP: 11  PIP: 27      01-23    135  |  98  |  35.8<H>  ----------------------------<  109<H>  4.7   |  28.0  |  1.10    Ca    8.7      23 Jan 2023 04:45  Phos  4.1     01-22  Mg     2.4     01-23                                 9.8    8.05  )-----------( 354      ( 23 Jan 2023 04:45 )             29.5                    CAPILLARY BLOOD GLUCOSE  POCT Blood Glucose.: 169 mg/dL (23 Jan 2023 08:16)  POCT Blood Glucose.: 100 mg/dL (23 Jan 2023 05:47)  POCT Blood Glucose.: 115 mg/dL (23 Jan 2023 05:30)  POCT Blood Glucose.: 104 mg/dL (23 Jan 2023 00:10)  POCT Blood Glucose.: 103 mg/dL (22 Jan 2023 20:23)  POCT Blood Glucose.: 105 mg/dL (22 Jan 2023 17:32)  POCT Blood Glucose.: 123 mg/dL (22 Jan 2023 16:15)  POCT Blood Glucose.: 58 mg/dL (22 Jan 2023 15:49)  POCT Blood Glucose.: 68 mg/dL (22 Jan 2023 15:47)  POCT Blood Glucose.: 150 mg/dL (22 Jan 2023 11:15)    I&O's Detail    22 Jan 2023 07:01  -  23 Jan 2023 07:00  --------------------------------------------------------  IN:    Oral Fluid: 600 mL  Total IN: 600 mL    OUT:    Indwelling Catheter - Urethral (mL): 1200 mL  Total OUT: 1200 mL  Total NET: -600 mL    Drug Dosing Weight  Height (cm): 157.5 (26 Dec 2022 21:23)  Weight (kg): 75.5 (11 Jan 2023 09:53)  BMI (kg/m2): 30.4 (11 Jan 2023 09:53)  BSA (m2): 1.77 (11 Jan 2023 09:53)    MEDICATIONS  (STANDING):  apixaban 5 milliGRAM(s) Enteral Tube every 12 hours  aspirin  chewable 81 milliGRAM(s) Oral daily  atenolol  Tablet 50 milliGRAM(s) Oral daily  atorvastatin 80 milliGRAM(s) Oral at bedtime  chlorhexidine 0.12% Liquid 15 milliLiter(s) Oral Mucosa every 12 hours  chlorhexidine 2% Cloths 1 Application(s) Topical daily  coronavirus bivalent (EUA) Booster Vaccine (PFIZER) 0.3 milliLiter(s) IntraMuscular once  dextrose 5%. 1000 milliLiter(s) (50 mL/Hr) IV Continuous <Continuous>  dextrose 5%. 1000 milliLiter(s) (100 mL/Hr) IV Continuous <Continuous>  dextrose 50% Injectable 25 Gram(s) IV Push once  dextrose 50% Injectable 12.5 Gram(s) IV Push once  dextrose 50% Injectable 25 Gram(s) IV Push once  doxazosin 2 milliGRAM(s) Oral at bedtime  glucagon  Injectable 1 milliGRAM(s) IntraMuscular once  influenza  Vaccine (HIGH DOSE) 0.7 milliLiter(s) IntraMuscular once  insulin glargine Injectable (LANTUS) 25 Unit(s) SubCutaneous every morning  insulin lispro (ADMELOG) corrective regimen sliding scale   SubCutaneous three times a day before meals  insulin lispro Injectable (ADMELOG) 8 Unit(s) SubCutaneous every 6 hours  lisinopril 10 milliGRAM(s) Oral daily  loperamide 2 milliGRAM(s) Oral three times a day  loteprednol 0.5% Ophthalmic Suspension 1 Drop(s) Right EYE daily  melatonin 5 milliGRAM(s) Oral at bedtime  OLANZapine 5 milliGRAM(s) Oral at bedtime  pantoprazole   Suspension 40 milliGRAM(s) Oral daily  prednisoLONE acetate 1% Suspension 1 Drop(s) Left EYE daily  psyllium Powder 1 Packet(s) Oral two times a day  traZODone 100 milliGRAM(s) Oral at bedtime    MEDICATIONS  (PRN):  acetaminophen     Tablet .. 650 milliGRAM(s) Oral every 6 hours PRN Temp greater or equal to 38C (100.4F), Mild Pain (1 - 3)  albuterol/ipratropium for Nebulization 3 milliLiter(s) Nebulizer every 6 hours PRN Shortness of Breath and/or Wheezing  aluminum hydroxide/magnesium hydroxide/simethicone Suspension 30 milliLiter(s) Oral every 4 hours PRN Dyspepsia  dextrose Oral Gel 15 Gram(s) Oral once PRN Blood Glucose LESS THAN 70 milliGRAM(s)/deciliter  hydrALAZINE Injectable 10 milliGRAM(s) IV Push every 2 hours PRN SBP >160  labetalol Injectable 10 milliGRAM(s) IV Push every 2 hours PRN SBP >160  lidocaine 2% Gel 1 Application(s) Topical every 6 hours PRN Pain at site  ondansetron Injectable 4 milliGRAM(s) IV Push every 8 hours PRN Nausea and/or Vomiting    Physical Exam  Gen: NAD  Neuro: sleeping,   Pulm: clear anteriorly, trach site intact, no breakdown  CV: S1S2 RRR  Abd: soft NT ND, PEG site intact no drainage  Extrem/MS: no edema, or cyanosis WWP

## 2023-01-23 NOTE — CHART NOTE - NSCHARTNOTEFT_GEN_A_CORE
HR 45 when sleeping  Chart reviewed  HR in 40s frequently  asymptomatic  VSS B/P 110/55 P 45  NAD  BMP  MG  Thyroid profile  CBC  Continue to monitor

## 2023-01-24 LAB
GLUCOSE BLDC GLUCOMTR-MCNC: 119 MG/DL — HIGH (ref 70–99)
GLUCOSE BLDC GLUCOMTR-MCNC: 127 MG/DL — HIGH (ref 70–99)
GLUCOSE BLDC GLUCOMTR-MCNC: 127 MG/DL — HIGH (ref 70–99)
GLUCOSE BLDC GLUCOMTR-MCNC: 148 MG/DL — HIGH (ref 70–99)
GLUCOSE BLDC GLUCOMTR-MCNC: 148 MG/DL — HIGH (ref 70–99)
GLUCOSE BLDC GLUCOMTR-MCNC: 93 MG/DL — SIGNIFICANT CHANGE UP (ref 70–99)

## 2023-01-24 PROCEDURE — 99233 SBSQ HOSP IP/OBS HIGH 50: CPT

## 2023-01-24 RX ADMIN — Medication 1 PACKET(S): at 17:46

## 2023-01-24 RX ADMIN — ATENOLOL 50 MILLIGRAM(S): 25 TABLET ORAL at 04:50

## 2023-01-24 RX ADMIN — CHLORHEXIDINE GLUCONATE 1 APPLICATION(S): 213 SOLUTION TOPICAL at 11:56

## 2023-01-24 RX ADMIN — Medication 5 MILLIGRAM(S): at 22:26

## 2023-01-24 RX ADMIN — APIXABAN 5 MILLIGRAM(S): 2.5 TABLET, FILM COATED ORAL at 17:49

## 2023-01-24 RX ADMIN — CHLORHEXIDINE GLUCONATE 15 MILLILITER(S): 213 SOLUTION TOPICAL at 18:14

## 2023-01-24 RX ADMIN — Medication 1 DROP(S): at 05:11

## 2023-01-24 RX ADMIN — LISINOPRIL 10 MILLIGRAM(S): 2.5 TABLET ORAL at 04:50

## 2023-01-24 RX ADMIN — Medication 2 MILLIGRAM(S): at 22:27

## 2023-01-24 RX ADMIN — Medication 81 MILLIGRAM(S): at 11:50

## 2023-01-24 RX ADMIN — LOTEPREDNOL ETABONATE 1 DROP(S): 2 SUSPENSION/ DROPS OPHTHALMIC at 17:47

## 2023-01-24 RX ADMIN — Medication 8 UNIT(S): at 05:07

## 2023-01-24 RX ADMIN — Medication 8 UNIT(S): at 18:29

## 2023-01-24 RX ADMIN — Medication 2 MILLIGRAM(S): at 04:50

## 2023-01-24 RX ADMIN — APIXABAN 5 MILLIGRAM(S): 2.5 TABLET, FILM COATED ORAL at 04:50

## 2023-01-24 RX ADMIN — Medication 100 MILLIGRAM(S): at 22:27

## 2023-01-24 RX ADMIN — Medication 8 UNIT(S): at 11:54

## 2023-01-24 RX ADMIN — CHLORHEXIDINE GLUCONATE 15 MILLILITER(S): 213 SOLUTION TOPICAL at 05:10

## 2023-01-24 RX ADMIN — LOTEPREDNOL ETABONATE 1 DROP(S): 2 SUSPENSION/ DROPS OPHTHALMIC at 04:56

## 2023-01-24 RX ADMIN — Medication 1 PACKET(S): at 05:11

## 2023-01-24 RX ADMIN — ATORVASTATIN CALCIUM 80 MILLIGRAM(S): 80 TABLET, FILM COATED ORAL at 22:26

## 2023-01-24 RX ADMIN — PANTOPRAZOLE SODIUM 40 MILLIGRAM(S): 20 TABLET, DELAYED RELEASE ORAL at 11:50

## 2023-01-24 RX ADMIN — LOTEPREDNOL ETABONATE 1 DROP(S): 2 SUSPENSION/ DROPS OPHTHALMIC at 04:54

## 2023-01-24 RX ADMIN — INSULIN GLARGINE 25 UNIT(S): 100 INJECTION, SOLUTION SUBCUTANEOUS at 08:48

## 2023-01-24 NOTE — PROGRESS NOTE ADULT - ASSESSMENT
76 y/o M w/ PMH of CAD, DM2, HTN, transferred from [facility] for CVA after presenting for Rt-sided weakness, difficulty swallowing and slurred speech.  Pt was out of window for TPA.  CTH was (-) but MRI brain was significant for an acute L-debby infarct and MRA was significant for L-vertebral artery stenosis vs dissection vs thrombus.  Pt was admitted to neuroICU.  He was intubated 12/28 for air way protection.  He eventually required trach/peg'd 01/03.  Pts hospital course complicated by LLL PNA w/ Scxs growing MSSA 12/30 and morganella 01/05.  Neurology and neurosurgery following.        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, dissection  Acute respiratory failure likely due to brainstem CVA  Acute metabolic encephalopathy - yesterday appeared to be hallucinating (pointing at spaces in the air) but patient vented and aphasic so difficult to tell - more cooperative and flat today  - c/w ASA and atorvastatin  - c/w Eliquis for thrombosis  - c/w daily spontaneous breathing trials  - s/p suture removal 1/10 by CT surgery  - Pulmonary following and recs noted  - Neurology and neurosurgery following and recs noted  - trazodone at night  - continue PRN zyprexa    #Urinary retention  - Placed tierney on 1/20 due to retention  - c/w Doxazosin  - TOV in am. Provider to RN     #COVID positive  - No symptoms  - Discharge postponed    #VAP  - Scxs from 12/30 growing MSSA  - Scxs from 01/05 growing Morganella Morgani  - Leukocytosis persists but no L-shift, remains afebrile and nontoxic appearing  - Completed course of Cefepime through 01/14    #Normocytic anemia  - Hb 14 on admission  - H/h remains low but stable  - Pt is on AC for suspected vertebral artery thrombus  - Trend CBC and transfuse as needed    #PEG dependence  - c/w tube feeds as tolerated  - Diarrhea likely from tube feeds - Added banatrol but may need TF changed - c diff negative  - added metamucil as well  - Nutrition following    #DM II  - A1c 8  - On basal/bolus insulin regimen  - Titrate insulin to goal BG<180  - ISS and hypoglycemic protocol in place    VTE ppx: on eliquis     Dispo: Discharge postponed, tested positive for COVID - can DC once 10 day quarantine time reached 1/30

## 2023-01-24 NOTE — CHART NOTE - NSCHARTNOTEFT_GEN_A_CORE
I have evaluated this patient and have determined that restraints are warranted to optimize medical care. Patient was assessed for current physical and psychological risk factors as well as special needs. There are no medical conditions or limitations that would place this patient at risk while in restraints. Dr. ARIEL Stanley made aware.

## 2023-01-24 NOTE — PROGRESS NOTE ADULT - SUBJECTIVE AND OBJECTIVE BOX
[facility] Division of Hospital Medicine    Chief Complaint:    CVA    SUBJECTIVE / OVERNIGHT EVENTS:  No events  Patient communicating with body language.  Denies pain    Patient denies chest pain, SOB, abd pain, N/V, fever, chills, dysuria or any other complaints. All remainder ROS negative.     MEDICATIONS  (STANDING):  apixaban 5 milliGRAM(s) Enteral Tube every 12 hours  aspirin  chewable 81 milliGRAM(s) Oral daily  atenolol  Tablet 50 milliGRAM(s) Oral daily  atorvastatin 80 milliGRAM(s) Oral at bedtime  chlorhexidine 0.12% Liquid 15 milliLiter(s) Oral Mucosa every 12 hours  chlorhexidine 2% Cloths 1 Application(s) Topical daily  coronavirus bivalent (EUA) Booster Vaccine (PFIZER) 0.3 milliLiter(s) IntraMuscular once  dextrose 5%. 1000 milliLiter(s) (50 mL/Hr) IV Continuous <Continuous>  dextrose 5%. 1000 milliLiter(s) (100 mL/Hr) IV Continuous <Continuous>  dextrose 50% Injectable 25 Gram(s) IV Push once  dextrose 50% Injectable 12.5 Gram(s) IV Push once  dextrose 50% Injectable 25 Gram(s) IV Push once  doxazosin 2 milliGRAM(s) Oral at bedtime  glucagon  Injectable 1 milliGRAM(s) IntraMuscular once  influenza  Vaccine (HIGH DOSE) 0.7 milliLiter(s) IntraMuscular once  insulin glargine Injectable (LANTUS) 25 Unit(s) SubCutaneous every morning  insulin lispro (ADMELOG) corrective regimen sliding scale   SubCutaneous three times a day before meals  insulin lispro Injectable (ADMELOG) 8 Unit(s) SubCutaneous every 6 hours  lisinopril 10 milliGRAM(s) Oral daily  loperamide 2 milliGRAM(s) Oral three times a day  loteprednol 0.5% Ophthalmic Suspension 1 Drop(s) Left EYE every 12 hours  loteprednol 0.5% Ophthalmic Suspension 1 Drop(s) Right EYE daily  melatonin 5 milliGRAM(s) Oral at bedtime  pantoprazole   Suspension 40 milliGRAM(s) Oral daily  prednisoLONE acetate 1% Suspension 1 Drop(s) Left EYE daily  psyllium Powder 1 Packet(s) Oral two times a day  traZODone 100 milliGRAM(s) Oral at bedtime    MEDICATIONS  (PRN):  acetaminophen     Tablet .. 650 milliGRAM(s) Oral every 6 hours PRN Temp greater or equal to 38C (100.4F), Mild Pain (1 - 3)  albuterol/ipratropium for Nebulization 3 milliLiter(s) Nebulizer every 6 hours PRN Shortness of Breath and/or Wheezing  aluminum hydroxide/magnesium hydroxide/simethicone Suspension 30 milliLiter(s) Oral every 4 hours PRN Dyspepsia  dextrose Oral Gel 15 Gram(s) Oral once PRN Blood Glucose LESS THAN 70 milliGRAM(s)/deciliter  hydrALAZINE Injectable 10 milliGRAM(s) IV Push every 2 hours PRN SBP >160  labetalol Injectable 10 milliGRAM(s) IV Push every 2 hours PRN SBP >160  lidocaine 2% Gel 1 Application(s) Topical every 6 hours PRN Pain at site  ondansetron Injectable 4 milliGRAM(s) IV Push every 8 hours PRN Nausea and/or Vomiting        I&O's Summary    23 Jan 2023 07:01  -  24 Jan 2023 07:00  --------------------------------------------------------  IN: 3540 mL / OUT: 1650 mL / NET: 1890 mL        PHYSICAL EXAM:  Vital Signs Last 24 Hrs  T(C): 36.9 (24 Jan 2023 11:11), Max: 36.9 (23 Jan 2023 22:00)  T(F): 98.5 (24 Jan 2023 11:11), Max: 98.5 (23 Jan 2023 22:00)  HR: 75 (24 Jan 2023 11:11) (51 - 78)  BP: 111/58 (24 Jan 2023 11:11) (111/58 - 128/66)  BP(mean): --  RR: 22 (24 Jan 2023 11:11) (16 - 22)  SpO2: 98% (24 Jan 2023 11:11) (92% - 99%)    Parameters below as of 24 Jan 2023 11:11  Patient On (Oxygen Delivery Method): conventional ventilator            CONSTITUTIONAL: NAD, supine, chronically sick appearing   ENMT: Moist oral mucosa, no pharyngeal injection or exudates; normal dentition  RESPIRATORY: On CPAP, bilateral expansion, mechanical lung sounds, trach  CARDIOVASCULAR: Regular rate and rhythm, normal S1 and S2, no murmur/rub/gallop; Peripheral pulses are 2+ bilaterally  ABDOMEN: Nontender to palpation, normoactive bowel sounds, no rebound/guarding; PEG   MUSCLOSKELETAL:  No clubbing or cyanosis of digits; no joint swelling or tenderness to palpation  PSYCH: A+O to person, place, and time; affect appropriate  NEUROLOGY: alert, right sided weakness  SKIN: No rashes; no palpable lesions    LABS:                        8.8    8.38  )-----------( 307      ( 23 Jan 2023 10:45 )             27.0     01-23    132<L>  |  97  |  37.5<H>  ----------------------------<  182<H>  5.0   |  27.0  |  1.17    Ca    8.2<L>      23 Jan 2023 10:45  Mg     2.4     01-23                CAPILLARY BLOOD GLUCOSE      POCT Blood Glucose.: 119 mg/dL (24 Jan 2023 11:47)  POCT Blood Glucose.: 127 mg/dL (24 Jan 2023 08:06)  POCT Blood Glucose.: 127 mg/dL (24 Jan 2023 08:06)  POCT Blood Glucose.: 148 mg/dL (24 Jan 2023 05:04)  POCT Blood Glucose.: 148 mg/dL (24 Jan 2023 05:04)  POCT Blood Glucose.: 97 mg/dL (23 Jan 2023 22:19)  POCT Blood Glucose.: 139 mg/dL (23 Jan 2023 16:16)        RADIOLOGY & ADDITIONAL TESTS:  Results Reviewed:   Imaging Personally Reviewed:  Electrocardiogram Personally Reviewed:

## 2023-01-25 LAB
GLUCOSE BLDC GLUCOMTR-MCNC: 115 MG/DL — HIGH (ref 70–99)
GLUCOSE BLDC GLUCOMTR-MCNC: 133 MG/DL — HIGH (ref 70–99)
GLUCOSE BLDC GLUCOMTR-MCNC: 149 MG/DL — HIGH (ref 70–99)
GLUCOSE BLDC GLUCOMTR-MCNC: 90 MG/DL — SIGNIFICANT CHANGE UP (ref 70–99)
GLUCOSE BLDC GLUCOMTR-MCNC: 91 MG/DL — SIGNIFICANT CHANGE UP (ref 70–99)
GLUCOSE BLDC GLUCOMTR-MCNC: 93 MG/DL — SIGNIFICANT CHANGE UP (ref 70–99)

## 2023-01-25 PROCEDURE — 99231 SBSQ HOSP IP/OBS SF/LOW 25: CPT

## 2023-01-25 PROCEDURE — 99232 SBSQ HOSP IP/OBS MODERATE 35: CPT

## 2023-01-25 RX ADMIN — Medication 5 MILLIGRAM(S): at 21:44

## 2023-01-25 RX ADMIN — Medication 2 MILLIGRAM(S): at 21:44

## 2023-01-25 RX ADMIN — CHLORHEXIDINE GLUCONATE 15 MILLILITER(S): 213 SOLUTION TOPICAL at 17:37

## 2023-01-25 RX ADMIN — Medication 1 DROP(S): at 05:49

## 2023-01-25 RX ADMIN — Medication 8 UNIT(S): at 05:53

## 2023-01-25 RX ADMIN — CHLORHEXIDINE GLUCONATE 1 APPLICATION(S): 213 SOLUTION TOPICAL at 12:05

## 2023-01-25 RX ADMIN — Medication 100 MILLIGRAM(S): at 21:44

## 2023-01-25 RX ADMIN — Medication 8 UNIT(S): at 12:04

## 2023-01-25 RX ADMIN — Medication 2 MILLIGRAM(S): at 05:46

## 2023-01-25 RX ADMIN — LOTEPREDNOL ETABONATE 1 DROP(S): 2 SUSPENSION/ DROPS OPHTHALMIC at 06:17

## 2023-01-25 RX ADMIN — LISINOPRIL 10 MILLIGRAM(S): 2.5 TABLET ORAL at 05:46

## 2023-01-25 RX ADMIN — Medication 1 PACKET(S): at 06:17

## 2023-01-25 RX ADMIN — APIXABAN 5 MILLIGRAM(S): 2.5 TABLET, FILM COATED ORAL at 05:46

## 2023-01-25 RX ADMIN — Medication 81 MILLIGRAM(S): at 12:04

## 2023-01-25 RX ADMIN — LOTEPREDNOL ETABONATE 1 DROP(S): 2 SUSPENSION/ DROPS OPHTHALMIC at 05:48

## 2023-01-25 RX ADMIN — INSULIN GLARGINE 25 UNIT(S): 100 INJECTION, SOLUTION SUBCUTANEOUS at 08:04

## 2023-01-25 RX ADMIN — APIXABAN 5 MILLIGRAM(S): 2.5 TABLET, FILM COATED ORAL at 17:37

## 2023-01-25 RX ADMIN — ATORVASTATIN CALCIUM 80 MILLIGRAM(S): 80 TABLET, FILM COATED ORAL at 21:44

## 2023-01-25 RX ADMIN — Medication 8 UNIT(S): at 17:36

## 2023-01-25 RX ADMIN — LOTEPREDNOL ETABONATE 1 DROP(S): 2 SUSPENSION/ DROPS OPHTHALMIC at 17:38

## 2023-01-25 RX ADMIN — CHLORHEXIDINE GLUCONATE 15 MILLILITER(S): 213 SOLUTION TOPICAL at 05:49

## 2023-01-25 RX ADMIN — PANTOPRAZOLE SODIUM 40 MILLIGRAM(S): 20 TABLET, DELAYED RELEASE ORAL at 12:04

## 2023-01-25 RX ADMIN — ATENOLOL 50 MILLIGRAM(S): 25 TABLET ORAL at 05:46

## 2023-01-25 NOTE — PROGRESS NOTE ADULT - ASSESSMENT
77M, PMH HTN, DM, CAD, Transferred from [facility] 12/26 with an acute L pontine CVA, possible L vert dissection. Worsening neuro exam 12/27 - RUE plegia, worsening bulbar dysfunction, Acute resp failure due to neurologic injury, required intubation 12/27 for airway protection now s/p trach/peg 1/3, surgicel d/c 1/4, sutures removed 1/10.

## 2023-01-25 NOTE — CHART NOTE - NSCHARTNOTEFT_GEN_A_CORE
I have evaluated this patient and have determined that restraints are warranted to optimize medical care. Patient was assessed for current physical and psychological risk factors as well as special needs. There are no medical conditions or limitations that would place this patient at risk while in restraints. B/L unsecured mittens deescalated to left mitten only. Dr. RIP Penaloza aware.

## 2023-01-25 NOTE — CHART NOTE - NSCHARTNOTEFT_GEN_A_CORE
Source: Patient [ ]  Family [ ]   other [x ]    Current Diet:   Diet, NPO with Tube Feed:   Tube Feeding Modality: Gastrostomy  Glucerna 1.5 Benny (GLUCERNA1.5)  Total Volume for 24 Hours (mL): 1440  Continuous  Starting Tube Feed Rate {mL per Hour}: 60  Until Goal Tube Feed Rate (mL per Hour): 60  Tube Feed Duration (in Hours): 24  Tube Feed Start Time: 11:00  Banatrol TF     Qty per Day:  4 (01-13-23 @ 14:01)    Patient reports [ ] nausea  [ ] vomiting [ ] diarrhea [ ] constipation  [ ]chewing problems [ ] swallowing issues  [ ] other:     Enteral /Parenteral Nutrition: Current diet order reads for Glucerna 1.5 running @ goal rate of 60 ml/hr (x20 hrs) to provide 1200 ml, 1800 kcal, 99g protein, 911 ml free water, and >100% of RDIs for vitamins/minerals.     Current Weight:   (1/23)  165.3 lbs   (1/11)  166.4 lbs  (1/3)   174.1 lbs   (12/27) 171.1 lbs   (12/26) 170.1 lbs     % Weight Change: ~5lb weight loss since admission noted, will continue to monitor     Pertinent Medications: MEDICATIONS  (STANDING):  apixaban 5 milliGRAM(s) Enteral Tube every 12 hours  aspirin  chewable 81 milliGRAM(s) Oral daily  atenolol  Tablet 50 milliGRAM(s) Oral daily  atorvastatin 80 milliGRAM(s) Oral at bedtime  chlorhexidine 0.12% Liquid 15 milliLiter(s) Oral Mucosa every 12 hours  chlorhexidine 2% Cloths 1 Application(s) Topical daily  coronavirus bivalent (EUA) Booster Vaccine (PFIZER) 0.3 milliLiter(s) IntraMuscular once  dextrose 5%. 1000 milliLiter(s) (100 mL/Hr) IV Continuous <Continuous>  dextrose 5%. 1000 milliLiter(s) (50 mL/Hr) IV Continuous <Continuous>  dextrose 50% Injectable 25 Gram(s) IV Push once  dextrose 50% Injectable 12.5 Gram(s) IV Push once  dextrose 50% Injectable 25 Gram(s) IV Push once  doxazosin 2 milliGRAM(s) Oral at bedtime  glucagon  Injectable 1 milliGRAM(s) IntraMuscular once  influenza  Vaccine (HIGH DOSE) 0.7 milliLiter(s) IntraMuscular once  insulin glargine Injectable (LANTUS) 25 Unit(s) SubCutaneous every morning  insulin lispro (ADMELOG) corrective regimen sliding scale   SubCutaneous three times a day before meals  insulin lispro Injectable (ADMELOG) 8 Unit(s) SubCutaneous every 6 hours  lisinopril 10 milliGRAM(s) Oral daily  loperamide 2 milliGRAM(s) Oral three times a day  loteprednol 0.5% Ophthalmic Suspension 1 Drop(s) Left EYE every 12 hours  loteprednol 0.5% Ophthalmic Suspension 1 Drop(s) Right EYE daily  melatonin 5 milliGRAM(s) Oral at bedtime  pantoprazole   Suspension 40 milliGRAM(s) Oral daily  prednisoLONE acetate 1% Suspension 1 Drop(s) Left EYE daily  psyllium Powder 1 Packet(s) Oral two times a day  traZODone 100 milliGRAM(s) Oral at bedtime    MEDICATIONS  (PRN):  acetaminophen     Tablet .. 650 milliGRAM(s) Oral every 6 hours PRN Temp greater or equal to 38C (100.4F), Mild Pain (1 - 3)  albuterol/ipratropium for Nebulization 3 milliLiter(s) Nebulizer every 6 hours PRN Shortness of Breath and/or Wheezing  aluminum hydroxide/magnesium hydroxide/simethicone Suspension 30 milliLiter(s) Oral every 4 hours PRN Dyspepsia  dextrose Oral Gel 15 Gram(s) Oral once PRN Blood Glucose LESS THAN 70 milliGRAM(s)/deciliter  hydrALAZINE Injectable 10 milliGRAM(s) IV Push every 2 hours PRN SBP >160  labetalol Injectable 10 milliGRAM(s) IV Push every 2 hours PRN SBP >160  lidocaine 2% Gel 1 Application(s) Topical every 6 hours PRN Pain at site  ondansetron Injectable 4 milliGRAM(s) IV Push every 8 hours PRN Nausea and/or Vomiting    Pertinent Labs: No recent nutrition-related labs     Skin: Stage 2 sacrum, skin tear penis    Nutrition focused physical exam conducted - found signs of malnutrition [ ]absent [ x]present    Subcutaneous fat loss: [ ] Orbital fat pads region, [ ]Buccal fat region, [ ]Triceps region,  [ ]Ribs region    Muscle wasting: [x ]Temples region, [ ]Clavicle region, [ ]Shoulder region, [ ]Scapula region, [ ]Interosseous region,  [ ]thigh region, [ ]Calf region    Estimated Needs:   [x ] no change since previous assessment  [ ] recalculated:     Current Nutrition Diagnosis: Pt remains at nutrition risk secondary to increased nutrient needs related to increased physiological demand for nutrient as evidenced by acute L pontine CVA, 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 Possible weight loss since admission, noted with resolving edema. Pt with BG well controlled, tolerating TF  goal rate. Per RN, Pt without a bowel movement. As ordered imodium, metamucil BID, Banatrol 4x/day.     Recommendations:   1) D/c Metamucil, monitor bowel function  2) Consider increase TF to goal rate of 65 ml/hr (x20 hrs) to provide 1300 ml, 1950 kcal, 107g protein, 987 ml free water, and >100% of RDIs for vitamins/minerals. Additional free water per MD discretion.   3) Monitor weights daily for trend/accuracy   4) Monitor electrolytes, correct PRN    Monitoring and Evaluation:   [ ] PO intake [ x] Tolerance to diet prescription [X] Weights  [X] Follow up per protocol [X] Labs:

## 2023-01-25 NOTE — PROGRESS NOTE ADULT - SUBJECTIVE AND OBJECTIVE BOX
Brief summary:  77yMale seen and assessed at bedside.  Pt laying comfortably in hospital bed in NAD on MV via trach.  Indicates no current physical complaints at this time.  ROS negative x 10 except as indicated in HPI.    Overnight events:  None.  Hospital course progressing as expected.        PAST MEDICAL & SURGICAL HISTORY:  HTN (hypertension)    CAD (coronary artery disease)    DM (diabetes mellitus)        Medications:  acetaminophen     Tablet .. 650 milliGRAM(s) Oral every 6 hours PRN  albuterol/ipratropium for Nebulization 3 milliLiter(s) Nebulizer every 6 hours PRN  aluminum hydroxide/magnesium hydroxide/simethicone Suspension 30 milliLiter(s) Oral every 4 hours PRN  apixaban 5 milliGRAM(s) Enteral Tube every 12 hours  aspirin  chewable 81 milliGRAM(s) Oral daily  atenolol  Tablet 50 milliGRAM(s) Oral daily  atorvastatin 80 milliGRAM(s) Oral at bedtime  chlorhexidine 0.12% Liquid 15 milliLiter(s) Oral Mucosa every 12 hours  chlorhexidine 2% Cloths 1 Application(s) Topical daily  coronavirus bivalent (EUA) Booster Vaccine (PFIZER) 0.3 milliLiter(s) IntraMuscular once  dextrose 5%. 1000 milliLiter(s) IV Continuous <Continuous>  dextrose 5%. 1000 milliLiter(s) IV Continuous <Continuous>  dextrose 50% Injectable 25 Gram(s) IV Push once  dextrose 50% Injectable 12.5 Gram(s) IV Push once  dextrose 50% Injectable 25 Gram(s) IV Push once  dextrose Oral Gel 15 Gram(s) Oral once PRN  doxazosin 2 milliGRAM(s) Oral at bedtime  glucagon  Injectable 1 milliGRAM(s) IntraMuscular once  hydrALAZINE Injectable 10 milliGRAM(s) IV Push every 2 hours PRN  influenza  Vaccine (HIGH DOSE) 0.7 milliLiter(s) IntraMuscular once  insulin glargine Injectable (LANTUS) 25 Unit(s) SubCutaneous every morning  insulin lispro (ADMELOG) corrective regimen sliding scale   SubCutaneous three times a day before meals  insulin lispro Injectable (ADMELOG) 8 Unit(s) SubCutaneous every 6 hours  labetalol Injectable 10 milliGRAM(s) IV Push every 2 hours PRN  lidocaine 2% Gel 1 Application(s) Topical every 6 hours PRN  lisinopril 10 milliGRAM(s) Oral daily  loperamide 2 milliGRAM(s) Oral three times a day  loteprednol 0.5% Ophthalmic Suspension 1 Drop(s) Left EYE every 12 hours  loteprednol 0.5% Ophthalmic Suspension 1 Drop(s) Right EYE daily  melatonin 5 milliGRAM(s) Oral at bedtime  ondansetron Injectable 4 milliGRAM(s) IV Push every 8 hours PRN  pantoprazole   Suspension 40 milliGRAM(s) Oral daily  prednisoLONE acetate 1% Suspension 1 Drop(s) Left EYE daily  psyllium Powder 1 Packet(s) Oral two times a day  traZODone 100 milliGRAM(s) Oral at bedtime      MEDICATIONS  (PRN):  acetaminophen     Tablet .. 650 milliGRAM(s) Oral every 6 hours PRN Temp greater or equal to 38C (100.4F), Mild Pain (1 - 3)  albuterol/ipratropium for Nebulization 3 milliLiter(s) Nebulizer every 6 hours PRN Shortness of Breath and/or Wheezing  aluminum hydroxide/magnesium hydroxide/simethicone Suspension 30 milliLiter(s) Oral every 4 hours PRN Dyspepsia  dextrose Oral Gel 15 Gram(s) Oral once PRN Blood Glucose LESS THAN 70 milliGRAM(s)/deciliter  hydrALAZINE Injectable 10 milliGRAM(s) IV Push every 2 hours PRN SBP >160  labetalol Injectable 10 milliGRAM(s) IV Push every 2 hours PRN SBP >160  lidocaine 2% Gel 1 Application(s) Topical every 6 hours PRN Pain at site  ondansetron Injectable 4 milliGRAM(s) IV Push every 8 hours PRN Nausea and/or Vomiting        Daily Review:    Mode: CPAP with PS, FiO2: 30, PEEP: 5, PS: 10, MAP: 10               8.8    8.38  )-----------( 307      ( 23 Jan 2023 10:45 )             27.0   01-23    132<L>  |  97  |  37.5<H>  ----------------------------<  182<H>  5.0   |  27.0  |  1.17    Ca    8.2<L>      23 Jan 2023 10:45      T(C): 36.9 (01-25-23 @ 04:20), Max: 36.9 (01-24-23 @ 11:11)  HR: 58 (01-25-23 @ 05:40) (52 - 75)  BP: 114/52 (01-25-23 @ 04:20) (111/58 - 115/64)  RR: 18 (01-25-23 @ 04:20) (18 - 24)  SpO2: 97% (01-25-23 @ 05:40) (97% - 100%)      CAPILLARY BLOOD GLUCOSE    POCT Blood Glucose.: 133 mg/dL (25 Jan 2023 07:40)  POCT Blood Glucose.: 149 mg/dL (25 Jan 2023 05:51)  POCT Blood Glucose.: 90 mg/dL (25 Jan 2023 00:02)  POCT Blood Glucose.: 93 mg/dL (24 Jan 2023 18:01)  POCT Blood Glucose.: 119 mg/dL (24 Jan 2023 11:47)      I&O's Summary    24 Jan 2023 07:01  -  25 Jan 2023 07:00  --------------------------------------------------------  IN: 0 mL / OUT: 2040 mL / NET: -2040 mL        Physical Exam    Neuro: A+O x 3, non-focal  Pulm: coarse breath sounds bilaterally, no wheezing or rales, + trach, mild secretions, no skin breakdown, FiO2 30%, PEEP 5  CV: RRR, +S1S2  Abd: soft, NT, ND, normoactive bowel sounds + PEG c/d/i  Ext: +DP Pulses b/l, +PT pulses, 2+ RUE edema, no LE edema

## 2023-01-25 NOTE — PROGRESS NOTE ADULT - ASSESSMENT
76 y/o M w/ PMH of CAD, DM2, HTN, transferred from [facility] for CVA after presenting for Rt-sided weakness, difficulty swallowing and slurred speech.  Pt was out of window for TPA.  CTH was (-) but MRI brain was significant for an acute L-debby infarct and MRA was significant for L-vertebral artery stenosis vs dissection vs thrombus.  Pt was admitted to neuroICU.  He was intubated 12/28 for air way protection.  He eventually required trach/peg'd 01/03.  Pts hospital course complicated by LLL PNA w/ Scxs growing MSSA 12/30 and morganella 01/05.  Neurology and neurosurgery following.        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, dissection  Acute respiratory failure likely due to brainstem CVA  Acute metabolic encephalopathy - yesterday appeared to be hallucinating (pointing at spaces in the air) but patient vented and aphasic so difficult to tell - more cooperative and flat today  - c/w ASA and atorvastatin  - c/w Eliquis for thrombosis  - c/w daily spontaneous breathing trials  - s/p suture removal 1/10 by CT surgery  - Pulmonary following and recs noted  - Neurology and neurosurgery following and recs noted  - trazodone at night  - discontinued qhs zyprexa    #Urinary retention  - Placed tierney on 1/20 due to retention  - c/w Doxazosin  - TOV failed yesterday - tierney replaced due to multiple need for straight cath  - replaced tierney on 1/25    #COVID positive  - No symptoms  - Discharge postponed    #VAP  - Scxs from 12/30 growing MSSA  - Scxs from 01/05 growing Morganella Morgani  - Leukocytosis persists but no L-shift, remains afebrile and nontoxic appearing  - Completed course of Cefepime through 01/14    #Normocytic anemia  - Hb 14 on admission  - H/h remains low but stable  - Pt is on AC for suspected vertebral artery thrombus  - Trend CBC and transfuse as needed    #PEG dependence  - c/w tube feeds as tolerated  - Diarrhea likely from tube feeds - Added banatrol but may need TF changed - c diff negative  - added metamucil as well  - Nutrition following    #DM II  - A1c 8  - On basal/bolus insulin regimen  - Titrate insulin to goal BG<180  - ISS and hypoglycemic protocol in place    VTE ppx: on eliquis     Dispo: Discharge postponed, tested positive for COVID - can DC once 10 day quarantine time reached 1/30

## 2023-01-25 NOTE — PROGRESS NOTE ADULT - SUBJECTIVE AND OBJECTIVE BOX
CC: Acute stroke (20 Jan 2023 12:10)    INTERVAL HPI/OVERNIGHT EVENTS:  without acute events    Vital Signs Last 24 Hrs  T(C): 37 (23 Jan 2023 11:02), Max: 37 (23 Jan 2023 11:02)  T(F): 98.6 (23 Jan 2023 11:02), Max: 98.6 (23 Jan 2023 11:02)  HR: 46 (23 Jan 2023 11:02) (45 - 74)  BP: 109/67 (23 Jan 2023 11:02) (102/57 - 114/55)  BP(mean): --  RR: 18 (23 Jan 2023 11:02) (16 - 18)  SpO2: 96% (23 Jan 2023 11:02) (96% - 100%)    PHYSICAL EXAM:  General: in no acute distress  Eyes: PERRLA, EOMI; conjunctiva and sclera clear  Head: Normocephalic; atraumatic  ENMT: No nasal discharge; airway clear  Neck: Supple; non tender; no masses; trach in place  Respiratory: No wheezes, rales or rhonchi  Cardiovascular: Regular rate and rhythm. S1 and S2 Normal; No murmurs, gallops or rubs  Gastrointestinal: Soft non-tender non-distended; Normal bowel sounds; PEG in place  Vascular: Peripheral pulses palpable 2+ bilaterally  Neurological: Alert but unable to assess if oriented; follows commands  Skin: Warm and dry. No acute rash  Psychiatric: Cooperative and appropriate    I&O's Detail    20 Jan 2023 07:01  -  21 Jan 2023 07:00  --------------------------------------------------------  IN:    Free Water: 1400 mL    Glucerna 1.5: 1380 mL  Total IN: 2780 mL    OUT:    Indwelling Catheter - Urethral (mL): 1000 mL  Total OUT: 1000 mL    Total NET: 1780 mL                       9.1    10.58 )-----------( 379      ( 21 Jan 2023 09:25 )             28.1     21 Jan 2023 09:25    131    |  95     |  50.3   ----------------------------<  162    5.1     |  28.0   |  0.99     Ca    8.3        21 Jan 2023 09:25  Phos  3.5       21 Jan 2023 09:25  Mg     2.4       21 Jan 2023 09:25    TPro  x      /  Alb  2.5    /  TBili  x      /  DBili  x      /  AST  x      /  ALT  x      /  AlkPhos  x      21 Jan 2023 09:25    CAPILLARY BLOOD GLUCOSE  POCT Blood Glucose.: 150 mg/dL (22 Jan 2023 11:15)  POCT Blood Glucose.: 122 mg/dL (22 Jan 2023 08:00)  POCT Blood Glucose.: 92 mg/dL (22 Jan 2023 06:56)  POCT Blood Glucose.: 118 mg/dL (21 Jan 2023 21:50)  POCT Blood Glucose.: 81 mg/dL (21 Jan 2023 16:49)    MEDICATIONS  (STANDING):  apixaban 5 milliGRAM(s) Enteral Tube every 12 hours  aspirin  chewable 81 milliGRAM(s) Oral daily  atenolol  Tablet 50 milliGRAM(s) Oral daily  atorvastatin 80 milliGRAM(s) Oral at bedtime  chlorhexidine 0.12% Liquid 15 milliLiter(s) Oral Mucosa every 12 hours  chlorhexidine 2% Cloths 1 Application(s) Topical daily  coronavirus bivalent (EUA) Booster Vaccine (PFIZER) 0.3 milliLiter(s) IntraMuscular once  dextrose 5%. 1000 milliLiter(s) (100 mL/Hr) IV Continuous <Continuous>  dextrose 5%. 1000 milliLiter(s) (50 mL/Hr) IV Continuous <Continuous>  dextrose 50% Injectable 25 Gram(s) IV Push once  dextrose 50% Injectable 12.5 Gram(s) IV Push once  dextrose 50% Injectable 25 Gram(s) IV Push once  doxazosin 2 milliGRAM(s) Oral at bedtime  glucagon  Injectable 1 milliGRAM(s) IntraMuscular once  influenza  Vaccine (HIGH DOSE) 0.7 milliLiter(s) IntraMuscular once  insulin glargine Injectable (LANTUS) 25 Unit(s) SubCutaneous every morning  insulin lispro (ADMELOG) corrective regimen sliding scale   SubCutaneous three times a day before meals  insulin lispro Injectable (ADMELOG) 8 Unit(s) SubCutaneous every 6 hours  lisinopril 10 milliGRAM(s) Oral daily  loperamide 2 milliGRAM(s) Oral three times a day  loteprednol 0.5% Ophthalmic Suspension 1 Drop(s) Right EYE daily  melatonin 5 milliGRAM(s) Oral at bedtime  OLANZapine 5 milliGRAM(s) Oral at bedtime  pantoprazole   Suspension 40 milliGRAM(s) Oral daily  prednisoLONE acetate 1% Suspension 1 Drop(s) Left EYE daily  traZODone 50 milliGRAM(s) Oral at bedtime    MEDICATIONS  (PRN):  acetaminophen     Tablet .. 650 milliGRAM(s) Oral every 6 hours PRN Temp greater or equal to 38C (100.4F), Mild Pain (1 - 3)  albuterol/ipratropium for Nebulization 3 milliLiter(s) Nebulizer every 6 hours PRN Shortness of Breath and/or Wheezing  aluminum hydroxide/magnesium hydroxide/simethicone Suspension 30 milliLiter(s) Oral every 4 hours PRN Dyspepsia  dextrose Oral Gel 15 Gram(s) Oral once PRN Blood Glucose LESS THAN 70 milliGRAM(s)/deciliter  hydrALAZINE Injectable 10 milliGRAM(s) IV Push every 2 hours PRN SBP >160  labetalol Injectable 10 milliGRAM(s) IV Push every 2 hours PRN SBP >160  lidocaine 2% Gel 1 Application(s) Topical every 6 hours PRN Pain at site  ondansetron Injectable 4 milliGRAM(s) IV Push every 8 hours PRN Nausea and/or Vomiting      RADIOLOGY & ADDITIONAL TESTS:

## 2023-01-25 NOTE — CHART NOTE - NSCHARTNOTESELECT_GEN_ALL_CORE
Event Note
Intubation note/Event Note
Nutrition Services
Transfer Note
restraint/Event Note

## 2023-01-26 LAB
GLUCOSE BLDC GLUCOMTR-MCNC: 127 MG/DL — HIGH (ref 70–99)
GLUCOSE BLDC GLUCOMTR-MCNC: 131 MG/DL — HIGH (ref 70–99)
GLUCOSE BLDC GLUCOMTR-MCNC: 141 MG/DL — HIGH (ref 70–99)
GLUCOSE BLDC GLUCOMTR-MCNC: 146 MG/DL — HIGH (ref 70–99)
GLUCOSE BLDC GLUCOMTR-MCNC: 176 MG/DL — HIGH (ref 70–99)

## 2023-01-26 PROCEDURE — 99232 SBSQ HOSP IP/OBS MODERATE 35: CPT

## 2023-01-26 PROCEDURE — 99231 SBSQ HOSP IP/OBS SF/LOW 25: CPT

## 2023-01-26 RX ORDER — INSULIN LISPRO 100/ML
VIAL (ML) SUBCUTANEOUS EVERY 6 HOURS
Refills: 0 | Status: DISCONTINUED | OUTPATIENT
Start: 2023-01-26 | End: 2023-01-30

## 2023-01-26 RX ADMIN — Medication 1 DROP(S): at 06:07

## 2023-01-26 RX ADMIN — Medication 81 MILLIGRAM(S): at 12:30

## 2023-01-26 RX ADMIN — Medication 2 MILLIGRAM(S): at 23:40

## 2023-01-26 RX ADMIN — Medication 1 PACKET(S): at 06:04

## 2023-01-26 RX ADMIN — Medication 5 MILLIGRAM(S): at 23:40

## 2023-01-26 RX ADMIN — Medication 1 PACKET(S): at 17:39

## 2023-01-26 RX ADMIN — Medication 8 UNIT(S): at 06:03

## 2023-01-26 RX ADMIN — LOTEPREDNOL ETABONATE 1 DROP(S): 2 SUSPENSION/ DROPS OPHTHALMIC at 06:05

## 2023-01-26 RX ADMIN — APIXABAN 5 MILLIGRAM(S): 2.5 TABLET, FILM COATED ORAL at 17:52

## 2023-01-26 RX ADMIN — Medication 8 UNIT(S): at 23:41

## 2023-01-26 RX ADMIN — ATORVASTATIN CALCIUM 80 MILLIGRAM(S): 80 TABLET, FILM COATED ORAL at 23:40

## 2023-01-26 RX ADMIN — Medication 100 MILLIGRAM(S): at 23:41

## 2023-01-26 RX ADMIN — Medication 2 MILLIGRAM(S): at 23:39

## 2023-01-26 RX ADMIN — LOTEPREDNOL ETABONATE 1 DROP(S): 2 SUSPENSION/ DROPS OPHTHALMIC at 17:36

## 2023-01-26 RX ADMIN — CHLORHEXIDINE GLUCONATE 15 MILLILITER(S): 213 SOLUTION TOPICAL at 06:05

## 2023-01-26 RX ADMIN — Medication 2: at 06:08

## 2023-01-26 RX ADMIN — LOTEPREDNOL ETABONATE 1 DROP(S): 2 SUSPENSION/ DROPS OPHTHALMIC at 06:00

## 2023-01-26 RX ADMIN — Medication 2 MILLIGRAM(S): at 06:04

## 2023-01-26 RX ADMIN — Medication 8 UNIT(S): at 17:52

## 2023-01-26 RX ADMIN — PANTOPRAZOLE SODIUM 40 MILLIGRAM(S): 20 TABLET, DELAYED RELEASE ORAL at 12:31

## 2023-01-26 RX ADMIN — CHLORHEXIDINE GLUCONATE 1 APPLICATION(S): 213 SOLUTION TOPICAL at 12:33

## 2023-01-26 RX ADMIN — INSULIN GLARGINE 25 UNIT(S): 100 INJECTION, SOLUTION SUBCUTANEOUS at 08:31

## 2023-01-26 RX ADMIN — APIXABAN 5 MILLIGRAM(S): 2.5 TABLET, FILM COATED ORAL at 06:04

## 2023-01-26 RX ADMIN — CHLORHEXIDINE GLUCONATE 15 MILLILITER(S): 213 SOLUTION TOPICAL at 17:38

## 2023-01-26 NOTE — PROGRESS NOTE ADULT - PROBLEM SELECTOR PLAN 1
Admitted with Acute Left Pontine CVA.  Intubated 12/27 for airway protection.  S/p Trach/PEG 1/3  Surgicel removed 1/4.  Sutures removed 1/10  Both trach and PEG sites C/D/I  Pending discharge to [facility] once Covid isolation completed; tested positive 1/17    Maintain gauze/drain sponge between trach flange and skin to prevent skin breakdown. Change as needed.  Continue trach care and suctioning.   Continue tube feeds as tolerated.   Thoracic surgery to continue to follow.    Plan discussed with Dr. Hernandez during AM rounds.

## 2023-01-26 NOTE — PROGRESS NOTE ADULT - ASSESSMENT
76 y/o M w/ PMH of CAD, DM2, HTN, transferred from [facility] for CVA after presenting for Rt-sided weakness, difficulty swallowing and slurred speech.  Pt was out of window for TPA.  CTH was (-) but MRI brain was significant for an acute L-debby infarct and MRA was significant for L-vertebral artery stenosis vs dissection vs thrombus.  Pt was admitted to neuroICU.  He was intubated 12/28 for air way protection.  He eventually required trach/peg'd 01/03.  Pts hospital course complicated by LLL PNA w/ Scxs growing MSSA 12/30 and morganella 01/05.  Neurology and neurosurgery following.        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, dissection  Acute respiratory failure likely due to brainstem CVA  Acute metabolic encephalopathy - at this time appears improved and able to follow commands better, answers yes/no questions and easily arousable  - c/w ASA and atorvastatin  - c/w Eliquis for thrombosis  - c/w daily spontaneous breathing trials  - s/p suture removal 1/10 by CT surgery  - Pulmonary following and recs noted  - Neurology and neurosurgery following and recs noted  - trazodone at night  - discontinued qhs zyprexa    #Urinary retention  - Placed tierney on 1/20 due to retention  - c/w Doxazosin  - TOV failed yesterday - tierney replaced due to multiple need for straight cath  - replaced tierney on 1/25    #COVID positive  - No symptoms  - Discharge postponed    #VAP  - Scxs from 12/30 growing MSSA  - Scxs from 01/05 growing Morganella Morgani  - Leukocytosis persists but no L-shift, remains afebrile and nontoxic appearing  - Completed course of Cefepime through 01/14    #Normocytic anemia  - Hb 14 on admission  - H/h remains low but stable  - Pt is on AC for suspected vertebral artery thrombus  - Trend CBC and transfuse as needed    #PEG dependence  - c/w tube feeds as tolerated  - Diarrhea likely from tube feeds - Added banatrol but may need TF changed - c diff negative  - added metamucil as well  - Nutrition following    #DM II  - A1c 8  - On basal/bolus insulin regimen  - Titrate insulin to goal BG<180  - ISS and hypoglycemic protocol in place    VTE ppx: on eliquis     Dispo: Discharge postponed, tested positive for COVID - can DC once 10 day quarantine time reached 1/30

## 2023-01-26 NOTE — PROGRESS NOTE ADULT - ASSESSMENT
77M, PMH HTN, DM, CAD, Transferred from [place] 12/26 with an acute L pontine CVA, possible L vert dissection. Worsening neuro exam 12/27 - RUE plegia, worsening bulbar dysfunction, Acute resp failure due to neurologic injury, required intubation 12/27 for airway protection now s/p trach/peg 1/3, surgicel d/c 1/4, sutures removed 1/10. Discharge to [facility] delayed due to pt testing positive for Covid on 1/17.

## 2023-01-26 NOTE — PROGRESS NOTE ADULT - SUBJECTIVE AND OBJECTIVE BOX
Brief summary:  77yMale seen and assessed at bedside.  Pt sleeping comfortably in hospital bed in NAD on MV via trach.      Overnight events:  None.  Hospital course progressing as expected.        PAST MEDICAL & SURGICAL HISTORY:  HTN (hypertension)    CAD (coronary artery disease)    DM (diabetes mellitus)        Medications:  acetaminophen     Tablet .. 650 milliGRAM(s) Oral every 6 hours PRN  albuterol/ipratropium for Nebulization 3 milliLiter(s) Nebulizer every 6 hours PRN  aluminum hydroxide/magnesium hydroxide/simethicone Suspension 30 milliLiter(s) Oral every 4 hours PRN  apixaban 5 milliGRAM(s) Enteral Tube every 12 hours  aspirin  chewable 81 milliGRAM(s) Oral daily  atenolol  Tablet 50 milliGRAM(s) Oral daily  atorvastatin 80 milliGRAM(s) Oral at bedtime  chlorhexidine 0.12% Liquid 15 milliLiter(s) Oral Mucosa every 12 hours  chlorhexidine 2% Cloths 1 Application(s) Topical daily  coronavirus bivalent (EUA) Booster Vaccine (PFIZER) 0.3 milliLiter(s) IntraMuscular once  dextrose 5%. 1000 milliLiter(s) IV Continuous <Continuous>  dextrose 5%. 1000 milliLiter(s) IV Continuous <Continuous>  dextrose 50% Injectable 25 Gram(s) IV Push once  dextrose 50% Injectable 12.5 Gram(s) IV Push once  dextrose 50% Injectable 25 Gram(s) IV Push once  dextrose Oral Gel 15 Gram(s) Oral once PRN  doxazosin 2 milliGRAM(s) Oral at bedtime  glucagon  Injectable 1 milliGRAM(s) IntraMuscular once  hydrALAZINE Injectable 10 milliGRAM(s) IV Push every 2 hours PRN  influenza  Vaccine (HIGH DOSE) 0.7 milliLiter(s) IntraMuscular once  insulin glargine Injectable (LANTUS) 25 Unit(s) SubCutaneous every morning  insulin lispro (ADMELOG) corrective regimen sliding scale   SubCutaneous three times a day before meals  insulin lispro Injectable (ADMELOG) 8 Unit(s) SubCutaneous every 6 hours  labetalol Injectable 10 milliGRAM(s) IV Push every 2 hours PRN  lidocaine 2% Gel 1 Application(s) Topical every 6 hours PRN  lisinopril 10 milliGRAM(s) Oral daily  loperamide 2 milliGRAM(s) Oral three times a day  loteprednol 0.5% Ophthalmic Suspension 1 Drop(s) Left EYE every 12 hours  loteprednol 0.5% Ophthalmic Suspension 1 Drop(s) Right EYE daily  melatonin 5 milliGRAM(s) Oral at bedtime  ondansetron Injectable 4 milliGRAM(s) IV Push every 8 hours PRN  pantoprazole   Suspension 40 milliGRAM(s) Oral daily  prednisoLONE acetate 1% Suspension 1 Drop(s) Left EYE daily  psyllium Powder 1 Packet(s) Oral two times a day  traZODone 100 milliGRAM(s) Oral at bedtime      MEDICATIONS  (PRN):  acetaminophen     Tablet .. 650 milliGRAM(s) Oral every 6 hours PRN Temp greater or equal to 38C (100.4F), Mild Pain (1 - 3)  albuterol/ipratropium for Nebulization 3 milliLiter(s) Nebulizer every 6 hours PRN Shortness of Breath and/or Wheezing  aluminum hydroxide/magnesium hydroxide/simethicone Suspension 30 milliLiter(s) Oral every 4 hours PRN Dyspepsia  dextrose Oral Gel 15 Gram(s) Oral once PRN Blood Glucose LESS THAN 70 milliGRAM(s)/deciliter  hydrALAZINE Injectable 10 milliGRAM(s) IV Push every 2 hours PRN SBP >160  labetalol Injectable 10 milliGRAM(s) IV Push every 2 hours PRN SBP >160  lidocaine 2% Gel 1 Application(s) Topical every 6 hours PRN Pain at site  ondansetron Injectable 4 milliGRAM(s) IV Push every 8 hours PRN Nausea and/or Vomiting        Daily Review:    Mode: AC/ CMV (Assist Control/ Continuous Mandatory Ventilation), RR (machine): 16, TV (machine): 400, FiO2: 30, PEEP: 5, MAP: 10, PIP: 29      T(C): 36.8 (01-26-23 @ 10:47), Max: 36.9 (01-25-23 @ 21:37)  HR: 54 (01-26-23 @ 10:47) (54 - 68)  BP: 126/61 (01-26-23 @ 10:47) (120/72 - 136/55)  RR: 19 (01-26-23 @ 10:47) (18 - 19)  SpO2: 99% (01-26-23 @ 09:29) (98% - 100%)      CAPILLARY BLOOD GLUCOSE    POCT Blood Glucose.: 131 mg/dL (26 Jan 2023 08:20)  POCT Blood Glucose.: 176 mg/dL (26 Jan 2023 06:02)  POCT Blood Glucose.: 91 mg/dL (25 Jan 2023 21:34)  POCT Blood Glucose.: 93 mg/dL (25 Jan 2023 16:52)  POCT Blood Glucose.: 115 mg/dL (25 Jan 2023 12:01)      I&O's Summary    25 Jan 2023 07:01  -  26 Jan 2023 07:00  --------------------------------------------------------  IN: 3865 mL / OUT: 1800 mL / NET: 2065 mL        Physical Exam  Neuro: sleeping but arousable, non-focal  Pulm: coarse breath sounds bilaterally, no wheezing or rales, + trach on MV, FiO2 30%, PEEP 5  CV: RRR, +S1S2  Abd: soft, NT, ND, normoactive bowel sounds, + PEG c/d/i  Ext: +DP Pulses b/l, +PT pulses, no edema

## 2023-01-26 NOTE — PROGRESS NOTE ADULT - SUBJECTIVE AND OBJECTIVE BOX
CC: Acute stroke (20 Jan 2023 12:10)    INTERVAL HPI/OVERNIGHT EVENTS:  without acute events    Vital Signs Last 24 Hrs  T(C): 36.7 (26 Jan 2023 05:28), Max: 36.9 (25 Jan 2023 21:37)  T(F): 98 (26 Jan 2023 05:28), Max: 98.5 (25 Jan 2023 21:37)  HR: 65 (26 Jan 2023 05:28) (55 - 68)  BP: 120/72 (26 Jan 2023 05:28) (120/72 - 136/55)  BP(mean): 82 (25 Jan 2023 21:37) (82 - 82)  RR: 18 (26 Jan 2023 05:28) (18 - 19)  SpO2: 99% (26 Jan 2023 05:28) (98% - 100%)    Parameters below as of 25 Jan 2023 21:37  Patient On (Oxygen Delivery Method): ventilator    O2 Concentration (%): 30    PHYSICAL EXAM:  General: in no acute distress  Eyes: PERRLA, EOMI; conjunctiva and sclera clear  Head: Normocephalic; atraumatic  ENMT: No nasal discharge; airway clear  Neck: Supple; non tender; no masses; trach in place  Respiratory: No wheezes, rales or rhonchi  Cardiovascular: Regular rate and rhythm. S1 and S2 Normal; No murmurs, gallops or rubs  Gastrointestinal: Soft non-tender non-distended; Normal bowel sounds; PEG in place  Vascular: Peripheral pulses palpable 2+ bilaterally  Neurological: Alert but unable to assess if oriented; follows commands  Skin: Warm and dry. No acute rash  Psychiatric: Cooperative and appropriate    I&O's Detail    20 Jan 2023 07:01  -  21 Jan 2023 07:00  --------------------------------------------------------  IN:    Free Water: 1400 mL    Glucerna 1.5: 1380 mL  Total IN: 2780 mL    OUT:    Indwelling Catheter - Urethral (mL): 1000 mL  Total OUT: 1000 mL    Total NET: 1780 mL                       9.1    10.58 )-----------( 379      ( 21 Jan 2023 09:25 )             28.1     21 Jan 2023 09:25    131    |  95     |  50.3   ----------------------------<  162    5.1     |  28.0   |  0.99     Ca    8.3        21 Jan 2023 09:25  Phos  3.5       21 Jan 2023 09:25  Mg     2.4       21 Jan 2023 09:25    TPro  x      /  Alb  2.5    /  TBili  x      /  DBili  x      /  AST  x      /  ALT  x      /  AlkPhos  x      21 Jan 2023 09:25    CAPILLARY BLOOD GLUCOSE  POCT Blood Glucose.: 150 mg/dL (22 Jan 2023 11:15)  POCT Blood Glucose.: 122 mg/dL (22 Jan 2023 08:00)  POCT Blood Glucose.: 92 mg/dL (22 Jan 2023 06:56)  POCT Blood Glucose.: 118 mg/dL (21 Jan 2023 21:50)  POCT Blood Glucose.: 81 mg/dL (21 Jan 2023 16:49)    MEDICATIONS  (STANDING):  apixaban 5 milliGRAM(s) Enteral Tube every 12 hours  aspirin  chewable 81 milliGRAM(s) Oral daily  atenolol  Tablet 50 milliGRAM(s) Oral daily  atorvastatin 80 milliGRAM(s) Oral at bedtime  chlorhexidine 0.12% Liquid 15 milliLiter(s) Oral Mucosa every 12 hours  chlorhexidine 2% Cloths 1 Application(s) Topical daily  coronavirus bivalent (EUA) Booster Vaccine (PFIZER) 0.3 milliLiter(s) IntraMuscular once  dextrose 5%. 1000 milliLiter(s) (100 mL/Hr) IV Continuous <Continuous>  dextrose 5%. 1000 milliLiter(s) (50 mL/Hr) IV Continuous <Continuous>  dextrose 50% Injectable 25 Gram(s) IV Push once  dextrose 50% Injectable 12.5 Gram(s) IV Push once  dextrose 50% Injectable 25 Gram(s) IV Push once  doxazosin 2 milliGRAM(s) Oral at bedtime  glucagon  Injectable 1 milliGRAM(s) IntraMuscular once  influenza  Vaccine (HIGH DOSE) 0.7 milliLiter(s) IntraMuscular once  insulin glargine Injectable (LANTUS) 25 Unit(s) SubCutaneous every morning  insulin lispro (ADMELOG) corrective regimen sliding scale   SubCutaneous three times a day before meals  insulin lispro Injectable (ADMELOG) 8 Unit(s) SubCutaneous every 6 hours  lisinopril 10 milliGRAM(s) Oral daily  loperamide 2 milliGRAM(s) Oral three times a day  loteprednol 0.5% Ophthalmic Suspension 1 Drop(s) Right EYE daily  melatonin 5 milliGRAM(s) Oral at bedtime  OLANZapine 5 milliGRAM(s) Oral at bedtime  pantoprazole   Suspension 40 milliGRAM(s) Oral daily  prednisoLONE acetate 1% Suspension 1 Drop(s) Left EYE daily  traZODone 50 milliGRAM(s) Oral at bedtime    MEDICATIONS  (PRN):  acetaminophen     Tablet .. 650 milliGRAM(s) Oral every 6 hours PRN Temp greater or equal to 38C (100.4F), Mild Pain (1 - 3)  albuterol/ipratropium for Nebulization 3 milliLiter(s) Nebulizer every 6 hours PRN Shortness of Breath and/or Wheezing  aluminum hydroxide/magnesium hydroxide/simethicone Suspension 30 milliLiter(s) Oral every 4 hours PRN Dyspepsia  dextrose Oral Gel 15 Gram(s) Oral once PRN Blood Glucose LESS THAN 70 milliGRAM(s)/deciliter  hydrALAZINE Injectable 10 milliGRAM(s) IV Push every 2 hours PRN SBP >160  labetalol Injectable 10 milliGRAM(s) IV Push every 2 hours PRN SBP >160  lidocaine 2% Gel 1 Application(s) Topical every 6 hours PRN Pain at site  ondansetron Injectable 4 milliGRAM(s) IV Push every 8 hours PRN Nausea and/or Vomiting      RADIOLOGY & ADDITIONAL TESTS:

## 2023-01-27 LAB
GLUCOSE BLDC GLUCOMTR-MCNC: 124 MG/DL — HIGH (ref 70–99)
GLUCOSE BLDC GLUCOMTR-MCNC: 147 MG/DL — HIGH (ref 70–99)
GLUCOSE BLDC GLUCOMTR-MCNC: 162 MG/DL — HIGH (ref 70–99)
GLUCOSE BLDC GLUCOMTR-MCNC: 170 MG/DL — HIGH (ref 70–99)

## 2023-01-27 PROCEDURE — 99232 SBSQ HOSP IP/OBS MODERATE 35: CPT

## 2023-01-27 RX ADMIN — Medication 1 PACKET(S): at 06:45

## 2023-01-27 RX ADMIN — Medication 2 MILLIGRAM(S): at 23:04

## 2023-01-27 RX ADMIN — CHLORHEXIDINE GLUCONATE 15 MILLILITER(S): 213 SOLUTION TOPICAL at 06:41

## 2023-01-27 RX ADMIN — ATORVASTATIN CALCIUM 80 MILLIGRAM(S): 80 TABLET, FILM COATED ORAL at 23:05

## 2023-01-27 RX ADMIN — Medication 100 MILLIGRAM(S): at 23:08

## 2023-01-27 RX ADMIN — Medication 5 MILLIGRAM(S): at 23:04

## 2023-01-27 RX ADMIN — PANTOPRAZOLE SODIUM 40 MILLIGRAM(S): 20 TABLET, DELAYED RELEASE ORAL at 11:19

## 2023-01-27 RX ADMIN — Medication 2 MILLIGRAM(S): at 11:19

## 2023-01-27 RX ADMIN — Medication 8 UNIT(S): at 17:11

## 2023-01-27 RX ADMIN — ATENOLOL 50 MILLIGRAM(S): 25 TABLET ORAL at 06:41

## 2023-01-27 RX ADMIN — LOTEPREDNOL ETABONATE 1 DROP(S): 2 SUSPENSION/ DROPS OPHTHALMIC at 06:43

## 2023-01-27 RX ADMIN — APIXABAN 5 MILLIGRAM(S): 2.5 TABLET, FILM COATED ORAL at 17:11

## 2023-01-27 RX ADMIN — Medication 8 UNIT(S): at 23:05

## 2023-01-27 RX ADMIN — Medication 8 UNIT(S): at 06:40

## 2023-01-27 RX ADMIN — LOTEPREDNOL ETABONATE 1 DROP(S): 2 SUSPENSION/ DROPS OPHTHALMIC at 17:13

## 2023-01-27 RX ADMIN — Medication 2 MILLIGRAM(S): at 06:41

## 2023-01-27 RX ADMIN — Medication 1 DROP(S): at 06:42

## 2023-01-27 RX ADMIN — Medication 2: at 06:40

## 2023-01-27 RX ADMIN — Medication 8 UNIT(S): at 11:20

## 2023-01-27 RX ADMIN — Medication 2 MILLIGRAM(S): at 23:05

## 2023-01-27 RX ADMIN — INSULIN GLARGINE 25 UNIT(S): 100 INJECTION, SOLUTION SUBCUTANEOUS at 08:44

## 2023-01-27 RX ADMIN — CHLORHEXIDINE GLUCONATE 15 MILLILITER(S): 213 SOLUTION TOPICAL at 17:12

## 2023-01-27 RX ADMIN — LOTEPREDNOL ETABONATE 1 DROP(S): 2 SUSPENSION/ DROPS OPHTHALMIC at 06:41

## 2023-01-27 RX ADMIN — LISINOPRIL 10 MILLIGRAM(S): 2.5 TABLET ORAL at 06:41

## 2023-01-27 RX ADMIN — Medication 81 MILLIGRAM(S): at 11:19

## 2023-01-27 RX ADMIN — APIXABAN 5 MILLIGRAM(S): 2.5 TABLET, FILM COATED ORAL at 06:41

## 2023-01-27 RX ADMIN — CHLORHEXIDINE GLUCONATE 1 APPLICATION(S): 213 SOLUTION TOPICAL at 11:28

## 2023-01-27 RX ADMIN — Medication 1 PACKET(S): at 17:12

## 2023-01-27 RX ADMIN — Medication 2: at 17:12

## 2023-01-27 NOTE — PROGRESS NOTE ADULT - ASSESSMENT
76 y/o M w/ PMH of CAD, DM2, HTN, transferred from [facility] for CVA after presenting for Rt-sided weakness, difficulty swallowing and slurred speech.  Pt was out of window for TPA.  CTH was (-) but MRI brain was significant for an acute L-debby infarct and MRA was significant for L-vertebral artery stenosis vs dissection vs thrombus.  Pt was admitted to neuroICU.  He was intubated 12/28 for air way protection.  He eventually required trach/peg'd 01/03.  Pts hospital course complicated by LLL PNA w/ Scxs growing MSSA 12/30 and morganella 01/05.  Neurology and neurosurgery following.        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, dissection  Acute respiratory failure likely due to brainstem CVA  Acute metabolic encephalopathy - at this time appears improved and able to follow commands better, answers yes/no questions and easily arousable  - c/w ASA and atorvastatin  - c/w Eliquis for thrombosis  - c/w daily spontaneous breathing trials  - s/p suture removal 1/10 by CT surgery  - Pulmonary following and recs noted  - Neurology and neurosurgery following and recs noted  - trazodone at night  - discontinued qhs zyprexa    #Urinary retention  - Placed tierney on 1/20 due to retention  - c/w Doxazosin  - TOV failed yesterday - tierney replaced due to multiple need for straight cath  - replaced tierney on 1/25    #COVID positive  - No symptoms  - Discharge postponed    #VAP  - Scxs from 12/30 growing MSSA  - Scxs from 01/05 growing Morganella Morgani  - Leukocytosis persists but no L-shift, remains afebrile and nontoxic appearing  - Completed course of Cefepime through 01/14    #Normocytic anemia  - Hb 14 on admission  - H/h remains low but stable  - Pt is on AC for suspected vertebral artery thrombus  - Trend CBC and transfuse as needed    #PEG dependence  - c/w tube feeds as tolerated  - Diarrhea likely from tube feeds - Added banatrol but may need TF changed - c diff negative  - added metamucil as well  - Nutrition following    #DM II  - A1c 8  - On basal/bolus insulin regimen  - Titrate insulin to goal BG<180  - ISS and hypoglycemic protocol in place    VTE ppx: on eliquis     Dispo: Discharge postponed, tested positive for COVID - can DC once 10 day quarantine time reached 1/30

## 2023-01-28 LAB
GLUCOSE BLDC GLUCOMTR-MCNC: 120 MG/DL — HIGH (ref 70–99)
GLUCOSE BLDC GLUCOMTR-MCNC: 131 MG/DL — HIGH (ref 70–99)
GLUCOSE BLDC GLUCOMTR-MCNC: 148 MG/DL — HIGH (ref 70–99)
GLUCOSE BLDC GLUCOMTR-MCNC: 151 MG/DL — HIGH (ref 70–99)

## 2023-01-28 PROCEDURE — 99232 SBSQ HOSP IP/OBS MODERATE 35: CPT

## 2023-01-28 RX ADMIN — Medication 2: at 11:04

## 2023-01-28 RX ADMIN — LOTEPREDNOL ETABONATE 1 DROP(S): 2 SUSPENSION/ DROPS OPHTHALMIC at 05:37

## 2023-01-28 RX ADMIN — Medication 8 UNIT(S): at 11:03

## 2023-01-28 RX ADMIN — Medication 81 MILLIGRAM(S): at 11:06

## 2023-01-28 RX ADMIN — CHLORHEXIDINE GLUCONATE 1 APPLICATION(S): 213 SOLUTION TOPICAL at 11:06

## 2023-01-28 RX ADMIN — Medication 2 MILLIGRAM(S): at 21:46

## 2023-01-28 RX ADMIN — Medication 8 UNIT(S): at 22:38

## 2023-01-28 RX ADMIN — LOTEPREDNOL ETABONATE 1 DROP(S): 2 SUSPENSION/ DROPS OPHTHALMIC at 17:10

## 2023-01-28 RX ADMIN — APIXABAN 5 MILLIGRAM(S): 2.5 TABLET, FILM COATED ORAL at 17:09

## 2023-01-28 RX ADMIN — Medication 1 PACKET(S): at 17:09

## 2023-01-28 RX ADMIN — Medication 2 MILLIGRAM(S): at 05:36

## 2023-01-28 RX ADMIN — CHLORHEXIDINE GLUCONATE 15 MILLILITER(S): 213 SOLUTION TOPICAL at 05:35

## 2023-01-28 RX ADMIN — APIXABAN 5 MILLIGRAM(S): 2.5 TABLET, FILM COATED ORAL at 05:35

## 2023-01-28 RX ADMIN — Medication 8 UNIT(S): at 05:34

## 2023-01-28 RX ADMIN — ATORVASTATIN CALCIUM 80 MILLIGRAM(S): 80 TABLET, FILM COATED ORAL at 21:46

## 2023-01-28 RX ADMIN — CHLORHEXIDINE GLUCONATE 15 MILLILITER(S): 213 SOLUTION TOPICAL at 17:10

## 2023-01-28 RX ADMIN — Medication 1 PACKET(S): at 05:36

## 2023-01-28 RX ADMIN — Medication 2 MILLIGRAM(S): at 21:47

## 2023-01-28 RX ADMIN — Medication 2 MILLIGRAM(S): at 11:06

## 2023-01-28 RX ADMIN — INSULIN GLARGINE 25 UNIT(S): 100 INJECTION, SOLUTION SUBCUTANEOUS at 07:49

## 2023-01-28 RX ADMIN — Medication 5 MILLIGRAM(S): at 21:46

## 2023-01-28 RX ADMIN — Medication 100 MILLIGRAM(S): at 21:46

## 2023-01-28 RX ADMIN — Medication 8 UNIT(S): at 17:09

## 2023-01-28 RX ADMIN — PANTOPRAZOLE SODIUM 40 MILLIGRAM(S): 20 TABLET, DELAYED RELEASE ORAL at 11:14

## 2023-01-28 NOTE — PROGRESS NOTE ADULT - SUBJECTIVE AND OBJECTIVE BOX
[facility] Division of Hospital Medicine    Chief Complaint:  cva    SUBJECTIVE: comunicates mild discomfort/spasm  in RUE, but it's been the same,     OVERNIGHT EVENTS: none reported      ROS is limited due to pt Trach status, but denied CP, abdominal pian, back pain, n/v, diarrhear    MEDICATIONS  (STANDING):  apixaban 5 milliGRAM(s) Enteral Tube every 12 hours  aspirin  chewable 81 milliGRAM(s) Oral daily  atenolol  Tablet 50 milliGRAM(s) Oral daily  atorvastatin 80 milliGRAM(s) Oral at bedtime  chlorhexidine 0.12% Liquid 15 milliLiter(s) Oral Mucosa every 12 hours  chlorhexidine 2% Cloths 1 Application(s) Topical daily  coronavirus bivalent (EUA) Booster Vaccine (PFIZER) 0.3 milliLiter(s) IntraMuscular once  dextrose 5%. 1000 milliLiter(s) (50 mL/Hr) IV Continuous <Continuous>  dextrose 5%. 1000 milliLiter(s) (100 mL/Hr) IV Continuous <Continuous>  dextrose 50% Injectable 25 Gram(s) IV Push once  dextrose 50% Injectable 12.5 Gram(s) IV Push once  dextrose 50% Injectable 25 Gram(s) IV Push once  doxazosin 2 milliGRAM(s) Oral at bedtime  glucagon  Injectable 1 milliGRAM(s) IntraMuscular once  influenza  Vaccine (HIGH DOSE) 0.7 milliLiter(s) IntraMuscular once  insulin glargine Injectable (LANTUS) 25 Unit(s) SubCutaneous every morning  insulin lispro (ADMELOG) corrective regimen sliding scale   SubCutaneous every 6 hours  insulin lispro Injectable (ADMELOG) 8 Unit(s) SubCutaneous every 6 hours  lisinopril 10 milliGRAM(s) Oral daily  loperamide 2 milliGRAM(s) Oral three times a day  loteprednol 0.5% Ophthalmic Suspension 1 Drop(s) Left EYE every 12 hours  loteprednol 0.5% Ophthalmic Suspension 1 Drop(s) Right EYE daily  melatonin 5 milliGRAM(s) Oral at bedtime  pantoprazole   Suspension 40 milliGRAM(s) Oral daily  prednisoLONE acetate 1% Suspension 1 Drop(s) Left EYE daily  psyllium Powder 1 Packet(s) Oral two times a day  traZODone 100 milliGRAM(s) Oral at bedtime    MEDICATIONS  (PRN):  acetaminophen     Tablet .. 650 milliGRAM(s) Oral every 6 hours PRN Temp greater or equal to 38C (100.4F), Mild Pain (1 - 3)  albuterol/ipratropium for Nebulization 3 milliLiter(s) Nebulizer every 6 hours PRN Shortness of Breath and/or Wheezing  aluminum hydroxide/magnesium hydroxide/simethicone Suspension 30 milliLiter(s) Oral every 4 hours PRN Dyspepsia  dextrose Oral Gel 15 Gram(s) Oral once PRN Blood Glucose LESS THAN 70 milliGRAM(s)/deciliter  hydrALAZINE Injectable 10 milliGRAM(s) IV Push every 2 hours PRN SBP >160  labetalol Injectable 10 milliGRAM(s) IV Push every 2 hours PRN SBP >160  lidocaine 2% Gel 1 Application(s) Topical every 6 hours PRN Pain at site  ondansetron Injectable 4 milliGRAM(s) IV Push every 8 hours PRN Nausea and/or Vomiting        I&O's Summary    27 Jan 2023 07:01  -  28 Jan 2023 07:00  --------------------------------------------------------  IN: 3240 mL / OUT: 1900 mL / NET: 1340 mL    28 Jan 2023 07:01  -  28 Jan 2023 17:16  --------------------------------------------------------  IN: 610 mL / OUT: 0 mL / NET: 610 mL        PHYSICAL EXAM:  Vital Signs Last 24 Hrs  T(C): 36.8 (28 Jan 2023 16:36), Max: 37 (27 Jan 2023 22:59)  T(F): 98.2 (28 Jan 2023 16:36), Max: 98.6 (27 Jan 2023 22:59)  HR: 67 (28 Jan 2023 16:36) (52 - 67)  BP: 126/59 (28 Jan 2023 16:36) (112/47 - 133/61)  BP(mean): 85 (27 Jan 2023 22:59) (85 - 85)  RR: 18 (28 Jan 2023 16:36) (16 - 18)  SpO2: 97% (28 Jan 2023 16:36) (97% - 100%)    Parameters below as of 28 Jan 2023 08:56  Patient On (Oxygen Delivery Method): ventilator,30%            CONSTITUTIONAL: NAD  ENMT: Moist oral mucosa, no pharyngeal injection or exudates; normal dentition; No JVD  RESPIRATORY: Normal respiratory effort; mechanically ventilated lungs sounds, minimal vent setting   CARDIOVASCULAR: Regular rate and rhythm, normal S1 and S2, no murmur/rub/gallop; No lower extremity edema; Peripheral pulses are 2+ bilaterally  ABDOMEN: Nontender to palpation, normoactive bowel sounds, no rebound/guarding; No hepatosplenomegaly, PEG in place, Dickson in place  MUSCLOSKELETAL:  no clubbing or cyanosis of digits; no joint swelling or tenderness to palpation  PSYCH: A+O to person, place, and almost time; affect appropriate  NEUROLOGY: CN 2-12 are intact and symmetric; RUE 3/5, RLE 1/5, LUE and LLE 5/5, no apparent loss of sensation.    SKIN: No rashes; no palpable lesions    LABS:                    CAPILLARY BLOOD GLUCOSE      POCT Blood Glucose.: 148 mg/dL (28 Jan 2023 16:36)  POCT Blood Glucose.: 151 mg/dL (28 Jan 2023 11:02)  POCT Blood Glucose.: 131 mg/dL (28 Jan 2023 05:33)  POCT Blood Glucose.: 147 mg/dL (27 Jan 2023 23:03)        RADIOLOGY & ADDITIONAL TESTS:  Results Reviewed:   Imaging Personally Reviewed:  Electrocardiogram Personally Reviewed:

## 2023-01-28 NOTE — PROGRESS NOTE ADULT - PROBLEM SELECTOR PLAN 1
Admitted with Acute Left Pontine CVA.  Intubated 12/27 for airway protection.  S/p Trach/PEG 1/3  Surgicel removed 1/4.  Sutures removed 1/10  Both trach and PEG sites C/D/I  Pending discharge to [facility] once Covid isolation completed; tested positive 1/17    Maintain gauze/drain sponge between trach flange and skin to prevent skin breakdown  Continue trach care and suctioning.   Continue tube feeds as tolerated.   Thoracic surgery to continue to follow.    Plan discussed with Dr. Hernandez during AM rounds.

## 2023-01-28 NOTE — PROGRESS NOTE ADULT - SUBJECTIVE AND OBJECTIVE BOX
Subjective:  Sleeping      V/S  T(C): 36.5 (01-28-23 @ 11:00), Max: 37 (01-27-23 @ 22:59)  HR: 59 (01-28-23 @ 11:00) (50 - 66)  BP: 125/59 (01-28-23 @ 11:00) (112/47 - 133/61)  RR: 17 (01-28-23 @ 11:00) (16 - 18)  SpO2: 100% (01-28-23 @ 11:00) (99% - 100%)        Mechanical Vent -   Ventilator Mode: AC/CMV  Targeted SpO2 Range (%): 88-97  Tidal Volume:  400   Respiratory Rate:  16   FiO2:  50  PEEP:  6  Additional Instructions:  CPAP 5/5 as tolerated (12-28-22 @ 02:51)                  I&O's Detail    27 Jan 2023 07:01  -  28 Jan 2023 07:00  --------------------------------------------------------  IN:    Enteral Tube Flush: 150 mL    Free Water: 1650 mL    Glucerna 1.5: 1440 mL  Total IN: 3240 mL    OUT:    Indwelling Catheter - Urethral (mL): 1900 mL  Total OUT: 1900 mL    Total NET: 1340 mL      28 Jan 2023 07:01  -  28 Jan 2023 12:29  --------------------------------------------------------  IN:    Enteral Tube Flush: 250 mL    Glucerna 1.5: 360 mL  Total IN: 610 mL    OUT:  Total OUT: 0 mL    Total NET: 610 mL          01-27-23 @ 07:01  -  01-28-23 @ 07:00  --------------------------------------------------------  IN: 3240 mL / OUT: 1900 mL / NET: 1340 mL    01-28-23 @ 07:01  -  01-28-23 @ 12:29  --------------------------------------------------------  IN: 610 mL / OUT: 0 mL / NET: 610 mL      MEDICATIONS  (STANDING):  apixaban 5 milliGRAM(s) Enteral Tube every 12 hours  aspirin  chewable 81 milliGRAM(s) Oral daily  atenolol  Tablet 50 milliGRAM(s) Oral daily  atorvastatin 80 milliGRAM(s) Oral at bedtime  chlorhexidine 0.12% Liquid 15 milliLiter(s) Oral Mucosa every 12 hours  chlorhexidine 2% Cloths 1 Application(s) Topical daily  coronavirus bivalent (EUA) Booster Vaccine (PFIZER) 0.3 milliLiter(s) IntraMuscular once  dextrose 5%. 1000 milliLiter(s) (100 mL/Hr) IV Continuous <Continuous>  dextrose 5%. 1000 milliLiter(s) (50 mL/Hr) IV Continuous <Continuous>  dextrose 50% Injectable 25 Gram(s) IV Push once  dextrose 50% Injectable 12.5 Gram(s) IV Push once  dextrose 50% Injectable 25 Gram(s) IV Push once  doxazosin 2 milliGRAM(s) Oral at bedtime  glucagon  Injectable 1 milliGRAM(s) IntraMuscular once  influenza  Vaccine (HIGH DOSE) 0.7 milliLiter(s) IntraMuscular once  insulin glargine Injectable (LANTUS) 25 Unit(s) SubCutaneous every morning  insulin lispro (ADMELOG) corrective regimen sliding scale   SubCutaneous every 6 hours  insulin lispro Injectable (ADMELOG) 8 Unit(s) SubCutaneous every 6 hours  lisinopril 10 milliGRAM(s) Oral daily  loperamide 2 milliGRAM(s) Oral three times a day  loteprednol 0.5% Ophthalmic Suspension 1 Drop(s) Left EYE every 12 hours  loteprednol 0.5% Ophthalmic Suspension 1 Drop(s) Right EYE daily  melatonin 5 milliGRAM(s) Oral at bedtime  pantoprazole   Suspension 40 milliGRAM(s) Oral daily  prednisoLONE acetate 1% Suspension 1 Drop(s) Left EYE daily  psyllium Powder 1 Packet(s) Oral two times a day  traZODone 100 milliGRAM(s) Oral at bedtime                               CAPILLARY BLOOD GLUCOSE      POCT Blood Glucose.: 151 mg/dL (28 Jan 2023 11:02)  POCT Blood Glucose.: 131 mg/dL (28 Jan 2023 05:33)  POCT Blood Glucose.: 147 mg/dL (27 Jan 2023 23:03)  POCT Blood Glucose.: 162 mg/dL (27 Jan 2023 16:54)                 Physical Exam:        Neuro: sleeping but arousable, non-focal  Pulm: coarse breath sounds bilaterally, no wheezing or rales, + trach on MV, FiO2 30%, PEEP 5  CV: RRR, +S1S2  Abd: soft, NT, ND, normoactive bowel sounds, + PEG c/d/i  Ext: +DP Pulses b/l, +PT pulses, no edema            PAST MEDICAL & SURGICAL HISTORY:  HTN (hypertension)      CAD (coronary artery disease)      DM (diabetes mellitus)

## 2023-01-28 NOTE — PROGRESS NOTE ADULT - ASSESSMENT
76 y/o M w/ PMH of CAD, DM2, HTN, transferred from [facility] for CVA after presenting for Rt-sided weakness, difficulty swallowing and slurred speech.  Pt was out of window for TPA.  CTH was (-) but MRI brain was significant for an acute L-debby infarct and MRA was significant for L-vertebral artery stenosis vs dissection vs thrombus.  Pt was admitted to neuroICU.  He was intubated 12/28 for air way protection.  He eventually required trach/peg'd 01/03.  Pts hospital course complicated by LLL PNA w/ Scxs growing MSSA 12/30 and morganella 01/05.  Neurology and neurosurgery following.        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, dissection c/b acure respiratory failure requiring Trach/PEG  - mentation has imrpoved, communicates but yes/no and mouthing words  - c/w ASA and atorvastatin  - c/w Eliquis for thrombosis  - c/w daily spontaneous breathing trials, pulmonary toilet  -  tolerating TF, c/w metamucil   - s/p suture removal 1/10 by CT surgery  - Pulmonary following and recs noted  - Neurology and neurosurgery following and recs noted  - trazodone at night    #Urinary retention  - Placed tierney on 1/20 due to retention, TOV failed on 1/26  - tierney replaced due to multiple need for straight cath  - c/w Doxazosin    #COVID positive  - No symptoms  - Discharge postponed    #VAP  - Scxs from 12/30 growing MSSA  - Scxs from 01/05 growing Morganella Morgani  - Leukocytosis persists but no L-shift, remains afebrile and nontoxic appearing  - Completed course of Cefepime through 01/14    #Normocytic anemia  - Hb 14 on admission  - H/h remains low but stable  - Pt is on AC for suspected vertebral artery thrombus  - Trend CBC and transfuse as needed    #DM II  - A1c 8  - c/w lantus 25, ac 8 q6h, MDSS, will adjust base on POC    VTE ppx: on eliquis   Dispo: Discharge postponed, tested positive for COVID - can DC once 10 day quarantine time reached 1/30 Next appt 2-2-21.

## 2023-01-28 NOTE — PROGRESS NOTE ADULT - ASSESSMENT
77M, PMH HTN, DM, CAD, Transferred from [place] 12/26 with an acute L pontine CVA, possible L vert dissection. Worsening neuro exam 12/27 - RUE plegia, worsening bulbar dysfunction, Acute resp failure due to neurologic injury, required intubation 12/27 for airway protection now s/p trach/peg 1/3, surgicel d/c 1/4, sutures removed 1/10. Discharge to [facility] delayed due to pt testing positive for Covid on 1/17.

## 2023-01-29 LAB
ANION GAP SERPL CALC-SCNC: 9 MMOL/L — SIGNIFICANT CHANGE UP (ref 5–17)
BUN SERPL-MCNC: 31.1 MG/DL — HIGH (ref 8–20)
CALCIUM SERPL-MCNC: 8.4 MG/DL — SIGNIFICANT CHANGE UP (ref 8.4–10.5)
CHLORIDE SERPL-SCNC: 98 MMOL/L — SIGNIFICANT CHANGE UP (ref 96–108)
CO2 SERPL-SCNC: 27 MMOL/L — SIGNIFICANT CHANGE UP (ref 22–29)
CREAT SERPL-MCNC: 0.78 MG/DL — SIGNIFICANT CHANGE UP (ref 0.5–1.3)
EGFR: 92 ML/MIN/1.73M2 — SIGNIFICANT CHANGE UP
GLUCOSE BLDC GLUCOMTR-MCNC: 111 MG/DL — HIGH (ref 70–99)
GLUCOSE BLDC GLUCOMTR-MCNC: 141 MG/DL — HIGH (ref 70–99)
GLUCOSE BLDC GLUCOMTR-MCNC: 164 MG/DL — HIGH (ref 70–99)
GLUCOSE BLDC GLUCOMTR-MCNC: 91 MG/DL — SIGNIFICANT CHANGE UP (ref 70–99)
GLUCOSE BLDC GLUCOMTR-MCNC: 91 MG/DL — SIGNIFICANT CHANGE UP (ref 70–99)
GLUCOSE SERPL-MCNC: 125 MG/DL — HIGH (ref 70–99)
HCT VFR BLD CALC: 23.4 % — LOW (ref 39–50)
HGB BLD-MCNC: 7.6 G/DL — LOW (ref 13–17)
MCHC RBC-ENTMCNC: 28.9 PG — SIGNIFICANT CHANGE UP (ref 27–34)
MCHC RBC-ENTMCNC: 32.5 GM/DL — SIGNIFICANT CHANGE UP (ref 32–36)
MCV RBC AUTO: 89 FL — SIGNIFICANT CHANGE UP (ref 80–100)
PLATELET # BLD AUTO: 284 K/UL — SIGNIFICANT CHANGE UP (ref 150–400)
POTASSIUM SERPL-MCNC: 4.6 MMOL/L — SIGNIFICANT CHANGE UP (ref 3.5–5.3)
POTASSIUM SERPL-SCNC: 4.6 MMOL/L — SIGNIFICANT CHANGE UP (ref 3.5–5.3)
RBC # BLD: 2.63 M/UL — LOW (ref 4.2–5.8)
RBC # FLD: 12.9 % — SIGNIFICANT CHANGE UP (ref 10.3–14.5)
SARS-COV-2 RNA SPEC QL NAA+PROBE: SIGNIFICANT CHANGE UP
SODIUM SERPL-SCNC: 133 MMOL/L — LOW (ref 135–145)
WBC # BLD: 11.25 K/UL — HIGH (ref 3.8–10.5)
WBC # FLD AUTO: 11.25 K/UL — HIGH (ref 3.8–10.5)

## 2023-01-29 PROCEDURE — 99232 SBSQ HOSP IP/OBS MODERATE 35: CPT

## 2023-01-29 PROCEDURE — 99231 SBSQ HOSP IP/OBS SF/LOW 25: CPT

## 2023-01-29 RX ORDER — PREDNISOLONE SODIUM PHOSPHATE 1 %
1 DROPS OPHTHALMIC (EYE)
Qty: 0 | Refills: 0 | DISCHARGE
Start: 2023-01-29

## 2023-01-29 RX ORDER — TRAZODONE HCL 50 MG
1 TABLET ORAL
Qty: 0 | Refills: 0 | DISCHARGE
Start: 2023-01-29

## 2023-01-29 RX ORDER — ATENOLOL 25 MG/1
1 TABLET ORAL
Qty: 0 | Refills: 0 | DISCHARGE
Start: 2023-01-29

## 2023-01-29 RX ORDER — EMPAGLIFLOZIN 10 MG/1
1 TABLET, FILM COATED ORAL
Qty: 0 | Refills: 0 | DISCHARGE

## 2023-01-29 RX ORDER — METFORMIN HYDROCHLORIDE 850 MG/1
1 TABLET ORAL
Qty: 0 | Refills: 0 | DISCHARGE

## 2023-01-29 RX ORDER — PSYLLIUM SEED (WITH DEXTROSE)
1 POWDER (GRAM) ORAL
Qty: 0 | Refills: 0 | DISCHARGE
Start: 2023-01-29

## 2023-01-29 RX ORDER — LISINOPRIL 2.5 MG/1
1 TABLET ORAL
Qty: 0 | Refills: 0 | DISCHARGE

## 2023-01-29 RX ORDER — INSULIN GLARGINE 100 [IU]/ML
32 INJECTION, SOLUTION SUBCUTANEOUS AT BEDTIME
Refills: 0 | Status: DISCONTINUED | OUTPATIENT
Start: 2023-01-29 | End: 2023-01-30

## 2023-01-29 RX ORDER — ATORVASTATIN CALCIUM 80 MG/1
1 TABLET, FILM COATED ORAL
Qty: 0 | Refills: 0 | DISCHARGE
Start: 2023-01-29

## 2023-01-29 RX ORDER — LOTEPREDNOL ETABONATE 2 MG/ML
1 SUSPENSION/ DROPS OPHTHALMIC
Qty: 0 | Refills: 0 | DISCHARGE
Start: 2023-01-29

## 2023-01-29 RX ORDER — DOXAZOSIN MESYLATE 4 MG
1 TABLET ORAL
Qty: 0 | Refills: 0 | DISCHARGE
Start: 2023-01-29

## 2023-01-29 RX ORDER — IPRATROPIUM/ALBUTEROL SULFATE 18-103MCG
3 AEROSOL WITH ADAPTER (GRAM) INHALATION
Qty: 0 | Refills: 0 | DISCHARGE
Start: 2023-01-29

## 2023-01-29 RX ORDER — LANOLIN ALCOHOL/MO/W.PET/CERES
1 CREAM (GRAM) TOPICAL
Qty: 0 | Refills: 0 | DISCHARGE
Start: 2023-01-29

## 2023-01-29 RX ORDER — INSULIN GLARGINE 100 [IU]/ML
30 INJECTION, SOLUTION SUBCUTANEOUS
Qty: 0 | Refills: 0 | DISCHARGE
Start: 2023-01-29

## 2023-01-29 RX ORDER — ATORVASTATIN CALCIUM 80 MG/1
1 TABLET, FILM COATED ORAL
Qty: 0 | Refills: 0 | DISCHARGE

## 2023-01-29 RX ORDER — FAMOTIDINE 10 MG/ML
5 INJECTION INTRAVENOUS
Qty: 300 | Refills: 0
Start: 2023-01-29 | End: 2023-02-27

## 2023-01-29 RX ORDER — ATENOLOL 25 MG/1
1 TABLET ORAL
Qty: 0 | Refills: 0 | DISCHARGE

## 2023-01-29 RX ORDER — APIXABAN 2.5 MG/1
1 TABLET, FILM COATED ORAL
Qty: 0 | Refills: 0 | DISCHARGE
Start: 2023-01-29

## 2023-01-29 RX ORDER — CLOPIDOGREL BISULFATE 75 MG/1
1 TABLET, FILM COATED ORAL
Qty: 0 | Refills: 0 | DISCHARGE

## 2023-01-29 RX ADMIN — Medication 5 MILLIGRAM(S): at 23:34

## 2023-01-29 RX ADMIN — Medication 1 PACKET(S): at 17:12

## 2023-01-29 RX ADMIN — Medication 2 MILLIGRAM(S): at 23:34

## 2023-01-29 RX ADMIN — CHLORHEXIDINE GLUCONATE 15 MILLILITER(S): 213 SOLUTION TOPICAL at 05:34

## 2023-01-29 RX ADMIN — Medication 1 PACKET(S): at 05:35

## 2023-01-29 RX ADMIN — ATORVASTATIN CALCIUM 80 MILLIGRAM(S): 80 TABLET, FILM COATED ORAL at 23:34

## 2023-01-29 RX ADMIN — LOTEPREDNOL ETABONATE 1 DROP(S): 2 SUSPENSION/ DROPS OPHTHALMIC at 06:00

## 2023-01-29 RX ADMIN — Medication 2: at 05:57

## 2023-01-29 RX ADMIN — APIXABAN 5 MILLIGRAM(S): 2.5 TABLET, FILM COATED ORAL at 17:12

## 2023-01-29 RX ADMIN — PANTOPRAZOLE SODIUM 40 MILLIGRAM(S): 20 TABLET, DELAYED RELEASE ORAL at 11:58

## 2023-01-29 RX ADMIN — INSULIN GLARGINE 32 UNIT(S): 100 INJECTION, SOLUTION SUBCUTANEOUS at 23:32

## 2023-01-29 RX ADMIN — CHLORHEXIDINE GLUCONATE 1 APPLICATION(S): 213 SOLUTION TOPICAL at 12:04

## 2023-01-29 RX ADMIN — LISINOPRIL 10 MILLIGRAM(S): 2.5 TABLET ORAL at 05:34

## 2023-01-29 RX ADMIN — APIXABAN 5 MILLIGRAM(S): 2.5 TABLET, FILM COATED ORAL at 05:35

## 2023-01-29 RX ADMIN — Medication 2 MILLIGRAM(S): at 05:34

## 2023-01-29 RX ADMIN — Medication 2 MILLIGRAM(S): at 13:57

## 2023-01-29 RX ADMIN — ATENOLOL 50 MILLIGRAM(S): 25 TABLET ORAL at 05:35

## 2023-01-29 RX ADMIN — Medication 2 MILLIGRAM(S): at 23:33

## 2023-01-29 RX ADMIN — LOTEPREDNOL ETABONATE 1 DROP(S): 2 SUSPENSION/ DROPS OPHTHALMIC at 17:28

## 2023-01-29 RX ADMIN — CHLORHEXIDINE GLUCONATE 15 MILLILITER(S): 213 SOLUTION TOPICAL at 17:13

## 2023-01-29 RX ADMIN — Medication 8 UNIT(S): at 11:59

## 2023-01-29 RX ADMIN — Medication 100 MILLIGRAM(S): at 23:35

## 2023-01-29 RX ADMIN — Medication 8 UNIT(S): at 05:57

## 2023-01-29 RX ADMIN — Medication 1 DROP(S): at 05:36

## 2023-01-29 RX ADMIN — INSULIN GLARGINE 25 UNIT(S): 100 INJECTION, SOLUTION SUBCUTANEOUS at 08:58

## 2023-01-29 RX ADMIN — LOTEPREDNOL ETABONATE 1 DROP(S): 2 SUSPENSION/ DROPS OPHTHALMIC at 05:35

## 2023-01-29 RX ADMIN — Medication 81 MILLIGRAM(S): at 11:58

## 2023-01-29 NOTE — PROGRESS NOTE ADULT - ASSESSMENT
77M, PMH HTN, DM, CAD, Transferred from [place] 12/26 with an acute L pontine CVA, possible L vert dissection. Worsening neuro exam 12/27 - RUE plegia, worsening bulbar dysfunction, Acute resp failure due to neurologic injury, required intubation 12/27 for airway protection now s/p trach/peg 1/3, surgicel d/c 1/4, sutures removed 1/10. Discharge to [facility] delayed due to pt testing positive for Covid on 1/17.

## 2023-01-29 NOTE — PROGRESS NOTE ADULT - SUBJECTIVE AND OBJECTIVE BOX
[facility] Division of Hospital Medicine    Chief Complaint:  CVA    SUBJECTIVE: no new complains this am    OVERNIGHT EVENTS: none reported     Patient denies chest pain, SOB, abd pain, N/V, fever, chills, dysuria or any other complaints. All remainder ROS negative.     MEDICATIONS  (STANDING):  apixaban 5 milliGRAM(s) Enteral Tube every 12 hours  aspirin  chewable 81 milliGRAM(s) Oral daily  atenolol  Tablet 50 milliGRAM(s) Oral daily  atorvastatin 80 milliGRAM(s) Oral at bedtime  chlorhexidine 0.12% Liquid 15 milliLiter(s) Oral Mucosa every 12 hours  chlorhexidine 2% Cloths 1 Application(s) Topical daily  coronavirus bivalent (EUA) Booster Vaccine (PFIZER) 0.3 milliLiter(s) IntraMuscular once  dextrose 5%. 1000 milliLiter(s) (50 mL/Hr) IV Continuous <Continuous>  dextrose 5%. 1000 milliLiter(s) (100 mL/Hr) IV Continuous <Continuous>  dextrose 50% Injectable 25 Gram(s) IV Push once  dextrose 50% Injectable 12.5 Gram(s) IV Push once  dextrose 50% Injectable 25 Gram(s) IV Push once  doxazosin 2 milliGRAM(s) Oral at bedtime  glucagon  Injectable 1 milliGRAM(s) IntraMuscular once  influenza  Vaccine (HIGH DOSE) 0.7 milliLiter(s) IntraMuscular once  insulin glargine Injectable (LANTUS) 25 Unit(s) SubCutaneous every morning  insulin lispro (ADMELOG) corrective regimen sliding scale   SubCutaneous every 6 hours  insulin lispro Injectable (ADMELOG) 8 Unit(s) SubCutaneous every 6 hours  lisinopril 10 milliGRAM(s) Oral daily  loperamide 2 milliGRAM(s) Oral three times a day  loteprednol 0.5% Ophthalmic Suspension 1 Drop(s) Left EYE every 12 hours  loteprednol 0.5% Ophthalmic Suspension 1 Drop(s) Right EYE daily  melatonin 5 milliGRAM(s) Oral at bedtime  pantoprazole   Suspension 40 milliGRAM(s) Oral daily  prednisoLONE acetate 1% Suspension 1 Drop(s) Left EYE daily  psyllium Powder 1 Packet(s) Oral two times a day  traZODone 100 milliGRAM(s) Oral at bedtime    MEDICATIONS  (PRN):  acetaminophen     Tablet .. 650 milliGRAM(s) Oral every 6 hours PRN Temp greater or equal to 38C (100.4F), Mild Pain (1 - 3)  albuterol/ipratropium for Nebulization 3 milliLiter(s) Nebulizer every 6 hours PRN Shortness of Breath and/or Wheezing  aluminum hydroxide/magnesium hydroxide/simethicone Suspension 30 milliLiter(s) Oral every 4 hours PRN Dyspepsia  dextrose Oral Gel 15 Gram(s) Oral once PRN Blood Glucose LESS THAN 70 milliGRAM(s)/deciliter  hydrALAZINE Injectable 10 milliGRAM(s) IV Push every 2 hours PRN SBP >160  labetalol Injectable 10 milliGRAM(s) IV Push every 2 hours PRN SBP >160  lidocaine 2% Gel 1 Application(s) Topical every 6 hours PRN Pain at site  ondansetron Injectable 4 milliGRAM(s) IV Push every 8 hours PRN Nausea and/or Vomiting        I&O's Summary    28 Jan 2023 07:01  -  29 Jan 2023 07:00  --------------------------------------------------------  IN: 2590 mL / OUT: 1600 mL / NET: 990 mL    29 Jan 2023 07:01  -  29 Jan 2023 14:51  --------------------------------------------------------  IN: 1300 mL / OUT: 0 mL / NET: 1300 mL        PHYSICAL EXAM:  Vital Signs Last 24 Hrs  T(C): 36.8 (29 Jan 2023 10:52), Max: 36.9 (28 Jan 2023 21:40)  T(F): 98.3 (29 Jan 2023 10:52), Max: 98.5 (28 Jan 2023 21:40)  HR: 60 (29 Jan 2023 10:52) (60 - 91)  BP: 116/59 (29 Jan 2023 10:52) (116/59 - 148/72)  BP(mean): 97 (28 Jan 2023 21:40) (97 - 97)  RR: 18 (29 Jan 2023 10:52) (16 - 18)  SpO2: 97% (29 Jan 2023 10:52) (96% - 100%)    Parameters below as of 29 Jan 2023 10:52  Patient On (Oxygen Delivery Method): ventilator      ENMT: Moist oral mucosa, no pharyngeal injection or exudates; normal dentition; No JVD  RESPIRATORY: Normal respiratory effort; mechanically ventilated lungs sounds, minimal vent setting   CARDIOVASCULAR: Regular rate and rhythm, normal S1 and S2, no murmur/rub/gallop; No lower extremity edema; Peripheral pulses are 2+ bilaterally  ABDOMEN: Nontender to palpation, normoactive bowel sounds, no rebound/guarding; No hepatosplenomegaly, PEG in place, Dickson in place  MUSCLOSKELETAL:  no clubbing or cyanosis of digits; no joint swelling or tenderness to palpation  PSYCH: A+O to person, place, and almost time; affect appropriate  NEUROLOGY: CN 2-12 are intact and symmetric; RUE 3/5, RLE 1/5, LUE and LLE 5/5, no apparent loss of sensation.    SKIN: No rashes; no palpable lesions    LABS:                        7.6    11.25 )-----------( 284      ( 29 Jan 2023 07:25 )             23.4     01-29    133<L>  |  98  |  31.1<H>  ----------------------------<  125<H>  4.6   |  27.0  |  0.78    Ca    8.4      29 Jan 2023 07:25                CAPILLARY BLOOD GLUCOSE      POCT Blood Glucose.: 91 mg/dL (29 Jan 2023 11:44)  POCT Blood Glucose.: 111 mg/dL (29 Jan 2023 08:56)  POCT Blood Glucose.: 164 mg/dL (29 Jan 2023 05:55)  POCT Blood Glucose.: 120 mg/dL (28 Jan 2023 21:45)  POCT Blood Glucose.: 148 mg/dL (28 Jan 2023 16:36)        RADIOLOGY & ADDITIONAL TESTS:  Results Reviewed:   Imaging Personally Reviewed:  Electrocardiogram Personally Reviewed:

## 2023-01-29 NOTE — PROGRESS NOTE ADULT - PROBLEM SELECTOR PLAN 1
Admitted with Acute Left Pontine CVA.  Intubated 12/27 for airway protection.  S/p Trach/PEG 1/3  Surgicel removed 1/4.  Sutures removed 1/10  Both trach and PEG sites C/D/I  Pending discharge to [facility] tomorrow, Monday, 1/30 as Covid isolation completed; tested positive 1/17  Hospitalist Dr Levine made aware of pt's current visual complaints and HA, states will evaluate patient.    Maintain gauze/drain sponge between trach flange and skin to prevent skin breakdown. Change as needed.  Continue trach care and suctioning.   Continue tube feeds as tolerated.   Thoracic surgery to sign off.  May reconsult as needed.    Plan discussed with Dr. Black during AM rounds.

## 2023-01-29 NOTE — PROGRESS NOTE ADULT - ASSESSMENT
76 y/o M w/ PMH of CAD, DM2, HTN, transferred from [facility] for CVA after presenting for Rt-sided weakness, difficulty swallowing and slurred speech.  Pt was out of window for TPA.  CTH was (-) but MRI brain was significant for an acute L-dbeby infarct and MRA was significant for L-vertebral artery stenosis vs dissection vs thrombus. Pt was evaluated by vascular neurologist, was advised to managed conservatively with asa, statins, eliquis for L vertebral artery dissection with thrombosis.  Hospital course c/b respiratory failure due to  LLL PNA w/ Scxs growing MSSA 12/30 and morganella 01/05 , requiring intubation  and eventually required trach/peg'd 01/03. Pt was transferred to hospital service on 01/07,  completed course of Cefepime through 01/14 and and currently tolerating cpap setting on vent. Trach site stiches was removed, and continue to received trach care with gauze/drain sponge between trach flange and skin to prevent skin breakdown and continue vent weaning. Pt also noted to have urinary retention, and failed several TOV, Tierney was replaced on 01/26. Pt also during hospital stay tested + for covid however remained with stable respiratory status, and now completed isolation period. In regards of DM, A1c 8, and pt euglycemic while on lantus 25, ac 8 q6h, MDSS, and current TF regiment ( Glucerna @ 60 cc/hr, totall 1440 ml, free water flashes 300 q6h).     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, dissection c/b acure respiratory failure requiring Trach/PEG  - mentation has imrpoved, communicates but yes/no and mouthing words  - c/w ASA and atorvastatin  - c/w Eliquis for thrombosis  - c/w daily spontaneous breathing trials, pulmonary toilet  -  tolerating TF, c/w metamucil   - s/p suture removal 1/10 by CT surgery  - Pulmonary following and recs noted  - Neurology and neurosurgery following and recs noted  - trazodone at night    #Urinary retention  - Placed tierney on 1/20 due to retention, TOV failed on 1/26  - tierney replaced due to multiple need for straight cath  - c/w Doxazosin    #COVID positive  - No symptoms  - Discharge postponed    #VAP  - Scxs from 12/30 growing MSSA  - Scxs from 01/05 growing Morganella Morgani  - Leukocytosis persists but no L-shift, remains afebrile and nontoxic appearing  - Completed course of Cefepime through 01/14    #Normocytic anemia  - Hb 14 on admission  - H/h remains low but stable  - Pt is on AC for suspected vertebral artery thrombus  - Trend CBC and transfuse as needed    #DM II  - A1c 8  - c/w lantus 25, ac 8 q6h, MDSS, will adjust base on POC    VTE ppx: on eliquis   Dispo: stable for transfer to [facility] [place] 1/30

## 2023-01-29 NOTE — PROGRESS NOTE ADULT - SUBJECTIVE AND OBJECTIVE BOX
Brief summary:  77yMale seen and assessed at bedside.  S/p trach/peg 1/3/23.  Pt laying in hospital bed in NAD.  Indicates he currently has frontal headache, change to left lateral vision, pain to left side of face.  Upon questioning, indicates he is unable to see laterally from left eye and is new today.  Denies new n/t/w in extremities.  Denies temporal tenderness.  Denies f/c, n/v, chest pain, sob, abdominal pain, d/c, urinary symptoms.    Overnight events:  None.  Hospital course progressing as expected.        PAST MEDICAL & SURGICAL HISTORY:  HTN (hypertension)    CAD (coronary artery disease)    DM (diabetes mellitus)        Medications:  acetaminophen     Tablet .. 650 milliGRAM(s) Oral every 6 hours PRN  albuterol/ipratropium for Nebulization 3 milliLiter(s) Nebulizer every 6 hours PRN  aluminum hydroxide/magnesium hydroxide/simethicone Suspension 30 milliLiter(s) Oral every 4 hours PRN  apixaban 5 milliGRAM(s) Enteral Tube every 12 hours  aspirin  chewable 81 milliGRAM(s) Oral daily  atenolol  Tablet 50 milliGRAM(s) Oral daily  atorvastatin 80 milliGRAM(s) Oral at bedtime  chlorhexidine 0.12% Liquid 15 milliLiter(s) Oral Mucosa every 12 hours  chlorhexidine 2% Cloths 1 Application(s) Topical daily  coronavirus bivalent (EUA) Booster Vaccine (PFIZER) 0.3 milliLiter(s) IntraMuscular once  dextrose 5%. 1000 milliLiter(s) IV Continuous <Continuous>  dextrose 5%. 1000 milliLiter(s) IV Continuous <Continuous>  dextrose 50% Injectable 25 Gram(s) IV Push once  dextrose 50% Injectable 12.5 Gram(s) IV Push once  dextrose 50% Injectable 25 Gram(s) IV Push once  dextrose Oral Gel 15 Gram(s) Oral once PRN  doxazosin 2 milliGRAM(s) Oral at bedtime  glucagon  Injectable 1 milliGRAM(s) IntraMuscular once  hydrALAZINE Injectable 10 milliGRAM(s) IV Push every 2 hours PRN  influenza  Vaccine (HIGH DOSE) 0.7 milliLiter(s) IntraMuscular once  insulin glargine Injectable (LANTUS) 25 Unit(s) SubCutaneous every morning  insulin lispro (ADMELOG) corrective regimen sliding scale   SubCutaneous every 6 hours  insulin lispro Injectable (ADMELOG) 8 Unit(s) SubCutaneous every 6 hours  labetalol Injectable 10 milliGRAM(s) IV Push every 2 hours PRN  lidocaine 2% Gel 1 Application(s) Topical every 6 hours PRN  lisinopril 10 milliGRAM(s) Oral daily  loperamide 2 milliGRAM(s) Oral three times a day  loteprednol 0.5% Ophthalmic Suspension 1 Drop(s) Left EYE every 12 hours  loteprednol 0.5% Ophthalmic Suspension 1 Drop(s) Right EYE daily  melatonin 5 milliGRAM(s) Oral at bedtime  ondansetron Injectable 4 milliGRAM(s) IV Push every 8 hours PRN  pantoprazole   Suspension 40 milliGRAM(s) Oral daily  prednisoLONE acetate 1% Suspension 1 Drop(s) Left EYE daily  psyllium Powder 1 Packet(s) Oral two times a day  traZODone 100 milliGRAM(s) Oral at bedtime      MEDICATIONS  (PRN):  acetaminophen     Tablet .. 650 milliGRAM(s) Oral every 6 hours PRN Temp greater or equal to 38C (100.4F), Mild Pain (1 - 3)  albuterol/ipratropium for Nebulization 3 milliLiter(s) Nebulizer every 6 hours PRN Shortness of Breath and/or Wheezing  aluminum hydroxide/magnesium hydroxide/simethicone Suspension 30 milliLiter(s) Oral every 4 hours PRN Dyspepsia  dextrose Oral Gel 15 Gram(s) Oral once PRN Blood Glucose LESS THAN 70 milliGRAM(s)/deciliter  hydrALAZINE Injectable 10 milliGRAM(s) IV Push every 2 hours PRN SBP >160  labetalol Injectable 10 milliGRAM(s) IV Push every 2 hours PRN SBP >160  lidocaine 2% Gel 1 Application(s) Topical every 6 hours PRN Pain at site  ondansetron Injectable 4 milliGRAM(s) IV Push every 8 hours PRN Nausea and/or Vomiting        Daily Review:    Mode: CPAP with PS, FiO2: 30, PEEP: 5, PS: 10, MAP: 11               7.6    11.25 )-----------( 284      ( 29 Jan 2023 07:25 )             23.4   01-29    133<L>  |  98  |  31.1<H>  ----------------------------<  125<H>  4.6   |  27.0  |  0.78    Ca    8.4      29 Jan 2023 07:25      T(C): 36.4 (01-29-23 @ 04:28), Max: 36.9 (01-28-23 @ 21:40)  HR: 78 (01-29-23 @ 08:55) (67 - 91)  BP: 144/72 (01-29-23 @ 04:28) (126/59 - 148/72)  RR: 18 (01-29-23 @ 04:28) (16 - 18)  SpO2: 100% (01-29-23 @ 08:55) (96% - 100%)      CAPILLARY BLOOD GLUCOSE    POCT Blood Glucose.: 91 mg/dL (29 Jan 2023 11:44)  POCT Blood Glucose.: 111 mg/dL (29 Jan 2023 08:56)  POCT Blood Glucose.: 164 mg/dL (29 Jan 2023 05:55)  POCT Blood Glucose.: 120 mg/dL (28 Jan 2023 21:45)  POCT Blood Glucose.: 148 mg/dL (28 Jan 2023 16:36)      I&O's Summary    28 Jan 2023 07:01  -  29 Jan 2023 07:00  --------------------------------------------------------  IN: 2590 mL / OUT: 1600 mL / NET: 990 mL    29 Jan 2023 07:01  -  29 Jan 2023 11:50  --------------------------------------------------------  IN: 120 mL / OUT: 0 mL / NET: 120 mL        Physical Exam    Neuro: A+O x 3, non-focal, mouths words  Pulm: coarse breath sounds bilaterally, no wheezing or rales, + trach, c/d/i, on MV FiO2 30%, PEEP 6  CV: RRR, +S1S2  Abd: soft, NT, ND, normoactive bowel sounds, + PEG, c/d/i  Ext: +DP Pulses b/l, +PT pulses, no edema

## 2023-01-30 VITALS
DIASTOLIC BLOOD PRESSURE: 66 MMHG | RESPIRATION RATE: 22 BRPM | OXYGEN SATURATION: 100 % | HEART RATE: 71 BPM | SYSTOLIC BLOOD PRESSURE: 136 MMHG | TEMPERATURE: 98 F

## 2023-01-30 LAB
BLD GP AB SCN SERPL QL: SIGNIFICANT CHANGE UP
GLUCOSE BLDC GLUCOMTR-MCNC: 172 MG/DL — HIGH (ref 70–99)
GLUCOSE BLDC GLUCOMTR-MCNC: 75 MG/DL — SIGNIFICANT CHANGE UP (ref 70–99)
GLUCOSE BLDC GLUCOMTR-MCNC: 82 MG/DL — SIGNIFICANT CHANGE UP (ref 70–99)
HCT VFR BLD CALC: 24.4 % — LOW (ref 39–50)
HGB BLD-MCNC: 7.9 G/DL — LOW (ref 13–17)
SARS-COV-2 RNA SPEC QL NAA+PROBE: SIGNIFICANT CHANGE UP

## 2023-01-30 PROCEDURE — 87640 STAPH A DNA AMP PROBE: CPT

## 2023-01-30 PROCEDURE — 84484 ASSAY OF TROPONIN QUANT: CPT

## 2023-01-30 PROCEDURE — 83930 ASSAY OF BLOOD OSMOLALITY: CPT

## 2023-01-30 PROCEDURE — 87641 MR-STAPH DNA AMP PROBE: CPT

## 2023-01-30 PROCEDURE — 83735 ASSAY OF MAGNESIUM: CPT

## 2023-01-30 PROCEDURE — 84443 ASSAY THYROID STIM HORMONE: CPT

## 2023-01-30 PROCEDURE — 82570 ASSAY OF URINE CREATININE: CPT

## 2023-01-30 PROCEDURE — 36600 WITHDRAWAL OF ARTERIAL BLOOD: CPT

## 2023-01-30 PROCEDURE — 83036 HEMOGLOBIN GLYCOSYLATED A1C: CPT

## 2023-01-30 PROCEDURE — 94799 UNLISTED PULMONARY SVC/PX: CPT

## 2023-01-30 PROCEDURE — 93005 ELECTROCARDIOGRAM TRACING: CPT

## 2023-01-30 PROCEDURE — 83690 ASSAY OF LIPASE: CPT

## 2023-01-30 PROCEDURE — 87493 C DIFF AMPLIFIED PROBE: CPT

## 2023-01-30 PROCEDURE — 97112 NEUROMUSCULAR REEDUCATION: CPT

## 2023-01-30 PROCEDURE — 85610 PROTHROMBIN TIME: CPT

## 2023-01-30 PROCEDURE — 94003 VENT MGMT INPAT SUBQ DAY: CPT

## 2023-01-30 PROCEDURE — 81005 URINALYSIS: CPT

## 2023-01-30 PROCEDURE — 99239 HOSP IP/OBS DSCHRG MGMT >30: CPT

## 2023-01-30 PROCEDURE — 82962 GLUCOSE BLOOD TEST: CPT

## 2023-01-30 PROCEDURE — 84540 ASSAY OF URINE/UREA-N: CPT

## 2023-01-30 PROCEDURE — 94640 AIRWAY INHALATION TREATMENT: CPT

## 2023-01-30 PROCEDURE — 84300 ASSAY OF URINE SODIUM: CPT

## 2023-01-30 PROCEDURE — 86850 RBC ANTIBODY SCREEN: CPT

## 2023-01-30 PROCEDURE — 82947 ASSAY GLUCOSE BLOOD QUANT: CPT

## 2023-01-30 PROCEDURE — 83605 ASSAY OF LACTIC ACID: CPT

## 2023-01-30 PROCEDURE — 80061 LIPID PANEL: CPT

## 2023-01-30 PROCEDURE — 71045 X-RAY EXAM CHEST 1 VIEW: CPT

## 2023-01-30 PROCEDURE — 82330 ASSAY OF CALCIUM: CPT

## 2023-01-30 PROCEDURE — U0003: CPT

## 2023-01-30 PROCEDURE — 82550 ASSAY OF CK (CPK): CPT

## 2023-01-30 PROCEDURE — 80202 ASSAY OF VANCOMYCIN: CPT

## 2023-01-30 PROCEDURE — 82435 ASSAY OF BLOOD CHLORIDE: CPT

## 2023-01-30 PROCEDURE — 85027 COMPLETE CBC AUTOMATED: CPT

## 2023-01-30 PROCEDURE — 82436 ASSAY OF URINE CHLORIDE: CPT

## 2023-01-30 PROCEDURE — 86803 HEPATITIS C AB TEST: CPT

## 2023-01-30 PROCEDURE — 86923 COMPATIBILITY TEST ELECTRIC: CPT

## 2023-01-30 PROCEDURE — 84295 ASSAY OF SERUM SODIUM: CPT

## 2023-01-30 PROCEDURE — 87077 CULTURE AEROBIC IDENTIFY: CPT

## 2023-01-30 PROCEDURE — 81001 URINALYSIS AUTO W/SCOPE: CPT

## 2023-01-30 PROCEDURE — 84480 ASSAY TRIIODOTHYRONINE (T3): CPT

## 2023-01-30 PROCEDURE — 94760 N-INVAS EAR/PLS OXIMETRY 1: CPT

## 2023-01-30 PROCEDURE — 70450 CT HEAD/BRAIN W/O DYE: CPT

## 2023-01-30 PROCEDURE — P9040: CPT

## 2023-01-30 PROCEDURE — 86900 BLOOD TYPING SEROLOGIC ABO: CPT

## 2023-01-30 PROCEDURE — 86901 BLOOD TYPING SEROLOGIC RH(D): CPT

## 2023-01-30 PROCEDURE — 92610 EVALUATE SWALLOWING FUNCTION: CPT

## 2023-01-30 PROCEDURE — 80048 BASIC METABOLIC PNL TOTAL CA: CPT

## 2023-01-30 PROCEDURE — 82803 BLOOD GASES ANY COMBINATION: CPT

## 2023-01-30 PROCEDURE — 82040 ASSAY OF SERUM ALBUMIN: CPT

## 2023-01-30 PROCEDURE — U0005: CPT

## 2023-01-30 PROCEDURE — 80076 HEPATIC FUNCTION PANEL: CPT

## 2023-01-30 PROCEDURE — 70547 MR ANGIOGRAPHY NECK W/O DYE: CPT

## 2023-01-30 PROCEDURE — 70551 MRI BRAIN STEM W/O DYE: CPT

## 2023-01-30 PROCEDURE — 36415 COLL VENOUS BLD VENIPUNCTURE: CPT

## 2023-01-30 PROCEDURE — 97110 THERAPEUTIC EXERCISES: CPT

## 2023-01-30 PROCEDURE — 70544 MR ANGIOGRAPHY HEAD W/O DYE: CPT

## 2023-01-30 PROCEDURE — 93970 EXTREMITY STUDY: CPT

## 2023-01-30 PROCEDURE — 87040 BLOOD CULTURE FOR BACTERIA: CPT

## 2023-01-30 PROCEDURE — C8929: CPT

## 2023-01-30 PROCEDURE — 80053 COMPREHEN METABOLIC PANEL: CPT

## 2023-01-30 PROCEDURE — 87186 SC STD MICRODIL/AGAR DIL: CPT

## 2023-01-30 PROCEDURE — 82553 CREATINE MB FRACTION: CPT

## 2023-01-30 PROCEDURE — 36430 TRANSFUSION BLD/BLD COMPNT: CPT

## 2023-01-30 PROCEDURE — 87070 CULTURE OTHR SPECIMN AEROBIC: CPT

## 2023-01-30 PROCEDURE — 85025 COMPLETE CBC W/AUTO DIFF WBC: CPT

## 2023-01-30 PROCEDURE — 84100 ASSAY OF PHOSPHORUS: CPT

## 2023-01-30 PROCEDURE — L8699: CPT

## 2023-01-30 PROCEDURE — 84132 ASSAY OF SERUM POTASSIUM: CPT

## 2023-01-30 PROCEDURE — 83935 ASSAY OF URINE OSMOLALITY: CPT

## 2023-01-30 PROCEDURE — 96105 ASSESSMENT OF APHASIA: CPT

## 2023-01-30 PROCEDURE — 85730 THROMBOPLASTIN TIME PARTIAL: CPT

## 2023-01-30 PROCEDURE — 85018 HEMOGLOBIN: CPT

## 2023-01-30 PROCEDURE — 84436 ASSAY OF TOTAL THYROXINE: CPT

## 2023-01-30 PROCEDURE — 84478 ASSAY OF TRIGLYCERIDES: CPT

## 2023-01-30 PROCEDURE — 97530 THERAPEUTIC ACTIVITIES: CPT

## 2023-01-30 PROCEDURE — 94002 VENT MGMT INPAT INIT DAY: CPT

## 2023-01-30 PROCEDURE — 84145 PROCALCITONIN (PCT): CPT

## 2023-01-30 PROCEDURE — 85014 HEMATOCRIT: CPT

## 2023-01-30 RX ORDER — INSULIN GLARGINE 100 [IU]/ML
30 INJECTION, SOLUTION SUBCUTANEOUS AT BEDTIME
Refills: 0 | Status: DISCONTINUED | OUTPATIENT
Start: 2023-01-30 | End: 2023-01-30

## 2023-01-30 RX ADMIN — LISINOPRIL 10 MILLIGRAM(S): 2.5 TABLET ORAL at 05:47

## 2023-01-30 RX ADMIN — Medication 1 PACKET(S): at 05:58

## 2023-01-30 RX ADMIN — CHLORHEXIDINE GLUCONATE 15 MILLILITER(S): 213 SOLUTION TOPICAL at 05:58

## 2023-01-30 RX ADMIN — Medication 2: at 11:26

## 2023-01-30 RX ADMIN — Medication 1 DROP(S): at 05:58

## 2023-01-30 RX ADMIN — Medication 81 MILLIGRAM(S): at 11:26

## 2023-01-30 RX ADMIN — LOTEPREDNOL ETABONATE 1 DROP(S): 2 SUSPENSION/ DROPS OPHTHALMIC at 05:48

## 2023-01-30 RX ADMIN — ATENOLOL 50 MILLIGRAM(S): 25 TABLET ORAL at 05:58

## 2023-01-30 RX ADMIN — Medication 2 MILLIGRAM(S): at 14:18

## 2023-01-30 RX ADMIN — CHLORHEXIDINE GLUCONATE 1 APPLICATION(S): 213 SOLUTION TOPICAL at 11:27

## 2023-01-30 RX ADMIN — PANTOPRAZOLE SODIUM 40 MILLIGRAM(S): 20 TABLET, DELAYED RELEASE ORAL at 11:26

## 2023-01-30 RX ADMIN — APIXABAN 5 MILLIGRAM(S): 2.5 TABLET, FILM COATED ORAL at 05:58

## 2023-01-30 RX ADMIN — LOTEPREDNOL ETABONATE 1 DROP(S): 2 SUSPENSION/ DROPS OPHTHALMIC at 06:01

## 2023-01-30 RX ADMIN — Medication 2 MILLIGRAM(S): at 05:47

## 2023-01-30 NOTE — PROGRESS NOTE ADULT - REASON FOR ADMISSION
Acute stroke

## 2023-01-30 NOTE — PROGRESS NOTE ADULT - SUBJECTIVE AND OBJECTIVE BOX
[facility] Division of Hospital Medicine    Chief Complaint:  CVA    SUBJECTIVE: no new complains this am    OVERNIGHT EVENTS: none reported     Patient denies chest pain, SOB, abd pain, N/V, fever, chills, dysuria or any other complaints. All remainder ROS negative.     MEDICATIONS  (STANDING):  apixaban 5 milliGRAM(s) Enteral Tube every 12 hours  aspirin  chewable 81 milliGRAM(s) Oral daily  atenolol  Tablet 50 milliGRAM(s) Oral daily  atorvastatin 80 milliGRAM(s) Oral at bedtime  chlorhexidine 0.12% Liquid 15 milliLiter(s) Oral Mucosa every 12 hours  chlorhexidine 2% Cloths 1 Application(s) Topical daily  coronavirus bivalent (EUA) Booster Vaccine (PFIZER) 0.3 milliLiter(s) IntraMuscular once  dextrose 5%. 1000 milliLiter(s) (50 mL/Hr) IV Continuous <Continuous>  dextrose 5%. 1000 milliLiter(s) (100 mL/Hr) IV Continuous <Continuous>  dextrose 50% Injectable 25 Gram(s) IV Push once  dextrose 50% Injectable 12.5 Gram(s) IV Push once  dextrose 50% Injectable 25 Gram(s) IV Push once  doxazosin 2 milliGRAM(s) Oral at bedtime  glucagon  Injectable 1 milliGRAM(s) IntraMuscular once  influenza  Vaccine (HIGH DOSE) 0.7 milliLiter(s) IntraMuscular once  insulin glargine Injectable (LANTUS) 25 Unit(s) SubCutaneous every morning  insulin lispro (ADMELOG) corrective regimen sliding scale   SubCutaneous every 6 hours  insulin lispro Injectable (ADMELOG) 8 Unit(s) SubCutaneous every 6 hours  lisinopril 10 milliGRAM(s) Oral daily  loperamide 2 milliGRAM(s) Oral three times a day  loteprednol 0.5% Ophthalmic Suspension 1 Drop(s) Left EYE every 12 hours  loteprednol 0.5% Ophthalmic Suspension 1 Drop(s) Right EYE daily  melatonin 5 milliGRAM(s) Oral at bedtime  pantoprazole   Suspension 40 milliGRAM(s) Oral daily  prednisoLONE acetate 1% Suspension 1 Drop(s) Left EYE daily  psyllium Powder 1 Packet(s) Oral two times a day  traZODone 100 milliGRAM(s) Oral at bedtime    MEDICATIONS  (PRN):  acetaminophen     Tablet .. 650 milliGRAM(s) Oral every 6 hours PRN Temp greater or equal to 38C (100.4F), Mild Pain (1 - 3)  albuterol/ipratropium for Nebulization 3 milliLiter(s) Nebulizer every 6 hours PRN Shortness of Breath and/or Wheezing  aluminum hydroxide/magnesium hydroxide/simethicone Suspension 30 milliLiter(s) Oral every 4 hours PRN Dyspepsia  dextrose Oral Gel 15 Gram(s) Oral once PRN Blood Glucose LESS THAN 70 milliGRAM(s)/deciliter  hydrALAZINE Injectable 10 milliGRAM(s) IV Push every 2 hours PRN SBP >160  labetalol Injectable 10 milliGRAM(s) IV Push every 2 hours PRN SBP >160  lidocaine 2% Gel 1 Application(s) Topical every 6 hours PRN Pain at site  ondansetron Injectable 4 milliGRAM(s) IV Push every 8 hours PRN Nausea and/or Vomiting        I&O's Summary    28 Jan 2023 07:01  -  29 Jan 2023 07:00  --------------------------------------------------------  IN: 2590 mL / OUT: 1600 mL / NET: 990 mL    29 Jan 2023 07:01  -  29 Jan 2023 14:51  --------------------------------------------------------  IN: 1300 mL / OUT: 0 mL / NET: 1300 mL        PHYSICAL EXAM:  Vital Signs Last 24 Hrs  T(C): 36.8 (30 Jan 2023 12:40), Max: 37.1 (30 Jan 2023 10:57)  T(F): 98.3 (30 Jan 2023 12:40), Max: 98.7 (30 Jan 2023 10:57)  HR: 58 (30 Jan 2023 12:40) (55 - 83)  BP: 112/63 (30 Jan 2023 12:40) (112/63 - 149/78)  BP(mean): --  RR: 22 (30 Jan 2023 12:40) (18 - 22)  SpO2: 100% (30 Jan 2023 12:40) (99% - 100%)    Parameters below as of 30 Jan 2023 12:40  Patient On (Oxygen Delivery Method): ventilator  O2 Flow (L/min): 30      ENMT: Moist oral mucosa, no pharyngeal injection or exudates; normal dentition; No JVD  RESPIRATORY: Normal respiratory effort; mechanically ventilated lungs sounds, minimal vent setting   CARDIOVASCULAR: Regular rate and rhythm, normal S1 and S2, no murmur/rub/gallop; No lower extremity edema; Peripheral pulses are 2+ bilaterally  ABDOMEN: Nontender to palpation, normoactive bowel sounds, no rebound/guarding; No hepatosplenomegaly, PEG in place, Dickson in place  MUSCLOSKELETAL:  no clubbing or cyanosis of digits; no joint swelling or tenderness to palpation  PSYCH: A+O to person, place, and almost time; affect appropriate  NEUROLOGY: CN 2-12 are intact and symmetric; RUE 3/5, RLE 1/5, LUE and LLE 5/5, no apparent loss of sensation.    SKIN: No rashes; no palpable lesions    LABS:                        7.9    x     )-----------( x        ( 30 Jan 2023 04:50 )             24.4     01-29    133<L>  |  98  |  31.1<H>  ----------------------------<  125<H>  4.6   |  27.0  |  0.78    Ca    8.4      29 Jan 2023 07:25                CAPILLARY BLOOD GLUCOSE      POCT Blood Glucose.: 172 mg/dL (30 Jan 2023 11:16)  POCT Blood Glucose.: 82 mg/dL (30 Jan 2023 06:31)  POCT Blood Glucose.: 75 mg/dL (30 Jan 2023 06:18)  POCT Blood Glucose.: 141 mg/dL (29 Jan 2023 23:31)  POCT Blood Glucose.: 91 mg/dL (29 Jan 2023 17:11)        RADIOLOGY & ADDITIONAL TESTS:  Results Reviewed:   Imaging Personally Reviewed:  Electrocardiogram Personally Reviewed:

## 2023-01-30 NOTE — PROGRESS NOTE ADULT - ASSESSMENT
78 y/o M w/ PMH of CAD, DM2, HTN, transferred from [facility] for CVA after presenting for Rt-sided weakness, difficulty swallowing and slurred speech.  Pt was out of window for TPA.  CTH was (-) but MRI brain was significant for an acute L-debby infarct and MRA was significant for L-vertebral artery stenosis vs dissection vs thrombus. Pt was evaluated by vascular neurologist, was advised to managed conservatively with asa, statins, eliquis for L vertebral artery dissection with thrombosis.  Hospital course c/b respiratory failure due to  LLL PNA w/ Scxs growing MSSA 12/30 and morganella 01/05 , requiring intubation  and eventually required trach/peg'd 01/03. Pt was transferred to hospital service on 01/07,  completed course of Cefepime through 01/14 and and currently tolerating cpap setting on vent. Trach site stiches was removed, and continue to received trach care with gauze/drain sponge between trach flange and skin to prevent skin breakdown and continue vent weaning. Pt also noted to have urinary retention, and failed several TOV, Tierney was replaced on 01/26. Pt also during hospital stay tested + for covid however remained with stable respiratory status, and now completed isolation period. In regards of DM, A1c 8, and pt euglycemic while on lantus 25, ac 8 q6h, MDSS, and current TF regiment ( Glucerna @ 60 cc/hr, totall 1440 ml, free water flashes 300 q6h).     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, dissection c/b acure respiratory failure requiring Trach/PEG  - mentation has imrpoved, communicates but yes/no and mouthing words  - c/w ASA and atorvastatin  - c/w Eliquis for thrombosis  - c/w daily spontaneous breathing trials, pulmonary toilet  -  tolerating TF, c/w metamucil  - s/p suture removal 1/10 by CT surgery  - Pulmonary following and recs noted  - Neurology and neurosurgery following and recs noted  - trazodone at night    #Urinary retention  - Placed tierney on 1/20 due to retention, TOV failed on 1/26  - tierney replaced due to multiple need for straight cath  - c/w Doxazosin    #COVID positive  - No symptoms  - Discharge postponed    #VAP  - Scxs from 12/30 growing MSSA  - Scxs from 01/05 growing Morganella Morgani  - Leukocytosis persists but no L-shift, remains afebrile and nontoxic appearing  - Completed course of Cefepime through 01/14    #Normocytic anemia  - will transfuse today   - H/h remains low but stable, most likely multifactorial ROSCOE AND ckd  - Pt is on AC for suspected vertebral artery thrombus  - Trend CBC and transfuse as needed    #DM II  - A1c 8  - c/w lantus 30, MDSS, will adjust base on POC    VTE ppx: on eliquis   Dispo: stable for transfer to [facility] ON 1/30

## 2023-01-30 NOTE — PROGRESS NOTE ADULT - PROVIDER SPECIALTY LIST ADULT
Hospitalist
Hospitalist
Nephrology
Rehab Medicine
Hospitalist
Internal Medicine
NSICU
Orthopedics
Rehab Medicine
Thoracic Surgery
Thoracic Surgery
Hospitalist
Internal Medicine
Internal Medicine
NSICU
NSICU
Nephrology
Neurology
Thoracic Surgery
Hospitalist
NSICU
Nephrology
Neurology
Thoracic Surgery
NSICU
NSICU
Pulmonology
Thoracic Surgery
NSICU
Pulmonology
Thoracic Surgery
CT Surgery
Thoracic Surgery
NSICU
Thoracic Surgery

## 2023-02-06 LAB — GLUCOSE BLDC GLUCOMTR-MCNC: 236 MG/DL — HIGH (ref 70–99)

## 2023-05-02 NOTE — PROGRESS NOTE ADULT - SUBJECTIVE AND OBJECTIVE BOX
Brought in by mother to be strep tested, brother strep positive at clinic today.     Triage Assessment     Row Name 05/02/23 0842       Triage Assessment (Pediatric)    Airway WDL WDL       Respiratory WDL    Respiratory WDL WDL       Skin Circulation/Temperature WDL    Skin Circulation/Temperature WDL WDL       Cardiac WDL    Cardiac WDL WDL       Peripheral/Neurovascular WDL    Peripheral Neurovascular WDL WDL       Cognitive/Neuro/Behavioral WDL    Cognitive/Neuro/Behavioral WDL WDL               78 y/o male with PMH of DM-2, HTN, CAD was transferred from [place] for stroke. Patient reported right sided weakness and slurred speech along with difficulty swallowing that started yesterday. Was seen at [place] today, code stroke initiated out of window for tPA. CT head: no acute finding. MRI: acute infarct with left debby. MRA head/neck: Left vertebral artery segmental stenoses and T1 hyperintensity suggesting foci of dissection and/or thrombus. As per resident, neurology from [facility] was consulted and accepted patient for further evaluation. Patient said he is upset about his condition, noted discomfort in his head otherwise no chest pain, shortness of breath, palpitation, fever, chills, nausea, vomiting, abdominal pain, change in bowel/urinary habit.  (26 Dec 2022 22:58)    24hr EVENTS: no acute events.    ICU Vital Signs Last 24 Hrs  T(C): 37.1 (06 Jan 2023 16:06), Max: 37.1 (06 Jan 2023 04:04)  T(F): 98.8 (06 Jan 2023 16:06), Max: 98.8 (06 Jan 2023 16:06)  HR: 78 (06 Jan 2023 16:42) (64 - 87)  BP: 156/70 (06 Jan 2023 16:42) (120/60 - 172/71)  BP(mean): 95 (06 Jan 2023 16:42) (76 - 100)  RR: 28 (06 Jan 2023 16:42) (10 - 28)  SpO2: 99% (06 Jan 2023 16:42) (94% - 100%)    O2 Parameters below as of 06 Jan 2023 15:00  Patient On (Oxygen Delivery Method): ventilator    VS, labs, imaging reviewed.      PHYSICAL EXAM: stable  General: Calm, cooperative.  Neuro:  -Mental status- following commands, EO spont  -CN- PERRL 3mm, tongue midline, R facial droop  R gaze pref  +weak cough/gag  flaccid RUE and RLE  LUE and LLE 5/5  diminished sensation on R    CV: RRR  Pulm: coarse breath sounds  Abd: Soft, nontender, nondistended  Ext: no noted edema in lower ext  Skin: warm, dry

## 2023-06-12 PROBLEM — Z00.00 ENCOUNTER FOR PREVENTIVE HEALTH EXAMINATION: Status: ACTIVE | Noted: 2023-06-12

## 2023-06-23 PROBLEM — Z00.00 ENCOUNTER FOR PREVENTIVE HEALTH EXAMINATION: Noted: 2023-06-23

## 2023-06-29 PROBLEM — I25.10 ATHEROSCLEROTIC HEART DISEASE OF NATIVE CORONARY ARTERY WITHOUT ANGINA PECTORIS: Chronic | Status: ACTIVE | Noted: 2022-12-26

## 2023-06-29 PROBLEM — I10 ESSENTIAL (PRIMARY) HYPERTENSION: Chronic | Status: ACTIVE | Noted: 2022-12-26

## 2023-06-29 PROBLEM — E11.9 TYPE 2 DIABETES MELLITUS WITHOUT COMPLICATIONS: Chronic | Status: ACTIVE | Noted: 2022-12-26

## 2023-07-05 ENCOUNTER — APPOINTMENT (OUTPATIENT)
Dept: NEUROLOGY | Facility: CLINIC | Age: 78
End: 2023-07-05
Payer: MEDICARE

## 2023-07-05 VITALS
WEIGHT: 160 LBS | HEIGHT: 62 IN | SYSTOLIC BLOOD PRESSURE: 106 MMHG | DIASTOLIC BLOOD PRESSURE: 60 MMHG | BODY MASS INDEX: 29.44 KG/M2

## 2023-07-05 VITALS — OXYGEN SATURATION: 96 % | HEART RATE: 71 BPM

## 2023-07-05 DIAGNOSIS — I77.74 DISSECTION OF VERTEBRAL ARTERY: ICD-10-CM

## 2023-07-05 PROCEDURE — 99215 OFFICE O/P EST HI 40 MIN: CPT

## 2023-07-05 NOTE — HISTORY OF PRESENT ILLNESS
[FreeTextEntry1] : 78 YO M who had a left pontine stroke in December due to left vertebral artery dissection vs thrombus. Pt was on Eliquis 5mg BID, but two month ago stopped Eliquis and continued to take Plavix 75mg and ASA 81mg daily.\par Since the stroke, pt has Right facial asymmetry, dysarthria, RUE and RLE paralysis. Pt was suppose to undergo reimaging of vertebro-basilar system but has not had it yet.\par Denies any new weakness, numbness, visual changes, speech or languge.

## 2023-07-05 NOTE — ASSESSMENT
[FreeTextEntry1] : 76 YO M w/left pontine stroke 6 months ago due to vert dissection vs thrombus  - reimage with MRA head/neck with FAT-SAT - ordered\par - continue ASA 81mg daily and Plavix 75mg\par - PT, OT, speech weekly\par - MRA head/neck \par - consider loop recorder placement\par follow up in 3 months

## 2023-07-05 NOTE — REVIEW OF SYSTEMS
[As Noted in HPI] : as noted in HPI [FreeTextEntry2] : 10 systems were reviewed and are  negative except whats in HPI

## 2023-07-05 NOTE — PHYSICAL EXAM
[FreeTextEntry1] : awake, alert, oriented x3\par speech is moderately-severely dysarthric, language appropriate\par PERLE, EOMI, right facial asymmetry, sensation intact to LT throughout, tongue deviated to the left\par LUE 5/5 proximally and distally, LLE 3+/5 proximally and distally\par RUE 0/5, RLE 1/5 proximally and distally\par Sensation intact to LT throughout\par FTN intact on the left, unable to do with the right\par wheelchair bound Principal Discharge DX:	Depression

## 2023-07-09 ENCOUNTER — FORM ENCOUNTER (OUTPATIENT)
Age: 78
End: 2023-07-09

## 2023-11-29 ENCOUNTER — APPOINTMENT (OUTPATIENT)
Dept: NEUROLOGY | Facility: CLINIC | Age: 78
End: 2023-11-29
Payer: MEDICARE

## 2023-11-29 VITALS
DIASTOLIC BLOOD PRESSURE: 56 MMHG | WEIGHT: 160 LBS | SYSTOLIC BLOOD PRESSURE: 100 MMHG | HEART RATE: 87 BPM | HEIGHT: 62 IN | OXYGEN SATURATION: 97 % | BODY MASS INDEX: 29.44 KG/M2

## 2023-11-29 PROCEDURE — 99215 OFFICE O/P EST HI 40 MIN: CPT

## 2023-11-29 RX ORDER — CLOPIDOGREL 75 MG/1
75 TABLET, FILM COATED ORAL
Refills: 0 | Status: ACTIVE | COMMUNITY

## 2023-11-29 RX ORDER — ASPIRIN 81 MG
81 TABLET, DELAYED RELEASE (ENTERIC COATED) ORAL
Refills: 0 | Status: ACTIVE | COMMUNITY

## 2024-05-29 ENCOUNTER — APPOINTMENT (OUTPATIENT)
Dept: NEUROLOGY | Facility: CLINIC | Age: 79
End: 2024-05-29

## 2024-07-24 NOTE — STROKE CODE NOTE - NIH STROKE SCALE: 9. BEST LANGUAGE, QM
(2) Severe aphasia; all communication is through fragmentary expression; great need for inference, questioning, and guessing by the listener. Range of information that can be exchanged is limited; listener carries burden of communication. Examiner cannot identify materials provided from patient response.
Detail Level: Detailed

## 2024-08-07 ENCOUNTER — APPOINTMENT (OUTPATIENT)
Dept: NEUROLOGY | Facility: CLINIC | Age: 79
End: 2024-08-07

## 2024-08-07 PROBLEM — E11.9 DIABETES: Status: ACTIVE | Noted: 2024-08-07

## 2024-08-07 PROBLEM — I63.9 LEFT PONTINE STROKE: Status: ACTIVE | Noted: 2024-08-07

## 2024-08-07 PROCEDURE — G2211 COMPLEX E/M VISIT ADD ON: CPT

## 2024-08-07 PROCEDURE — 99214 OFFICE O/P EST MOD 30 MIN: CPT

## 2024-08-08 NOTE — HISTORY OF PRESENT ILLNESS
[FreeTextEntry1] : Ted Lisa is a 79 year old male with history of L pontine stroke 12/2023, HTN, HLD, CAD s/p stent, T2DM, presenting for follow up, accompanied by family.  Since last visit, patient and family say he is better. He feels stronger. He is still using a wheelchair, not walking yet.  However, pt stands with daughter daily. The right side of his face is still weak. He eats regular meals, had one episode of choking/coughing but this is very infrequent and not regular. He is compliant with ASA/Plavix daily. He worked as a , retired about 11 years ago.    F/u 11/29/23: "Since the last visit, Pt is able to stand with minimal assist as per the family. R side remains weak but patient reports increased movement. Pt is currently in a wheelchair for today's appointment. PT comes once a week to the home, family is doing at least an hour a day of PT/OT therapy exercises as well. G tube was removed, able to eat and swallow regular foods. Patient remains on ASA 81 mg daily and Plavix 75 mg daily, reports compliance. Denies any new weakness, numbness, visual changes, speech or language."  Initial Visit: 7/5/23 "Pt had a left pontine stroke in December due to left vertebral artery dissection vs thrombus. Pt was on Eliquis 5mg BID, but two month ago stopped Eliquis and continued to take Plavix 75mg and ASA 81mg daily. Since the stroke, pt has Right facial asymmetry, dysarthria, RUE and RLE paralysis. Pt was suppose to undergo reimaging of vertebro-basilar system but has not had it yet."

## 2024-08-08 NOTE — ASSESSMENT
[FreeTextEntry1] : 78 YO M w/left pontine stroke 12/2023, HTN, HLD, CAD s/p stent, T2DM presenting today for follow up.  MRA head/neck with FAT-SAT 7/2023 not likely to be dissection, more likely to be thrombus.   Physical exam remains with dysarthria, R sided weakness of face, UE, and LE, with some improvement.  - continue ASA 81mg daily and Plavix 75mg daily. - Continue PT, OT, speech daily, both formally and with family members. stroke risk factor modifications discussed in detail - BP control - heart rate and rhythm control - glucose and cholesterol control - healthy lifestyle, diet, physical activity as tolerated - smoking cessation  Patient to return to office in 1 year, or sooner if needed. Patient and family understand to seek urgent medical evaluation for any new or worsening symptoms.

## 2024-08-08 NOTE — ASSESSMENT
[FreeTextEntry1] : 80 YO M w/left pontine stroke 12/2023, HTN, HLD, CAD s/p stent, T2DM presenting today for follow up.  MRA head/neck with FAT-SAT 7/2023 not likely to be dissection, more likely to be thrombus.   Physical exam remains with dysarthria, R sided weakness of face, UE, and LE, with some improvement.  - continue ASA 81mg daily and Plavix 75mg daily. - Continue PT, OT, speech daily, both formally and with family members. stroke risk factor modifications discussed in detail - BP control - heart rate and rhythm control - glucose and cholesterol control - healthy lifestyle, diet, physical activity as tolerated - smoking cessation  Patient to return to office in 1 year, or sooner if needed. Patient and family understand to seek urgent medical evaluation for any new or worsening symptoms.

## 2024-08-08 NOTE — PHYSICAL EXAM
[FreeTextEntry1] : GENERAL PHYSICAL EXAM: GEN: no distress, normal affect EYES: sclera white, conjunctiva clear, no nystagmus MSK: muscle tone is decreased in the RUE and RLE.  NEUROLOGICAL EXAM: Mental Status Orientation: alert and oriented to person, place, date, year, and situation Able to identify current president.  Language: dysarthric, improved since last visit.  Responds to questions appropriately. Able to follow commands.   Cranial Nerves Visual fields full to confrontation PERRL, EOMI facial weakness on R side. hearing intact uvula midline, soft palate elevates normally L shoulder shrug intact. tongue midline  Motor LUE and LLE 5/5 distally and proximally. RUE-  strength 2/5. Proximal 1/5. RLE - hip flexor 1/5. Knee flexion 1/5. Dorsiflexion 1/5. Plantar flexion 2/5.  Sensation: intact to LT throughout Cerebellum: FTN intact on the left, unable to lift arm on the right Gait: wheelchair bound

## (undated) DEVICE — SUT SILK 2-0 18" PS

## (undated) DEVICE — GLV 7.5 PROTEXIS (WHITE)

## (undated) DEVICE — TRAP SPECIMEN SPUTUM 40CC

## (undated) DEVICE — SOL IRR POUR H2O 1000ML

## (undated) DEVICE — PACK BASIC GOWN MAYO COVER

## (undated) DEVICE — WARMING BLANKET LOWER ADULT

## (undated) DEVICE — SOL IRR POUR NS 0.9% 1000ML

## (undated) DEVICE — STERIS DEFENDO 3-PIECE KIT (AIR/WATER, SUCTION & BIOPSY VALVES)

## (undated) DEVICE — VENODYNE/SCD SLEEVE CALF MEDIUM

## (undated) DEVICE — WARMING BLANKET UPPER ADULT

## (undated) DEVICE — PREP BETADINE SPONGE STICKS